# Patient Record
Sex: MALE | Race: WHITE | NOT HISPANIC OR LATINO | Employment: FULL TIME | URBAN - METROPOLITAN AREA
[De-identification: names, ages, dates, MRNs, and addresses within clinical notes are randomized per-mention and may not be internally consistent; named-entity substitution may affect disease eponyms.]

---

## 2018-04-26 ENCOUNTER — TELEPHONE (OUTPATIENT)
Dept: GASTROENTEROLOGY | Facility: CLINIC | Age: 58
End: 2018-04-26

## 2018-05-16 RX ORDER — SUCRALFATE ORAL 1 G/10ML
SUSPENSION ORAL
COMMUNITY
Start: 2015-04-23 | End: 2018-05-18

## 2018-05-18 ENCOUNTER — OFFICE VISIT (OUTPATIENT)
Dept: GASTROENTEROLOGY | Facility: CLINIC | Age: 58
End: 2018-05-18
Payer: COMMERCIAL

## 2018-05-18 VITALS
HEART RATE: 80 BPM | SYSTOLIC BLOOD PRESSURE: 118 MMHG | BODY MASS INDEX: 31.58 KG/M2 | HEIGHT: 64 IN | WEIGHT: 185 LBS | DIASTOLIC BLOOD PRESSURE: 70 MMHG

## 2018-05-18 DIAGNOSIS — K21.9 CHRONIC GERD: Primary | ICD-10-CM

## 2018-05-18 PROCEDURE — 99213 OFFICE O/P EST LOW 20 MIN: CPT | Performed by: INTERNAL MEDICINE

## 2018-05-18 RX ORDER — PANTOPRAZOLE SODIUM 40 MG/1
TABLET, DELAYED RELEASE ORAL
COMMUNITY
Start: 2018-05-03 | End: 2019-08-11 | Stop reason: SDUPTHER

## 2018-05-18 NOTE — LETTER
May 18, 2018     Mckenzie Barton DO  [address]  [address]    Patient: Antony Reynoso   YOB: 1960   Date of Visit: 5/18/2018       Dear Dr Marck Nguyen: Thank you for referring Maia Luna to me for evaluation  Below are my notes for this consultation  If you have questions, please do not hesitate to call me  I look forward to following your patient along with you  Sincerely,        Chandu Melendez MD        CC: No Recipients  LeelaIván Azevedo  5/18/2018 10:07 AM  Sign at close encounter  [facility]      Chief Complaint: follow up reflux     HPI:  Antony Reynoso is a 66 y o   male with history of GERD who presents here today for follow up  He has been experiencing increase in epigastric abdominal pain with increase in reflux  He is taking Pantoprazole once daily, prior to this was only taking Pantoprazole once or twice weekly  His reflux has since improved since returning to the daily dosage  No exertional symptomatology  He does eat fairly late at night which gives him some nocturnal symptomatology  He denies any early satiety, melena, hematochezia, dysphagia  His weight has been stable  He had abnormal XR study showing questionable polyp  Review of Systems:   Constitutional: No fever or chills, feels well, no tiredness, no recent weight gain or weight loss  HENT: No complaints of earache, no hearing loss, no nosebleeds, no nasal discharge, no sore throat, no hoarseness  Eyes: No complaints of eye pain, no red eyes, no discharge from eyes, no itchy eyes  Cardiovascular: No complaints of slow heart rate, no fast heart rate, no chest pain, no palpitations, no leg claudication, no lower extremity edema  Respiratory: No complaints of shortness of breath, no wheezing, no cough, no SOB on exertion, no orthopnea     Gastrointestinal: As noted in HPI  Genitourinary: No complaints of dysuria, no incontinence, no hesitancy, no nocturia  Musculoskeletal: No complaints of arthralgia, no myalgias, no joint swelling or stiffness, no limb pain or swelling  Neurological: No complaints of headache, no confusion, no convulsions, no numbness or tingling, no dizziness or fainting, no limb weakness, no difficulty walking  Skin: No complaints of skin rash or skin lesions, no itching, no skin wound, no dry skin  Hematological/Lymphatic: No complaints of swollen glands, does not bleed easy  Allergic/Immunologic: No immunocompromised state  Endocrine:  No complaints of polyuria, no polydipsia  Psychiatric/Behavioral: is not suicidal, no sleep disturbances, no anxiety or depression, no change in personality, no emotional problems  Historical Information   Past Medical History:   Diagnosis Date    Hyperlipidemia      No past surgical history on file  Social History   History   Alcohol Use No     History   Drug Use No     History   Smoking Status    Never Smoker   Smokeless Tobacco    Not on file     No family history on file  Current Medications: has a current medication list which includes the following prescription(s): pantoprazole and rosuvastatin  Vital Signs: /70   Pulse 80   Ht 5' 4" (1 626 m)   Wt 83 9 kg (185 lb)   BMI 31 76 kg/m²        Physical Exam:   Constitutional  General Appearance: No acute distress, well appearing and well nourished  Head  Normocephalic  Eyes  Conjunctivae and lids: No swelling, erythema, or discharge  Pupils and irises: Equal, round and reactive to light  Ears, Nose, Mouth, and Throat  External inspection of ears and nose: Normal  Nasal mucosa, septum and turbinates: Normal without edema or erythema/   Oropharynx: Normal with no erythema, edema, exudate or lesions  Neck  Normal range of motion  Neck supple  Cardiovascular  Auscultation of the heart: Normal rate and rhythm, normal S1 and S2 without murmurs    Examination of the extremities for edema and/or varicosities: Normal  Pulmonary/Chest  Respiratory effort: No increased work of breathing or signs of respiratory distress  Auscultation of lungs: Clear to auscultation, equal breath sounds bilaterally, no wheezes, rales, no rhonchi  Abdomen  Abdomen: Non-tender, no masses  No succussion splash  Liver and spleen: No hepatomegaly or splenomegaly  Musculoskeletal  Gait and station: normal   Digits and Nails: normal without clubbing or cyanosis  Inspection/palpation of joints, bones, and muscles: Normal  Neurological  No nystagmus or asterixis  Skin  Skin and subcutaneous tissue: Normal without rashes or lesions  Lymphatic  Palpation of the lymph nodes in neck: No lymphadenopathy  Psychiatric  Orientation to person, place and time: Normal   Mood and affect: Normal          Labs:  Lab Results   Component Value Date    ALT 48 06/25/2016    AST 33 06/25/2016    BUN 21 06/26/2016    CALCIUM 7 5 (L) 06/26/2016     (H) 06/26/2016    CHOL 110 06/26/2016    CO2 29 06/26/2016    CREATININE 1 09 06/26/2016    HDL 38 (L) 06/26/2016    HCT 43 1 06/25/2016    HGB 15 2 06/25/2016     06/25/2016    K 3 7 06/26/2016     06/26/2016    TRIG 42 06/26/2016    WBC 6 77 06/25/2016         X-Rays & Procedures:   No orders to display           ______________________________________________________________________      Assessment & Plan:     Chronic GERD  Plan is to schedule him for EGD  Continue Pantoprazole daily  Attestation:   By signing my name below, Ivania Abbott, attest that this documentation has been prepared under the direction and in the presence of Nick Damon MD  Electronically Signed: Iván Florentino  05/18/18       I, Nick Damon, personally performed the services described in this documentation  All medical record entries made by the aaronibopal were at my direction and in my presence   I have reviewed the chart and discharge instructions and agree that the record reflects my personal performance and is accurate and complete   Josh Major MD  05/18/18

## 2018-05-18 NOTE — PROGRESS NOTES
[facility]      Chief Complaint: follow up reflux     HPI:  Sukhdev Luna is a 66 y o   male with history of GERD who presents here today for follow up  He has been experiencing increase in epigastric abdominal pain with increase in reflux  He is taking Pantoprazole once daily, prior to this was only taking Pantoprazole once or twice weekly  His reflux has since improved since returning to the daily dosage  No exertional symptomatology  He does eat fairly late at night which gives him some nocturnal symptomatology  He denies any early satiety, melena, hematochezia, dysphagia  His weight has been stable  He had abnormal XR study showing questionable polyp  Review of Systems:   Constitutional: No fever or chills, feels well, no tiredness, no recent weight gain or weight loss  HENT: No complaints of earache, no hearing loss, no nosebleeds, no nasal discharge, no sore throat, no hoarseness  Eyes: No complaints of eye pain, no red eyes, no discharge from eyes, no itchy eyes  Cardiovascular: No complaints of slow heart rate, no fast heart rate, no chest pain, no palpitations, no leg claudication, no lower extremity edema  Respiratory: No complaints of shortness of breath, no wheezing, no cough, no SOB on exertion, no orthopnea  Gastrointestinal: As noted in HPI  Genitourinary: No complaints of dysuria, no incontinence, no hesitancy, no nocturia  Musculoskeletal: No complaints of arthralgia, no myalgias, no joint swelling or stiffness, no limb pain or swelling  Neurological: No complaints of headache, no confusion, no convulsions, no numbness or tingling, no dizziness or fainting, no limb weakness, no difficulty walking  Skin: No complaints of skin rash or skin lesions, no itching, no skin wound, no dry skin  Hematological/Lymphatic: No complaints of swollen glands, does not bleed easy  Allergic/Immunologic: No immunocompromised state    Endocrine:  No complaints of polyuria, no polydipsia  Psychiatric/Behavioral: is not suicidal, no sleep disturbances, no anxiety or depression, no change in personality, no emotional problems  Historical Information   Past Medical History:   Diagnosis Date    Hyperlipidemia      No past surgical history on file  Social History   History   Alcohol Use No     History   Drug Use No     History   Smoking Status    Never Smoker   Smokeless Tobacco    Not on file     No family history on file  Current Medications: has a current medication list which includes the following prescription(s): pantoprazole and rosuvastatin  Vital Signs: /70   Pulse 80   Ht 5' 4" (1 626 m)   Wt 83 9 kg (185 lb)   BMI 31 76 kg/m²       Physical Exam:   Constitutional  General Appearance: No acute distress, well appearing and well nourished  Head  Normocephalic  Eyes  Conjunctivae and lids: No swelling, erythema, or discharge  Pupils and irises: Equal, round and reactive to light  Ears, Nose, Mouth, and Throat  External inspection of ears and nose: Normal  Nasal mucosa, septum and turbinates: Normal without edema or erythema/   Oropharynx: Normal with no erythema, edema, exudate or lesions  Neck  Normal range of motion  Neck supple  Cardiovascular  Auscultation of the heart: Normal rate and rhythm, normal S1 and S2 without murmurs  Examination of the extremities for edema and/or varicosities: Normal  Pulmonary/Chest  Respiratory effort: No increased work of breathing or signs of respiratory distress  Auscultation of lungs: Clear to auscultation, equal breath sounds bilaterally, no wheezes, rales, no rhonchi  Abdomen  Abdomen: Non-tender, no masses  No succussion splash  Liver and spleen: No hepatomegaly or splenomegaly  Musculoskeletal  Gait and station: normal   Digits and Nails: normal without clubbing or cyanosis  Inspection/palpation of joints, bones, and muscles: Normal  Neurological  No nystagmus or asterixis  Skin  Skin and subcutaneous tissue: Normal without rashes or lesions  Lymphatic  Palpation of the lymph nodes in neck: No lymphadenopathy  Psychiatric  Orientation to person, place and time: Normal   Mood and affect: Normal          Labs:  Lab Results   Component Value Date    ALT 48 06/25/2016    AST 33 06/25/2016    BUN 21 06/26/2016    CALCIUM 7 5 (L) 06/26/2016     (H) 06/26/2016    CHOL 110 06/26/2016    CO2 29 06/26/2016    CREATININE 1 09 06/26/2016    HDL 38 (L) 06/26/2016    HCT 43 1 06/25/2016    HGB 15 2 06/25/2016     06/25/2016    K 3 7 06/26/2016     06/26/2016    TRIG 42 06/26/2016    WBC 6 77 06/25/2016         X-Rays & Procedures:   No orders to display           ______________________________________________________________________      Assessment & Plan:     Chronic GERD  Plan is to schedule him for EGD  Continue Pantoprazole daily  He will begin to take Pepcid q h s     We discussed his eating habits but there is little hope he will be able to change these given his occupation  The             Attestation:   By signing my name below, I, SURENDRA CHAWLA  [facility], attest that this documentation has been prepared under the direction and in the presence of Riccardo Beauchamp MD  Electronically Signed: SURENDRA S  [facility] Hospital[facility]  05/18/18       IRiccardo, personally performed the services described in this documentation  All medical record entries made by the scribe were at my direction and in my presence  I have reviewed the chart and discharge instructions and agree that the record reflects my personal performance and is accurate and complete   Riccardo Beauchamp MD  05/18/18

## 2018-05-24 ENCOUNTER — ANESTHESIA (OUTPATIENT)
Dept: PERIOP | Facility: HOSPITAL | Age: 58
End: 2018-05-24
Payer: COMMERCIAL

## 2018-05-24 ENCOUNTER — ANESTHESIA EVENT (OUTPATIENT)
Dept: PERIOP | Facility: HOSPITAL | Age: 58
End: 2018-05-24
Payer: COMMERCIAL

## 2018-05-24 ENCOUNTER — HOSPITAL ENCOUNTER (OUTPATIENT)
Facility: HOSPITAL | Age: 58
Setting detail: OUTPATIENT SURGERY
Discharge: HOME | End: 2018-05-24
Attending: INTERNAL MEDICINE | Admitting: INTERNAL MEDICINE
Payer: COMMERCIAL

## 2018-05-24 VITALS
RESPIRATION RATE: 22 BRPM | HEIGHT: 64 IN | SYSTOLIC BLOOD PRESSURE: 117 MMHG | OXYGEN SATURATION: 98 % | WEIGHT: 184.97 LBS | HEART RATE: 69 BPM | BODY MASS INDEX: 31.58 KG/M2 | TEMPERATURE: 97.7 F | DIASTOLIC BLOOD PRESSURE: 71 MMHG

## 2018-05-24 DIAGNOSIS — K21.9 CHRONIC GERD: ICD-10-CM

## 2018-05-24 PROCEDURE — 88342 IMHCHEM/IMCYTCHM 1ST ANTB: CPT | Performed by: PATHOLOGY

## 2018-05-24 PROCEDURE — 43239 EGD BIOPSY SINGLE/MULTIPLE: CPT | Performed by: INTERNAL MEDICINE

## 2018-05-24 PROCEDURE — 88305 TISSUE EXAM BY PATHOLOGIST: CPT | Performed by: PATHOLOGY

## 2018-05-24 RX ORDER — PROPOFOL 10 MG/ML
INJECTION, EMULSION INTRAVENOUS AS NEEDED
Status: DISCONTINUED | OUTPATIENT
Start: 2018-05-24 | End: 2018-05-24 | Stop reason: SURG

## 2018-05-24 RX ORDER — ASPIRIN 81 MG/1
81 TABLET ORAL DAILY
COMMUNITY

## 2018-05-24 RX ORDER — LIDOCAINE HYDROCHLORIDE 10 MG/ML
INJECTION, SOLUTION INFILTRATION; PERINEURAL AS NEEDED
Status: DISCONTINUED | OUTPATIENT
Start: 2018-05-24 | End: 2018-05-24 | Stop reason: SURG

## 2018-05-24 RX ORDER — SODIUM CHLORIDE, SODIUM LACTATE, POTASSIUM CHLORIDE, CALCIUM CHLORIDE 600; 310; 30; 20 MG/100ML; MG/100ML; MG/100ML; MG/100ML
INJECTION, SOLUTION INTRAVENOUS CONTINUOUS PRN
Status: DISCONTINUED | OUTPATIENT
Start: 2018-05-24 | End: 2018-05-24 | Stop reason: SURG

## 2018-05-24 RX ADMIN — PROPOFOL 150 MG: 10 INJECTION, EMULSION INTRAVENOUS at 10:13

## 2018-05-24 RX ADMIN — PROPOFOL 30 MG: 10 INJECTION, EMULSION INTRAVENOUS at 10:17

## 2018-05-24 RX ADMIN — LIDOCAINE HYDROCHLORIDE 50 MG: 10 INJECTION, SOLUTION INFILTRATION; PERINEURAL at 10:12

## 2018-05-24 RX ADMIN — SODIUM CHLORIDE, SODIUM LACTATE, POTASSIUM CHLORIDE, AND CALCIUM CHLORIDE: .6; .31; .03; .02 INJECTION, SOLUTION INTRAVENOUS at 10:08

## 2018-05-24 NOTE — ANESTHESIA PREPROCEDURE EVALUATION
Review of Systems/Medical History  Patient summary reviewed  Chart reviewed      Cardiovascular  Hyperlipidemia,    Pulmonary       GI/Hepatic    GERD well controlled,             Endo/Other     GYN       Hematology   Musculoskeletal       Neurology   Psychology           Physical Exam    Airway    Mallampati score: II  TM Distance: >3 FB  Neck ROM: full     Dental   No notable dental hx     Cardiovascular  Rhythm: regular, Rate: normal, Cardiovascular exam normal    Pulmonary  Pulmonary exam normal Breath sounds clear to auscultation,     Other Findings        Anesthesia Plan  ASA Score- 2     Anesthesia Type- IV sedation with anesthesia with ASA Monitors  Additional Monitors:   Airway Plan:         Plan Factors-    Induction- intravenous  Postoperative Plan- Plan for postoperative opioid use  Informed Consent- Anesthetic plan and risks discussed with patient and spouse  I personally reviewed this patient with the CRNA  Discussed and agreed on the Anesthesia Plan with the CRNA  Felicita Lynn

## 2018-05-24 NOTE — DISCHARGE INSTRUCTIONS
Upper Endoscopy   WHAT YOU NEED TO KNOW:   An upper endoscopy is also called an upper gastrointestinal (GI) endoscopy, or an esophagogastroduodenoscopy (EGD)  You may feel bloated, gassy, or have some abdominal discomfort after your procedure  Your throat may be sore for 24 to 36 hours  You may burp or pass gas from air that is still inside your body  DISCHARGE INSTRUCTIONS:   Call 911 for any of the following:   · You have sudden chest pain or trouble breathing  Seek care immediately if:   · You feel dizzy or faint  · You have trouble swallowing  · Your bowel movements are very dark or black  · Your abdomen is hard and firm and you have severe pain  · You vomit blood  Contact your healthcare provider if:   · You feel full or bloated and cannot burp or pass gas  · You have not had a bowel movement for 3 days after your procedure  · You have neck pain  · You have a fever or chills  · You have nausea or are vomiting  · You have a rash or hives  · You have questions or concerns about your endoscopy  Relieve a sore throat:  Suck on throat lozenges or crushed ice  Gargle with a small amount of warm salt water  Mix 1 teaspoon of salt and 1 cup of warm water to make salt water  Relieve gas and discomfort from bloating:  Lie on your right side with a heating pad on your abdomen  Take short walks to help pass gas  Eat small meals until bloating is relieved  Rest after your procedure: You have been given medicine to relax you  Do not  drive or make important decisions until the day after your procedure  Return to your normal activity as directed  You can usually return to work the day after your procedure  Follow up with your healthcare provider as directed:  Write down your questions so you remember to ask them during your visits     © 2017 [address] is for End User's use only and may not be sold, redistributed or otherwise used for commercial purposes  All illustrations and images included in CareNotes® are the copyrighted property of A D A M , Inc  or Romeo Edwards  The above information is an  only  It is not intended as medical advice for individual conditions or treatments  Talk to your doctor, nurse or pharmacist before following any medical regimen to see if it is safe and effective for you

## 2018-05-24 NOTE — OP NOTE
**** GI/ENDOSCOPY REPORT ****     PATIENT NAME: Marcus Brumfield - VISIT ID:  Patient ID: [id number]   YOB: 1960     INTRODUCTION: Esophagogastroduodenoscopy - A 66 male patient presents for   an outpatient Esophagogastroduodenoscopy at [facility]  INDICATIONS: Pain located in the epigastrium  GERD  CONSENT: The benefits, risks, and alternatives to the procedure were   discussed and informed consent was obtained from the patient  PREPARATION:  EKG, pulse, pulse oximetry and blood pressure were monitored   throughout the procedure  MEDICATIONS:asa 2     PROCEDURE:  The endoscope was passed without difficulty through the mouth   under direct visualization and advanced to the 3rd portion of the   duodenum  The scope was withdrawn and the mucosa was carefully examined  FINDINGS:  The EGD examination was completely normal   Esophagus: The   esophagus appeared to be normal  The Z line was visualized at 40 cm from   the entry site  Stomach: The antrum, body of the stomach, cardia, fundus,   incisura, and pylorus appeared to be normal  A biopsy was taken from the   antrum, body of the stomach, and fundus  Duodenum: The duodenal bulb, 2nd   portion of the duodenum, and 3rd portion of the duodenum appeared to be   normal      COMPLICATIONS: There were no complications  IMPRESSIONS: Normal EGD  Normal esophagus  Z line visualized  Normal   antrum, body of the stomach, cardia, fundus, incisura, and pylorus  Biopsy   taken  Normal duodenal bulb, 2nd portion of the duodenum, and 3rd portion   of the duodenum  RECOMMENDATIONS: Follow-up on the results of the biopsy specimens in 1   week  ESTIMATED BLOOD LOSS: Insignificant  PATHOLOGY SPECIMENS: Random biopsy taken from the antrum, body of the   stomach, and fundus       PROCEDURE CODES: 14289 - EGD flexible; with biopsy     ICD-9 Codes: 789 06 Abdominal pain, epigastric 530 81 Esophageal reflux     ICD-10 Codes: R10 13 Epigastric pain K21 Gastro-esophageal reflux disease     PERFORMED BY: RASHEEDA Mullins  on 05/24/2018  Version 1, electronically signed by RASHEEDA Feliciano , D O  on   05/24/2018 at 10:20

## 2018-05-29 ENCOUNTER — TELEPHONE (OUTPATIENT)
Dept: GASTROENTEROLOGY | Facility: CLINIC | Age: 58
End: 2018-05-29

## 2018-11-21 ENCOUNTER — TELEPHONE (OUTPATIENT)
Dept: FAMILY MEDICINE CLINIC | Facility: CLINIC | Age: 58
End: 2018-11-21

## 2018-11-21 NOTE — TELEPHONE ENCOUNTER
Patient's wife called to get the results of his sleep study - no results are in epic or old system  On Monday, can you please call [facility] to have a copy faxed over since Im not here on Monday   Thanks    [phone]

## 2018-11-26 ENCOUNTER — TELEPHONE (OUTPATIENT)
Dept: FAMILY MEDICINE CLINIC | Facility: CLINIC | Age: 58
End: 2018-11-26

## 2018-11-26 DIAGNOSIS — E78.2 MIXED HYPERLIPIDEMIA: Primary | ICD-10-CM

## 2018-11-26 NOTE — TELEPHONE ENCOUNTER
I ordered lab tests for the patient  If done at [facility]! [employer] patient may need to  the Rx rec was issued for the lab  Also I am not aware of the CPAP supplies    If that is what the [facility] equipment is check this and let me know if I need to sign a form or else we need to request a new order

## 2018-11-26 NOTE — TELEPHONE ENCOUNTER
Faxed lab RX to [facility] @6:28 and informed pt's wife  Will call [facility] tomorrow  pc from pt's wife said pt needs lab RX for recheck cholesterol to [facility]/ also said [address] forms for equipment  And she would like this done before end of year  P# [phone]

## 2018-11-30 NOTE — TELEPHONE ENCOUNTER
pc to [facility] was told that the Home Sleep Study and Titration was faxed to new fax # nwith request for order of CPAP & supplies Pressure 8cm H2O Fax # [phone]

## 2018-12-03 NOTE — TELEPHONE ENCOUNTER
Please check if the order came through from the company for the CPAP machine and supplies that I should sign off for this

## 2018-12-04 DIAGNOSIS — G47.33 OSA (OBSTRUCTIVE SLEEP APNEA): Primary | ICD-10-CM

## 2018-12-04 NOTE — TELEPHONE ENCOUNTER
Please order the CPAP machine and supplies with pressure at 8 cm per instructions from CPAP sleep study interpretation report  Then I will sign the report and we can print and fax it to the supplier for the patient  Then we can notify the patient

## 2018-12-05 ENCOUNTER — OFFICE VISIT (OUTPATIENT)
Dept: UROLOGY | Facility: CLINIC | Age: 58
End: 2018-12-05
Payer: COMMERCIAL

## 2018-12-05 VITALS
BODY MASS INDEX: 31.76 KG/M2 | HEIGHT: 64 IN | WEIGHT: 186 LBS | SYSTOLIC BLOOD PRESSURE: 124 MMHG | DIASTOLIC BLOOD PRESSURE: 76 MMHG | HEART RATE: 83 BPM

## 2018-12-05 DIAGNOSIS — N20.0 KIDNEY STONES: ICD-10-CM

## 2018-12-05 DIAGNOSIS — R10.30 INGUINAL PAIN, UNSPECIFIED LATERALITY: Primary | ICD-10-CM

## 2018-12-05 PROCEDURE — 99213 OFFICE O/P EST LOW 20 MIN: CPT | Performed by: PHYSICIAN ASSISTANT

## 2018-12-05 NOTE — PROGRESS NOTES
UROLOGY PROGRESS NOTE   Patient Identifiers: Stella Amaya (MRN [id number])  Date of Service: 12/5/2018    Subjective:     77-year-old male seen in the past for kidney stones  He presents with a complaint of low back pain and right groin pain  He was unable to provide a urine sample  He has no lower tract complaints  He fell over the summer cleaning is poor  No burning no gross hematuria  Patient has    See above      Objective:     VITALS:    Vitals:    12/05/18 0833   BP: 124/76   Pulse: 83     AUA SYMPTOM SCORE      Most Recent Value   AUA SYMPTOM SCORE   How often have you had a sensation of not emptying your bladder completely after you finished urinating? 1   How often have you had to urinate again less than two hours after you finished urinating? 0   How often have you found you stopped and started again several times when you urinate? 1   How often have you found it difficult to postpone urination? 0   How often have you had a weak urinary stream?  0   How often have you had to push or strain to begin urination?   0   How many times did you most typically get up to urinate from the time you went to bed at night until the time you got up in the morning?  0   Quality of Life: If you were to spend the rest of your life with your urinary condition just the way it is now, how would you feel about that?  1   AUA SYMPTOM SCORE  2            LABS:  Lab Results   Component Value Date    HGB 15 2 06/25/2016    HCT 43 1 06/25/2016    WBC 6 77 06/25/2016     06/25/2016   ]    Lab Results   Component Value Date    K 3 7 06/26/2016     (H) 06/26/2016    CO2 29 06/26/2016    BUN 21 06/26/2016    CREATININE 1 09 06/26/2016    CALCIUM 7 5 (L) 06/26/2016   ]        INPATIENT MEDS:    Current Outpatient Prescriptions:     aspirin (ECOTRIN LOW STRENGTH) 81 mg EC tablet, Take 81 mg by mouth daily, Disp: , Rfl:     pantoprazole (PROTONIX) 40 mg tablet, , Disp: , Rfl:     rosuvastatin (CRESTOR) 5 mg tablet, Take 5 mg by mouth daily  , Disp: , Rfl:       Physical Exam:   /76 (BP Location: Left arm, Patient Position: Sitting, Cuff Size: Adult)   Pulse 83   Ht 5' 4" (1 626 m)   Wt 84 4 kg (186 lb)   BMI 31 93 kg/m²   GEN: no acute distress    RESP: breathing comfortably with no accessory muscle use    ABD: soft, non-tender, non-distended   INCISION:    EXT: no significant peripheral edema   (Male): Penis circumcised, phallus normal, meatus patent  Testicles descended into scrotum bilaterally without masses nor tenderness  No inguinal hernias bilaterally  RADIOLOGY:    none     Assessment:    1  Right groin pain   2   History kidney stones     Plan:   - I believe his discomfort is mostly musculoskeletal  - check a renal ultrasound to rule out any stones  - he should call for the results  -

## 2018-12-12 ENCOUNTER — HOSPITAL ENCOUNTER (OUTPATIENT)
Dept: RADIOLOGY | Facility: MEDICAL CENTER | Age: 58
Discharge: HOME | End: 2018-12-12
Payer: COMMERCIAL

## 2018-12-12 DIAGNOSIS — N20.0 KIDNEY STONES: ICD-10-CM

## 2018-12-12 PROCEDURE — 76770 US EXAM ABDO BACK WALL COMP: CPT

## 2018-12-17 ENCOUNTER — TELEPHONE (OUTPATIENT)
Dept: UROLOGY | Facility: AMBULATORY SURGERY CENTER | Age: 58
End: 2018-12-17

## 2018-12-17 DIAGNOSIS — N28.1 KIDNEY CYST, ACQUIRED: Primary | ICD-10-CM

## 2018-12-17 NOTE — TELEPHONE ENCOUNTER
I called the patient's wife left a message as requested with ultrasound results  There is comment of a small cyst on the kidney and radiologist is  Recommending a CT scan for further evaluation  This can be done after the new year  I did put orders in

## 2018-12-17 NOTE — TELEPHONE ENCOUNTER
Spoke with patient's wife, she wants ultrasound results  Please call her back  She said you can leave a message on machine

## 2018-12-18 NOTE — TELEPHONE ENCOUNTER
I called patients wife back and she just wanted to know if she can have the CT scan before the New Year because they have already met their deductible  Claudetta Harry, P A -C said he can have this done before the New Year  I gave her the central scheduling phone number to set up the CT scan

## 2018-12-27 ENCOUNTER — OFFICE VISIT (OUTPATIENT)
Dept: FAMILY MEDICINE CLINIC | Facility: CLINIC | Age: 58
End: 2018-12-27
Payer: COMMERCIAL

## 2018-12-27 ENCOUNTER — HOSPITAL ENCOUNTER (OUTPATIENT)
Dept: RADIOLOGY | Facility: MEDICAL CENTER | Age: 58
Discharge: HOME | End: 2018-12-27
Payer: COMMERCIAL

## 2018-12-27 VITALS
DIASTOLIC BLOOD PRESSURE: 78 MMHG | TEMPERATURE: 99.6 F | SYSTOLIC BLOOD PRESSURE: 118 MMHG | HEART RATE: 87 BPM | WEIGHT: 186.2 LBS | BODY MASS INDEX: 31.79 KG/M2 | HEIGHT: 64 IN | OXYGEN SATURATION: 97 %

## 2018-12-27 DIAGNOSIS — R35.1 NOCTURIA MORE THAN TWICE PER NIGHT: ICD-10-CM

## 2018-12-27 DIAGNOSIS — G47.33 OBSTRUCTIVE SLEEP APNEA: ICD-10-CM

## 2018-12-27 DIAGNOSIS — E78.2 MIXED HYPERLIPIDEMIA: ICD-10-CM

## 2018-12-27 DIAGNOSIS — N28.1 KIDNEY CYST, ACQUIRED: ICD-10-CM

## 2018-12-27 DIAGNOSIS — F41.9 ANXIETY: ICD-10-CM

## 2018-12-27 DIAGNOSIS — K21.9 CHRONIC GERD: Primary | ICD-10-CM

## 2018-12-27 PROCEDURE — 74178 CT ABD&PLV WO CNTR FLWD CNTR: CPT

## 2018-12-27 PROCEDURE — 1036F TOBACCO NON-USER: CPT | Performed by: FAMILY MEDICINE

## 2018-12-27 PROCEDURE — 99214 OFFICE O/P EST MOD 30 MIN: CPT | Performed by: FAMILY MEDICINE

## 2018-12-27 RX ADMIN — IOHEXOL 100 ML: 350 INJECTION, SOLUTION INTRAVENOUS at 14:14

## 2018-12-27 NOTE — ASSESSMENT & PLAN NOTE
Patient presents for lab review general checkup last cholesterol done was good continue on rosuvastatin repeat lab work in 6 months  Total cholesterol 162 HDL is at 53 the ratio is good he has no liver function test elevations blood glucose is good as well

## 2018-12-27 NOTE — ASSESSMENT & PLAN NOTE
Patient recently started sleep apnea machine his CPAP is set at 3-4 mm Hg he needs to work on adjusting the apparatus to get best results at this point

## 2018-12-27 NOTE — ASSESSMENT & PLAN NOTE
Patient presents today for six-month follow-up examination GERD symptoms are under control with pantoprazole continue 40 mg daily

## 2018-12-27 NOTE — PATIENT INSTRUCTIONS

## 2018-12-27 NOTE — ASSESSMENT & PLAN NOTE
Generalized anxiety disorder which is mild patient is not on any medications for this at this time doing relatively well sleeping good working daily no difficulty interacting with others no panic attacks

## 2018-12-31 ENCOUNTER — TELEPHONE (OUTPATIENT)
Dept: UROLOGY | Facility: AMBULATORY SURGERY CENTER | Age: 58
End: 2018-12-31

## 2019-01-02 NOTE — TELEPHONE ENCOUNTER
Called and discussed results of Ct scan with the patient  Patient denies any issues or concerns  Advised patient to follow up with our office PRN

## 2019-03-06 ENCOUNTER — TELEPHONE (OUTPATIENT)
Dept: FAMILY MEDICINE CLINIC | Facility: CLINIC | Age: 59
End: 2019-03-06

## 2019-03-06 DIAGNOSIS — J01.00 ACUTE MAXILLARY SINUSITIS, RECURRENCE NOT SPECIFIED: Primary | ICD-10-CM

## 2019-03-06 RX ORDER — AMOXICILLIN 500 MG/1
1000 TABLET, FILM COATED ORAL 2 TIMES DAILY
Qty: 28 TABLET | Refills: 1 | Status: SHIPPED | OUTPATIENT
Start: 2019-03-06 | End: 2019-03-13

## 2019-03-06 NOTE — TELEPHONE ENCOUNTER
Patient has sinus infection  Has congestion and discahrge from nose for 5 days    Need antibotic can not come in today

## 2019-03-08 ENCOUNTER — HOSPITAL ENCOUNTER (EMERGENCY)
Facility: HOSPITAL | Age: 59
Discharge: HOME | End: 2019-03-08
Attending: EMERGENCY MEDICINE
Payer: COMMERCIAL

## 2019-03-08 VITALS
OXYGEN SATURATION: 98 % | WEIGHT: 200.62 LBS | SYSTOLIC BLOOD PRESSURE: 166 MMHG | BODY MASS INDEX: 34.44 KG/M2 | DIASTOLIC BLOOD PRESSURE: 92 MMHG | TEMPERATURE: 98 F | RESPIRATION RATE: 20 BRPM | HEART RATE: 80 BPM

## 2019-03-08 DIAGNOSIS — N20.1 RIGHT URETERAL STONE: Primary | ICD-10-CM

## 2019-03-08 LAB
BACTERIA UR QL AUTO: ABNORMAL /HPF
BILIRUB UR QL STRIP: NEGATIVE
CLARITY UR: CLEAR
COLOR UR: YELLOW
GLUCOSE UR STRIP-MCNC: NEGATIVE MG/DL
HGB UR QL STRIP.AUTO: ABNORMAL
KETONES UR STRIP-MCNC: NEGATIVE MG/DL
LEUKOCYTE ESTERASE UR QL STRIP: NEGATIVE
MUCOUS THREADS UR QL AUTO: ABNORMAL
NITRITE UR QL STRIP: NEGATIVE
NON-SQ EPI CELLS URNS QL MICRO: ABNORMAL /HPF
PH UR STRIP.AUTO: 5.5 [PH] (ref 4.5–8)
PROT UR STRIP-MCNC: NEGATIVE MG/DL
RBC #/AREA URNS AUTO: ABNORMAL /HPF
SP GR UR STRIP.AUTO: 1.02 (ref 1–1.03)
UROBILINOGEN UR QL STRIP.AUTO: 0.2 E.U./DL
WBC #/AREA URNS AUTO: ABNORMAL /HPF

## 2019-03-08 PROCEDURE — 81003 URINALYSIS AUTO W/O SCOPE: CPT

## 2019-03-08 PROCEDURE — 81001 URINALYSIS AUTO W/SCOPE: CPT

## 2019-03-08 PROCEDURE — 99284 EMERGENCY DEPT VISIT MOD MDM: CPT

## 2019-03-08 RX ORDER — HYDROCODONE BITARTRATE AND ACETAMINOPHEN 5; 325 MG/1; MG/1
1 TABLET ORAL EVERY 6 HOURS PRN
Qty: 10 TABLET | Refills: 0 | Status: SHIPPED | OUTPATIENT
Start: 2019-03-08 | End: 2019-03-18

## 2019-03-08 RX ORDER — HYDROCODONE BITARTRATE AND ACETAMINOPHEN 5; 325 MG/1; MG/1
1 TABLET ORAL ONCE
Status: COMPLETED | OUTPATIENT
Start: 2019-03-08 | End: 2019-03-08

## 2019-03-08 RX ADMIN — HYDROCODONE BITARTRATE AND ACETAMINOPHEN 1 TABLET: 5; 325 TABLET ORAL at 21:43

## 2019-03-09 NOTE — ED PROVIDER NOTES
History  Chief Complaint   Patient presents with    Flank Pain     Pt  with right flank pain starting approx  30 min PTA, also complains of chills  Hx  of kidney stones  History provided by:  Patient   used: No     53-year-old male presented with acute onset right-sided flank pain radiating towards the groin starting shortly prior to arrival   Initially had some difficulty urinating but was eventually able to urinate spontaneously  No retention  The no fever, chills  History of ureteral stones and this feels similar  Has never had any ureteral procedures  Arrival to the emergency department he actually reports his pain has become minimal   Per records,  CT from 3 months ago showed bilateral renal calculi  Check urine to ensure no infection and re-evaluate  Prior to Admission Medications   Prescriptions Last Dose Informant Patient Reported? Taking?   amoxicillin (AMOXIL) 500 MG tablet   No Yes   Sig: Take 2 tablets (1,000 mg total) by mouth 2 (two) times a day for 7 days   aspirin (ECOTRIN LOW STRENGTH) 81 mg EC tablet  Self Yes Yes   Sig: Take 81 mg by mouth daily   pantoprazole (PROTONIX) 40 mg tablet  Self Yes Yes   rosuvastatin (CRESTOR) 5 mg tablet  Self Yes Yes   Sig: Take 5 mg by mouth daily  Facility-Administered Medications: None       Past Medical History:   Diagnosis Date    Chronic low back pain     Hyperlipidemia     Sleep apnea        Past Surgical History:   Procedure Laterality Date    COLONOSCOPY      ESOPHAGOGASTRODUODENOSCOPY      PA ESOPHAGOGASTRODUODENOSCOPY TRANSORAL DIAGNOSTIC N/A 5/24/2018    Procedure: ESOPHAGOGASTRODUODENOSCOPY (EGD); Surgeon: Alfonso Pat MD;  Location: [facility]; Service: Gastroenterology       Family History   Problem Relation Age of Onset    Diabetes Mother     Lung cancer Father      I have reviewed and agree with the history as documented      Social History     Tobacco Use    Smoking status: Never Smoker    Smokeless tobacco: Never Used   Substance Use Topics    Alcohol use: No    Drug use: No        Review of Systems   Constitutional: Negative for activity change and appetite change  Genitourinary: Negative for decreased urine volume and difficulty urinating  Neurological: Negative for dizziness and weakness  All other systems reviewed and are negative  Physical Exam  Physical Exam   Constitutional: He is oriented to person, place, and time  He appears well-developed and well-nourished  No distress  HENT:   Head: Normocephalic  Mouth/Throat: Oropharynx is clear and moist    Cardiovascular: Normal rate and regular rhythm  Pulmonary/Chest: Effort normal  No respiratory distress  Abdominal: Soft  He exhibits no distension  There is no tenderness  No CVA tenderness  Neurological: He is alert and oriented to person, place, and time  Skin: Skin is warm  There is erythema  There is pallor  Psychiatric: He has a normal mood and affect  His behavior is normal    Nursing note and vitals reviewed        Vital Signs  ED Triage Vitals [03/08/19 2109]   Temperature Pulse Respirations Blood Pressure SpO2   98 °F (36 7 °C) 80 20 166/92 98 %      Temp Source Heart Rate Source Patient Position - Orthostatic VS BP Location FiO2 (%)   Oral Monitor Sitting Right arm --      Pain Score       8           Vitals:    03/08/19 2109   BP: 166/92   Pulse: 80   Patient Position - Orthostatic VS: Sitting       Visual Acuity      ED Medications  Medications   HYDROcodone-acetaminophen (NORCO) 5-325 mg per tablet 1 tablet (1 tablet Oral Given 3/8/19 2143)       Diagnostic Studies  Results Reviewed     Procedure Component Value Units Date/Time    Urine Microscopic [[id number]]  (Abnormal) Collected:  03/08/19 2140    Lab Status:  Final result Specimen:  Urine, Clean Catch Updated:  03/08/19 2223     RBC, UA 0-1 /hpf      WBC, UA 0-1 /hpf      Epithelial Cells None Seen /hpf      Bacteria, UA Occasional /hpf      MUCUS THREADS Moderate    ED Urine Macroscopic [[id number]]  (Abnormal) Collected:  03/08/19 2140    Lab Status:  Final result Specimen:  Urine Updated:  03/08/19 2136     Color, UA Yellow     Clarity, UA Clear     pH, UA 5 5     Leukocytes, UA Negative     Nitrite, UA Negative     Protein, UA Negative mg/dl      Glucose, UA Negative mg/dl      Ketones, UA Negative mg/dl      Urobilinogen, UA 0 2 E U /dl      Bilirubin, UA Negative     Blood, UA Trace     Specific [place], UA 1 025    Narrative:       CLINITEK RESULT                 No orders to display              Procedures  Procedures       Phone Contacts  ED Phone Contact    ED Course  ED Course as of Mar 09 0125   Giovanny Trujillo Mar 08, 2019   2237 Patient has not had recurrence of pain  Stable for discharge  MDM  Number of Diagnoses or Management Options  Right ureteral stone: new and requires workup  Diagnosis management comments: 68-year-old male presented with right-sided flank pain radiating to the groin is evening  His pain subsided after about an hour and is asymptomatic on my exam   Abdominal tenderness  Minimal CVA tenderness  Urine remarkable for trace blood  Will treat empirically for ureteral stone  Amount and/or Complexity of Data Reviewed  Clinical lab tests: ordered and reviewed    Patient Progress  Patient progress: improved      Disposition  Final diagnoses:   Right ureteral stone - Suspected     Time reflects when diagnosis was documented in both MDM as applicable and the Disposition within this note     Time User Action Codes Description Comment    3/8/2019 10:37 PM Mario NAVARRETE Add [N20 1] Right ureteral stone     3/8/2019 10:37 PM Mario NAVARRETE Modify [N20 1] Right ureteral stone Suspected      ED Disposition     ED Disposition Condition Date/Time Comment    Discharge Stable Fri Mar 8, 2019 10:37 PM [address] [place] [address] discharge to home/self care              Follow-up Information     Follow up With Specialties Details Why Contact Info Additional [address] Emergency Department Emergency Medicine  If symptoms worsen [address]  [phone] [facility], [address], [place], [place], [address]    Your urologist               Discharge Medication List as of 3/8/2019 10:40 PM      START taking these medications    Details   HYDROcodone-acetaminophen (NORCO) 5-325 mg per tablet Take 1 tablet by mouth every 6 (six) hours as needed for pain for up to 10 daysMax Daily Amount: 4 tablets, Starting Fri 3/8/2019, Until Mon 3/18/2019, Print         CONTINUE these medications which have NOT CHANGED    Details   amoxicillin (AMOXIL) 500 MG tablet Take 2 tablets (1,000 mg total) by mouth 2 (two) times a day for 7 days, Starting Wed 3/6/2019, Until Wed 3/13/2019, Normal      aspirin (ECOTRIN LOW STRENGTH) 81 mg EC tablet Take 81 mg by mouth daily, Historical Med      pantoprazole (PROTONIX) 40 mg tablet Starting Thu 5/3/2018, Historical Med      rosuvastatin (CRESTOR) 5 mg tablet Take 5 mg by mouth daily  , Historical Med           No discharge procedures on file      ED Provider  Electronically Signed by           Che Cline MD  03/09/19 7781

## 2019-03-22 ENCOUNTER — TELEPHONE (OUTPATIENT)
Dept: FAMILY MEDICINE CLINIC | Facility: CLINIC | Age: 59
End: 2019-03-22

## 2019-03-22 DIAGNOSIS — J01.80 ACUTE NON-RECURRENT SINUSITIS OF OTHER SINUS: Primary | ICD-10-CM

## 2019-03-22 RX ORDER — AZITHROMYCIN 500 MG/1
500 TABLET, FILM COATED ORAL DAILY
Qty: 5 TABLET | Refills: 1 | Status: SHIPPED | OUTPATIENT
Start: 2019-03-22 | End: 2019-03-27

## 2019-03-22 NOTE — TELEPHONE ENCOUNTER
Pt's wife was in office today stating that  was in office for a cold he was given an antibiotic and still does not feel better instructed by you to put in message for Z-pac please ordered and send to pharmacy

## 2019-03-26 ENCOUNTER — OFFICE VISIT (OUTPATIENT)
Dept: FAMILY MEDICINE CLINIC | Facility: CLINIC | Age: 59
End: 2019-03-26
Payer: COMMERCIAL

## 2019-03-26 VITALS
HEART RATE: 87 BPM | DIASTOLIC BLOOD PRESSURE: 80 MMHG | HEIGHT: 64 IN | SYSTOLIC BLOOD PRESSURE: 130 MMHG | WEIGHT: 181 LBS | OXYGEN SATURATION: 97 % | TEMPERATURE: 98.3 F | BODY MASS INDEX: 30.9 KG/M2

## 2019-03-26 DIAGNOSIS — K21.9 CHRONIC GERD: ICD-10-CM

## 2019-03-26 DIAGNOSIS — J31.0 NON-ALLERGIC RHINITIS: ICD-10-CM

## 2019-03-26 DIAGNOSIS — Z11.59 NEED FOR HEPATITIS C SCREENING TEST: Primary | ICD-10-CM

## 2019-03-26 DIAGNOSIS — G47.33 OBSTRUCTIVE SLEEP APNEA: ICD-10-CM

## 2019-03-26 DIAGNOSIS — R10.31 RIGHT GROIN PAIN: ICD-10-CM

## 2019-03-26 PROCEDURE — 99213 OFFICE O/P EST LOW 20 MIN: CPT | Performed by: FAMILY MEDICINE

## 2019-03-26 NOTE — ASSESSMENT & PLAN NOTE
Intermittant right groin pain, will see the Urologist for this in the next 2 weeks  Hx of stones  No hernia on exam today  Trace hematuria on last UA, pt states its clear now

## 2019-03-26 NOTE — ASSESSMENT & PLAN NOTE
Not allergic her rhinosinusitis with turbinate hypertrophy likely secondary to his CPAP machine which patient feels is creating the problem he has no fevers or chills associated with this  He will adjust the machine periodically have the pressures checked on this  And inquire about I recommend humidifier in the room    I recommend nasal saline solution morning and night prior to and after awakening from the CPAP machine to flush out the sinuses possibly also a nasal flush such as a Neti pot

## 2019-03-26 NOTE — PROGRESS NOTES
Assessment/Plan:       Problem List Items Addressed This Visit        Digestive    Chronic GERD      Chronic GERD symptoms stable with pantoprazole continue current medication no change            Respiratory    Obstructive sleep apnea      Obstructive sleep apnea stable patient has less fatigue however the machine and the mask is creating   Sinus congestion and pressure  I recommend that he adjust the mask inquire about changing this through the supply company and check the equipment  Have the equipment cleaned and be sure he can have humidified air  Add a humidifier to the bedroom         Non-allergic rhinitis      Not allergic her rhinosinusitis with turbinate hypertrophy likely secondary to his CPAP machine which patient feels is creating the problem he has no fevers or chills associated with this  He will adjust the machine periodically have the pressures checked on this  And inquire about I recommend humidifier in the room  I recommend nasal saline solution morning and night prior to and after awakening from the CPAP machine to flush out the sinuses possibly also a nasal flush such as a Neti pot            Other    Right groin pain     Intermittant right groin pain, will see the Urologist for this in the next 2 weeks  Hx of stones  No hernia on exam today  Trace hematuria on last UA, pt states its clear now  Other Visit Diagnoses     Need for hepatitis C screening test    -  Primary            Subjective:      Patient ID: Chago Woo is a 64 y o  male  Patient presents today overall doing well has kidney stones and will be following up with Urology in the next 2 weeks  He has had nasal congestion which has been chronic and ongoing despite thinking this was sinusitis and taking antibiotics it is unchanged and likely due to his CPAP machine as the patient presumes this  Disruption in urination at times hypogastric and groin pains come and go        The following portions of the patient's history were reviewed and updated as appropriate: allergies, current medications, past family history, past medical history, past social history, past surgical history and problem list     Review of Systems   Constitutional: Negative for chills, fatigue and fever  HENT: Positive for postnasal drip and rhinorrhea  Negative for congestion, nosebleeds, sinus pressure and sore throat  Eyes: Negative for discharge and redness  Respiratory: Negative for cough and shortness of breath  Cardiovascular: Negative for chest pain, palpitations and leg swelling  Gastrointestinal: Negative for abdominal pain, blood in stool and nausea  Endocrine: Negative for cold intolerance, heat intolerance and polyuria  Genitourinary: Negative for dysuria and frequency  Musculoskeletal: Negative for arthralgias, back pain and myalgias  Skin: Negative for rash  Neurological: Negative for dizziness, weakness and headaches  Hematological: Negative for adenopathy  Psychiatric/Behavioral: Negative for behavioral problems and sleep disturbance  The patient is not nervous/anxious  Objective:      /80 (BP Location: Left arm, Patient Position: Sitting)   Pulse 87   Temp 98 3 °F (36 8 °C) (Tympanic)   Ht 5' 4" (1 626 m)   Wt 82 1 kg (181 lb)   SpO2 97%   BMI 31 07 kg/m²        Physical Exam   Constitutional: He is oriented to person, place, and time  He appears well-developed and well-nourished  HENT:   Head: Normocephalic and atraumatic  Right Ear: External ear normal    Left Ear: External ear normal    Nose: Nose normal    Mouth/Throat: Oropharynx is clear and moist      Clear thick rhinorrhea mild turbinate hypertrophy pale boggy turbinates  Throat is clear no exudates   Eyes: Pupils are equal, round, and reactive to light  Conjunctivae and EOM are normal  No scleral icterus  Neck: Normal range of motion  Neck supple  No JVD present  No thyromegaly present     Cardiovascular: Normal rate, regular rhythm and normal heart sounds  No murmur heard  Pulmonary/Chest: Effort normal and breath sounds normal  He has no wheezes  He has no rales  He exhibits no tenderness  Abdominal: Soft  Bowel sounds are normal  He exhibits no distension and no mass  There is no tenderness  There is no rebound and no guarding  Musculoskeletal: Normal range of motion  He exhibits no edema, tenderness or deformity  Lymphadenopathy:     He has no cervical adenopathy  Neurological: He is alert and oriented to person, place, and time  He has normal reflexes  He displays normal reflexes  No cranial nerve deficit  Skin: Skin is warm and dry  No rash noted  No erythema  Psychiatric: He has a normal mood and affect  His behavior is normal  Judgment and thought content normal    Nursing note and vitals reviewed  Data:    Laboratory Results: I have personally reviewed the pertinent laboratory results/reports   Radiology/Other Diagnostic Testing Results: I have personally reviewed pertinent reports         Lab Results   Component Value Date    WBC 6 77 06/25/2016    HGB 15 2 06/25/2016    HCT 43 1 06/25/2016    MCV 90 06/25/2016     06/25/2016     Lab Results   Component Value Date    K 3 7 06/26/2016     (H) 06/26/2016    CO2 29 06/26/2016    BUN 21 06/26/2016    CREATININE 1 09 06/26/2016    CALCIUM 7 5 (L) 06/26/2016    AST 33 06/25/2016    ALT 48 06/25/2016    ALKPHOS 66 06/25/2016    EGFR >60 0 06/26/2016     Lab Results   Component Value Date    CHOLESTEROL 110 06/26/2016     Lab Results   Component Value Date    HDL 38 (L) 06/26/2016     Lab Results   Component Value Date    LDLCALC 64 06/26/2016     Lab Results   Component Value Date    TRIG 42 06/26/2016     No results found for: [place], [place]  Lab Results   Component Value Date    [id number] 4 829 (H) 06/25/2016     No results found for: HGBA1C  No results found for: PSA    Selena Countess, DO

## 2019-03-26 NOTE — ASSESSMENT & PLAN NOTE
Obstructive sleep apnea stable patient has less fatigue however the machine and the mask is creating   Sinus congestion and pressure  I recommend that he adjust the mask inquire about changing this through the supply company and check the equipment  Have the equipment cleaned and be sure he can have humidified air    Add a humidifier to the bedroom

## 2019-04-05 ENCOUNTER — OFFICE VISIT (OUTPATIENT)
Dept: UROLOGY | Facility: CLINIC | Age: 59
End: 2019-04-05
Payer: COMMERCIAL

## 2019-04-05 ENCOUNTER — TRANSCRIBE ORDERS (OUTPATIENT)
Dept: ADMINISTRATIVE | Age: 59
End: 2019-04-05

## 2019-04-05 ENCOUNTER — APPOINTMENT (OUTPATIENT)
Dept: RADIOLOGY | Age: 59
End: 2019-04-05
Payer: COMMERCIAL

## 2019-04-05 VITALS
HEIGHT: 64 IN | BODY MASS INDEX: 31.24 KG/M2 | HEART RATE: 86 BPM | DIASTOLIC BLOOD PRESSURE: 76 MMHG | SYSTOLIC BLOOD PRESSURE: 120 MMHG | WEIGHT: 183 LBS

## 2019-04-05 DIAGNOSIS — N20.0 KIDNEY STONES: ICD-10-CM

## 2019-04-05 DIAGNOSIS — N20.0 KIDNEY STONES: Primary | ICD-10-CM

## 2019-04-05 PROCEDURE — 74018 RADEX ABDOMEN 1 VIEW: CPT

## 2019-04-05 PROCEDURE — 99213 OFFICE O/P EST LOW 20 MIN: CPT | Performed by: PHYSICIAN ASSISTANT

## 2019-04-05 RX ORDER — OXYCODONE HYDROCHLORIDE AND ACETAMINOPHEN 5; 325 MG/1; MG/1
1 TABLET ORAL EVERY 4 HOURS PRN
Qty: 12 TABLET | Refills: 0 | Status: SHIPPED | OUTPATIENT
Start: 2019-04-05 | End: 2020-01-06 | Stop reason: ALTCHOICE

## 2019-06-06 DIAGNOSIS — E78.2 MIXED HYPERLIPIDEMIA: Primary | ICD-10-CM

## 2019-06-06 RX ORDER — ROSUVASTATIN CALCIUM 5 MG/1
5 TABLET, COATED ORAL DAILY
Qty: 90 TABLET | Refills: 3 | Status: SHIPPED | OUTPATIENT
Start: 2019-06-06 | End: 2020-06-01

## 2019-06-22 ENCOUNTER — APPOINTMENT (OUTPATIENT)
Dept: LAB | Facility: CLINIC | Age: 59
End: 2019-06-22
Payer: COMMERCIAL

## 2019-06-22 DIAGNOSIS — Z11.59 NEED FOR HEPATITIS C SCREENING TEST: ICD-10-CM

## 2019-06-22 DIAGNOSIS — E78.2 MIXED HYPERLIPIDEMIA: ICD-10-CM

## 2019-06-22 DIAGNOSIS — K21.9 CHRONIC GERD: ICD-10-CM

## 2019-06-22 DIAGNOSIS — R35.1 NOCTURIA MORE THAN TWICE PER NIGHT: ICD-10-CM

## 2019-06-22 LAB
ALBUMIN SERPL BCP-MCNC: 4.1 G/DL (ref 3.5–5)
ALP SERPL-CCNC: 71 U/L (ref 46–116)
ALT SERPL W P-5'-P-CCNC: 34 U/L (ref 12–78)
ANION GAP SERPL CALCULATED.3IONS-SCNC: 6 MMOL/L (ref 4–13)
AST SERPL W P-5'-P-CCNC: 18 U/L (ref 5–45)
BASOPHILS # BLD AUTO: 0.04 THOUSANDS/ΜL (ref 0–0.1)
BASOPHILS NFR BLD AUTO: 1 % (ref 0–1)
BILIRUB SERPL-MCNC: 0.52 MG/DL (ref 0.2–1)
BUN SERPL-MCNC: 23 MG/DL (ref 5–25)
CALCIUM SERPL-MCNC: 9.1 MG/DL (ref 8.3–10.1)
CHLORIDE SERPL-SCNC: 111 MMOL/L (ref 100–108)
CHOLEST SERPL-MCNC: 160 MG/DL (ref 50–200)
CO2 SERPL-SCNC: 28 MMOL/L (ref 21–32)
CREAT SERPL-MCNC: 1.06 MG/DL (ref 0.6–1.3)
EOSINOPHIL # BLD AUTO: 0.07 THOUSAND/ΜL (ref 0–0.61)
EOSINOPHIL NFR BLD AUTO: 2 % (ref 0–6)
ERYTHROCYTE [DISTWIDTH] IN BLOOD BY AUTOMATED COUNT: 12.4 % (ref 11.6–15.1)
GFR SERPL CREATININE-BSD FRML MDRD: 78 ML/MIN/1.73SQ M
GLUCOSE P FAST SERPL-MCNC: 100 MG/DL (ref 65–99)
HCT VFR BLD AUTO: 44 % (ref 36.5–49.3)
HDLC SERPL-MCNC: 52 MG/DL (ref 40–60)
HGB BLD-MCNC: 14.5 G/DL (ref 12–17)
IMM GRANULOCYTES # BLD AUTO: 0.01 THOUSAND/UL (ref 0–0.2)
IMM GRANULOCYTES NFR BLD AUTO: 0 % (ref 0–2)
LDLC SERPL CALC-MCNC: 98 MG/DL (ref 0–100)
LYMPHOCYTES # BLD AUTO: 1.29 THOUSANDS/ΜL (ref 0.6–4.47)
LYMPHOCYTES NFR BLD AUTO: 30 % (ref 14–44)
MCH RBC QN AUTO: 31.4 PG (ref 26.8–34.3)
MCHC RBC AUTO-ENTMCNC: 33 G/DL (ref 31.4–37.4)
MCV RBC AUTO: 95 FL (ref 82–98)
MONOCYTES # BLD AUTO: 0.36 THOUSAND/ΜL (ref 0.17–1.22)
MONOCYTES NFR BLD AUTO: 8 % (ref 4–12)
NEUTROPHILS # BLD AUTO: 2.53 THOUSANDS/ΜL (ref 1.85–7.62)
NEUTS SEG NFR BLD AUTO: 59 % (ref 43–75)
NONHDLC SERPL-MCNC: 108 MG/DL
NRBC BLD AUTO-RTO: 0 /100 WBCS
PLATELET # BLD AUTO: 144 THOUSANDS/UL (ref 149–390)
PMV BLD AUTO: 12 FL (ref 8.9–12.7)
POTASSIUM SERPL-SCNC: 4.1 MMOL/L (ref 3.5–5.3)
PROT SERPL-MCNC: 7 G/DL (ref 6.4–8.2)
PSA SERPL-MCNC: 0.6 NG/ML (ref 0–4)
RBC # BLD AUTO: 4.62 MILLION/UL (ref 3.88–5.62)
SODIUM SERPL-SCNC: 145 MMOL/L (ref 136–145)
TRIGL SERPL-MCNC: 49 MG/DL
WBC # BLD AUTO: 4.3 THOUSAND/UL (ref 4.31–10.16)

## 2019-06-22 PROCEDURE — 85025 COMPLETE CBC W/AUTO DIFF WBC: CPT

## 2019-06-22 PROCEDURE — 86803 HEPATITIS C AB TEST: CPT

## 2019-06-22 PROCEDURE — G0103 PSA SCREENING: HCPCS

## 2019-06-22 PROCEDURE — 36415 COLL VENOUS BLD VENIPUNCTURE: CPT

## 2019-06-22 PROCEDURE — 80061 LIPID PANEL: CPT

## 2019-06-22 PROCEDURE — 80053 COMPREHEN METABOLIC PANEL: CPT

## 2019-06-23 LAB — HCV AB SER QL: NORMAL

## 2019-07-10 ENCOUNTER — OFFICE VISIT (OUTPATIENT)
Dept: FAMILY MEDICINE CLINIC | Facility: CLINIC | Age: 59
End: 2019-07-10
Payer: COMMERCIAL

## 2019-07-10 VITALS
BODY MASS INDEX: 31.41 KG/M2 | DIASTOLIC BLOOD PRESSURE: 70 MMHG | WEIGHT: 184 LBS | HEART RATE: 70 BPM | TEMPERATURE: 99.6 F | OXYGEN SATURATION: 97 % | SYSTOLIC BLOOD PRESSURE: 116 MMHG | HEIGHT: 64 IN

## 2019-07-10 DIAGNOSIS — G47.33 OBSTRUCTIVE SLEEP APNEA: ICD-10-CM

## 2019-07-10 DIAGNOSIS — E78.2 MIXED HYPERLIPIDEMIA: ICD-10-CM

## 2019-07-10 DIAGNOSIS — K21.9 CHRONIC GERD: Primary | ICD-10-CM

## 2019-07-10 DIAGNOSIS — R35.1 NOCTURIA MORE THAN TWICE PER NIGHT: ICD-10-CM

## 2019-07-10 PROCEDURE — 3008F BODY MASS INDEX DOCD: CPT | Performed by: FAMILY MEDICINE

## 2019-07-10 PROCEDURE — 99214 OFFICE O/P EST MOD 30 MIN: CPT | Performed by: FAMILY MEDICINE

## 2019-07-10 PROCEDURE — 1036F TOBACCO NON-USER: CPT | Performed by: FAMILY MEDICINE

## 2019-07-10 NOTE — PROGRESS NOTES
Assessment/Plan:       Problem List Items Addressed This Visit        Digestive    Chronic GERD - Primary     Chronic GERD symptoms without esophagitis continue on Protonix 40 mg daily and follow up at next office visit in 6 months  Patient has not had any dyspepsia blood in his stools or worsening abdominal pain if symptoms change or worsen he will be referred to Gastroenterology            Respiratory    Obstructive sleep apnea     Obstructive sleep apnea continue with current treatment plan and follow-up in 6 months keep the equipment clean change equipment every 6 months            Other    Hyperlipidemia     Hyperlipidemia with total cholesterol level at 160 now on Crestor 5 mg doing relatively well continue this watch diet avoid saturated fats and follow-up lipid profile in 6 months         Nocturia more than twice per night     BPH with nocturia PSA level is good at 0 6 continue with current medications no change in urinary flow at this time if symptoms worsen I could initiate Flomax for him but at this point he is doing well no change at this time follow-up PSA in 1 year                 Subjective:      Patient ID: Lesvia Rice is a 64 y o  male  Patient presents today for general checkup regarding chronic low back pain GERD sleep apnea and at this point he has cracks between the toes which are itching and bothering him he would like evaluated further      The following portions of the patient's history were reviewed and updated as appropriate: allergies, current medications, past family history, past medical history, past social history, past surgical history and problem list     Review of Systems   Constitutional: Negative for chills, fatigue and fever  HENT: Negative for congestion, nosebleeds, rhinorrhea, sinus pressure and sore throat  Eyes: Negative for discharge and redness  Respiratory: Negative for cough and shortness of breath      Cardiovascular: Negative for chest pain, palpitations and leg swelling  Gastrointestinal: Negative for abdominal pain, blood in stool and nausea  Endocrine: Negative for cold intolerance, heat intolerance and polyuria  Genitourinary: Negative for dysuria and frequency  Musculoskeletal: Negative for arthralgias, back pain and myalgias  Skin: Negative for rash  Neurological: Negative for dizziness, weakness and headaches  Hematological: Negative for adenopathy  Psychiatric/Behavioral: Negative for behavioral problems and sleep disturbance  The patient is not nervous/anxious  Objective:      /70 (BP Location: Left arm, Patient Position: Sitting)   Pulse 70   Temp 99 6 °F (37 6 °C) (Tympanic)   Ht 5' 4" (1 626 m)   Wt 83 5 kg (184 lb)   SpO2 97%   BMI 31 58 kg/m²        Physical Exam   Constitutional: He is oriented to person, place, and time  He appears well-developed and well-nourished  HENT:   Head: Normocephalic and atraumatic  Right Ear: External ear normal    Left Ear: External ear normal    Nose: Nose normal    Mouth/Throat: Oropharynx is clear and moist    Eyes: Pupils are equal, round, and reactive to light  Conjunctivae and EOM are normal  No scleral icterus  Neck: Normal range of motion  Neck supple  No JVD present  No thyromegaly present  Cardiovascular: Normal rate, regular rhythm and normal heart sounds  No murmur heard  Pulmonary/Chest: Effort normal and breath sounds normal  He has no wheezes  He has no rales  He exhibits no tenderness  Abdominal: Soft  Bowel sounds are normal  He exhibits no distension and no mass  There is no tenderness  There is no rebound and no guarding  Musculoskeletal: Normal range of motion  He exhibits no edema, tenderness or deformity  Lymphadenopathy:     He has no cervical adenopathy  Neurological: He is alert and oriented to person, place, and time  He has normal reflexes  He displays normal reflexes  No cranial nerve deficit  Skin: Skin is warm and dry  No rash noted  No erythema  Psychiatric: He has a normal mood and affect  His behavior is normal  Judgment and thought content normal    Nursing note and vitals reviewed  Data:    Laboratory Results: I have personally reviewed the pertinent laboratory results/reports   Radiology/Other Diagnostic Testing Results: I have personally reviewed pertinent reports         Lab Results   Component Value Date    WBC 4 30 (L) 06/22/2019    HGB 14 5 06/22/2019    HCT 44 0 06/22/2019    MCV 95 06/22/2019     (L) 06/22/2019     Lab Results   Component Value Date    K 4 1 06/22/2019     (H) 06/22/2019    CO2 28 06/22/2019    BUN 23 06/22/2019    CREATININE 1 06 06/22/2019    GLUF 100 (H) 06/22/2019    CALCIUM 9 1 06/22/2019    AST 18 06/22/2019    ALT 34 06/22/2019    ALKPHOS 71 06/22/2019    EGFR 78 06/22/2019     Lab Results   Component Value Date    CHOLESTEROL 160 06/22/2019    CHOLESTEROL 110 06/26/2016     Lab Results   Component Value Date    HDL 52 06/22/2019    HDL 38 (L) 06/26/2016     Lab Results   Component Value Date    LDLCALC 98 06/22/2019    LDLCALC 64 06/26/2016     Lab Results   Component Value Date    TRIG 49 06/22/2019    TRIG 42 06/26/2016     No results found for: [place], [place]  Lab Results   Component Value Date    [id number] 4 829 (H) 06/25/2016     No results found for: HGBA1C  Lab Results   Component Value Date    PSA 0 6 06/22/2019       Marek Rojo DO

## 2019-07-10 NOTE — ASSESSMENT & PLAN NOTE
Hyperlipidemia with total cholesterol level at 160 now on Crestor 5 mg doing relatively well continue this watch diet avoid saturated fats and follow-up lipid profile in 6 months

## 2019-07-10 NOTE — PATIENT INSTRUCTIONS

## 2019-07-10 NOTE — ASSESSMENT & PLAN NOTE
Chronic GERD symptoms without esophagitis continue on Protonix 40 mg daily and follow up at next office visit in 6 months    Patient has not had any dyspepsia blood in his stools or worsening abdominal pain if symptoms change or worsen he will be referred to Gastroenterology

## 2019-07-10 NOTE — ASSESSMENT & PLAN NOTE
BPH with nocturia PSA level is good at 0 6 continue with current medications no change in urinary flow at this time if symptoms worsen I could initiate Flomax for him but at this point he is doing well no change at this time follow-up PSA in 1 year

## 2019-07-10 NOTE — ASSESSMENT & PLAN NOTE
Obstructive sleep apnea continue with current treatment plan and follow-up in 6 months keep the equipment clean change equipment every 6 months

## 2019-07-10 NOTE — PROGRESS NOTES
BMI Counseling: Body mass index is 31 58 kg/m²  Discussed the patient's BMI with him  The BMI is above average  BMI counseling and education was provided to the patient  Nutrition recommendations include decreasing overall calorie intake

## 2019-08-11 DIAGNOSIS — K21.9 CHRONIC GERD: Primary | ICD-10-CM

## 2019-08-12 RX ORDER — PANTOPRAZOLE SODIUM 40 MG/1
TABLET, DELAYED RELEASE ORAL
Qty: 90 TABLET | Refills: 3 | Status: SHIPPED | OUTPATIENT
Start: 2019-08-12 | End: 2020-08-06

## 2019-10-29 ENCOUNTER — TELEPHONE (OUTPATIENT)
Dept: FAMILY MEDICINE CLINIC | Facility: CLINIC | Age: 59
End: 2019-10-29

## 2019-10-29 DIAGNOSIS — J32.9 SINUSITIS, UNSPECIFIED CHRONICITY, UNSPECIFIED LOCATION: Primary | ICD-10-CM

## 2019-10-29 RX ORDER — AZITHROMYCIN 500 MG/1
500 TABLET, FILM COATED ORAL DAILY
Qty: 5 TABLET | Refills: 1 | Status: SHIPPED | OUTPATIENT
Start: 2019-10-29 | End: 2019-11-03

## 2019-10-29 NOTE — TELEPHONE ENCOUNTER
This patient is most likely at work all day today he is welcome to come at the end of the day otherwise an antibiotic has been sent in for him and notify him that if he is not improved to come in by the end of the week or next week

## 2019-10-29 NOTE — TELEPHONE ENCOUNTER
Patient woke up at 830 am with a sore throat and r ear pain  Is requesting antiboitc / Refused appt for today  You have several openings

## 2019-12-21 ENCOUNTER — APPOINTMENT (OUTPATIENT)
Dept: LAB | Facility: CLINIC | Age: 59
End: 2019-12-21
Payer: COMMERCIAL

## 2019-12-21 DIAGNOSIS — E78.2 MIXED HYPERLIPIDEMIA: ICD-10-CM

## 2019-12-21 DIAGNOSIS — K21.9 CHRONIC GERD: ICD-10-CM

## 2019-12-21 LAB
ALBUMIN SERPL BCP-MCNC: 4 G/DL (ref 3.5–5)
ALP SERPL-CCNC: 51 U/L (ref 46–116)
ALT SERPL W P-5'-P-CCNC: 34 U/L (ref 12–78)
ANION GAP SERPL CALCULATED.3IONS-SCNC: 3 MMOL/L (ref 4–13)
AST SERPL W P-5'-P-CCNC: 19 U/L (ref 5–45)
BASOPHILS # BLD AUTO: 0.05 THOUSANDS/ΜL (ref 0–0.1)
BASOPHILS NFR BLD AUTO: 1 % (ref 0–1)
BILIRUB SERPL-MCNC: 0.63 MG/DL (ref 0.2–1)
BUN SERPL-MCNC: 22 MG/DL (ref 5–25)
CALCIUM SERPL-MCNC: 9.5 MG/DL (ref 8.3–10.1)
CHLORIDE SERPL-SCNC: 108 MMOL/L (ref 100–108)
CHOLEST SERPL-MCNC: 169 MG/DL (ref 50–200)
CO2 SERPL-SCNC: 31 MMOL/L (ref 21–32)
CREAT SERPL-MCNC: 1.11 MG/DL (ref 0.6–1.3)
EOSINOPHIL # BLD AUTO: 0.18 THOUSAND/ΜL (ref 0–0.61)
EOSINOPHIL NFR BLD AUTO: 4 % (ref 0–6)
ERYTHROCYTE [DISTWIDTH] IN BLOOD BY AUTOMATED COUNT: 11.9 % (ref 11.6–15.1)
GFR SERPL CREATININE-BSD FRML MDRD: 73 ML/MIN/1.73SQ M
GLUCOSE P FAST SERPL-MCNC: 97 MG/DL (ref 65–99)
HCT VFR BLD AUTO: 46.9 % (ref 36.5–49.3)
HDLC SERPL-MCNC: 49 MG/DL
HGB BLD-MCNC: 15.6 G/DL (ref 12–17)
IMM GRANULOCYTES # BLD AUTO: 0.01 THOUSAND/UL (ref 0–0.2)
IMM GRANULOCYTES NFR BLD AUTO: 0 % (ref 0–2)
LDLC SERPL CALC-MCNC: 110 MG/DL (ref 0–100)
LYMPHOCYTES # BLD AUTO: 1.38 THOUSANDS/ΜL (ref 0.6–4.47)
LYMPHOCYTES NFR BLD AUTO: 27 % (ref 14–44)
MCH RBC QN AUTO: 31.7 PG (ref 26.8–34.3)
MCHC RBC AUTO-ENTMCNC: 33.3 G/DL (ref 31.4–37.4)
MCV RBC AUTO: 95 FL (ref 82–98)
MONOCYTES # BLD AUTO: 0.45 THOUSAND/ΜL (ref 0.17–1.22)
MONOCYTES NFR BLD AUTO: 9 % (ref 4–12)
NEUTROPHILS # BLD AUTO: 3.02 THOUSANDS/ΜL (ref 1.85–7.62)
NEUTS SEG NFR BLD AUTO: 59 % (ref 43–75)
NONHDLC SERPL-MCNC: 120 MG/DL
NRBC BLD AUTO-RTO: 0 /100 WBCS
PLATELET # BLD AUTO: 146 THOUSANDS/UL (ref 149–390)
PMV BLD AUTO: 12.1 FL (ref 8.9–12.7)
POTASSIUM SERPL-SCNC: 4.3 MMOL/L (ref 3.5–5.3)
PROT SERPL-MCNC: 7.2 G/DL (ref 6.4–8.2)
RBC # BLD AUTO: 4.92 MILLION/UL (ref 3.88–5.62)
SODIUM SERPL-SCNC: 142 MMOL/L (ref 136–145)
TRIGL SERPL-MCNC: 51 MG/DL
WBC # BLD AUTO: 5.09 THOUSAND/UL (ref 4.31–10.16)

## 2019-12-21 PROCEDURE — 80053 COMPREHEN METABOLIC PANEL: CPT

## 2019-12-21 PROCEDURE — 36415 COLL VENOUS BLD VENIPUNCTURE: CPT

## 2019-12-21 PROCEDURE — 80061 LIPID PANEL: CPT

## 2019-12-21 PROCEDURE — 85025 COMPLETE CBC W/AUTO DIFF WBC: CPT

## 2019-12-21 PROCEDURE — 86765 RUBEOLA ANTIBODY: CPT

## 2019-12-24 LAB — MEV IGG SER QL: NORMAL

## 2020-01-06 ENCOUNTER — OFFICE VISIT (OUTPATIENT)
Dept: FAMILY MEDICINE CLINIC | Facility: CLINIC | Age: 60
End: 2020-01-06
Payer: COMMERCIAL

## 2020-01-06 VITALS
SYSTOLIC BLOOD PRESSURE: 114 MMHG | BODY MASS INDEX: 32.33 KG/M2 | DIASTOLIC BLOOD PRESSURE: 70 MMHG | HEIGHT: 64 IN | OXYGEN SATURATION: 98 % | HEART RATE: 96 BPM | WEIGHT: 189.4 LBS

## 2020-01-06 DIAGNOSIS — E78.2 MIXED HYPERLIPIDEMIA: ICD-10-CM

## 2020-01-06 DIAGNOSIS — G47.33 OBSTRUCTIVE SLEEP APNEA: ICD-10-CM

## 2020-01-06 DIAGNOSIS — Z12.5 SCREENING PSA (PROSTATE SPECIFIC ANTIGEN): ICD-10-CM

## 2020-01-06 DIAGNOSIS — J06.9 ACUTE URI: ICD-10-CM

## 2020-01-06 PROCEDURE — 99214 OFFICE O/P EST MOD 30 MIN: CPT | Performed by: NURSE PRACTITIONER

## 2020-01-06 PROCEDURE — 1036F TOBACCO NON-USER: CPT | Performed by: NURSE PRACTITIONER

## 2020-01-06 PROCEDURE — 3008F BODY MASS INDEX DOCD: CPT | Performed by: NURSE PRACTITIONER

## 2020-01-06 RX ORDER — AZITHROMYCIN 250 MG/1
250 TABLET, FILM COATED ORAL DAILY
Qty: 6 TABLET | Refills: 0 | Status: SHIPPED | OUTPATIENT
Start: 2020-01-06 | End: 2020-01-11

## 2020-01-06 RX ORDER — FLUTICASONE PROPIONATE 50 MCG
1 SPRAY, SUSPENSION (ML) NASAL DAILY
Qty: 1 BOTTLE | Refills: 0 | Status: SHIPPED | OUTPATIENT
Start: 2020-01-06 | End: 2021-02-03 | Stop reason: ALTCHOICE

## 2020-01-06 NOTE — ASSESSMENT & PLAN NOTE
You have been prescribed an antibiotic  This medication is used to treat bacterial infections  Follow the directions as prescribed  Do not share this medication with anyone  Do not stop taking her medication until it is finished, even if you are feeling better  Take medication with a full glass of water

## 2020-01-06 NOTE — PATIENT INSTRUCTIONS
Cold Symptoms   WHAT YOU NEED TO KNOW:   A cold is an infection caused by a virus  The infection causes your upper respiratory system to become inflamed  Common symptoms of a cold include sneezing, dry throat, a stuffy nose, headache, watery eyes, and a cough  Your cough may be dry, or you may cough up mucus  You may also have muscle aches, joint pain, and tiredness  Rarely, you may have a fever  Most colds go away without treatment  DISCHARGE INSTRUCTIONS:   Return to the emergency department if:   · You have increased tiredness and weakness  · You are unable to eat  · Your heart is beating much faster than usual for you  · You see white spots in the back of your throat and your neck is swollen and sore to the touch  · You see pinpoint or larger reddish-purple dots on your skin  Contact your healthcare provider if:   · You have a fever higher than 102°F (38 9°C)  · You have new or worsening shortness of breath  · You have thick nasal drainage for more than 2 days  · Your symptoms do not improve or get worse within 5 days  · You have questions or concerns about your condition or care  Medicines: The following medicines may be suggested by your healthcare provider to decrease your cold symptoms  These medicines are available without a doctor's order  Ask which medicines to take and when to take them  Follow directions  · NSAIDs or acetaminophen  help to bring down a fever or decrease pain  · Decongestants  help decrease nasal stuffiness  · Antihistamines  help decrease sneezing and a runny nose  · Cough suppressants  help decrease how much you cough  · Expectorants  help loosen mucus so you can cough it up  · Take your medicine as directed  Contact your healthcare provider if you think your medicine is not helping or if you have side effects  Tell him of her if you are allergic to any medicine  Keep a list of the medicines, vitamins, and herbs you take   Include the amounts, and when and why you take them  Bring the list or the pill bottles to follow-up visits  Carry your medicine list with you in case of an emergency  Symptom relief: The following may help relieve cold symptoms, such as a dry throat and congestion:  · Gargle with mouthwash or warm salt water as directed  · Suck on throat lozenges or hard candy  · Use a cold or warm vaporizer or humidifier to ease your breathing  · Rest for at least 2 days and then as needed to decrease tiredness and weakness  · Use petroleum based jelly around your nostrils to decrease irritation from blowing your nose  Drink liquids:  Liquids will help thin and loosen thick mucus so you can cough it up  Liquids will also keep you hydrated  Ask your healthcare provider which liquids are best for you and how much to drink each day  Prevent the spread of germs: You can spread your cold germs to others for at least 3 days after your symptoms start  Wash your hands often  Do not share items, such as eating utensils  Cover your nose and mouth when you cough or sneeze using the crook of your elbow instead of your hands  Throw used tissues in the garbage  Do not smoke:  Smoking may worsen your symptoms and increase the length of time you feel sick  Talk with your healthcare provider if you need help to stop smoking  Follow up with your healthcare provider as directed:  Write down your questions so you remember to ask them during your visits  © 2017 [employer] Information is for End User's use only and may not be sold, redistributed or otherwise used for commercial purposes  All illustrations and images included in CareNotes® are the copyrighted property of [employer]  or [employer]  The above information is an  only  It is not intended as medical advice for individual conditions or treatments   Talk to your doctor, nurse or pharmacist before following any medical regimen to see if it is safe and effective for you

## 2020-01-06 NOTE — PROGRESS NOTES
OFFICE VISIT  Sanford Powell 64 y o  male MRN: [id number]    BMI Counseling: Body mass index is 32 51 kg/m²  The BMI is above normal  Nutrition recommendations include consuming healthier snacks  Exercise recommendations include moderate physical activity 150 minutes/week  No pharmacotherapy was ordered  Assessment / Plan:  Problem List Items Addressed This Visit        Respiratory    Obstructive sleep apnea     Will refer to ENT, to discuss implantable device  Relevant Orders    Ambulatory Referral to Otolaryngology    Acute URI     You have been prescribed an antibiotic  This medication is used to treat bacterial infections  Follow the directions as prescribed  Do not share this medication with anyone  Do not stop taking her medication until it is finished, even if you are feeling better  Take medication with a full glass of water  Relevant Medications    fluticasone (FLONASE) 50 mcg/act nasal spray    azithromycin (ZITHROMAX) 250 mg tablet       Other    Hyperlipidemia     Continue crestor 5mg  Discussed lifestyle changes, exercise  Relevant Orders    CBC and differential    Comprehensive metabolic panel    Lipid Panel with Direct LDL reflex      Other Visit Diagnoses     Screening PSA (prostate specific antigen)        Relevant Orders    PSA, total and free            Reason For Visit / Chief Complaint  Chief Complaint   Patient presents with    Cold Like Symptoms     pt states hes had fever and cough x 2 days         HPI:  Sanford Powell is a 64 y o  male who presents today for acute sick visit, he reports fever 101 on Saturday  He reports a cough, worse when laying down  HE has some congestion  He reports ill contact at home, one week ago  He has tried otc zyretc with no relief  He has known [facility], he is unable to tolerate his cpap machine, he has tried numerous times  He is interested in  Implanted device       Historical Information   Past Medical History: Diagnosis Date    Chronic low back pain     GERD (gastroesophageal reflux disease)     Hyperlipidemia     Sleep apnea      Past Surgical History:   Procedure Laterality Date    COLONOSCOPY      ESOPHAGOGASTRODUODENOSCOPY      RI ESOPHAGOGASTRODUODENOSCOPY TRANSORAL DIAGNOSTIC N/A 5/24/2018    Procedure: ESOPHAGOGASTRODUODENOSCOPY (EGD); Surgeon: Shu Hazel MD;  Location: [facility]; Service: Gastroenterology     Social History   Social History     Substance and Sexual Activity   Alcohol Use No     Social History     Substance and Sexual Activity   Drug Use No     Social History     Tobacco Use   Smoking Status Never Smoker   Smokeless Tobacco Never Used     Family History   Problem Relation Age of Onset    Diabetes Mother     Lung cancer Father        Meds/Allergies   No Known Allergies    Meds:    Current Outpatient Medications:     aspirin (ECOTRIN LOW STRENGTH) 81 mg EC tablet, Take 81 mg by mouth daily, Disp: , Rfl:     pantoprazole (PROTONIX) 40 mg tablet, TAKE 1 TABLET DAILY, Disp: 90 tablet, Rfl: 3    rosuvastatin (CRESTOR) 5 mg tablet, Take 1 tablet (5 mg total) by mouth daily, Disp: 90 tablet, Rfl: 3    azithromycin (ZITHROMAX) 250 mg tablet, Take 1 tablet (250 mg total) by mouth daily for 5 days 2 pills today, then one daily for 4 more days, Disp: 6 tablet, Rfl: 0    fluticasone (FLONASE) 50 mcg/act nasal spray, 1 spray into each nostril daily, Disp: 1 Bottle, Rfl: 0      REVIEW OF SYSTEMS  Review of Systems   Constitutional: Positive for fatigue  Negative for appetite change and fever  HENT: Positive for congestion and rhinorrhea  Negative for ear discharge, ear pain and postnasal drip  Eyes: Negative for pain, discharge, redness, itching and visual disturbance  Respiratory: Positive for cough  Negative for chest tightness, shortness of breath and wheezing  Cardiovascular: Negative for chest pain, palpitations and leg swelling     Gastrointestinal: Negative for abdominal distention, abdominal pain, blood in stool, diarrhea, nausea and vomiting  Endocrine: Negative for cold intolerance, heat intolerance, polydipsia, polyphagia and polyuria  Genitourinary: Negative for decreased urine volume, difficulty urinating, dysuria, frequency, hematuria, testicular pain and urgency  Musculoskeletal: Negative for arthralgias, back pain, myalgias, neck pain and neck stiffness  Skin: Negative for color change, pallor, rash and wound  Neurological: Negative for dizziness, light-headedness, numbness and headaches  Hematological: Negative for adenopathy  Does not bruise/bleed easily  Psychiatric/Behavioral: Negative for agitation, behavioral problems, self-injury, sleep disturbance and suicidal ideas  The patient is not nervous/anxious  Current Vitals:   Blood Pressure: 114/70 (01/06/20 1331)  Pulse: 96 (01/06/20 1331)  Height: 5' 4" (162 6 cm) (01/06/20 1331)  Weight - Scale: 85 9 kg (189 lb 6 4 oz) (01/06/20 1331)  SpO2: 98 % (01/06/20 1331)  [unfilled]    PHYSICAL EXAMS:  Physical Exam   Constitutional: He is oriented to person, place, and time  He appears well-developed and well-nourished  HENT:   Head: Normocephalic and atraumatic  Right Ear: External ear normal    Left Ear: External ear normal    Nose: Mucosal edema and rhinorrhea present  Mouth/Throat: Posterior oropharyngeal erythema present  Eyes: Pupils are equal, round, and reactive to light  Conjunctivae are normal  Right eye exhibits no discharge  Left eye exhibits no discharge  Neck: Normal range of motion  Neck supple  No thyromegaly present  Cardiovascular: Normal rate, regular rhythm and normal heart sounds  Pulmonary/Chest: Effort normal and breath sounds normal    Abdominal: Soft  Bowel sounds are normal  He exhibits no distension  There is no tenderness  Musculoskeletal: Normal range of motion  He exhibits no edema, tenderness or deformity     Lymphadenopathy:     He has cervical adenopathy  Right cervical: Superficial cervical adenopathy present  Left cervical: Superficial cervical adenopathy present  Neurological: He is alert and oriented to person, place, and time  Skin: Skin is warm and dry  No rash noted  No erythema  Psychiatric: He has a normal mood and affect  His behavior is normal            Lab, imaging and other studies: I have personally reviewed pertinent reports  Lisette Dillon

## 2020-01-07 ENCOUNTER — TELEPHONE (OUTPATIENT)
Dept: FAMILY MEDICINE CLINIC | Facility: CLINIC | Age: 60
End: 2020-01-07

## 2020-01-07 DIAGNOSIS — J06.9 ACUTE URI: Primary | ICD-10-CM

## 2020-01-07 RX ORDER — BENZONATATE 100 MG/1
100 CAPSULE ORAL 3 TIMES DAILY PRN
Qty: 20 CAPSULE | Refills: 0 | Status: SHIPPED | OUTPATIENT
Start: 2020-01-07 | End: 2020-01-14

## 2020-05-31 DIAGNOSIS — E78.2 MIXED HYPERLIPIDEMIA: ICD-10-CM

## 2020-06-01 RX ORDER — ROSUVASTATIN CALCIUM 5 MG/1
TABLET, COATED ORAL
Qty: 90 TABLET | Refills: 3 | Status: SHIPPED | OUTPATIENT
Start: 2020-06-01 | End: 2021-05-26

## 2020-07-01 ENCOUNTER — APPOINTMENT (OUTPATIENT)
Dept: LAB | Facility: CLINIC | Age: 60
End: 2020-07-01
Payer: COMMERCIAL

## 2020-07-01 DIAGNOSIS — Z12.5 SCREENING PSA (PROSTATE SPECIFIC ANTIGEN): ICD-10-CM

## 2020-07-01 DIAGNOSIS — E78.2 MIXED HYPERLIPIDEMIA: ICD-10-CM

## 2020-07-01 LAB
ALBUMIN SERPL BCP-MCNC: 4.1 G/DL (ref 3.5–5)
ALP SERPL-CCNC: 46 U/L (ref 46–116)
ALT SERPL W P-5'-P-CCNC: 36 U/L (ref 12–78)
ANION GAP SERPL CALCULATED.3IONS-SCNC: 7 MMOL/L (ref 4–13)
AST SERPL W P-5'-P-CCNC: 20 U/L (ref 5–45)
BASOPHILS # BLD AUTO: 0.03 THOUSANDS/ΜL (ref 0–0.1)
BASOPHILS NFR BLD AUTO: 1 % (ref 0–1)
BILIRUB SERPL-MCNC: 0.79 MG/DL (ref 0.2–1)
BUN SERPL-MCNC: 17 MG/DL (ref 5–25)
CALCIUM SERPL-MCNC: 9.2 MG/DL (ref 8.3–10.1)
CHLORIDE SERPL-SCNC: 107 MMOL/L (ref 100–108)
CHOLEST SERPL-MCNC: 183 MG/DL (ref 50–200)
CO2 SERPL-SCNC: 26 MMOL/L (ref 21–32)
CREAT SERPL-MCNC: 1.02 MG/DL (ref 0.6–1.3)
EOSINOPHIL # BLD AUTO: 0.1 THOUSAND/ΜL (ref 0–0.61)
EOSINOPHIL NFR BLD AUTO: 2 % (ref 0–6)
ERYTHROCYTE [DISTWIDTH] IN BLOOD BY AUTOMATED COUNT: 12.3 % (ref 11.6–15.1)
GFR SERPL CREATININE-BSD FRML MDRD: 81 ML/MIN/1.73SQ M
GLUCOSE P FAST SERPL-MCNC: 93 MG/DL (ref 65–99)
HCT VFR BLD AUTO: 45.9 % (ref 36.5–49.3)
HDLC SERPL-MCNC: 54 MG/DL
HGB BLD-MCNC: 15.2 G/DL (ref 12–17)
IMM GRANULOCYTES # BLD AUTO: 0.01 THOUSAND/UL (ref 0–0.2)
IMM GRANULOCYTES NFR BLD AUTO: 0 % (ref 0–2)
LDLC SERPL CALC-MCNC: 114 MG/DL (ref 0–100)
LYMPHOCYTES # BLD AUTO: 1.31 THOUSANDS/ΜL (ref 0.6–4.47)
LYMPHOCYTES NFR BLD AUTO: 28 % (ref 14–44)
MCH RBC QN AUTO: 32.5 PG (ref 26.8–34.3)
MCHC RBC AUTO-ENTMCNC: 33.1 G/DL (ref 31.4–37.4)
MCV RBC AUTO: 98 FL (ref 82–98)
MONOCYTES # BLD AUTO: 0.52 THOUSAND/ΜL (ref 0.17–1.22)
MONOCYTES NFR BLD AUTO: 11 % (ref 4–12)
NEUTROPHILS # BLD AUTO: 2.75 THOUSANDS/ΜL (ref 1.85–7.62)
NEUTS SEG NFR BLD AUTO: 58 % (ref 43–75)
NRBC BLD AUTO-RTO: 0 /100 WBCS
PLATELET # BLD AUTO: 146 THOUSANDS/UL (ref 149–390)
PMV BLD AUTO: 12.3 FL (ref 8.9–12.7)
POTASSIUM SERPL-SCNC: 4 MMOL/L (ref 3.5–5.3)
PROT SERPL-MCNC: 7.2 G/DL (ref 6.4–8.2)
RBC # BLD AUTO: 4.68 MILLION/UL (ref 3.88–5.62)
SODIUM SERPL-SCNC: 140 MMOL/L (ref 136–145)
TRIGL SERPL-MCNC: 77 MG/DL
WBC # BLD AUTO: 4.72 THOUSAND/UL (ref 4.31–10.16)

## 2020-07-01 PROCEDURE — 85025 COMPLETE CBC W/AUTO DIFF WBC: CPT

## 2020-07-01 PROCEDURE — 80061 LIPID PANEL: CPT

## 2020-07-01 PROCEDURE — 80053 COMPREHEN METABOLIC PANEL: CPT

## 2020-07-01 PROCEDURE — 84153 ASSAY OF PSA TOTAL: CPT

## 2020-07-01 PROCEDURE — 84154 ASSAY OF PSA FREE: CPT

## 2020-07-01 PROCEDURE — 36415 COLL VENOUS BLD VENIPUNCTURE: CPT

## 2020-07-03 LAB
PSA FREE MFR SERPL: 24.3 %
PSA FREE SERPL-MCNC: 0.17 NG/ML
PSA SERPL-MCNC: 0.7 NG/ML (ref 0–4)

## 2020-07-07 ENCOUNTER — OFFICE VISIT (OUTPATIENT)
Dept: FAMILY MEDICINE CLINIC | Facility: CLINIC | Age: 60
End: 2020-07-07
Payer: COMMERCIAL

## 2020-07-07 VITALS
DIASTOLIC BLOOD PRESSURE: 76 MMHG | RESPIRATION RATE: 20 BRPM | HEIGHT: 64 IN | BODY MASS INDEX: 31.65 KG/M2 | OXYGEN SATURATION: 99 % | SYSTOLIC BLOOD PRESSURE: 130 MMHG | TEMPERATURE: 97.6 F | WEIGHT: 185.4 LBS | HEART RATE: 83 BPM

## 2020-07-07 DIAGNOSIS — K21.9 CHRONIC GERD: Primary | ICD-10-CM

## 2020-07-07 DIAGNOSIS — G47.33 OBSTRUCTIVE SLEEP APNEA: ICD-10-CM

## 2020-07-07 DIAGNOSIS — R35.1 NOCTURIA MORE THAN TWICE PER NIGHT: ICD-10-CM

## 2020-07-07 DIAGNOSIS — E78.2 MIXED HYPERLIPIDEMIA: ICD-10-CM

## 2020-07-07 DIAGNOSIS — Z00.00 ANNUAL PHYSICAL EXAM: ICD-10-CM

## 2020-07-07 PROCEDURE — 3008F BODY MASS INDEX DOCD: CPT | Performed by: FAMILY MEDICINE

## 2020-07-07 PROCEDURE — 99396 PREV VISIT EST AGE 40-64: CPT | Performed by: FAMILY MEDICINE

## 2020-07-07 NOTE — ASSESSMENT & PLAN NOTE
Hyperlipidemia stable continue with Crestor 5 mg tablets check lipid profile every 6 months as directed heart healthy diet and low saturated fats

## 2020-07-07 NOTE — ASSESSMENT & PLAN NOTE
Nocturia stable currently if the patient notes change in urinary stream or worsening frequency at night he will notify me in follow-up with Urology check PSA annually

## 2020-07-07 NOTE — ASSESSMENT & PLAN NOTE
Obstructive sleep apnea stable currently no change in regimen work on weight reduction exercise regularly

## 2020-07-07 NOTE — PROGRESS NOTES
Assessment/Plan:       Problem List Items Addressed This Visit        Digestive    Chronic GERD - Primary     Chronic GERD symptoms stable with Protonix 40 mg tablets continue this medication as directed and follow up with me in 6 months            Respiratory    Obstructive sleep apnea      Obstructive sleep apnea stable currently no change in regimen work on weight reduction exercise regularly            Other    Hyperlipidemia      Hyperlipidemia stable continue with Crestor 5 mg tablets check lipid profile every 6 months as directed heart healthy diet and low saturated fats         Relevant Orders    CBC and differential    Comprehensive metabolic panel    Lipid panel    Nocturia more than twice per night      Nocturia stable currently if the patient notes change in urinary stream or worsening frequency at night he will notify me in follow-up with Urology check PSA annually         Annual physical exam      Annual physical exam completed reviewed all medications laboratory work and discuss general health concerns with patient at this time follow-up in 6 months         Relevant Orders    HIV 1/2 Antigen/Antibody (4th Generation) w Reflex [facility]            Subjective:      Patient ID: Huy Jade is a 64 y o  male  Patient presents for annual physical exam doing well overall continuation with medications and here to review all laboratory work      The following portions of the patient's history were reviewed and updated as appropriate: allergies, current medications, past family history, past medical history, past social history, past surgical history and problem list     Review of Systems   Constitutional: Negative for chills, fatigue and fever  HENT: Negative for congestion, nosebleeds, rhinorrhea, sinus pressure and sore throat  Eyes: Negative for discharge and redness  Respiratory: Negative for cough and shortness of breath      Cardiovascular: Negative for chest pain, palpitations and leg swelling  Gastrointestinal: Negative for abdominal pain, blood in stool and nausea  Endocrine: Negative for cold intolerance, heat intolerance and polyuria  Genitourinary: Negative for dysuria and frequency  Musculoskeletal: Negative for arthralgias, back pain and myalgias  Skin: Negative for rash  Neurological: Negative for dizziness, weakness and headaches  Hematological: Negative for adenopathy  Psychiatric/Behavioral: Negative for behavioral problems and sleep disturbance  The patient is not nervous/anxious  Objective:      /76   Pulse 83   Temp 97 6 °F (36 4 °C) (Tympanic)   Resp 20   Ht 5' 4" (1 626 m)   Wt 84 1 kg (185 lb 6 4 oz)   SpO2 99%   BMI 31 82 kg/m²        Physical Exam   Constitutional: He is oriented to person, place, and time  He appears well-developed and well-nourished  HENT:   Head: Normocephalic and atraumatic  Right Ear: External ear normal    Left Ear: External ear normal    Nose: Nose normal    Mouth/Throat: Oropharynx is clear and moist    Eyes: Pupils are equal, round, and reactive to light  Conjunctivae and EOM are normal  No scleral icterus  Neck: Normal range of motion  Neck supple  No JVD present  No thyromegaly present  Cardiovascular: Normal rate, regular rhythm and normal heart sounds  No murmur heard  Pulmonary/Chest: Effort normal and breath sounds normal  He has no wheezes  He has no rales  He exhibits no tenderness  Abdominal: Soft  Bowel sounds are normal  He exhibits no distension and no mass  There is no tenderness  There is no rebound and no guarding  Musculoskeletal: Normal range of motion  He exhibits no edema, tenderness or deformity  Lymphadenopathy:     He has no cervical adenopathy  Neurological: He is alert and oriented to person, place, and time  He has normal reflexes  He displays normal reflexes  No cranial nerve deficit  Skin: Skin is warm and dry  No rash noted  No erythema     Psychiatric: He has a normal mood and affect  His behavior is normal  Judgment and thought content normal    Nursing note and vitals reviewed  Data:    Laboratory Results: I have personally reviewed the pertinent laboratory results/reports   Radiology/Other Diagnostic Testing Results: I have personally reviewed pertinent reports         Lab Results   Component Value Date    WBC 4 72 07/01/2020    HGB 15 2 07/01/2020    HCT 45 9 07/01/2020    MCV 98 07/01/2020     (L) 07/01/2020     Lab Results   Component Value Date    K 4 0 07/01/2020     07/01/2020    CO2 26 07/01/2020    BUN 17 07/01/2020    CREATININE 1 02 07/01/2020    GLUF 93 07/01/2020    CALCIUM 9 2 07/01/2020    AST 20 07/01/2020    ALT 36 07/01/2020    ALKPHOS 46 07/01/2020    EGFR 81 07/01/2020     Lab Results   Component Value Date    CHOLESTEROL 183 07/01/2020    CHOLESTEROL 169 12/21/2019    CHOLESTEROL 160 06/22/2019     Lab Results   Component Value Date    HDL 54 07/01/2020    HDL 49 12/21/2019    HDL 52 06/22/2019     Lab Results   Component Value Date    LDLCALC 114 (H) 07/01/2020    LDLCALC 110 (H) 12/21/2019    LDLCALC 98 06/22/2019     Lab Results   Component Value Date    TRIG 77 07/01/2020    TRIG 51 12/21/2019    TRIG 49 06/22/2019     No results found for: [place], [place]  Lab Results   Component Value Date    ICQ4UNBBPMGT 4 829 (H) 06/25/2016     No results found for: HGBA1C  Lab Results   Component Value Date    PSA 0 7 07/01/2020       Farrah Calderon DO

## 2020-07-07 NOTE — ASSESSMENT & PLAN NOTE
Chronic GERD symptoms stable with Protonix 40 mg tablets continue this medication as directed and follow up with me in 6 months

## 2020-07-07 NOTE — PATIENT INSTRUCTIONS
Heart Healthy Diet   WHAT YOU NEED TO KNOW:   A heart healthy diet is an eating plan low in total fat, unhealthy fats, and sodium (salt)  A heart healthy diet helps decrease your risk for heart disease and stroke  Limit the amount of fat you eat to 25% to 35% of your total daily calories  Limit sodium to less than 2,300 mg each day  DISCHARGE INSTRUCTIONS:   Healthy fats:  Healthy fats can help improve cholesterol levels  The risk for heart disease is decreased when cholesterol levels are normal  Choose healthy fats, such as the following:  · Unsaturated fat  is found in foods such as soybean, canola, olive, corn, and safflower oils  It is also found in soft tub margarine that is made with liquid vegetable oil  · Omega-3 fat  is found in certain fish, such as salmon, tuna, and trout, and in walnuts and flaxseed  Unhealthy fats:  Unhealthy fats can cause unhealthy cholesterol levels in your blood and increase your risk of heart disease  Limit unhealthy fats, such as the following:  · Cholesterol  is found in animal foods, such as eggs and lobster, and in dairy products made from whole milk  Limit cholesterol to less than 300 milligrams (mg) each day  You may need to limit cholesterol to 200 mg each day if you have heart disease  · Saturated fat  is found in meats, such as steiner and hamburger  It is also found in chicken or turkey skin, whole milk, and butter  Limit saturated fat to less than 7% of your total daily calories  Limit saturated fat to less than 6% if you have heart disease or are at increased risk for it  · Trans fat  is found in packaged foods, such as potato chips and cookies  It is also in hard margarine, some fried foods, and shortening  Avoid trans fats as much as possible    Heart healthy foods and drinks to include:  Ask your dietitian or healthcare provider how many servings to have from each of the following food groups:  · Grains:      ¨ Whole-wheat breads, cereals, and pastas, and brown rice    ¨ Low-fat, low-sodium crackers and chips    · Vegetables:      ¨ Broccoli, green beans, green peas, and spinach    ¨ Collards, kale, and lima beans    ¨ Carrots, sweet potatoes, tomatoes, and peppers    ¨ Canned vegetables with no salt added    · Fruits:      ¨ Bananas, peaches, pears, and pineapple    ¨ Grapes, raisins, and dates    ¨ Oranges, tangerines, grapefruit, orange juice, and grapefruit juice    ¨ Apricots, mangoes, melons, and papaya    ¨ Raspberries and strawberries    ¨ Canned fruit with no added sugar    · Low-fat dairy products:      ¨ Nonfat (skim) milk, 1% milk, and low-fat almond, cashew, or soy milks fortified with calcium    ¨ Low-fat cheese, regular or frozen yogurt, and cottage cheese    · Meats and proteins , such as lean cuts of beef and pork (loin, leg, round), skinless chicken and turkey, legumes, soy products, egg whites, and nuts  Foods and drinks to limit or avoid:  Ask your dietitian or healthcare provider about these and other foods that are high in unhealthy fat, sodium, and sugar:  · Snack or packaged foods , such as frozen dinners, cookies, macaroni and cheese, and cereals with more than 300 mg of sodium per serving    · Canned or dry mixes  for cakes, soups, sauces, or gravies    · Vegetables with added sodium , such as instant potatoes, vegetables with added sauces, or regular canned vegetables    · Other foods high in sodium , such as ketchup, barbecue sauce, salad dressing, pickles, olives, soy sauce, and miso    · High-fat dairy foods  such as whole or 2% milk, cream cheese, or sour cream, and cheeses     · High-fat protein foods  such as high-fat cuts of beef (T-bone steaks, ribs), chicken or turkey with skin, and organ meats, such as liver    · Cured or smoked meats , such as hot dogs, steiner, and sausage    · Unhealthy fats and oils , such as butter, stick margarine, shortening, and cooking oils such as coconut or palm oil    · Food and drinks high in sugar , such as soft drinks (soda), sports drinks, sweetened tea, candy, cake, cookies, pies, and doughnuts  Other diet guidelines to follow:   · Eat more foods containing omega-3 fats  Eat fish high in omega-3 fats at least 2 times a week  · Limit alcohol  Too much alcohol can damage your heart and raise your blood pressure  Women should limit alcohol to 1 drink a day  Men should limit alcohol to 2 drinks a day  A drink of alcohol is 12 ounces of beer, 5 ounces of wine, or 1½ ounces of liquor  · Choose low-sodium foods  High-sodium foods can lead to high blood pressure  Add little or no salt to food you prepare  Use herbs and spices in place of salt  · Eat more fiber  to help lower cholesterol levels  Eat at least 5 servings of fruits and vegetables each day  Eat 3 ounces of whole-grain foods each day  Legumes (beans) are also a good source of fiber  Lifestyle guidelines:   · Do not smoke  Nicotine and other chemicals in cigarettes and cigars can cause lung and heart damage  Ask your healthcare provider for information if you currently smoke and need help to quit  E-cigarettes or smokeless tobacco still contain nicotine  Talk to your healthcare provider before you use these products  · Exercise regularly  to help you maintain a healthy weight and improve your blood pressure and cholesterol levels  Ask your healthcare provider about the best exercise plan for you  Do not start an exercise program without asking your healthcare provider  Follow up with your healthcare provider as directed:  Write down your questions so you remember to ask them during your visits  © 2017 [employer] Information is for End User's use only and may not be sold, redistributed or otherwise used for commercial purposes  All illustrations and images included in CareNotes® are the copyrighted property of [employer]  or [employer]  The above information is an  only   It is not intended as medical advice for individual conditions or treatments  Talk to your doctor, nurse or pharmacist before following any medical regimen to see if it is safe and effective for you

## 2020-07-07 NOTE — ASSESSMENT & PLAN NOTE
Annual physical exam completed reviewed all medications laboratory work and discuss general health concerns with patient at this time follow-up in 6 months

## 2020-07-27 DIAGNOSIS — R35.1 NOCTURIA MORE THAN TWICE PER NIGHT: Primary | ICD-10-CM

## 2020-08-06 DIAGNOSIS — K21.9 CHRONIC GERD: ICD-10-CM

## 2020-08-06 RX ORDER — PANTOPRAZOLE SODIUM 40 MG/1
TABLET, DELAYED RELEASE ORAL
Qty: 90 TABLET | Refills: 3 | Status: SHIPPED | OUTPATIENT
Start: 2020-08-06 | End: 2021-08-02

## 2020-11-16 ENCOUNTER — OFFICE VISIT (OUTPATIENT)
Dept: FAMILY MEDICINE CLINIC | Facility: CLINIC | Age: 60
End: 2020-11-16
Payer: COMMERCIAL

## 2020-11-16 ENCOUNTER — TELEPHONE (OUTPATIENT)
Dept: FAMILY MEDICINE CLINIC | Facility: CLINIC | Age: 60
End: 2020-11-16

## 2020-11-16 VITALS
BODY MASS INDEX: 31.41 KG/M2 | HEART RATE: 71 BPM | OXYGEN SATURATION: 98 % | WEIGHT: 184 LBS | TEMPERATURE: 97.5 F | DIASTOLIC BLOOD PRESSURE: 80 MMHG | HEIGHT: 64 IN | SYSTOLIC BLOOD PRESSURE: 120 MMHG

## 2020-11-16 DIAGNOSIS — L23.7 ALLERGIC CONTACT DERMATITIS DUE TO PLANTS, EXCEPT FOOD: Primary | ICD-10-CM

## 2020-11-16 PROCEDURE — 3725F SCREEN DEPRESSION PERFORMED: CPT | Performed by: FAMILY MEDICINE

## 2020-11-16 PROCEDURE — 99213 OFFICE O/P EST LOW 20 MIN: CPT | Performed by: FAMILY MEDICINE

## 2020-11-16 RX ORDER — DESOXIMETASONE 2.5 MG/G
CREAM TOPICAL 2 TIMES DAILY
Qty: 30 G | Refills: 0 | Status: SHIPPED | OUTPATIENT
Start: 2020-11-16 | End: 2021-02-03 | Stop reason: ALTCHOICE

## 2020-11-23 ENCOUNTER — OFFICE VISIT (OUTPATIENT)
Dept: FAMILY MEDICINE CLINIC | Facility: CLINIC | Age: 60
End: 2020-11-23
Payer: COMMERCIAL

## 2020-11-23 ENCOUNTER — TELEPHONE (OUTPATIENT)
Dept: FAMILY MEDICINE CLINIC | Facility: CLINIC | Age: 60
End: 2020-11-23

## 2020-11-23 VITALS
RESPIRATION RATE: 18 BRPM | BODY MASS INDEX: 31.38 KG/M2 | SYSTOLIC BLOOD PRESSURE: 120 MMHG | HEART RATE: 72 BPM | OXYGEN SATURATION: 98 % | TEMPERATURE: 98.2 F | HEIGHT: 64 IN | WEIGHT: 183.8 LBS | DIASTOLIC BLOOD PRESSURE: 80 MMHG

## 2020-11-23 DIAGNOSIS — L23.7 ALLERGIC CONTACT DERMATITIS DUE TO PLANTS, EXCEPT FOOD: Primary | ICD-10-CM

## 2020-11-23 PROCEDURE — 3008F BODY MASS INDEX DOCD: CPT | Performed by: FAMILY MEDICINE

## 2020-11-23 PROCEDURE — 1036F TOBACCO NON-USER: CPT | Performed by: FAMILY MEDICINE

## 2020-11-23 PROCEDURE — 99213 OFFICE O/P EST LOW 20 MIN: CPT | Performed by: FAMILY MEDICINE

## 2020-11-23 RX ORDER — PREDNISONE 10 MG/1
TABLET ORAL
Qty: 28 TABLET | Refills: 1 | Status: SHIPPED | OUTPATIENT
Start: 2020-11-23 | End: 2021-02-03 | Stop reason: ALTCHOICE

## 2021-01-18 ENCOUNTER — LAB (OUTPATIENT)
Dept: LAB | Facility: CLINIC | Age: 61
End: 2021-01-18
Payer: COMMERCIAL

## 2021-01-18 DIAGNOSIS — Z00.00 ANNUAL PHYSICAL EXAM: ICD-10-CM

## 2021-01-18 DIAGNOSIS — E78.2 MIXED HYPERLIPIDEMIA: ICD-10-CM

## 2021-01-18 LAB
ALBUMIN SERPL BCP-MCNC: 4 G/DL (ref 3.5–5)
ALP SERPL-CCNC: 61 U/L (ref 46–116)
ALT SERPL W P-5'-P-CCNC: 35 U/L (ref 12–78)
ANION GAP SERPL CALCULATED.3IONS-SCNC: 2 MMOL/L (ref 4–13)
AST SERPL W P-5'-P-CCNC: 21 U/L (ref 5–45)
BACTERIA UR QL AUTO: NORMAL /HPF
BASOPHILS # BLD AUTO: 0.05 THOUSANDS/ΜL (ref 0–0.1)
BASOPHILS NFR BLD AUTO: 1 % (ref 0–1)
BILIRUB SERPL-MCNC: 0.73 MG/DL (ref 0.2–1)
BILIRUB UR QL STRIP: NEGATIVE
BUN SERPL-MCNC: 20 MG/DL (ref 5–25)
CALCIUM SERPL-MCNC: 8.7 MG/DL (ref 8.3–10.1)
CHLORIDE SERPL-SCNC: 107 MMOL/L (ref 100–108)
CHOLEST SERPL-MCNC: 172 MG/DL (ref 50–200)
CLARITY UR: CLEAR
CO2 SERPL-SCNC: 32 MMOL/L (ref 21–32)
COLOR UR: YELLOW
CREAT SERPL-MCNC: 1.17 MG/DL (ref 0.6–1.3)
EOSINOPHIL # BLD AUTO: 0.12 THOUSAND/ΜL (ref 0–0.61)
EOSINOPHIL NFR BLD AUTO: 2 % (ref 0–6)
ERYTHROCYTE [DISTWIDTH] IN BLOOD BY AUTOMATED COUNT: 12.1 % (ref 11.6–15.1)
GFR SERPL CREATININE-BSD FRML MDRD: 68 ML/MIN/1.73SQ M
GLUCOSE P FAST SERPL-MCNC: 118 MG/DL (ref 65–99)
GLUCOSE UR STRIP-MCNC: NEGATIVE MG/DL
HCT VFR BLD AUTO: 45.4 % (ref 36.5–49.3)
HDLC SERPL-MCNC: 52 MG/DL
HGB BLD-MCNC: 15 G/DL (ref 12–17)
HGB UR QL STRIP.AUTO: NEGATIVE
HYALINE CASTS #/AREA URNS LPF: NORMAL /LPF
IMM GRANULOCYTES # BLD AUTO: 0.01 THOUSAND/UL (ref 0–0.2)
IMM GRANULOCYTES NFR BLD AUTO: 0 % (ref 0–2)
KETONES UR STRIP-MCNC: NEGATIVE MG/DL
LDLC SERPL CALC-MCNC: 110 MG/DL (ref 0–100)
LEUKOCYTE ESTERASE UR QL STRIP: NEGATIVE
LYMPHOCYTES # BLD AUTO: 1.47 THOUSANDS/ΜL (ref 0.6–4.47)
LYMPHOCYTES NFR BLD AUTO: 28 % (ref 14–44)
MCH RBC QN AUTO: 32.1 PG (ref 26.8–34.3)
MCHC RBC AUTO-ENTMCNC: 33 G/DL (ref 31.4–37.4)
MCV RBC AUTO: 97 FL (ref 82–98)
MONOCYTES # BLD AUTO: 0.58 THOUSAND/ΜL (ref 0.17–1.22)
MONOCYTES NFR BLD AUTO: 11 % (ref 4–12)
NEUTROPHILS # BLD AUTO: 3.01 THOUSANDS/ΜL (ref 1.85–7.62)
NEUTS SEG NFR BLD AUTO: 58 % (ref 43–75)
NITRITE UR QL STRIP: NEGATIVE
NON-SQ EPI CELLS URNS QL MICRO: NORMAL /HPF
NONHDLC SERPL-MCNC: 120 MG/DL
NRBC BLD AUTO-RTO: 0 /100 WBCS
PH UR STRIP.AUTO: 6 [PH]
PLATELET # BLD AUTO: 147 THOUSANDS/UL (ref 149–390)
PMV BLD AUTO: 11.8 FL (ref 8.9–12.7)
POTASSIUM SERPL-SCNC: 4.2 MMOL/L (ref 3.5–5.3)
PROT SERPL-MCNC: 6.9 G/DL (ref 6.4–8.2)
PROT UR STRIP-MCNC: ABNORMAL MG/DL
RBC # BLD AUTO: 4.67 MILLION/UL (ref 3.88–5.62)
RBC #/AREA URNS AUTO: NORMAL /HPF
SODIUM SERPL-SCNC: 141 MMOL/L (ref 136–145)
SP GR UR STRIP.AUTO: 1.03 (ref 1–1.03)
TRIGL SERPL-MCNC: 51 MG/DL
UROBILINOGEN UR QL STRIP.AUTO: 1 E.U./DL
WBC # BLD AUTO: 5.24 THOUSAND/UL (ref 4.31–10.16)
WBC #/AREA URNS AUTO: NORMAL /HPF

## 2021-01-18 PROCEDURE — 85025 COMPLETE CBC W/AUTO DIFF WBC: CPT

## 2021-01-18 PROCEDURE — 80061 LIPID PANEL: CPT

## 2021-01-18 PROCEDURE — 87389 HIV-1 AG W/HIV-1&-2 AB AG IA: CPT

## 2021-01-18 PROCEDURE — 80053 COMPREHEN METABOLIC PANEL: CPT

## 2021-01-18 PROCEDURE — 81001 URINALYSIS AUTO W/SCOPE: CPT | Performed by: FAMILY MEDICINE

## 2021-01-18 PROCEDURE — 36415 COLL VENOUS BLD VENIPUNCTURE: CPT

## 2021-01-19 LAB — HIV 1+2 AB+HIV1 P24 AG SERPL QL IA: NORMAL

## 2021-02-03 ENCOUNTER — OFFICE VISIT (OUTPATIENT)
Dept: FAMILY MEDICINE CLINIC | Facility: CLINIC | Age: 61
End: 2021-02-03
Payer: COMMERCIAL

## 2021-02-03 VITALS
HEIGHT: 64 IN | OXYGEN SATURATION: 91 % | BODY MASS INDEX: 30.7 KG/M2 | HEART RATE: 92 BPM | WEIGHT: 179.8 LBS | TEMPERATURE: 98.1 F | DIASTOLIC BLOOD PRESSURE: 82 MMHG | SYSTOLIC BLOOD PRESSURE: 132 MMHG

## 2021-02-03 DIAGNOSIS — M25.521 RIGHT ELBOW PAIN: Primary | ICD-10-CM

## 2021-02-03 DIAGNOSIS — R35.1 NOCTURIA MORE THAN TWICE PER NIGHT: ICD-10-CM

## 2021-02-03 DIAGNOSIS — E78.2 MIXED HYPERLIPIDEMIA: ICD-10-CM

## 2021-02-03 DIAGNOSIS — R73.9 HYPERGLYCEMIA: ICD-10-CM

## 2021-02-03 DIAGNOSIS — K21.9 CHRONIC GERD: ICD-10-CM

## 2021-02-03 PROCEDURE — 99214 OFFICE O/P EST MOD 30 MIN: CPT | Performed by: FAMILY MEDICINE

## 2021-02-03 PROCEDURE — 1036F TOBACCO NON-USER: CPT | Performed by: FAMILY MEDICINE

## 2021-02-03 PROCEDURE — 3008F BODY MASS INDEX DOCD: CPT | Performed by: FAMILY MEDICINE

## 2021-02-03 NOTE — PATIENT INSTRUCTIONS
Meal Planning with Diabetes Exchanges   AMBULATORY CARE:   Diabetes exchanges  are servings of food that contain similar amounts of carbohydrate, fat, protein, and calories within a food group  The exchanges can be used to develop a healthy meal plan that helps to keep your blood sugar within the recommended levels  A meal plan with the right amount of carbohydrates is especially important  Your blood sugar naturally rises after you eat carbohydrates  Too many carbohydrates in 1 meal or snack can raise your blood sugar level  Carbohydrates are found in starches, fruit, milk, yogurt, and sweets  Call your doctor if:   · You have high blood sugar levels during a certain time of day, or almost all of the time  · You often have low blood sugar levels  · You have questions or concerns about your condition or care  Create a meal plan with exchanges:  A dietitian will work with you to develop a healthy meal plan that is right for you  This meal plan will include the amount of exchanges you can have from each food group throughout the day  Follow your meal plan by keeping track of the amount of exchanges you eat for each meal and snack  Your meal plan will be based on your age, weight, blood sugar levels, medicine, and activity level  Starch food group exchanges:  Each exchange below contains about 15 grams of carbohydrate , 3 grams of protein, 1 gram of fat, and 80 calories  · 1 ounce of white, whole wheat or rye bread (1 slice)    · 1 ounce of bagel (about ¼ of a bagel)    · 1 6-inch flour or corn tortilla or 1 4-inch pancake (about ¼ inch thick)    · ?  cup of cooked pasta or rice    · ¾ cup of dry, ready-to-eat cereal with no sugar added     · ½ cup of cooked cereal, such as oatmeal    · 3 vu cracker squares or 8 animal crackers    · 6 saltine-type crackers or     · 3 cups of popcorn or ¾ ounce of pretzels     · Starchy vegetables and cooked legumes:      ? ½ cup of corn, green peas, sweet potatoes, or mashed potatoes     ? ¼ of a large baked potato     ? 1 cup of acorn, butternut squash, or pumpkin     ? ½ cup of beans, lentils, or peas (such as neri, kidney, or black-eyed)    ? ? cup of lima beans    Fruit group exchanges:  Each exchange contains about 15 grams of carbohydrate  and 60 calories  · 1 small (4 ounce) apple, banana orange, or nectarine    · ½ cup of canned or fresh fruit    · ½ cup (4 ounces) of unsweetened fruit juice    · 2 tablespoons of dried fruit    Milk group exchanges:  Each exchange contains about 12 grams of carbohydrate  and 8 grams of protein  The amount of fat and calories in each serving depends on the type of milk (such as whole, low-fat, or fat-free)  · 1 cup fat-free or low-fat milk    · ¾ cup of plain, nonfat yogurt    · 1 cup fat-free, flavored yogurt with artificial (no calorie) sweetener    Non-starchy vegetable group exchanges:  Each exchange contains about 5 grams of carbohydrate , 2 grams of protein, and 25 calories  Examples include beets, broccoli, cabbage, carrots, cauliflower, cucumber, mushrooms, tomatoes, and zucchini  · ½ cup of cooked vegetables or 1 cup of raw vegetables     · ½ cup of vegetable juice    Meat and meat substitute group exchanges:  Each exchange of a lean meat  listed below contains about 7 grams of protein, 0 to 3 grams of fat, and 45 calories  The meat and meat substitutes food group does not contain any carbohydrates  Medium and high-fat meats have more calories  · 1 ounce of chicken or turkey without skin, or 1 ounce of fish (not breaded or fried)     · 1 ounce of lean beef, pork, or lamb     · 1-inch cube or 1 ounce of low-fat cheese     · 2 egg whites or ¼ cup of egg substitute     · ½ cup of tofu    Sweets, desserts, and other carbohydrate group exchanges:   · Sweets and other desserts:  Each exchange has about 15 grams of carbohydrate   ? 1 ounce of rogelio food cake or 2-inch square cake (unfrosted)    ? 2 small cookies     ?  ½ cup of sugar-free, fat-free ice cream    ? 1 tablespoon of syrup, jam, jelly, table sugar, or honey    · Combination foods:     ? 1 cup of an entrée, such as lasagna, spaghetti with meatballs, macaroni and cheese, and chili with beans (each serving counts as 2 carbohydrate exchanges )     ? 1 cup of tomato or vegetable beef soup (each serving counts as 1 carbohydrate exchange )    Fat group exchanges:  Each exchange contains 5 grams of fat and 45 calories  · 1 teaspoon of oil (such as canola, olive, or corn oil)     · 6 almonds or cashews, 10 peanuts, or 4 pecan halves     · 2 tablespoons of avocado     · ½ tablespoon of peanut butter     · 1 teaspoon of regular margarine or 2 teaspoons of low-fat margarine     · 1 teaspoon of regular butter or 1 tablespoon of low-fat butter     · 1 teaspoon of regular mayonnaise or 1 tablespoon of low-fat mayonnaise     · 1 tablespoon of regular salad dressing or 2 tablespoons of low-fat salad dressing    Free foods: The foods on this list are called free foods because they have very few calories  Free foods usually do not increase your blood sugar if you limit them  · 1 tablespoon of catsup or taco sauce     · ¼ cup of salsa     · 2 tablespoons of sugar-free syrup or 2 teaspoons of light jam or jelly     · 1 tablespoon of fat-free salad dressing     · 4 tablespoons of fat-free margarine or fat-free mayonnaise     · Sugar-free drinks: diet soda, sugar-free drink mixes, or mineral water     · Low-sodium bouillon or fat-free broth     · Mustard     · Seasonings such as spices, herbs, and garlic     · Sugar-free gelatin without added fruit    Other healthy nutrition guidelines:   · Limit drinks with sugar substitutes  Your dietitian or healthcare provider will encourage you to drink water  Water helps your kidneys to function properly  Ask how much water you should drink every day  · Eat more fiber    Choose foods that are good sources of fiber, such as fruits, vegetables, and whole grains  Cereals that contain 5 or more grams of fiber per serving are good sources of fiber  Legumes such as garbanzo, neri beans, kidney beans, and lentils are also good sources  · Limit fat  Ask your dietitian or healthcare provider how much fat you should eat each day  Choose foods low in fat, saturated fat, trans fat, and cholesterol  Examples include turkey or chicken without the skin, fish, lean cuts of meat, and beans  Low-fat dairy foods, such as low-fat or fat-free milk and low-fat yogurt are also good choices  Omega-3 fatty acids are healthy fats that are found in canola oil, soybean oil and fatty fish  [place], albacore tuna, and sardines are good sources of omega 3 fatty acids  Eat 2 servings of these types of fish each week  Do not eat fried fish  · Limit sugar  Sugar and sweets must be counted toward the carbohydrate exchanges that you can have within your meal plan  Limit sugar and sweets because they are usually also high in calories and fat  Eat smaller portions of sweets by sharing a dessert or asking for a child-size portion at a restaurant  · Limit sodium  (salt) to about 2,300 mg per day  You may need to eat even less sodium if you have certain medical conditions  Foods high in sodium include soy sauce, potato chips, and soup  · Limit alcohol  Ask your healthcare provider if it is safe for you to drink alcohol  If alcohol is safe for you to have, eat a meal when you drink alcohol  If you drink alcohol on an empty stomach, your blood sugar may drop to a low level  Women 21 years or older and men 72 years or older should limit alcohol to 1 drink a day  Men aged 24 to 59 years should limit alcohol to 2 drinks a day  A drink of alcohol is 5 ounces of wine, 12 ounces of beer, or 1½ ounces of liquor  Other ways to manage your diabetes:   · Control your blood sugar level  Test your blood sugar level regularly and keep a record of the results   Ask your healthcare provider when and how often to test your blood sugar  You may need to check your blood sugar level at least 3 times each day  · Talk to your healthcare provider about your weight  Ask if you need to lose weight, and how much you need to lose  If you are overweight, you may need to make other changes to lose weight  Ask your healthcare provider to help you create a weight loss program      · Get regular physical activity  Physical activity can help decrease your blood sugar level  It can also help to decrease your risk for heart disease and help you lose weight  Adults should have moderate intensity physical activity for at least 150 minutes every week  Spread the amount of activity over at least 3 days a week  Do not skip more than 2 days in a row  Children should get at least 60 minutes of moderate physical activity on most days of the week  Examples of moderate physical activity include brisk walking, running, and swimming  Do not sit for longer than 30 minutes  Work with your healthcare provider to create a plan for physical activity  © Copyright [address] Information is for End User's use only and may not be sold, redistributed or otherwise used for commercial purposes  All illustrations and images included in CareNotes® are the copyrighted property of [employer]  or [address] [place] [address]  The above information is an  only  It is not intended as medical advice for individual conditions or treatments  Talk to your doctor, nurse or pharmacist before following any medical regimen to see if it is safe and effective for you

## 2021-02-03 NOTE — ASSESSMENT & PLAN NOTE
Lab work reviewed at this time continue same diet heart healthy diet avoiding saturated fats and concentrated sweets follow-up laboratory work in 6 months

## 2021-02-03 NOTE — ASSESSMENT & PLAN NOTE
Patient is managing to watch diet now closely avoiding concentrated sweets reducing carbohydrates and follow-up laboratory work in 6 months

## 2021-02-03 NOTE — ASSESSMENT & PLAN NOTE
Right elbow pain tendinitis after arm wrestling patient will ice this area at rest it refer for Orthopedics if not improved over the next 2-4 weeks

## 2021-02-03 NOTE — PROGRESS NOTES
Assessment/Plan:       Problem List Items Addressed This Visit        Digestive    Chronic GERD     Continue medication as needed proton pump inhibitor follow-up in 6 months            Other    Hyperlipidemia     Lab work reviewed at this time continue same diet heart healthy diet avoiding saturated fats and concentrated sweets follow-up laboratory work in 6 months         Right elbow pain - Primary     Right elbow pain tendinitis after arm wrestling patient will ice this area at rest it refer for Orthopedics if not improved over the next 2-4 weeks         Hyperglycemia     Patient is managing to watch diet now closely avoiding concentrated sweets reducing carbohydrates and follow-up laboratory work in 6 months                 Subjective:      Patient ID: Robert Kirk is a 64 y o  male  Patient has been working more on diet recently lost about 9 lb since the holidays when he over ate and was exercising less his blood sugar was really reviewed and showed an elevated 1  One [de-identified] [age] he notes that his mother had diabetes and now he is more aware of a diabetic diet and working on exercise and weight reduction  Repeating lab work will be done in the near future he came in today for his general six-month checkup       The following portions of the patient's history were reviewed and updated as appropriate: allergies, current medications, past family history, past medical history, past social history, past surgical history and problem list     Review of Systems   Constitutional: Negative for chills, fatigue and fever  HENT: Negative for congestion, nosebleeds, rhinorrhea, sinus pressure and sore throat  Eyes: Negative for discharge and redness  Respiratory: Negative for cough and shortness of breath  Cardiovascular: Negative for chest pain, palpitations and leg swelling  Gastrointestinal: Negative for abdominal pain, blood in stool and nausea     Endocrine: Negative for cold intolerance, heat intolerance and polyuria  Genitourinary: Negative for dysuria and frequency  Musculoskeletal: Negative for arthralgias, back pain and myalgias  Skin: Negative for rash  Neurological: Negative for dizziness, weakness and headaches  Hematological: Negative for adenopathy  Psychiatric/Behavioral: Negative for behavioral problems and sleep disturbance  The patient is not nervous/anxious  Objective:      /82   Pulse 92   Temp 98 1 °F (36 7 °C)   Ht 5' 4" (1 626 m)   Wt 81 6 kg (179 lb 12 8 oz)   SpO2 91%   BMI 30 86 kg/m²        Physical Exam  Vitals signs and nursing note reviewed  Constitutional:       Appearance: Normal appearance  He is well-developed and normal weight  HENT:      Head: Normocephalic and atraumatic  Right Ear: External ear normal       Left Ear: Tympanic membrane and external ear normal       Nose: Nose normal       Mouth/Throat:      Mouth: Mucous membranes are moist       Pharynx: Oropharynx is clear  Eyes:      General: No scleral icterus  Conjunctiva/sclera: Conjunctivae normal       Pupils: Pupils are equal, round, and reactive to light  Neck:      Musculoskeletal: Normal range of motion and neck supple  Thyroid: No thyromegaly  Vascular: No JVD  Cardiovascular:      Rate and Rhythm: Normal rate and regular rhythm  Heart sounds: Normal heart sounds  No murmur  Pulmonary:      Effort: Pulmonary effort is normal       Breath sounds: Normal breath sounds  No wheezing or rales  Chest:      Chest wall: No tenderness  Abdominal:      General: Bowel sounds are normal  There is no distension  Palpations: Abdomen is soft  There is no mass  Tenderness: There is no abdominal tenderness  There is no guarding or rebound  Musculoskeletal: Normal range of motion  General: No tenderness or deformity  Lymphadenopathy:      Cervical: No cervical adenopathy  Skin:     General: Skin is warm and dry        Findings: No erythema or rash  Neurological:      Mental Status: He is alert and oriented to person, place, and time  Cranial Nerves: No cranial nerve deficit  Deep Tendon Reflexes: Reflexes are normal and symmetric  Reflexes normal    Psychiatric:         Mood and Affect: Mood normal          Behavior: Behavior normal          Thought Content: Thought content normal          Judgment: Judgment normal           Data:    Laboratory Results: I have personally reviewed the pertinent laboratory results/reports   Radiology/Other Diagnostic Testing Results: I have personally reviewed pertinent reports         Lab Results   Component Value Date    WBC 5 24 01/18/2021    HGB 15 0 01/18/2021    HCT 45 4 01/18/2021    MCV 97 01/18/2021     (L) 01/18/2021     Lab Results   Component Value Date    K 4 2 01/18/2021     01/18/2021    CO2 32 01/18/2021    BUN 20 01/18/2021    CREATININE 1 17 01/18/2021    GLUF 118 (H) 01/18/2021    CALCIUM 8 7 01/18/2021    AST 21 01/18/2021    ALT 35 01/18/2021    ALKPHOS 61 01/18/2021    EGFR 68 01/18/2021     Lab Results   Component Value Date    CHOLESTEROL 172 01/18/2021    CHOLESTEROL 183 07/01/2020    CHOLESTEROL 169 12/21/2019     Lab Results   Component Value Date    HDL 52 01/18/2021    HDL 54 07/01/2020    HDL 49 12/21/2019     Lab Results   Component Value Date    LDLCALC 110 (H) 01/18/2021    LDLCALC 114 (H) 07/01/2020    LDLCALC 110 (H) 12/21/2019     Lab Results   Component Value Date    TRIG 51 01/18/2021    TRIG 77 07/01/2020    TRIG 51 12/21/2019     No results found for: [place], [place]  Lab Results   Component Value Date    BVF5SVMSIPPE 4 829 (H) 06/25/2016     No results found for: HGBA1C  Lab Results   Component Value Date    PSA 0 7 07/01/2020       Natalee Coe DO

## 2021-02-05 ENCOUNTER — TELEPHONE (OUTPATIENT)
Dept: FAMILY MEDICINE CLINIC | Facility: CLINIC | Age: 61
End: 2021-02-05

## 2021-02-05 NOTE — TELEPHONE ENCOUNTER
I spoke to patients wife, she was made aware and advised  ----- Message from Romero Noe DO sent at 2/4/2021 12:56 PM EST -----  Diclofenac 1% gel-OTC  ----- Message -----  From: Angle Yun MA  Sent: 2/4/2021  11:48 AM EST  To: Romero oNe DO    Pt called asking what the name of the cream that he could use for his elbow     TY

## 2021-05-26 DIAGNOSIS — E78.2 MIXED HYPERLIPIDEMIA: ICD-10-CM

## 2021-05-26 RX ORDER — ROSUVASTATIN CALCIUM 5 MG/1
TABLET, COATED ORAL
Qty: 90 TABLET | Refills: 3 | Status: SHIPPED | OUTPATIENT
Start: 2021-05-26 | End: 2022-04-28

## 2021-06-10 ENCOUNTER — APPOINTMENT (OUTPATIENT)
Dept: LAB | Facility: CLINIC | Age: 61
End: 2021-06-10
Payer: COMMERCIAL

## 2021-06-10 DIAGNOSIS — M25.521 RIGHT ELBOW PAIN: ICD-10-CM

## 2021-06-10 DIAGNOSIS — E78.2 MIXED HYPERLIPIDEMIA: ICD-10-CM

## 2021-06-10 DIAGNOSIS — R73.9 HYPERGLYCEMIA: ICD-10-CM

## 2021-06-10 DIAGNOSIS — K21.9 CHRONIC GERD: ICD-10-CM

## 2021-06-10 DIAGNOSIS — R35.1 NOCTURIA MORE THAN TWICE PER NIGHT: ICD-10-CM

## 2021-06-10 LAB
ALBUMIN SERPL BCP-MCNC: 4.3 G/DL (ref 3.5–5)
ALP SERPL-CCNC: 56 U/L (ref 46–116)
ALT SERPL W P-5'-P-CCNC: 40 U/L (ref 12–78)
ANION GAP SERPL CALCULATED.3IONS-SCNC: 4 MMOL/L (ref 4–13)
AST SERPL W P-5'-P-CCNC: 21 U/L (ref 5–45)
BASOPHILS # BLD AUTO: 0.03 THOUSANDS/ΜL (ref 0–0.1)
BASOPHILS NFR BLD AUTO: 1 % (ref 0–1)
BILIRUB SERPL-MCNC: 0.64 MG/DL (ref 0.2–1)
BUN SERPL-MCNC: 18 MG/DL (ref 5–25)
CALCIUM SERPL-MCNC: 8.9 MG/DL (ref 8.3–10.1)
CHLORIDE SERPL-SCNC: 108 MMOL/L (ref 100–108)
CHOLEST SERPL-MCNC: 164 MG/DL (ref 50–200)
CO2 SERPL-SCNC: 29 MMOL/L (ref 21–32)
CREAT SERPL-MCNC: 0.88 MG/DL (ref 0.6–1.3)
EOSINOPHIL # BLD AUTO: 0.03 THOUSAND/ΜL (ref 0–0.61)
EOSINOPHIL NFR BLD AUTO: 1 % (ref 0–6)
ERYTHROCYTE [DISTWIDTH] IN BLOOD BY AUTOMATED COUNT: 12.1 % (ref 11.6–15.1)
EST. AVERAGE GLUCOSE BLD GHB EST-MCNC: 108 MG/DL
GFR SERPL CREATININE-BSD FRML MDRD: 94 ML/MIN/1.73SQ M
GLUCOSE P FAST SERPL-MCNC: 104 MG/DL (ref 65–99)
HBA1C MFR BLD: 5.4 %
HCT VFR BLD AUTO: 44.7 % (ref 36.5–49.3)
HDLC SERPL-MCNC: 54 MG/DL
HGB BLD-MCNC: 14.8 G/DL (ref 12–17)
IMM GRANULOCYTES # BLD AUTO: 0.01 THOUSAND/UL (ref 0–0.2)
IMM GRANULOCYTES NFR BLD AUTO: 0 % (ref 0–2)
LDLC SERPL CALC-MCNC: 102 MG/DL (ref 0–100)
LYMPHOCYTES # BLD AUTO: 1.16 THOUSANDS/ΜL (ref 0.6–4.47)
LYMPHOCYTES NFR BLD AUTO: 27 % (ref 14–44)
MCH RBC QN AUTO: 31.8 PG (ref 26.8–34.3)
MCHC RBC AUTO-ENTMCNC: 33.1 G/DL (ref 31.4–37.4)
MCV RBC AUTO: 96 FL (ref 82–98)
MONOCYTES # BLD AUTO: 0.41 THOUSAND/ΜL (ref 0.17–1.22)
MONOCYTES NFR BLD AUTO: 10 % (ref 4–12)
NEUTROPHILS # BLD AUTO: 2.59 THOUSANDS/ΜL (ref 1.85–7.62)
NEUTS SEG NFR BLD AUTO: 61 % (ref 43–75)
NONHDLC SERPL-MCNC: 110 MG/DL
NRBC BLD AUTO-RTO: 0 /100 WBCS
PLATELET # BLD AUTO: 131 THOUSANDS/UL (ref 149–390)
PMV BLD AUTO: 12.3 FL (ref 8.9–12.7)
POTASSIUM SERPL-SCNC: 4.3 MMOL/L (ref 3.5–5.3)
PROT SERPL-MCNC: 7.4 G/DL (ref 6.4–8.2)
RBC # BLD AUTO: 4.66 MILLION/UL (ref 3.88–5.62)
SODIUM SERPL-SCNC: 141 MMOL/L (ref 136–145)
TRIGL SERPL-MCNC: 40 MG/DL
TSH SERPL DL<=0.05 MIU/L-ACNC: 2.47 UIU/ML (ref 0.36–3.74)
WBC # BLD AUTO: 4.23 THOUSAND/UL (ref 4.31–10.16)

## 2021-06-10 PROCEDURE — 84153 ASSAY OF PSA TOTAL: CPT

## 2021-06-10 PROCEDURE — 83036 HEMOGLOBIN GLYCOSYLATED A1C: CPT

## 2021-06-10 PROCEDURE — 36415 COLL VENOUS BLD VENIPUNCTURE: CPT

## 2021-06-10 PROCEDURE — 80061 LIPID PANEL: CPT

## 2021-06-10 PROCEDURE — 84443 ASSAY THYROID STIM HORMONE: CPT

## 2021-06-10 PROCEDURE — 84154 ASSAY OF PSA FREE: CPT

## 2021-06-10 PROCEDURE — 85025 COMPLETE CBC W/AUTO DIFF WBC: CPT

## 2021-06-10 PROCEDURE — 80053 COMPREHEN METABOLIC PANEL: CPT

## 2021-06-11 LAB
PSA FREE MFR SERPL: 30 %
PSA FREE SERPL-MCNC: 0.18 NG/ML
PSA SERPL-MCNC: 0.6 NG/ML (ref 0–4)

## 2021-06-14 ENCOUNTER — TELEPHONE (OUTPATIENT)
Dept: FAMILY MEDICINE CLINIC | Facility: CLINIC | Age: 61
End: 2021-06-14

## 2021-06-14 NOTE — TELEPHONE ENCOUNTER
All labs were good fasting blood sugar just above 100 david but A1c number at 5 4 showing still in normal range not prediabetic range and he needs to continue to monitor diet and exercise    Cholesterol liver kidney function all the rest was good as well as prostate I will go over this with him at his next appointment here

## 2021-07-14 ENCOUNTER — TELEMEDICINE (OUTPATIENT)
Dept: FAMILY MEDICINE CLINIC | Facility: CLINIC | Age: 61
End: 2021-07-14
Payer: COMMERCIAL

## 2021-07-14 DIAGNOSIS — J06.9 ACUTE URI: Primary | ICD-10-CM

## 2021-07-14 PROCEDURE — 99214 OFFICE O/P EST MOD 30 MIN: CPT | Performed by: NURSE PRACTITIONER

## 2021-07-14 RX ORDER — AZITHROMYCIN 250 MG/1
TABLET, FILM COATED ORAL
Qty: 6 TABLET | Refills: 0 | Status: SHIPPED | OUTPATIENT
Start: 2021-07-14 | End: 2021-07-18

## 2021-07-14 NOTE — PROGRESS NOTES
Virtual Regular Visit    Verification of patient location:    Patient is currently located in the state of [place]  Patient is currently located in a state in which I am licensed    Assessment/Plan:    Problem List Items Addressed This Visit        Respiratory    Acute URI - Primary     You have been prescribed an antibiotic  This medication is used to treat bacterial infections  Follow the directions as prescribed  Do not share this medication with anyone  Do not stop taking her medication until it is finished, even if you are feeling better  Taking medication with a full glass of water  Call the office if you experience any possible side effects  I recommend that the patient takes an over the counter probiotic or eats yogurt with live cultures in it [place]) to keep good bacteria in the gut and help prevent diarrhea  Wash hands frequently to prevent the spread of infection  Ibuprofen and/or tylenol as needed for pain or fever  If not improving over the next 7-10 days, call office  Relevant Medications    azithromycin (ZITHROMAX) 250 mg tablet               Reason for visit is   Chief Complaint   Patient presents with    URI        Encounter provider [facility]    Provider located at [place] [address]  [phone]      Recent Visits  No visits were found meeting these conditions  Showing recent visits within past 7 days and meeting all other requirements  Today's Visits  Date Type Provider Dept   07/14/21 Telemedicine [facility] [address] [facility] today's visits and meeting all other requirements  Future Appointments  No visits were found meeting these conditions  Showing future appointments within next 150 days and meeting all other requirements       The patient was identified by name and date of birth   Sukhdev Luna was informed that this is a telemedicine visit and that the visit is being conducted through [employer] and patient was informed that this is a secure, HIPAA-compliant platform  He agrees to proceed     My office door was closed  No one else was in the room  He acknowledged consent and understanding of privacy and security of the video platform  The patient has agreed to participate and understands they can discontinue the visit at any time  Patient is aware this is a billable service  Subjective  Nancy Bonilla is a 63 y o  male who presents today for cold like symptoms  He reports left side sore tort, left ear pain, blocked, and nasal congestion  He reports no fever or chills, no body aches  He reports not taking any otc medication, no ill contacts at home  Was in [place] around child who had sinus infection  He is prone to antbx, does get twice yearly  HPI     Past Medical History:   Diagnosis Date    Chronic low back pain     GERD (gastroesophageal reflux disease)     Hyperlipidemia     Sleep apnea        Past Surgical History:   Procedure Laterality Date    COLONOSCOPY      ESOPHAGOGASTRODUODENOSCOPY      MT ESOPHAGOGASTRODUODENOSCOPY TRANSORAL DIAGNOSTIC N/A 5/24/2018    Procedure: ESOPHAGOGASTRODUODENOSCOPY (EGD); Surgeon: Stas Pederson MD;  Location: [facility]; Service: Gastroenterology       Current Outpatient Medications   Medication Sig Dispense Refill    aspirin (ECOTRIN LOW STRENGTH) 81 mg EC tablet Take 81 mg by mouth daily      azithromycin (ZITHROMAX) 250 mg tablet Take 2 tablets today then 1 tablet daily x 4 days 6 tablet 0    fluocinonide (LIDEX) 0 05 % cream Apply topically 2 (two) times a day 30 g 0    pantoprazole (PROTONIX) 40 mg tablet TAKE 1 TABLET DAILY 90 tablet 3    rosuvastatin (CRESTOR) 5 mg tablet TAKE 1 TABLET DAILY 90 tablet 3     No current facility-administered medications for this visit  No Known Allergies    Review of Systems   Constitutional: Negative for activity change, chills, fatigue and fever     HENT: Positive for congestion, sinus pressure, sinus pain and sore throat  Negative for ear discharge, ear pain, tinnitus and trouble swallowing  Eyes: Negative for photophobia, pain, discharge, itching and visual disturbance  Respiratory: Negative for cough, chest tightness, shortness of breath and wheezing  Cardiovascular: Negative for chest pain and leg swelling  Gastrointestinal: Negative for abdominal distention, abdominal pain, constipation, diarrhea, nausea and vomiting  Endocrine: Negative for polydipsia, polyphagia and polyuria  Genitourinary: Negative for dysuria and frequency  Musculoskeletal: Negative for arthralgias, myalgias, neck pain and neck stiffness  Skin: Negative for color change  Neurological: Negative for dizziness, syncope, weakness, numbness and headaches  Hematological: Does not bruise/bleed easily  Psychiatric/Behavioral: Negative for behavioral problems, confusion, self-injury, sleep disturbance and suicidal ideas  The patient is not nervous/anxious  Video Exam    There were no vitals filed for this visit  Physical Exam  Constitutional:       Appearance: Normal appearance  He is not ill-appearing  Pulmonary:      Effort: Pulmonary effort is normal    Neurological:      Mental Status: He is alert  Psychiatric:         Mood and Affect: Mood normal          Behavior: Behavior normal           I spent 15 minutes with patient today in which greater than 50% of the time was spent in counseling/coordination of care regarding treatment plan     VIRTUAL VISIT Illa Bound verbally agrees to participate in [employer]  Pt is aware that [employer] could be limited without vital signs or the ability to perform a full hands-on physical [place] Push understands he or the provider may request at any time to terminate the video visit and request the patient to seek care or treatment in person

## 2021-08-01 DIAGNOSIS — K21.9 CHRONIC GERD: ICD-10-CM

## 2021-08-02 RX ORDER — PANTOPRAZOLE SODIUM 40 MG/1
TABLET, DELAYED RELEASE ORAL
Qty: 90 TABLET | Refills: 3 | Status: SHIPPED | OUTPATIENT
Start: 2021-08-02 | End: 2022-07-27

## 2021-09-15 ENCOUNTER — OFFICE VISIT (OUTPATIENT)
Dept: FAMILY MEDICINE CLINIC | Facility: CLINIC | Age: 61
End: 2021-09-15
Payer: COMMERCIAL

## 2021-09-15 VITALS
WEIGHT: 168.5 LBS | HEART RATE: 74 BPM | DIASTOLIC BLOOD PRESSURE: 80 MMHG | OXYGEN SATURATION: 98 % | HEIGHT: 64 IN | RESPIRATION RATE: 18 BRPM | BODY MASS INDEX: 28.77 KG/M2 | TEMPERATURE: 100.1 F | SYSTOLIC BLOOD PRESSURE: 140 MMHG

## 2021-09-15 DIAGNOSIS — G47.33 OBSTRUCTIVE SLEEP APNEA: Primary | ICD-10-CM

## 2021-09-15 DIAGNOSIS — E78.2 MIXED HYPERLIPIDEMIA: ICD-10-CM

## 2021-09-15 DIAGNOSIS — R35.1 NOCTURIA MORE THAN TWICE PER NIGHT: ICD-10-CM

## 2021-09-15 DIAGNOSIS — K21.9 CHRONIC GERD: ICD-10-CM

## 2021-09-15 PROCEDURE — 99396 PREV VISIT EST AGE 40-64: CPT | Performed by: FAMILY MEDICINE

## 2021-09-15 PROCEDURE — 3008F BODY MASS INDEX DOCD: CPT | Performed by: FAMILY MEDICINE

## 2021-09-15 PROCEDURE — 1036F TOBACCO NON-USER: CPT | Performed by: FAMILY MEDICINE

## 2021-09-15 PROCEDURE — 3725F SCREEN DEPRESSION PERFORMED: CPT | Performed by: FAMILY MEDICINE

## 2021-09-15 NOTE — PROGRESS NOTES
[facility]    NAME: Mega Olguin  AGE: 63 y o  SEX: male  : 1960     DATE: 9/15/2021     Assessment and Plan:     Problem List Items Addressed This Visit        Digestive    Chronic GERD      Chronic GERD continue with pantoprazole 40 mg tablets            Respiratory    Obstructive sleep apnea - Primary      Patient doing well overall working on weight reduction now his weight has come down last year in  183 lb he now he is at 168 lb he is down 15 lb continue to work on diet and exercise to improve on sleep apnea            Other    Hyperlipidemia      Mixed hyperlipidemia stable on Crestor 5 mg tablets follow-up in 6 months repeat laboratory work at that time continue with low-fat diet plan         Nocturia more than twice per night     PSA followed now yearly symptoms have not worsed  Immunizations and preventive care screenings were discussed with patient today  Appropriate education was printed on patient's after visit summary  Counseling:  Alcohol/drug use: discussed moderation in alcohol intake, the recommendations for healthy alcohol use, and avoidance of illicit drug use  Dental Health: discussed importance of regular tooth brushing, flossing, and dental visits  Injury prevention: discussed safety/seat belts, safety helmets, smoke detectors, carbon dioxide detectors, and smoking near bedding or upholstery  Sexual health: discussed sexually transmitted diseases, partner selection, use of condoms, avoidance of unintended pregnancy, and contraceptive alternatives  · Exercise: the importance of regular exercise/physical activity was discussed  Recommend exercise 3-5 times per week for at least 30 minutes  Return in about 6 months (around 3/15/2022)       Chief Complaint:     Chief Complaint   Patient presents with    Follow-up     6 month with labs      History of Present Illness:     Adult Annual Physical   Patient here for a comprehensive physical exam  The patient reports no problems  Diet and Physical Activity  · Diet/Nutrition: well balanced diet  · Exercise: no formal exercise  Depression Screening  PHQ-9 Depression Screening    PHQ-9:   Frequency of the following problems over the past two weeks:      Little interest or pleasure in doing things: 0 - not at all  Feeling down, depressed, or hopeless: 0 - not at all  PHQ-2 Score: 0       General Health  · Sleep: sleeps well  · Hearing: normal - bilateral   · Vision: no vision problems  · Dental: regular dental visits   Health  · Symptoms include: none     Review of Systems:     Review of Systems   Constitutional: Negative for chills, fatigue and fever  HENT: Negative for congestion, nosebleeds, rhinorrhea, sinus pressure and sore throat  Eyes: Negative for discharge and redness  Respiratory: Negative for cough and shortness of breath  Cardiovascular: Negative for chest pain, palpitations and leg swelling  Gastrointestinal: Negative for abdominal pain, blood in stool and nausea  Endocrine: Negative for cold intolerance, heat intolerance and polyuria  Genitourinary: Negative for dysuria and frequency  Musculoskeletal: Negative for arthralgias, back pain and myalgias  Skin: Negative for rash  Neurological: Negative for dizziness, weakness and headaches  Hematological: Negative for adenopathy  Psychiatric/Behavioral: Negative for behavioral problems and sleep disturbance  The patient is not nervous/anxious         Past Medical History:     Past Medical History:   Diagnosis Date    Chronic low back pain     GERD (gastroesophageal reflux disease)     Hyperlipidemia     Sleep apnea       Past Surgical History:     Past Surgical History:   Procedure Laterality Date    COLONOSCOPY      ESOPHAGOGASTRODUODENOSCOPY      RI ESOPHAGOGASTRODUODENOSCOPY TRANSORAL DIAGNOSTIC N/A 5/24/2018 Procedure: ESOPHAGOGASTRODUODENOSCOPY (EGD); Surgeon: Rell Jimenez MD;  Location: [facility]; Service: Gastroenterology      Family History:     Family History   Problem Relation Age of Onset    Diabetes Mother     Lung cancer Father       Social History:     Social History     Socioeconomic History    Marital status: /Civil Union     Spouse name: None    Number of children: None    Years of education: None    Highest education level: None   Occupational History    None   Tobacco Use    Smoking status: Never Smoker    Smokeless tobacco: Never Used   Vaping Use    Vaping Use: Never used   Substance and Sexual Activity    Alcohol use: Yes    Drug use: No    Sexual activity: Yes   Other Topics Concern    None   Social History Narrative    None     Social Determinants of Health     Financial Resource Strain:     Difficulty of Paying Living Expenses:    Food Insecurity:     Worried About Running Out of Food in the Last Year:     [address] in the Last Year:    Transportation Needs:     Lack of Transportation (Medical):      Lack of Transportation (Non-Medical):    Physical Activity:     Days of Exercise per Week:     Minutes of Exercise per Session:    Stress:     Feeling of Stress :    Social Connections:     Frequency of Communication with Friends and Family:     Frequency of Social Gatherings with Friends and Family:     Attends [employer]oration[employer] Services:     Active Member of Clubs or Organizations:     Attends Club or Organization Meetings:     Marital Status:    Intimate Partner Violence:     Fear of Current or Ex-Partner:     Emotionally Abused:     Physically Abused:     Sexually Abused:       Current Medications:     Current Outpatient Medications   Medication Sig Dispense Refill    aspirin (ECOTRIN LOW STRENGTH) 81 mg EC tablet Take 81 mg by mouth daily      fluocinonide (LIDEX) 0 05 % cream Apply topically 2 (two) times a day 30 g 0    pantoprazole (PROTONIX) 40 mg tablet TAKE 1 TABLET DAILY 90 tablet 3    rosuvastatin (CRESTOR) 5 mg tablet TAKE 1 TABLET DAILY 90 tablet 3     No current facility-administered medications for this visit  Allergies:     No Known Allergies   Physical Exam:     /80 (BP Location: Left arm, Patient Position: Sitting)   Pulse 74   Temp 100 1 °F (37 8 °C)   Resp 18   Ht 5' 4" (1 626 m)   Wt 76 4 kg (168 lb 8 oz)   SpO2 98%   BMI 28 92 kg/m²     Physical Exam  Vitals and nursing note reviewed  Constitutional:       Appearance: Normal appearance  He is well-developed and normal weight  HENT:      Head: Normocephalic and atraumatic  Right Ear: Tympanic membrane and ear canal normal       Left Ear: Tympanic membrane, ear canal and external ear normal       Nose: Nose normal       Mouth/Throat:      Mouth: Mucous membranes are moist       Pharynx: Oropharynx is clear  Eyes:      Conjunctiva/sclera: Conjunctivae normal    Cardiovascular:      Rate and Rhythm: Normal rate and regular rhythm  Pulses: Normal pulses  Heart sounds: Normal heart sounds  No murmur heard  Pulmonary:      Effort: Pulmonary effort is normal  No respiratory distress  Breath sounds: Normal breath sounds  Abdominal:      Palpations: Abdomen is soft  Tenderness: There is no abdominal tenderness  Musculoskeletal:      Cervical back: Neck supple  Skin:     General: Skin is warm and dry  Neurological:      General: No focal deficit present  Mental Status: He is alert and oriented to person, place, and time  Mental status is at baseline  Psychiatric:         Mood and Affect: Mood normal          Behavior: Behavior normal          Thought Content: Thought content normal          Judgment: Judgment normal         BMI Counseling: Body mass index is 28 92 kg/m²   The BMI is above normal  Nutrition recommendations include reducing portion sizes, reducing fast food intake, consuming healthier snacks, decreasing soda and/or juice intake, moderation in carbohydrate intake, reducing intake of saturated fat and trans fat and reducing intake of cholesterol  Exercise recommendations include exercising 3-5 times per week    Joana DO AUGUST [address]

## 2021-09-15 NOTE — ASSESSMENT & PLAN NOTE
Mixed hyperlipidemia stable on Crestor 5 mg tablets follow-up in 6 months repeat laboratory work at that time continue with low-fat diet plan

## 2021-09-15 NOTE — ASSESSMENT & PLAN NOTE
Patient doing well overall working on weight reduction now his weight has come down last year in November 1 183 lb he now he is at 168 lb he is down 15 lb continue to work on diet and exercise to improve on sleep apnea

## 2021-12-19 ENCOUNTER — HOSPITAL ENCOUNTER (EMERGENCY)
Facility: HOSPITAL | Age: 61
Discharge: HOME | End: 2021-12-19
Attending: EMERGENCY MEDICINE
Payer: COMMERCIAL

## 2021-12-19 VITALS
HEART RATE: 92 BPM | OXYGEN SATURATION: 100 % | RESPIRATION RATE: 18 BRPM | SYSTOLIC BLOOD PRESSURE: 160 MMHG | DIASTOLIC BLOOD PRESSURE: 75 MMHG | TEMPERATURE: 98.4 F

## 2021-12-19 DIAGNOSIS — R55 NEAR SYNCOPE: ICD-10-CM

## 2021-12-19 DIAGNOSIS — K56.41 FECAL IMPACTION (HCC): Primary | ICD-10-CM

## 2021-12-19 PROCEDURE — 99283 EMERGENCY DEPT VISIT LOW MDM: CPT

## 2021-12-19 PROCEDURE — 99282 EMERGENCY DEPT VISIT SF MDM: CPT | Performed by: EMERGENCY MEDICINE

## 2022-02-25 ENCOUNTER — OFFICE VISIT (OUTPATIENT)
Dept: FAMILY MEDICINE CLINIC | Facility: CLINIC | Age: 62
End: 2022-02-25
Payer: COMMERCIAL

## 2022-02-25 VITALS
RESPIRATION RATE: 18 BRPM | HEIGHT: 64 IN | SYSTOLIC BLOOD PRESSURE: 120 MMHG | BODY MASS INDEX: 29.16 KG/M2 | HEART RATE: 79 BPM | OXYGEN SATURATION: 98 % | TEMPERATURE: 98 F | DIASTOLIC BLOOD PRESSURE: 60 MMHG | WEIGHT: 170.8 LBS

## 2022-02-25 DIAGNOSIS — E78.2 MIXED HYPERLIPIDEMIA: ICD-10-CM

## 2022-02-25 DIAGNOSIS — R35.1 NOCTURIA MORE THAN TWICE PER NIGHT: ICD-10-CM

## 2022-02-25 DIAGNOSIS — M54.32 SCIATICA, LEFT SIDE: Primary | ICD-10-CM

## 2022-02-25 DIAGNOSIS — R73.9 HYPERGLYCEMIA: ICD-10-CM

## 2022-02-25 DIAGNOSIS — K21.9 CHRONIC GERD: ICD-10-CM

## 2022-02-25 PROCEDURE — 1036F TOBACCO NON-USER: CPT | Performed by: FAMILY MEDICINE

## 2022-02-25 PROCEDURE — 99214 OFFICE O/P EST MOD 30 MIN: CPT | Performed by: FAMILY MEDICINE

## 2022-02-25 PROCEDURE — 3008F BODY MASS INDEX DOCD: CPT | Performed by: FAMILY MEDICINE

## 2022-02-25 RX ORDER — NAPROXEN 500 MG/1
500 TABLET ORAL 2 TIMES DAILY WITH MEALS
Qty: 60 TABLET | Refills: 5 | Status: SHIPPED | OUTPATIENT
Start: 2022-02-25 | End: 2022-03-24 | Stop reason: HOSPADM

## 2022-02-25 NOTE — ASSESSMENT & PLAN NOTE
Follow-up Gastroenterology continue with pantoprazole monitor for side effects from nonsteroidals provided this time for his lumbar sprain

## 2022-02-25 NOTE — PROGRESS NOTES
Assessment/Plan:       Problem List Items Addressed This Visit        Digestive    Chronic GERD     Follow-up Gastroenterology continue with pantoprazole monitor for side effects from nonsteroidals provided this time for his lumbar sprain         Relevant Orders    CBC and differential    Comprehensive metabolic panel    Lipid panel    TSH, 3rd generation with Free T4 reflex    PSA, total and free       Nervous and Auditory    Sciatica, left side - Primary     Left-sided sciatica developed unexplained possibly from bending moving twisting he fell to his knees last night unable to move he had difficulty with urinating and at this point feels pain but is overall stable  On exam he has pain at L4-5 region left side in to his sciatic notch buttocks without radicular pain shooting down into his legs  He has a positive straight leg raise test on the left side  I recommend physical therapy home stretching exercises strengthening core muscles and stretching his lumbar spine he understood this and will follow directions at home    Anti-inflammatory medication briefly can be prescribed         Relevant Medications    naproxen (Naprosyn) 500 mg tablet    Other Relevant Orders    CBC and differential    Comprehensive metabolic panel    Lipid panel    TSH, 3rd generation with Free T4 reflex    PSA, total and free    Ambulatory Referral to Physical Therapy       Other    Hyperlipidemia     Follow-up lab work at next visit         Relevant Orders    CBC and differential    Comprehensive metabolic panel    Lipid panel    TSH, 3rd generation with Free T4 reflex    PSA, total and free    Nocturia more than twice per night     Monitor PSA         Relevant Orders    CBC and differential    Comprehensive metabolic panel    Lipid panel    TSH, 3rd generation with Free T4 reflex    PSA, total and free    Hyperglycemia     Lab work ordered review at next visit next month         Relevant Orders    CBC and differential    Comprehensive metabolic panel    Lipid panel    TSH, 3rd generation with Free T4 reflex    PSA, total and free            Subjective:      Patient ID: Jigna Hutchinson is a 63 y o  male  Patient presents today for left-sided pain shooting in sciatic distribution into his buttocks over the last 2 days      The following portions of the patient's history were reviewed and updated as appropriate: allergies, current medications, past family history, past medical history, past social history, past surgical history and problem list     Review of Systems   Constitutional: Negative for chills, fatigue and fever  HENT: Negative for congestion, nosebleeds, rhinorrhea, sinus pressure and sore throat  Eyes: Negative for discharge and redness  Respiratory: Negative for cough and shortness of breath  Cardiovascular: Negative for chest pain, palpitations and leg swelling  Gastrointestinal: Negative for abdominal pain, blood in stool and nausea  Endocrine: Negative for cold intolerance, heat intolerance and polyuria  Genitourinary: Negative for dysuria and frequency  Musculoskeletal: Positive for back pain  Negative for arthralgias and myalgias  Skin: Negative for rash  Neurological: Negative for dizziness, weakness and headaches  Hematological: Negative for adenopathy  Psychiatric/Behavioral: Negative for behavioral problems and sleep disturbance  The patient is not nervous/anxious  Objective:      /60 (BP Location: Left arm, Patient Position: Standing)   Pulse 79   Temp 98 °F (36 7 °C)   Resp 18   Ht 5' 4" (1 626 m)   Wt 77 5 kg (170 lb 12 8 oz)   SpO2 98%   BMI 29 32 kg/m²        Physical Exam  Vitals and nursing note reviewed  Constitutional:       Appearance: Normal appearance  He is well-developed and normal weight  HENT:      Head: Normocephalic and atraumatic        Right Ear: External ear normal       Left Ear: External ear normal       Nose: Nose normal    Eyes:      General: No scleral icterus  Conjunctiva/sclera: Conjunctivae normal       Pupils: Pupils are equal, round, and reactive to light  Neck:      Thyroid: No thyromegaly  Vascular: No JVD  Cardiovascular:      Rate and Rhythm: Normal rate and regular rhythm  Heart sounds: Normal heart sounds  No murmur heard  Pulmonary:      Effort: Pulmonary effort is normal       Breath sounds: Normal breath sounds  No wheezing or rales  Chest:      Chest wall: No tenderness  Abdominal:      General: Bowel sounds are normal  There is no distension  Palpations: Abdomen is soft  There is no mass  Tenderness: There is no abdominal tenderness  There is no guarding or rebound  Musculoskeletal:         General: No tenderness or deformity  Normal range of motion  Cervical back: Normal range of motion and neck supple  Comments: Sciatic notch pain left side in to lumbar spine   Lymphadenopathy:      Cervical: No cervical adenopathy  Skin:     General: Skin is warm and dry  Findings: No erythema or rash  Neurological:      Mental Status: He is alert and oriented to person, place, and time  Cranial Nerves: No cranial nerve deficit  Deep Tendon Reflexes: Reflexes are normal and symmetric  Reflexes normal    Psychiatric:         Behavior: Behavior normal          Thought Content: Thought content normal          Judgment: Judgment normal           Data:    Laboratory Results: I have personally reviewed the pertinent laboratory results/reports   Radiology/Other Diagnostic Testing Results: I have personally reviewed pertinent reports         Lab Results   Component Value Date    WBC 4 23 (L) 06/10/2021    HGB 14 8 06/10/2021    HCT 44 7 06/10/2021    MCV 96 06/10/2021     (L) 06/10/2021     Lab Results   Component Value Date    K 4 3 06/10/2021     06/10/2021    CO2 29 06/10/2021    BUN 18 06/10/2021    CREATININE 0 88 06/10/2021    GLUF 104 (H) 06/10/2021    CALCIUM 8 9 06/10/2021 AST 21 06/10/2021    ALT 40 06/10/2021    ALKPHOS 56 06/10/2021    EGFR 94 06/10/2021     Lab Results   Component Value Date    CHOLESTEROL 164 06/10/2021    CHOLESTEROL 172 01/18/2021    CHOLESTEROL 183 07/01/2020     Lab Results   Component Value Date    HDL 54 06/10/2021    HDL 52 01/18/2021    HDL 54 07/01/2020     Lab Results   Component Value Date    LDLCALC 102 (H) 06/10/2021    LDLCALC 110 (H) 01/18/2021    LDLCALC 114 (H) 07/01/2020     Lab Results   Component Value Date    TRIG 40 06/10/2021    TRIG 51 01/18/2021    TRIG 77 07/01/2020     No results found for: [place], [place]  Lab Results   Component Value Date    [id number] 2 470 06/10/2021     Lab Results   Component Value Date    HGBA1C 5 4 06/10/2021     Lab Results   Component Value Date    PSA 0 6 06/10/2021       [facility], DO

## 2022-02-25 NOTE — ASSESSMENT & PLAN NOTE
Left-sided sciatica developed unexplained possibly from bending moving twisting he fell to his knees last night unable to move he had difficulty with urinating and at this point feels pain but is overall stable  On exam he has pain at L4-5 region left side in to his sciatic notch buttocks without radicular pain shooting down into his legs  He has a positive straight leg raise test on the left side  I recommend physical therapy home stretching exercises strengthening core muscles and stretching his lumbar spine he understood this and will follow directions at home    Anti-inflammatory medication briefly can be prescribed

## 2022-03-19 ENCOUNTER — APPOINTMENT (OUTPATIENT)
Dept: LAB | Facility: CLINIC | Age: 62
End: 2022-03-19
Payer: COMMERCIAL

## 2022-03-19 ENCOUNTER — RA CDI HCC (OUTPATIENT)
Dept: OTHER | Facility: HOSPITAL | Age: 62
End: 2022-03-19

## 2022-03-19 DIAGNOSIS — R73.9 HYPERGLYCEMIA: ICD-10-CM

## 2022-03-19 DIAGNOSIS — K21.9 CHRONIC GERD: ICD-10-CM

## 2022-03-19 DIAGNOSIS — M54.32 SCIATICA, LEFT SIDE: ICD-10-CM

## 2022-03-19 DIAGNOSIS — E78.2 MIXED HYPERLIPIDEMIA: ICD-10-CM

## 2022-03-19 DIAGNOSIS — R35.1 NOCTURIA MORE THAN TWICE PER NIGHT: ICD-10-CM

## 2022-03-19 LAB
ALBUMIN SERPL BCP-MCNC: 4.1 G/DL (ref 3.5–5)
ALP SERPL-CCNC: 42 U/L (ref 46–116)
ALT SERPL W P-5'-P-CCNC: 33 U/L (ref 12–78)
ANION GAP SERPL CALCULATED.3IONS-SCNC: 6 MMOL/L (ref 4–13)
AST SERPL W P-5'-P-CCNC: 22 U/L (ref 5–45)
BASOPHILS # BLD AUTO: 0.04 THOUSANDS/ΜL (ref 0–0.1)
BASOPHILS NFR BLD AUTO: 1 % (ref 0–1)
BILIRUB SERPL-MCNC: 0.78 MG/DL (ref 0.2–1)
BUN SERPL-MCNC: 21 MG/DL (ref 5–25)
CALCIUM SERPL-MCNC: 8.9 MG/DL (ref 8.3–10.1)
CHLORIDE SERPL-SCNC: 108 MMOL/L (ref 100–108)
CHOLEST SERPL-MCNC: 146 MG/DL
CO2 SERPL-SCNC: 27 MMOL/L (ref 21–32)
CREAT SERPL-MCNC: 1.02 MG/DL (ref 0.6–1.3)
EOSINOPHIL # BLD AUTO: 0.1 THOUSAND/ΜL (ref 0–0.61)
EOSINOPHIL NFR BLD AUTO: 3 % (ref 0–6)
ERYTHROCYTE [DISTWIDTH] IN BLOOD BY AUTOMATED COUNT: 12 % (ref 11.6–15.1)
GFR SERPL CREATININE-BSD FRML MDRD: 79 ML/MIN/1.73SQ M
GLUCOSE P FAST SERPL-MCNC: 93 MG/DL (ref 65–99)
HCT VFR BLD AUTO: 44.2 % (ref 36.5–49.3)
HDLC SERPL-MCNC: 47 MG/DL
HGB BLD-MCNC: 14.9 G/DL (ref 12–17)
IMM GRANULOCYTES # BLD AUTO: 0.01 THOUSAND/UL (ref 0–0.2)
IMM GRANULOCYTES NFR BLD AUTO: 0 % (ref 0–2)
LDLC SERPL CALC-MCNC: 89 MG/DL (ref 0–100)
LYMPHOCYTES # BLD AUTO: 1.55 THOUSANDS/ΜL (ref 0.6–4.47)
LYMPHOCYTES NFR BLD AUTO: 39 % (ref 14–44)
MCH RBC QN AUTO: 32.3 PG (ref 26.8–34.3)
MCHC RBC AUTO-ENTMCNC: 33.7 G/DL (ref 31.4–37.4)
MCV RBC AUTO: 96 FL (ref 82–98)
MONOCYTES # BLD AUTO: 0.43 THOUSAND/ΜL (ref 0.17–1.22)
MONOCYTES NFR BLD AUTO: 11 % (ref 4–12)
NEUTROPHILS # BLD AUTO: 1.83 THOUSANDS/ΜL (ref 1.85–7.62)
NEUTS SEG NFR BLD AUTO: 46 % (ref 43–75)
NONHDLC SERPL-MCNC: 99 MG/DL
NRBC BLD AUTO-RTO: 0 /100 WBCS
PLATELET # BLD AUTO: 128 THOUSANDS/UL (ref 149–390)
PMV BLD AUTO: 11.9 FL (ref 8.9–12.7)
POTASSIUM SERPL-SCNC: 4 MMOL/L (ref 3.5–5.3)
PROT SERPL-MCNC: 7.1 G/DL (ref 6.4–8.2)
RBC # BLD AUTO: 4.62 MILLION/UL (ref 3.88–5.62)
SODIUM SERPL-SCNC: 141 MMOL/L (ref 136–145)
TRIGL SERPL-MCNC: 50 MG/DL
TSH SERPL DL<=0.05 MIU/L-ACNC: 2.78 UIU/ML (ref 0.36–3.74)
WBC # BLD AUTO: 3.96 THOUSAND/UL (ref 4.31–10.16)

## 2022-03-19 PROCEDURE — 36415 COLL VENOUS BLD VENIPUNCTURE: CPT

## 2022-03-19 PROCEDURE — 84153 ASSAY OF PSA TOTAL: CPT

## 2022-03-19 PROCEDURE — 80053 COMPREHEN METABOLIC PANEL: CPT

## 2022-03-19 PROCEDURE — 84443 ASSAY THYROID STIM HORMONE: CPT

## 2022-03-19 PROCEDURE — 84154 ASSAY OF PSA FREE: CPT

## 2022-03-19 PROCEDURE — 80061 LIPID PANEL: CPT

## 2022-03-19 PROCEDURE — 85025 COMPLETE CBC W/AUTO DIFF WBC: CPT

## 2022-03-19 NOTE — PROGRESS NOTES
[facility]  coding opportunities       Chart reviewed, no opportunity found: CHART REVIEWED, NO OPPORTUNITY FOUND        Patients Insurance        Commercial Insurance: [address] [place] [address]

## 2022-03-22 LAB
PSA FREE MFR SERPL: 32 %
PSA FREE SERPL-MCNC: 0.16 NG/ML
PSA SERPL-MCNC: 0.5 NG/ML (ref 0–4)

## 2022-03-24 ENCOUNTER — OFFICE VISIT (OUTPATIENT)
Dept: FAMILY MEDICINE CLINIC | Facility: CLINIC | Age: 62
End: 2022-03-24
Payer: COMMERCIAL

## 2022-03-24 VITALS
SYSTOLIC BLOOD PRESSURE: 132 MMHG | TEMPERATURE: 98.8 F | BODY MASS INDEX: 27.83 KG/M2 | HEART RATE: 74 BPM | HEIGHT: 64 IN | DIASTOLIC BLOOD PRESSURE: 80 MMHG | OXYGEN SATURATION: 98 % | WEIGHT: 163 LBS

## 2022-03-24 DIAGNOSIS — G47.33 OBSTRUCTIVE SLEEP APNEA: ICD-10-CM

## 2022-03-24 DIAGNOSIS — E78.2 MIXED HYPERLIPIDEMIA: ICD-10-CM

## 2022-03-24 DIAGNOSIS — K21.9 CHRONIC GERD: Primary | ICD-10-CM

## 2022-03-24 DIAGNOSIS — M54.32 SCIATICA, LEFT SIDE: ICD-10-CM

## 2022-03-24 PROCEDURE — 1036F TOBACCO NON-USER: CPT | Performed by: FAMILY MEDICINE

## 2022-03-24 PROCEDURE — 99214 OFFICE O/P EST MOD 30 MIN: CPT | Performed by: FAMILY MEDICINE

## 2022-03-24 PROCEDURE — 3008F BODY MASS INDEX DOCD: CPT | Performed by: FAMILY MEDICINE

## 2022-03-24 PROCEDURE — 3725F SCREEN DEPRESSION PERFORMED: CPT | Performed by: FAMILY MEDICINE

## 2022-03-24 NOTE — PROGRESS NOTES
Assessment/Plan:       Problem List Items Addressed This Visit        Digestive    Chronic GERD - Primary     Chronic GERD stable with pantoprazole 40 mg continue same dosage            Respiratory    Obstructive sleep apnea     Stable continue same treatment            Nervous and Auditory    Sciatica, left side     Continue home exercise program stretching and strengthening            Other    Hyperlipidemia     Continue with Crestor 5 mg tablets                 Subjective:      Patient ID: Jonathan King is a 63 y o  male  Follow-up exam 6 months and review lab work for hyperglycemia  Patient has been dieting and exercising he has dropped weight now      The following portions of the patient's history were reviewed and updated as appropriate: allergies, current medications, past family history, past medical history, past social history, past surgical history and problem list     Review of Systems   Constitutional: Negative for chills, fatigue and fever  HENT: Negative for congestion, nosebleeds, rhinorrhea, sinus pressure and sore throat  Eyes: Negative for discharge and redness  Respiratory: Negative for cough and shortness of breath  Cardiovascular: Negative for chest pain, palpitations and leg swelling  Gastrointestinal: Negative for abdominal pain, blood in stool and nausea  Endocrine: Negative for cold intolerance, heat intolerance and polyuria  Genitourinary: Negative for dysuria and frequency  Musculoskeletal: Negative for arthralgias, back pain and myalgias  Skin: Negative for rash  Neurological: Negative for dizziness, weakness and headaches  Hematological: Negative for adenopathy  Psychiatric/Behavioral: Negative for behavioral problems and sleep disturbance  The patient is not nervous/anxious            Objective:      /80 (BP Location: Left arm, Patient Position: Sitting)   Pulse 74   Temp 98 8 °F (37 1 °C)   Ht 5' 4" (1 626 m)   Wt 73 9 kg (163 lb)   SpO2 98% BMI 27 98 kg/m²        Physical Exam  Vitals and nursing note reviewed  Constitutional:       Appearance: Normal appearance  He is well-developed and normal weight  HENT:      Head: Normocephalic and atraumatic  Right Ear: Tympanic membrane, ear canal and external ear normal       Left Ear: Tympanic membrane, ear canal and external ear normal       Nose: Nose normal       Mouth/Throat:      Mouth: Mucous membranes are moist       Pharynx: Oropharynx is clear  Eyes:      General: No scleral icterus  Extraocular Movements: Extraocular movements intact  Conjunctiva/sclera: Conjunctivae normal       Pupils: Pupils are equal, round, and reactive to light  Neck:      Thyroid: No thyromegaly  Vascular: No JVD  Cardiovascular:      Rate and Rhythm: Normal rate and regular rhythm  Pulses: Normal pulses  Heart sounds: Normal heart sounds  No murmur heard  Pulmonary:      Effort: Pulmonary effort is normal       Breath sounds: Normal breath sounds  No wheezing or rales  Chest:      Chest wall: No tenderness  Abdominal:      General: Bowel sounds are normal  There is no distension  Palpations: Abdomen is soft  There is no mass  Tenderness: There is no abdominal tenderness  There is no guarding or rebound  Musculoskeletal:         General: No tenderness or deformity  Normal range of motion  Cervical back: Normal range of motion and neck supple  Lymphadenopathy:      Cervical: No cervical adenopathy  Skin:     General: Skin is warm and dry  Capillary Refill: Capillary refill takes less than 2 seconds  Findings: No erythema or rash  Neurological:      General: No focal deficit present  Mental Status: He is alert and oriented to person, place, and time  Mental status is at baseline  Cranial Nerves: No cranial nerve deficit  Deep Tendon Reflexes: Reflexes are normal and symmetric   Reflexes normal    Psychiatric:         Mood and Affect: Mood normal          Behavior: Behavior normal          Thought Content: Thought content normal          Judgment: Judgment normal           Data:    Laboratory Results: I have personally reviewed the pertinent laboratory results/reports   Radiology/Other Diagnostic Testing Results: I have personally reviewed pertinent reports         Lab Results   Component Value Date    WBC 3 96 (L) 03/19/2022    HGB 14 9 03/19/2022    HCT 44 2 03/19/2022    MCV 96 03/19/2022     (L) 03/19/2022     Lab Results   Component Value Date    K 4 0 03/19/2022     03/19/2022    CO2 27 03/19/2022    BUN 21 03/19/2022    CREATININE 1 02 03/19/2022    GLUF 93 03/19/2022    CALCIUM 8 9 03/19/2022    AST 22 03/19/2022    ALT 33 03/19/2022    ALKPHOS 42 (L) 03/19/2022    EGFR 79 03/19/2022     Lab Results   Component Value Date    CHOLESTEROL 146 03/19/2022    CHOLESTEROL 164 06/10/2021    CHOLESTEROL 172 01/18/2021     Lab Results   Component Value Date    HDL 47 03/19/2022    HDL 54 06/10/2021    HDL 52 01/18/2021     Lab Results   Component Value Date    LDLCALC 89 03/19/2022    LDLCALC 102 (H) 06/10/2021    LDLCALC 110 (H) 01/18/2021     Lab Results   Component Value Date    TRIG 50 03/19/2022    TRIG 40 06/10/2021    TRIG 51 01/18/2021     No results found for: [place], [place]  Lab Results   Component Value Date    [id number] 2 780 03/19/2022     Lab Results   Component Value Date    HGBA1C 5 4 06/10/2021     Lab Results   Component Value Date    PSA 0 5 03/19/2022       Carrington Ryan DO

## 2022-04-28 DIAGNOSIS — E78.2 MIXED HYPERLIPIDEMIA: ICD-10-CM

## 2022-04-28 RX ORDER — ROSUVASTATIN CALCIUM 5 MG/1
TABLET, COATED ORAL
Qty: 90 TABLET | Refills: 3 | Status: SHIPPED | OUTPATIENT
Start: 2022-04-28

## 2022-05-12 ENCOUNTER — OFFICE VISIT (OUTPATIENT)
Dept: FAMILY MEDICINE CLINIC | Facility: CLINIC | Age: 62
End: 2022-05-12
Payer: COMMERCIAL

## 2022-05-12 VITALS — RESPIRATION RATE: 20 BRPM | TEMPERATURE: 99.8 F | OXYGEN SATURATION: 97 % | HEART RATE: 82 BPM

## 2022-05-12 DIAGNOSIS — R09.81 NASAL CONGESTION: Primary | ICD-10-CM

## 2022-05-12 DIAGNOSIS — G47.33 OBSTRUCTIVE SLEEP APNEA: ICD-10-CM

## 2022-05-12 DIAGNOSIS — R73.9 HYPERGLYCEMIA: ICD-10-CM

## 2022-05-12 DIAGNOSIS — U07.1 COVID-19 VIRUS INFECTION: ICD-10-CM

## 2022-05-12 LAB
SARS-COV-2 AG UPPER RESP QL IA: POSITIVE
VALID CONTROL: ABNORMAL

## 2022-05-12 PROCEDURE — 1036F TOBACCO NON-USER: CPT | Performed by: FAMILY MEDICINE

## 2022-05-12 PROCEDURE — 87811 SARS-COV-2 COVID19 W/OPTIC: CPT | Performed by: FAMILY MEDICINE

## 2022-05-12 PROCEDURE — 99214 OFFICE O/P EST MOD 30 MIN: CPT | Performed by: FAMILY MEDICINE

## 2022-05-12 NOTE — ASSESSMENT & PLAN NOTE
Hyperglycemia stable monitor for changes in blood sugar and fluctuations due to recent COVID infection

## 2022-05-12 NOTE — ASSESSMENT & PLAN NOTE
Complicated situation for COVID infection he will watch for change in respiratory status and contact me if symptoms change or worsen

## 2022-05-12 NOTE — PROGRESS NOTES
COVID-19 Outpatient Progress Note    Assessment/Plan:    Problem List Items Addressed This Visit        Respiratory    Obstructive sleep apnea     Complicated situation for COVID infection he will watch for change in respiratory status and contact me if symptoms change or worsen              Other    Hyperglycemia     Hyperglycemia stable monitor for changes in blood sugar and fluctuations due to recent COVID infection           COVID-19 virus infection     Symptoms began yesterday congestion sore throat came to the office for evaluation testing done in the parking lot came back positive after rapid testing he denies exposure or known contact  He has been working from home and wears a mask when he goes out to stores  He will take vitamin-C D and zinc and continue with home treatment plans for symptoms and call me in 24 hours if change or worsening symptoms he declines other treatment at this time  He did have positive COVID infection in January as well and he is unvaccinated             Other Visit Diagnoses     Nasal congestion    -  Primary    Relevant Orders    POCT Rapid Covid Ag (Completed)         Disposition:     Rapid antigen testing was performed and the result is POSITIVE for COVID-19  Patient has COVID-19 infection  Based off CDC guidelines, they were recommended to isolate for 5 days from the date of the positive test  If they remain asymptomatic, isolation may be ended followed by 5 days of wearing a mask when around othes to minimize risk of infecting others  If they have a fever, continue to stay home until fever resolves for at least 24 hours  Discussed symptom directed medication options with patient  Discussed vitamin D, vitamin C, and/or zinc supplementation with patient  I have spent 27 minutes directly with the patient   Greater than 50% of this time was spent in counseling/coordination of care regarding: diagnostic results, prognosis, risks and benefits of treatment options, instructions for management, patient and family education, importance of treatment compliance, risk factor reductions and impressions  Encounter provider Tonna Ganser, DO    Provider located at [facility] [address]  [address] [address] [place] [address]  [phone]    Recent Visits  No visits were found meeting these conditions  Showing recent visits within past 7 days and meeting all other requirements  Today's Visits  Date Type Provider Dept   05/12/22 Office Visit Tonna Ganser, DO [address] [facility] today's visits and meeting all other requirements  Future Appointments  No visits were found meeting these conditions  Showing future appointments within next 150 days and meeting all other requirements     Subjective:   Deb Phan is a 63 y o  male who is concerned about COVID-19  Patient's symptoms include nasal congestion, sore throat and headache  Patient denies fever, chills, fatigue, rhinorrhea, cough, shortness of breath, abdominal pain, nausea and myalgias           COVID-19 vaccination status: Not vaccinated    Exposure:   Contact with a person who is under investigation (PUI) for or who is positive for COVID-19 within the last 14 days?: No    Hospitalized recently for fever and/or lower respiratory symptoms?: No      Currently a healthcare worker that is involved in direct patient care?: No      Works in a special setting where the risk of COVID-19 transmission may be high? (this may include long-term care, correctional and group home facilities; homeless shelters; assisted-living facilities and group homes ): No      Resident in a special setting where the risk of COVID-19 transmission may be high? (this may include long-term care, correctional and group home facilities; homeless shelters; assisted-living facilities and group homes ): No      Lab Results   Component Value Date    SARSCOVAG Positive (A) 05/12/2022     Past Medical History: Diagnosis Date    Chronic low back pain     GERD (gastroesophageal reflux disease)     Hyperlipidemia     Sleep apnea      Past Surgical History:   Procedure Laterality Date    COLONOSCOPY      ESOPHAGOGASTRODUODENOSCOPY      SD ESOPHAGOGASTRODUODENOSCOPY TRANSORAL DIAGNOSTIC N/A 5/24/2018    Procedure: ESOPHAGOGASTRODUODENOSCOPY (EGD); Surgeon: Obinna Curry MD;  Location: [facility]; Service: Gastroenterology     Current Outpatient Medications   Medication Sig Dispense Refill    aspirin (ECOTRIN LOW STRENGTH) 81 mg EC tablet Take 81 mg by mouth daily      pantoprazole (PROTONIX) 40 mg tablet TAKE 1 TABLET DAILY 90 tablet 3    rosuvastatin (CRESTOR) 5 mg tablet TAKE 1 TABLET DAILY 90 tablet 3     No current facility-administered medications for this visit  No Known Allergies    Review of Systems   Constitutional: Negative for chills, fatigue and fever  HENT: Positive for congestion and sore throat  Negative for nosebleeds, rhinorrhea and sinus pressure  Eyes: Negative for discharge and redness  Respiratory: Negative for cough and shortness of breath  Cardiovascular: Negative for chest pain, palpitations and leg swelling  Gastrointestinal: Negative for abdominal pain, blood in stool and nausea  Endocrine: Negative for cold intolerance, heat intolerance and polyuria  Genitourinary: Negative for dysuria and frequency  Musculoskeletal: Negative for arthralgias, back pain and myalgias  Skin: Negative for rash  Neurological: Positive for headaches  Negative for dizziness and weakness  Hematological: Negative for adenopathy  Psychiatric/Behavioral: Negative for behavioral problems and sleep disturbance  The patient is not nervous/anxious  Objective:    Vitals:    05/12/22 1557   Pulse: 82   Resp: 20   Temp: 99 8 °F (37 7 °C)   SpO2: 97%       Physical Exam  Vitals and nursing note reviewed  Constitutional:       Appearance: Normal appearance   He is well-developed and normal weight  HENT:      Head: Normocephalic and atraumatic  Right Ear: Tympanic membrane, ear canal and external ear normal       Left Ear: Tympanic membrane, ear canal and external ear normal       Nose: Congestion and rhinorrhea present  Mouth/Throat:      Mouth: Mucous membranes are moist       Pharynx: Oropharynx is clear  Eyes:      Extraocular Movements: Extraocular movements intact  Conjunctiva/sclera: Conjunctivae normal       Pupils: Pupils are equal, round, and reactive to light  Cardiovascular:      Rate and Rhythm: Normal rate and regular rhythm  Pulses: Normal pulses  Heart sounds: Normal heart sounds  No murmur heard  Pulmonary:      Effort: Pulmonary effort is normal  No respiratory distress  Breath sounds: Normal breath sounds  Abdominal:      Palpations: Abdomen is soft  Tenderness: There is no abdominal tenderness  Musculoskeletal:      Cervical back: Normal range of motion and neck supple  Skin:     General: Skin is warm and dry  Neurological:      General: No focal deficit present  Mental Status: He is alert and oriented to person, place, and time  Mental status is at baseline  Psychiatric:         Mood and Affect: Mood normal          Behavior: Behavior normal          Thought Content: Thought content normal          Judgment: Judgment normal          VIRTUAL VISIT DISCLAIMER    Deb Phan verbally agrees to participate in [employer]  Pt is aware that [employer] could be limited without vital signs or the ability to perform a full hands-on physical Mojgan Briseno understands he or the provider may request at any time to terminate the video visit and request the patient to seek care or treatment in person

## 2022-05-12 NOTE — ASSESSMENT & PLAN NOTE
Symptoms began yesterday congestion sore throat came to the office for evaluation testing done in the parking lot came back positive after rapid testing he denies exposure or known contact  He has been working from home and wears a mask when he goes out to stores  He will take vitamin-C D and zinc and continue with home treatment plans for symptoms and call me in 24 hours if change or worsening symptoms he declines other treatment at this time    He did have positive COVID infection in January as well and he is unvaccinated

## 2022-07-19 ENCOUNTER — OFFICE VISIT (OUTPATIENT)
Dept: FAMILY MEDICINE CLINIC | Facility: CLINIC | Age: 62
End: 2022-07-19
Payer: COMMERCIAL

## 2022-07-19 VITALS
BODY MASS INDEX: 27.9 KG/M2 | TEMPERATURE: 98.1 F | DIASTOLIC BLOOD PRESSURE: 80 MMHG | OXYGEN SATURATION: 97 % | SYSTOLIC BLOOD PRESSURE: 128 MMHG | HEIGHT: 64 IN | WEIGHT: 163.4 LBS | HEART RATE: 76 BPM | RESPIRATION RATE: 18 BRPM

## 2022-07-19 DIAGNOSIS — L74.0 HEAT RASH: Primary | ICD-10-CM

## 2022-07-19 PROCEDURE — 99213 OFFICE O/P EST LOW 20 MIN: CPT | Performed by: FAMILY MEDICINE

## 2022-07-19 RX ORDER — DESOXIMETASONE 2.5 MG/G
CREAM TOPICAL 2 TIMES DAILY
Qty: 30 G | Refills: 0 | Status: SHIPPED | OUTPATIENT
Start: 2022-07-19

## 2022-07-19 RX ORDER — HYDROXYZINE HYDROCHLORIDE 25 MG/1
25 TABLET, FILM COATED ORAL EVERY 6 HOURS PRN
Qty: 30 TABLET | Refills: 1 | Status: SHIPPED | OUTPATIENT
Start: 2022-07-19

## 2022-07-19 NOTE — ASSESSMENT & PLAN NOTE
Petechial rash which is papular in form over his upper thighs groin and lower back into the upper buttocks    This occurred as a result of combination of stress and heat and I will give him hydrocortisone at this time along with Atarax

## 2022-07-19 NOTE — PROGRESS NOTES
Assessment/Plan:       Problem List Items Addressed This Visit        Musculoskeletal and Integument    Heat rash - Primary     Petechial rash which is papular in form over his upper thighs groin and lower back into the upper buttocks  This occurred as a result of combination of stress and heat and I will give him hydrocortisone at this time along with Atarax                 Subjective:      Patient ID: Christine Dejesus is a 63 y o  male  Papular rash over groin and legs      The following portions of the patient's history were reviewed and updated as appropriate: allergies, current medications, past family history, past medical history, past social history, past surgical history and problem list     Review of Systems   Constitutional: Negative for chills, fatigue and fever  HENT: Negative for congestion, nosebleeds, rhinorrhea, sinus pressure and sore throat  Eyes: Negative for discharge and redness  Respiratory: Negative for cough and shortness of breath  Cardiovascular: Negative for chest pain, palpitations and leg swelling  Gastrointestinal: Negative for abdominal pain, blood in stool and nausea  Endocrine: Negative for cold intolerance, heat intolerance and polyuria  Genitourinary: Negative for dysuria and frequency  Musculoskeletal: Negative for arthralgias, back pain and myalgias  Skin: Positive for rash  Neurological: Negative for dizziness, weakness and headaches  Hematological: Negative for adenopathy  Psychiatric/Behavioral: Negative for behavioral problems and sleep disturbance  The patient is not nervous/anxious  Objective:      /80 (BP Location: Left arm, Patient Position: Sitting)   Pulse 76   Temp 98 1 °F (36 7 °C)   Resp 18   Ht 5' 4" (1 626 m)   Wt 74 1 kg (163 lb 6 4 oz)   SpO2 97%   BMI 28 05 kg/m²        Physical Exam  Vitals and nursing note reviewed  Constitutional:       Appearance: Normal appearance  He is well-developed and normal weight  HENT:      Head: Normocephalic and atraumatic  Right Ear: Tympanic membrane and external ear normal       Left Ear: External ear normal       Nose: Nose normal       Mouth/Throat:      Mouth: Mucous membranes are moist    Eyes:      General: No scleral icterus  Conjunctiva/sclera: Conjunctivae normal       Pupils: Pupils are equal, round, and reactive to light  Neck:      Thyroid: No thyromegaly  Vascular: No JVD  Cardiovascular:      Rate and Rhythm: Normal rate and regular rhythm  Pulses: Normal pulses  Heart sounds: Normal heart sounds  No murmur heard  Pulmonary:      Effort: Pulmonary effort is normal       Breath sounds: Normal breath sounds  No wheezing or rales  Chest:      Chest wall: No tenderness  Abdominal:      General: Bowel sounds are normal  There is no distension  Palpations: Abdomen is soft  There is no mass  Tenderness: There is no abdominal tenderness  There is no guarding or rebound  Musculoskeletal:         General: No tenderness or deformity  Normal range of motion  Cervical back: Normal range of motion and neck supple  Lymphadenopathy:      Cervical: No cervical adenopathy  Skin:     General: Skin is warm and dry  Findings: No erythema or rash  Neurological:      General: No focal deficit present  Mental Status: He is alert and oriented to person, place, and time  Cranial Nerves: No cranial nerve deficit  Deep Tendon Reflexes: Reflexes are normal and symmetric  Reflexes normal    Psychiatric:         Mood and Affect: Mood normal          Behavior: Behavior normal          Thought Content: Thought content normal          Judgment: Judgment normal           Data:    Laboratory Results: I have personally reviewed the pertinent laboratory results/reports   Radiology/Other Diagnostic Testing Results: I have personally reviewed pertinent reports         Lab Results   Component Value Date    WBC 3 96 (L) 03/19/2022    HGB 14 9 03/19/2022    HCT 44 2 03/19/2022    MCV 96 03/19/2022     (L) 03/19/2022     Lab Results   Component Value Date    K 4 0 03/19/2022     03/19/2022    CO2 27 03/19/2022    BUN 21 03/19/2022    CREATININE 1 02 03/19/2022    GLUF 93 03/19/2022    CALCIUM 8 9 03/19/2022    AST 22 03/19/2022    ALT 33 03/19/2022    ALKPHOS 42 (L) 03/19/2022    EGFR 79 03/19/2022     Lab Results   Component Value Date    CHOLESTEROL 146 03/19/2022    CHOLESTEROL 164 06/10/2021    CHOLESTEROL 172 01/18/2021     Lab Results   Component Value Date    HDL 47 03/19/2022    HDL 54 06/10/2021    HDL 52 01/18/2021     Lab Results   Component Value Date    LDLCALC 89 03/19/2022    LDLCALC 102 (H) 06/10/2021    LDLCALC 110 (H) 01/18/2021     Lab Results   Component Value Date    TRIG 50 03/19/2022    TRIG 40 06/10/2021    TRIG 51 01/18/2021     No results found for: [place], [place]  Lab Results   Component Value Date    [id number] 2 780 03/19/2022     Lab Results   Component Value Date    HGBA1C 5 4 06/10/2021     Lab Results   Component Value Date    PSA 0 5 03/19/2022       Lance Go, DO    BMI Counseling: Body mass index is 28 05 kg/m²  The BMI is above normal  Nutrition recommendations include reducing portion sizes, decreasing overall calorie intake, reducing fast food intake, consuming healthier snacks, decreasing soda and/or juice intake and moderation in carbohydrate intake  Exercise recommendations include exercising 3-5 times per week

## 2022-07-27 DIAGNOSIS — K21.9 CHRONIC GERD: ICD-10-CM

## 2022-07-27 RX ORDER — PANTOPRAZOLE SODIUM 40 MG/1
TABLET, DELAYED RELEASE ORAL
Qty: 90 TABLET | Refills: 3 | Status: SHIPPED | OUTPATIENT
Start: 2022-07-27

## 2022-07-28 ENCOUNTER — OFFICE VISIT (OUTPATIENT)
Dept: FAMILY MEDICINE CLINIC | Facility: CLINIC | Age: 62
End: 2022-07-28
Payer: COMMERCIAL

## 2022-07-28 VITALS
SYSTOLIC BLOOD PRESSURE: 120 MMHG | RESPIRATION RATE: 18 BRPM | WEIGHT: 167.4 LBS | OXYGEN SATURATION: 97 % | BODY MASS INDEX: 28.58 KG/M2 | TEMPERATURE: 99 F | DIASTOLIC BLOOD PRESSURE: 80 MMHG | HEIGHT: 64 IN | HEART RATE: 85 BPM

## 2022-07-28 DIAGNOSIS — L74.0 HEAT RASH: Primary | ICD-10-CM

## 2022-07-28 DIAGNOSIS — R23.8 BLISTERS OF MULTIPLE SITES: ICD-10-CM

## 2022-07-28 PROCEDURE — 99213 OFFICE O/P EST LOW 20 MIN: CPT | Performed by: FAMILY MEDICINE

## 2022-07-28 RX ORDER — CEPHALEXIN 500 MG/1
500 CAPSULE ORAL EVERY 8 HOURS SCHEDULED
Qty: 21 CAPSULE | Refills: 0 | Status: SHIPPED | OUTPATIENT
Start: 2022-07-28 | End: 2022-08-04

## 2022-07-28 NOTE — PROGRESS NOTES
Assessment/Plan:       Problem List Items Addressed This Visit        Musculoskeletal and Integument    Heat rash - Primary    Relevant Medications    cephalexin (KEFLEX) 500 mg capsule       Other    Blisters of multiple sites     Opened and drained blisters ointment applied he will use Desitin                 Subjective:      Patient ID: Tisha Norman is a 63 y o  male  Infected blisters of legs      The following portions of the patient's history were reviewed and updated as appropriate: allergies, current medications, past family history, past medical history, past social history, past surgical history and problem list     Review of Systems   Constitutional: Negative for chills, fatigue and fever  HENT: Negative for congestion, nosebleeds, rhinorrhea, sinus pressure and sore throat  Eyes: Negative for discharge and redness  Respiratory: Negative for cough and shortness of breath  Cardiovascular: Negative for chest pain, palpitations and leg swelling  Gastrointestinal: Negative for abdominal pain, blood in stool and nausea  Endocrine: Negative for cold intolerance, heat intolerance and polyuria  Genitourinary: Negative for dysuria and frequency  Musculoskeletal: Negative for arthralgias, back pain and myalgias  Skin: Positive for rash  Neurological: Negative for dizziness, weakness and headaches  Hematological: Negative for adenopathy  Psychiatric/Behavioral: Negative for behavioral problems and sleep disturbance  The patient is not nervous/anxious  Objective:      /80 (BP Location: Left arm, Patient Position: Sitting)   Pulse 85   Temp 99 °F (37 2 °C)   Resp 18   Ht 5' 4" (1 626 m)   Wt 75 9 kg (167 lb 6 4 oz)   SpO2 97%   BMI 28 73 kg/m²        Physical Exam  Vitals and nursing note reviewed  Constitutional:       Appearance: He is well-developed  HENT:      Head: Normocephalic and atraumatic        Right Ear: External ear normal       Left Ear: External ear normal       Nose: Nose normal    Eyes:      General: No scleral icterus  Conjunctiva/sclera: Conjunctivae normal       Pupils: Pupils are equal, round, and reactive to light  Neck:      Thyroid: No thyromegaly  Vascular: No JVD  Cardiovascular:      Rate and Rhythm: Normal rate and regular rhythm  Heart sounds: Normal heart sounds  No murmur heard  Pulmonary:      Effort: Pulmonary effort is normal       Breath sounds: Normal breath sounds  No wheezing or rales  Chest:      Chest wall: No tenderness  Abdominal:      General: Bowel sounds are normal  There is no distension  Palpations: Abdomen is soft  There is no mass  Tenderness: There is no abdominal tenderness  There is no guarding or rebound  Musculoskeletal:         General: No tenderness or deformity  Normal range of motion  Cervical back: Normal range of motion and neck supple  Lymphadenopathy:      Cervical: No cervical adenopathy  Skin:     General: Skin is warm and dry  Findings: Rash present  No erythema  Neurological:      Mental Status: He is alert and oriented to person, place, and time  Cranial Nerves: No cranial nerve deficit  Deep Tendon Reflexes: Reflexes are normal and symmetric  Reflexes normal    Psychiatric:         Behavior: Behavior normal          Thought Content:  Thought content normal          Judgment: Judgment normal           Data:    Laboratory Results:   Radiology/Other Diagnostic Testing Results:      Lab Results   Component Value Date    WBC 3 96 (L) 03/19/2022    HGB 14 9 03/19/2022    HCT 44 2 03/19/2022    MCV 96 03/19/2022     (L) 03/19/2022     Lab Results   Component Value Date    K 4 0 03/19/2022     03/19/2022    CO2 27 03/19/2022    BUN 21 03/19/2022    CREATININE 1 02 03/19/2022    GLUF 93 03/19/2022    CALCIUM 8 9 03/19/2022    AST 22 03/19/2022    ALT 33 03/19/2022    ALKPHOS 42 (L) 03/19/2022    EGFR 79 03/19/2022     Lab Results   Component Value Date    CHOLESTEROL 146 03/19/2022    CHOLESTEROL 164 06/10/2021    CHOLESTEROL 172 01/18/2021     Lab Results   Component Value Date    HDL 47 03/19/2022    HDL 54 06/10/2021    HDL 52 01/18/2021     Lab Results   Component Value Date    LDLCALC 89 03/19/2022    LDLCALC 102 (H) 06/10/2021    LDLCALC 110 (H) 01/18/2021     Lab Results   Component Value Date    TRIG 50 03/19/2022    TRIG 40 06/10/2021    TRIG 51 01/18/2021     No results found for: [place], [place]  Lab Results   Component Value Date    [id number] 2 780 03/19/2022     Lab Results   Component Value Date    HGBA1C 5 4 06/10/2021     Lab Results   Component Value Date    PSA 0 5 03/19/2022       Yolette Suresh DO

## 2022-08-03 DIAGNOSIS — L50.9 HIVES: Primary | ICD-10-CM

## 2022-09-30 ENCOUNTER — HOSPITAL ENCOUNTER (EMERGENCY)
Facility: HOSPITAL | Age: 62
Discharge: HOME | End: 2022-09-30
Attending: EMERGENCY MEDICINE
Payer: COMMERCIAL

## 2022-09-30 ENCOUNTER — APPOINTMENT (EMERGENCY)
Dept: CT IMAGING | Facility: HOSPITAL | Age: 62
End: 2022-09-30
Payer: COMMERCIAL

## 2022-09-30 VITALS
WEIGHT: 168 LBS | HEIGHT: 64 IN | DIASTOLIC BLOOD PRESSURE: 66 MMHG | OXYGEN SATURATION: 100 % | BODY MASS INDEX: 28.68 KG/M2 | RESPIRATION RATE: 20 BRPM | HEART RATE: 87 BPM | SYSTOLIC BLOOD PRESSURE: 127 MMHG | TEMPERATURE: 98.7 F

## 2022-09-30 DIAGNOSIS — N20.1 RIGHT URETERAL STONE: ICD-10-CM

## 2022-09-30 DIAGNOSIS — R10.9 ACUTE ABDOMINAL PAIN IN RIGHT FLANK: Primary | ICD-10-CM

## 2022-09-30 LAB
ALBUMIN SERPL BCP-MCNC: 4.3 G/DL (ref 3.5–5)
ALP SERPL-CCNC: 52 U/L (ref 46–116)
ALT SERPL W P-5'-P-CCNC: 40 U/L (ref 12–78)
ANION GAP SERPL CALCULATED.3IONS-SCNC: 9 MMOL/L (ref 4–13)
AST SERPL W P-5'-P-CCNC: 22 U/L (ref 5–45)
BACTERIA UR QL AUTO: ABNORMAL /HPF
BASOPHILS # BLD AUTO: 0.02 THOUSANDS/ΜL (ref 0–0.1)
BASOPHILS NFR BLD AUTO: 0 % (ref 0–1)
BILIRUB DIRECT SERPL-MCNC: 0.15 MG/DL (ref 0–0.2)
BILIRUB SERPL-MCNC: 0.63 MG/DL (ref 0.2–1)
BILIRUB UR QL STRIP: NEGATIVE
BUN SERPL-MCNC: 18 MG/DL (ref 5–25)
CALCIUM SERPL-MCNC: 9 MG/DL (ref 8.3–10.1)
CHLORIDE SERPL-SCNC: 105 MMOL/L (ref 96–108)
CLARITY UR: CLEAR
CO2 SERPL-SCNC: 28 MMOL/L (ref 21–32)
COLOR UR: YELLOW
CREAT SERPL-MCNC: 1.02 MG/DL (ref 0.6–1.3)
EOSINOPHIL # BLD AUTO: 0.01 THOUSAND/ΜL (ref 0–0.61)
EOSINOPHIL NFR BLD AUTO: 0 % (ref 0–6)
ERYTHROCYTE [DISTWIDTH] IN BLOOD BY AUTOMATED COUNT: 11.9 % (ref 11.6–15.1)
GFR SERPL CREATININE-BSD FRML MDRD: 79 ML/MIN/1.73SQ M
GLUCOSE SERPL-MCNC: 137 MG/DL (ref 65–140)
GLUCOSE UR STRIP-MCNC: NEGATIVE MG/DL
HCT VFR BLD AUTO: 43 % (ref 36.5–49.3)
HGB BLD-MCNC: 14.8 G/DL (ref 12–17)
HGB UR QL STRIP.AUTO: ABNORMAL
IMM GRANULOCYTES # BLD AUTO: 0.02 THOUSAND/UL (ref 0–0.2)
IMM GRANULOCYTES NFR BLD AUTO: 0 % (ref 0–2)
KETONES UR STRIP-MCNC: ABNORMAL MG/DL
LACTATE SERPL-SCNC: 2.2 MMOL/L (ref 0.5–2)
LEUKOCYTE ESTERASE UR QL STRIP: NEGATIVE
LIPASE SERPL-CCNC: 147 U/L (ref 73–393)
LYMPHOCYTES # BLD AUTO: 0.71 THOUSANDS/ΜL (ref 0.6–4.47)
LYMPHOCYTES NFR BLD AUTO: 9 % (ref 14–44)
MCH RBC QN AUTO: 32.5 PG (ref 26.8–34.3)
MCHC RBC AUTO-ENTMCNC: 34.4 G/DL (ref 31.4–37.4)
MCV RBC AUTO: 95 FL (ref 82–98)
MONOCYTES # BLD AUTO: 0.34 THOUSAND/ΜL (ref 0.17–1.22)
MONOCYTES NFR BLD AUTO: 4 % (ref 4–12)
MUCOUS THREADS UR QL AUTO: ABNORMAL
NEUTROPHILS # BLD AUTO: 7.01 THOUSANDS/ΜL (ref 1.85–7.62)
NEUTS SEG NFR BLD AUTO: 87 % (ref 43–75)
NITRITE UR QL STRIP: NEGATIVE
NON-SQ EPI CELLS URNS QL MICRO: ABNORMAL /HPF
NRBC BLD AUTO-RTO: 0 /100 WBCS
PH UR STRIP.AUTO: 6 [PH]
PLATELET # BLD AUTO: 134 THOUSANDS/UL (ref 149–390)
PMV BLD AUTO: 11 FL (ref 8.9–12.7)
POTASSIUM SERPL-SCNC: 4.2 MMOL/L (ref 3.5–5.3)
PROT SERPL-MCNC: 7.5 G/DL (ref 6.4–8.4)
PROT UR STRIP-MCNC: ABNORMAL MG/DL
RBC # BLD AUTO: 4.55 MILLION/UL (ref 3.88–5.62)
RBC #/AREA URNS AUTO: ABNORMAL /HPF
SODIUM SERPL-SCNC: 142 MMOL/L (ref 135–147)
SP GR UR STRIP.AUTO: 1.02 (ref 1–1.03)
UROBILINOGEN UR QL STRIP.AUTO: 0.2 E.U./DL
WBC # BLD AUTO: 8.11 THOUSAND/UL (ref 4.31–10.16)
WBC #/AREA URNS AUTO: ABNORMAL /HPF

## 2022-09-30 PROCEDURE — 36415 COLL VENOUS BLD VENIPUNCTURE: CPT | Performed by: EMERGENCY MEDICINE

## 2022-09-30 PROCEDURE — 80076 HEPATIC FUNCTION PANEL: CPT | Performed by: EMERGENCY MEDICINE

## 2022-09-30 PROCEDURE — 83690 ASSAY OF LIPASE: CPT | Performed by: EMERGENCY MEDICINE

## 2022-09-30 PROCEDURE — 99284 EMERGENCY DEPT VISIT MOD MDM: CPT

## 2022-09-30 PROCEDURE — 96374 THER/PROPH/DIAG INJ IV PUSH: CPT

## 2022-09-30 PROCEDURE — 96361 HYDRATE IV INFUSION ADD-ON: CPT

## 2022-09-30 PROCEDURE — 99285 EMERGENCY DEPT VISIT HI MDM: CPT | Performed by: EMERGENCY MEDICINE

## 2022-09-30 PROCEDURE — G1004 CDSM NDSC: HCPCS

## 2022-09-30 PROCEDURE — 74177 CT ABD & PELVIS W/CONTRAST: CPT

## 2022-09-30 PROCEDURE — 80048 BASIC METABOLIC PNL TOTAL CA: CPT | Performed by: EMERGENCY MEDICINE

## 2022-09-30 PROCEDURE — 96375 TX/PRO/DX INJ NEW DRUG ADDON: CPT

## 2022-09-30 PROCEDURE — 83605 ASSAY OF LACTIC ACID: CPT | Performed by: EMERGENCY MEDICINE

## 2022-09-30 PROCEDURE — 85025 COMPLETE CBC W/AUTO DIFF WBC: CPT | Performed by: EMERGENCY MEDICINE

## 2022-09-30 PROCEDURE — 87086 URINE CULTURE/COLONY COUNT: CPT | Performed by: EMERGENCY MEDICINE

## 2022-09-30 PROCEDURE — 81001 URINALYSIS AUTO W/SCOPE: CPT | Performed by: EMERGENCY MEDICINE

## 2022-09-30 RX ORDER — TAMSULOSIN HYDROCHLORIDE 0.4 MG/1
0.4 CAPSULE ORAL
Qty: 7 CAPSULE | Refills: 0 | Status: SHIPPED | OUTPATIENT
Start: 2022-10-01

## 2022-09-30 RX ORDER — KETOROLAC TROMETHAMINE 30 MG/ML
15 INJECTION, SOLUTION INTRAMUSCULAR; INTRAVENOUS ONCE
Status: COMPLETED | OUTPATIENT
Start: 2022-09-30 | End: 2022-09-30

## 2022-09-30 RX ORDER — OXYCODONE HYDROCHLORIDE AND ACETAMINOPHEN 5; 325 MG/1; MG/1
1 TABLET ORAL EVERY 4 HOURS PRN
Qty: 15 TABLET | Refills: 0 | Status: SHIPPED | OUTPATIENT
Start: 2022-09-30 | End: 2022-10-10

## 2022-09-30 RX ORDER — MORPHINE SULFATE 4 MG/ML
4 INJECTION, SOLUTION INTRAMUSCULAR; INTRAVENOUS ONCE
Status: COMPLETED | OUTPATIENT
Start: 2022-09-30 | End: 2022-09-30

## 2022-09-30 RX ORDER — NAPROXEN 500 MG/1
500 TABLET ORAL EVERY 12 HOURS PRN
Qty: 20 TABLET | Refills: 0 | Status: SHIPPED | OUTPATIENT
Start: 2022-09-30

## 2022-09-30 RX ORDER — ONDANSETRON 4 MG/1
4 TABLET, ORALLY DISINTEGRATING ORAL EVERY 8 HOURS PRN
Qty: 12 TABLET | Refills: 0 | Status: SHIPPED | OUTPATIENT
Start: 2022-09-30

## 2022-09-30 RX ORDER — TAMSULOSIN HYDROCHLORIDE 0.4 MG/1
0.4 CAPSULE ORAL ONCE
Status: COMPLETED | OUTPATIENT
Start: 2022-09-30 | End: 2022-09-30

## 2022-09-30 RX ORDER — ONDANSETRON 2 MG/ML
4 INJECTION INTRAMUSCULAR; INTRAVENOUS ONCE
Status: COMPLETED | OUTPATIENT
Start: 2022-09-30 | End: 2022-09-30

## 2022-09-30 RX ADMIN — SODIUM CHLORIDE 1000 ML: 0.9 INJECTION, SOLUTION INTRAVENOUS at 09:59

## 2022-09-30 RX ADMIN — IOHEXOL 100 ML: 350 INJECTION, SOLUTION INTRAVENOUS at 10:40

## 2022-09-30 RX ADMIN — ONDANSETRON 4 MG: 2 INJECTION INTRAMUSCULAR; INTRAVENOUS at 09:59

## 2022-09-30 RX ADMIN — TAMSULOSIN HYDROCHLORIDE 0.4 MG: 0.4 CAPSULE ORAL at 12:03

## 2022-09-30 RX ADMIN — MORPHINE SULFATE 4 MG: 4 INJECTION INTRAVENOUS at 10:00

## 2022-09-30 RX ADMIN — KETOROLAC TROMETHAMINE 15 MG: 30 INJECTION, SOLUTION INTRAMUSCULAR at 09:59

## 2022-09-30 NOTE — ED PROVIDER NOTES
History  Chief Complaint   Patient presents with    Flank Pain     Patient co right sided flank pain that radiates this to his abdomen  Symptoms started this morning  Hx of Kidney stones  Patient is a 24-year-old male with past medical history of chronic low back pain, GERD, hyperlipidemia, nephrolithiasis, presents to the emergency department by ambulance for acute right flank pain that radiates into his right and central lower abdomen  He reports pressure in his lower abdomen as well as difficulty urinating since this started  The pain started at 5:00 a m , waking him from sleep  He states it feels similar to prior kidney stones  He reports painful urination and is only urinating a small amount at a time  He denies any gross hematuria  Vital signs pre-hospital were all within normal limits  Patient was noted to have chills but denies any known fever  He reports nausea but no vomiting  He states he had 4 bowel movements this morning but they were normal and not diarrhea and nonbloody  He denies any headache, dizziness or near syncope, chest pain, palpitations, dyspnea, cough, URI symptoms, abdominal distension, vomiting, blood per rectum, melena, testicular pain or swelling, skin rash or color change, extremity weakness or paresthesia or other focal neurologic deficits  He reports having had prior kidney stones all of which he has passed on his own  History provided by:  Patient and EMS personnel   used: No    Flank Pain  Associated symptoms: chills, dysuria and nausea    Associated symptoms: no chest pain, no constipation, no cough, no diarrhea, no fever, no hematuria, no shortness of breath, no sore throat and no vomiting        Prior to Admission Medications   Prescriptions Last Dose Informant Patient Reported?  Taking?   aspirin (ECOTRIN LOW STRENGTH) 81 mg EC tablet  Self Yes No   Sig: Take 81 mg by mouth daily   desoximetasone (TOPICORT) 0 25 % cream   No No   Sig: Apply topically 2 (two) times a day   hydrOXYzine HCL (ATARAX) 25 mg tablet   No No   Sig: Take 1 tablet (25 mg total) by mouth every 6 (six) hours as needed for itching   pantoprazole (PROTONIX) 40 mg tablet   No No   Sig: TAKE 1 TABLET DAILY   rosuvastatin (CRESTOR) 5 mg tablet   No No   Sig: TAKE 1 TABLET DAILY      Facility-Administered Medications: None       Past Medical History:   Diagnosis Date    Chronic low back pain     GERD (gastroesophageal reflux disease)     Hyperlipidemia     Sleep apnea        Past Surgical History:   Procedure Laterality Date    COLONOSCOPY      ESOPHAGOGASTRODUODENOSCOPY      [place] ESOPHAGOGASTRODUODENOSCOPY TRANSORAL DIAGNOSTIC N/A 5/24/2018    Procedure: ESOPHAGOGASTRODUODENOSCOPY (EGD); Surgeon: Sade Garcia MD;  Location: [facility]; Service: Gastroenterology       Family History   Problem Relation Age of Onset    Diabetes Mother     Lung cancer Father      I have reviewed and agree with the history as documented  E-Cigarette/Vaping    E-Cigarette Use Never User      E-Cigarette/Vaping Substances    Nicotine No     THC No     CBD No     Flavoring No     Other No     Unknown No      Social History     Tobacco Use    Smoking status: Never Smoker    Smokeless tobacco: Never Used   Vaping Use    Vaping Use: Never used   Substance Use Topics    Alcohol use: Yes    Drug use: No       Review of Systems   Constitutional: Positive for chills  Negative for fever  HENT: Negative for congestion, ear pain, rhinorrhea and sore throat  Respiratory: Negative for cough, shortness of breath and wheezing  Cardiovascular: Negative for chest pain and palpitations  Gastrointestinal: Positive for abdominal pain and nausea  Negative for abdominal distention, blood in stool, constipation, diarrhea and vomiting  Genitourinary: Positive for difficulty urinating, dysuria and flank pain   Negative for frequency, hematuria, scrotal swelling, testicular pain and urgency  Musculoskeletal: Negative for back pain, neck pain and neck stiffness  Skin: Negative for color change, pallor, rash and wound  Allergic/Immunologic: Negative for immunocompromised state  Neurological: Negative for dizziness, syncope, weakness, light-headedness, numbness and headaches  Hematological: Negative for adenopathy  Does not bruise/bleed easily  Psychiatric/Behavioral: Negative for confusion and decreased concentration  All other systems reviewed and are negative  Physical Exam  Physical Exam  Vitals and nursing note reviewed  Constitutional:       General: He is not in acute distress  Appearance: Normal appearance  He is well-developed  He is not ill-appearing, toxic-appearing or diaphoretic  HENT:      Head: Normocephalic and atraumatic  Right Ear: External ear normal       Left Ear: External ear normal       Mouth/Throat:      Comments: Orpharyngeal exam deferred at this time due to risk of exposure to COVID-19 during current pandemic  Patient has no oropharyngeal complaints  Eyes:      Extraocular Movements: Extraocular movements intact  Conjunctiva/sclera: Conjunctivae normal    Neck:      Vascular: No JVD  Cardiovascular:      Rate and Rhythm: Normal rate and regular rhythm  Pulses: Normal pulses  Heart sounds: Normal heart sounds  No murmur heard  No friction rub  No gallop  Pulmonary:      Effort: Pulmonary effort is normal  No respiratory distress  Breath sounds: Normal breath sounds  No wheezing, rhonchi or rales  Abdominal:      General: There is no distension  Palpations: Abdomen is soft  There is no mass  Tenderness: There is abdominal tenderness  There is no right CVA tenderness, left CVA tenderness, guarding or rebound  Comments: Diffuse tenderness in the right upper and lower abdomen as well as the suprapubic region and left lower quadrant  Musculoskeletal:         General: No swelling or tenderness  Normal range of motion  Cervical back: Normal range of motion and neck supple  No rigidity  Skin:     General: Skin is warm and dry  Coloration: Skin is not pale  Findings: No erythema or rash  Neurological:      General: No focal deficit present  Mental Status: He is alert and oriented to person, place, and time  Sensory: No sensory deficit  Motor: No weakness     Psychiatric:         Mood and Affect: Mood normal          Behavior: Behavior normal          Vital Signs  ED Triage Vitals [09/30/22 0938]   Temperature Pulse Respirations Blood Pressure SpO2   98 7 °F (37 1 °C) 87 20 127/66 100 %      Temp Source Heart Rate Source Patient Position - Orthostatic VS BP Location FiO2 (%)   Oral Monitor Lying Left arm --      Pain Score       --         Vitals:    09/30/22 0938   BP: 127/66   BP Location: Left arm   Pulse: 87   Resp: 20   Temp: 98 7 °F (37 1 °C)   TempSrc: Oral   SpO2: 100%   Weight: 76 2 kg (168 lb)   Height: 5' 4" (1 626 m)       Visual Acuity      ED Medications  Medications   sodium chloride 0 9 % bolus 1,000 mL (0 mL Intravenous Stopped 9/30/22 1204)   ondansetron (ZOFRAN) injection 4 mg (4 mg Intravenous Given 9/30/22 0959)   ketorolac (TORADOL) injection 15 mg (15 mg Intravenous Given 9/30/22 0959)   morphine injection 4 mg (4 mg Intravenous Given 9/30/22 1000)   iohexol (OMNIPAQUE) 350 MG/ML injection (SINGLE-DOSE) 100 mL (100 mL Intravenous Given 9/30/22 1040)   tamsulosin (FLOMAX) capsule 0 4 mg (0 4 mg Oral Given 9/30/22 1203)       Diagnostic Studies  Results Reviewed     Procedure Component Value Units Date/Time    Urine Microscopic [[id number]]  (Abnormal) Collected: 09/30/22 1018    Lab Status: Final result Specimen: Urine, Clean Catch Updated: 09/30/22 1108     RBC, UA 10-20 /hpf      WBC, UA None Seen /hpf      Epithelial Cells None Seen /hpf      Bacteria, UA None Seen /hpf      MUCUS THREADS Occasional    UA (URINE) with reflex to Scope [[id number]] (Abnormal) Collected: 09/30/22 1018    Lab Status: Final result Specimen: Urine, Clean Catch Updated: 09/30/22 1042     Color, UA Yellow     Clarity, UA Clear     Specific [place], UA 1 020     pH, UA 6 0     Leukocytes, UA Negative     Nitrite, UA Negative     Protein, UA Trace mg/dl      Glucose, UA Negative mg/dl      Ketones, UA >=80 (3+) mg/dl      Urobilinogen, UA 0 2 E U /dl      Bilirubin, UA Negative     Occult Blood, UA Large    Basic metabolic panel [[id number]] Collected: 09/30/22 0953    Lab Status: Final result Specimen: Blood from Arm, Left Updated: 09/30/22 1035     Sodium 142 mmol/L      Potassium 4 2 mmol/L      Chloride 105 mmol/L      CO2 28 mmol/L      ANION GAP 9 mmol/L      BUN 18 mg/dL      Creatinine 1 02 mg/dL      Glucose 137 mg/dL      Calcium 9 0 mg/dL      eGFR 79 ml/min/1 73sq m     Narrative:      [place] guidelines for Chronic Kidney Disease (CKD):     Stage 1 with normal or high GFR (GFR > 90 mL/min/1 73 square meters)    Stage 2 Mild CKD (GFR = 60-89 mL/min/1 73 square meters)    Stage 3A Moderate CKD (GFR = 45-59 mL/min/1 73 square meters)    Stage 3B Moderate CKD (GFR = 30-44 mL/min/1 73 square meters)    Stage 4 Severe CKD (GFR = 15-29 mL/min/1 73 square meters)    Stage 5 End Stage CKD (GFR <15 mL/min/1 73 square meters)  Note: GFR calculation is accurate only with a steady state creatinine    Hepatic function panel [[id number]]  (Normal) Collected: 09/30/22 0953    Lab Status: Final result Specimen: Blood from Arm, Left Updated: 09/30/22 1035     Total Bilirubin 0 63 mg/dL      Bilirubin, Direct 0 15 mg/dL      Alkaline Phosphatase 52 U/L      AST 22 U/L      ALT 40 U/L      Total Protein 7 5 g/dL      Albumin 4 3 g/dL     Lipase [[id number]]  (Normal) Collected: 09/30/22 0953    Lab Status: Final result Specimen: Blood from Arm, Left Updated: 09/30/22 1035     Lipase 147 u/L     Lactic acid [[id number]]  (Abnormal) Collected: 09/30/22 0953    Lab Status: Final result Specimen: Blood from Arm, Left Updated: 09/30/22 1031     LACTIC ACID 2 2 mmol/L     Narrative:      Result may be elevated if tourniquet was used during collection  Urine culture [[id number]] Collected: 09/30/22 1019    Lab Status: In process Specimen: Urine, Clean Catch Updated: 09/30/22 1021    CBC and differential [[id number]]  (Abnormal) Collected: 09/30/22 0953    Lab Status: Final result Specimen: Blood from Arm, Left Updated: 09/30/22 1002     WBC 8 11 Thousand/uL      RBC 4 55 Million/uL      Hemoglobin 14 8 g/dL      Hematocrit 43 0 %      MCV 95 fL      MCH 32 5 pg      MCHC 34 4 g/dL      RDW 11 9 %      MPV 11 0 fL      Platelets 833 Thousands/uL      nRBC 0 /100 WBCs      Neutrophils Relative 87 %      Immat GRANS % 0 %      Lymphocytes Relative 9 %      Monocytes Relative 4 %      Eosinophils Relative 0 %      Basophils Relative 0 %      Neutrophils Absolute 7 01 Thousands/µL      Immature Grans Absolute 0 02 Thousand/uL      Lymphocytes Absolute 0 71 Thousands/µL      Monocytes Absolute 0 34 Thousand/µL      Eosinophils Absolute 0 01 Thousand/µL      Basophils Absolute 0 02 Thousands/µL                  CT abdomen pelvis with contrast   Final Result by Uche Diaz MD (09/30 1149)   Addendum 1 of 1 by Uche Diaz MD (09/30 1149)   ADDENDUM:      Correction of laterality error  Calculus is in the right distal ureter    causing mild right hydronephrosis  KIDNEYS/URETERS: Normal kidney size and position, with multifocal cortical    scarring at the anterior upper pole of the left kidney  No suspicious    solid lesions  Subcentimeter simple cyst at the posterior upper pole of    the left kidney  Multiple bilateral    nonobstructive renal calculi, measuring 9 mm in the interpolar left kidney    and 6 mm in the lower pole of the left kidney, and measuring 4 mm and 3 mm    in the lower pole of the right kidney  No left hydronephrosis   Mild right hydroureteronephrosis    secondary to a 3 x 3 x 3 mm calculus in the distal ureter just proximal to    the ureterovesical junction  IMPRESSION:    Mild right hydroureteronephrosis secondary to a 3 mm calculus just    proximal to the ureterovesical junction  Additional bilateral    nonobstructive calculi noted  Final      Mild left hydroureteronephrosis secondary to a 3 mm calculus just proximal to the ureterovesical junction  Additional bilateral nonobstructive calculi noted  I personally discussed this study with Shraddha Mitchell on 9/30/2022 at 11:36 AM                     Workstation performed: [id number]                    Procedures  Procedures         ED Course  ED Course as of 09/30/22 1212   Fri Sep 30, 2022   1032 Patient reassessed and pain is starting to improve after being given medication  Patient appears more comfortable  1105 Blood, UA(!): Large   1108 Leukocytes, UA: Negative   1108 Nitrite, UA: Negative   1109 WBC, UA: None Seen   1109 Bacteria, UA: None Seen   1157 Spoke with radiologist who confirmed a right UVJ stone  Although the initial CT report shows left-sided UVJ stone, radiologist made addendum and corrected the report that it is RIGHT sided  Patient updated about this  Plan is to continue hydrating and start Flomax  Pain controlled and will likely give patient a trial to be discharged  18 Patient's wife is present and needs to go back to work patient does not want to stay for repeat lactic acid  Most likely this is from mild dehydration  Given there is no leukocytosis, no WILFRED, patient being afebrile, very low suspicion for sepsis and patient has already been hydrated with 1 L of IV fluids  ED return parameters discussed including worsening or uncontrolled pain, worsening or uncontrolled nausea or vomiting, new fevers, new difficulty urinating or any other concerning symptoms                           MDM  Number of Diagnoses or Management Options  Diagnosis management comments: 82-year-old male presents to the ED for acute right flank pain radiating into the lower abdomen, with nausea, difficulty urinating and dysuria  Most likely patient has acute renal colic secondary to passing kidney stone  Other considerations include UTI or ascending infection/pyelonephritis  Will workup with UA, culture, abdominal labs and CT abdomen and pelvis with IV contrast   Will give IV fluid bolus, Toradol, morphine for pain relief and Zofran for nausea  Amount and/or Complexity of Data Reviewed  Clinical lab tests: ordered and reviewed  Tests in the radiology section of CPT®: ordered and reviewed  Obtain history from someone other than the patient: yes (EMS)  Independent visualization of images, tracings, or specimens: yes        Disposition  Final diagnoses:   Acute abdominal pain in right flank   Right ureteral stone     Time reflects when diagnosis was documented in both MDM as applicable and the Disposition within this note     Time User Action Codes Description Comment    9/30/2022 12:00 PM Rui Weber [R10 9] Acute abdominal pain in right flank     9/30/2022 12:00 PM Rui Weber [N20 1] Right ureteral stone       ED Disposition     ED Disposition   Discharge    Condition   Stable    Date/Time   Fri Sep 30, 2022 12:01 PM    Comment   [facility] to home/self care                 Follow-up Information     Follow up With Specialties Details Why Contact Info Additional Information    Gilberto Celis DO Family Medicine Schedule an appointment as soon as possible for a visit   [address] [facility] [address] Sol[address]       [facility] Urology Schedule an appointment as soon as possible for a visit   [address]  [address] [phone]  [address]  [facility], [address]  [address] [facility], [address], [facility], [place], [phone] [address] [facility] Emergency Department Emergency Medicine Go to  If symptoms worsen [address] [facility] [address] [facility] Emergency Department, [address] [place], [place], [address]          Patient's Medications   Discharge Prescriptions    NAPROXEN (NAPROSYN) 500 MG TABLET    Take 1 tablet (500 mg total) by mouth every 12 (twelve) hours as needed for mild pain or moderate pain       Start Date: 9/30/2022 End Date: --       Order Dose: 500 mg       Quantity: 20 tablet    Refills: 0    ONDANSETRON (ZOFRAN-ODT) 4 MG DISINTEGRATING TABLET    Take 1 tablet (4 mg total) by mouth every 8 (eight) hours as needed for nausea or vomiting       Start Date: 9/30/2022 End Date: --       Order Dose: 4 mg       Quantity: 12 tablet    Refills: 0    OXYCODONE-ACETAMINOPHEN (PERCOCET) 5-325 MG PER TABLET    Take 1 tablet by mouth every 4 (four) hours as needed for severe pain for up to 10 days Max Daily Amount: 6 tablets       Start Date: 9/30/2022 End Date: 10/10/2022       Order Dose: 1 tablet       Quantity: 15 tablet    Refills: 0    TAMSULOSIN (FLOMAX) 0 4 MG    Take 1 capsule (0 4 mg total) by mouth daily with dinner Do not start before October 1, 2022  Start Date: 10/1/2022 End Date: --       Order Dose: 0 4 mg       Quantity: 7 capsule    Refills: 0       No discharge procedures on file      PDMP Review     None          ED Provider  Electronically Signed by           Jluis Altamirano DO  09/30/22 6216

## 2022-10-01 LAB — BACTERIA UR CULT: NORMAL

## 2022-10-03 ENCOUNTER — TELEPHONE (OUTPATIENT)
Dept: OTHER | Facility: OTHER | Age: 62
End: 2022-10-03

## 2022-10-03 DIAGNOSIS — N20.0 CALCULUS OF KIDNEY: Primary | ICD-10-CM

## 2022-10-03 NOTE — TELEPHONE ENCOUNTER
Pt called in stating he was in the hospital on Friday with kidney stones  He passed the stone on Saturday and he has it in a container  He would like the dr to get it tested  He is requesting a call back

## 2022-10-04 NOTE — TELEPHONE ENCOUNTER
Chelsie Beard, [facility] Clinical 17 hours ago (2:52 PM)     BL    Patient has not been seen in more than 3 years   Would recommend AP visit for evaluation and discussion  Hortensia De La Garza can be sent out at that visit  Btey salas

## 2022-10-06 ENCOUNTER — OFFICE VISIT (OUTPATIENT)
Dept: UROLOGY | Facility: CLINIC | Age: 62
End: 2022-10-06
Payer: COMMERCIAL

## 2022-10-06 VITALS
SYSTOLIC BLOOD PRESSURE: 128 MMHG | HEART RATE: 69 BPM | BODY MASS INDEX: 28.2 KG/M2 | DIASTOLIC BLOOD PRESSURE: 78 MMHG | WEIGHT: 165.2 LBS | OXYGEN SATURATION: 100 % | HEIGHT: 64 IN

## 2022-10-06 DIAGNOSIS — N20.0 KIDNEY STONES: Primary | ICD-10-CM

## 2022-10-06 PROCEDURE — 99213 OFFICE O/P EST LOW 20 MIN: CPT | Performed by: PHYSICIAN ASSISTANT

## 2022-10-06 PROCEDURE — 82360 CALCULUS ASSAY QUANT: CPT | Performed by: PHYSICIAN ASSISTANT

## 2022-10-12 LAB
COLOR STONE: NORMAL
COM MFR STONE: 100 %
COMMENT-STONE3: NORMAL
COMPOSITION: NORMAL
LABORATORY COMMENT REPORT: NORMAL
PHOTO: NORMAL
SIZE STONE: NORMAL MM
SPEC SOURCE SUBJ: NORMAL
STONE ANALYSIS-IMP: NORMAL
WT STONE: 38 MG

## 2022-10-18 ENCOUNTER — OFFICE VISIT (OUTPATIENT)
Dept: FAMILY MEDICINE CLINIC | Facility: CLINIC | Age: 62
End: 2022-10-18
Payer: COMMERCIAL

## 2022-10-18 VITALS
OXYGEN SATURATION: 97 % | HEART RATE: 72 BPM | RESPIRATION RATE: 18 BRPM | TEMPERATURE: 98.3 F | SYSTOLIC BLOOD PRESSURE: 112 MMHG | HEIGHT: 64 IN | WEIGHT: 167.6 LBS | BODY MASS INDEX: 28.61 KG/M2 | DIASTOLIC BLOOD PRESSURE: 70 MMHG

## 2022-10-18 DIAGNOSIS — Z00.00 ANNUAL PHYSICAL EXAM: ICD-10-CM

## 2022-10-18 DIAGNOSIS — R35.1 NOCTURIA MORE THAN TWICE PER NIGHT: ICD-10-CM

## 2022-10-18 DIAGNOSIS — M54.32 SCIATICA, LEFT SIDE: ICD-10-CM

## 2022-10-18 DIAGNOSIS — H61.23 BILATERAL IMPACTED CERUMEN: ICD-10-CM

## 2022-10-18 DIAGNOSIS — E78.2 MIXED HYPERLIPIDEMIA: ICD-10-CM

## 2022-10-18 DIAGNOSIS — K21.9 CHRONIC GERD: Primary | ICD-10-CM

## 2022-10-18 PROCEDURE — 99396 PREV VISIT EST AGE 40-64: CPT | Performed by: FAMILY MEDICINE

## 2022-10-18 PROCEDURE — 69210 REMOVE IMPACTED EAR WAX UNI: CPT | Performed by: FAMILY MEDICINE

## 2022-10-18 NOTE — PROGRESS NOTES
ADULT ANNUAL [address] [facility]    NAME: Loki Mcnamara  AGE: 63 y o  SEX: male  : 1960     DATE: 10/18/2022     Assessment and Plan:     Problem List Items Addressed This Visit        Digestive    Chronic GERD - Primary     Can you with pantoprazole            Nervous and Auditory    Sciatica, left side     Stable continue home exercise program         Bilateral impacted cerumen     Ear pain and hearing loss secondary to cerumen impaction this improved after irrigation            Other    Hyperlipidemia     Continue Crestor 5mg recheck laboratory work in March         Nocturia more than twice per night     Check PSA at next lab test in March               Immunizations and preventive care screenings were discussed with patient today  Appropriate education was printed on patient's after visit summary  Discussed risks and benefits of prostate cancer screening  We discussed the controversial history of PSA screening for prostate cancer in the [place] as well as the risk of over detection and over treatment of prostate cancer by way of PSA screening  The patient understands that PSA blood testing is an imperfect way to screen for prostate cancer and that elevated PSA levels in the blood may also be caused by infection, inflammation, prostatic trauma or manipulation, urological procedures, or by benign prostatic enlargement  The role of the digital rectal examination in prostate cancer screening was also discussed and I discussed with him that there is large interobserver variability in the findings of digital rectal examination  Counseling:  Alcohol/drug use: discussed moderation in alcohol intake, the recommendations for healthy alcohol use, and avoidance of illicit drug use  Dental Health: discussed importance of regular tooth brushing, flossing, and dental visits    Injury prevention: discussed safety/seat belts, safety helmets, smoke detectors, carbon dioxide detectors, and smoking near bedding or upholstery  Sexual health: discussed sexually transmitted diseases, partner selection, use of condoms, avoidance of unintended pregnancy, and contraceptive alternatives  · Exercise: the importance of regular exercise/physical activity was discussed  Recommend exercise 3-5 times per week for at least 30 minutes  No follow-ups on file  Chief Complaint:     Chief Complaint   Patient presents with   • Cerumen Impaction     Right ear also hurts      History of Present Illness:     Adult Annual Physical   Patient here for a comprehensive physical exam  The patient reports no problems  Diet and Physical Activity  · Diet/Nutrition: well balanced diet  · Exercise: no formal exercise  Depression Screening  PHQ-2/9 Depression Screening    Little interest or pleasure in doing things: 0 - not at all  Feeling down, depressed, or hopeless: 0 - not at all  PHQ-2 Score: 0  PHQ-2 Interpretation: Negative depression screen       General Health  · Sleep: sleeps well  · Hearing: normal - bilateral   · Vision: no vision problems and wears glasses  · Dental: regular dental visits   Health  · Symptoms include: none     Review of Systems:     Review of Systems   Constitutional: Negative for chills, fatigue and fever  HENT: Negative for congestion, nosebleeds, rhinorrhea, sinus pressure and sore throat  Eyes: Negative for discharge and redness  Respiratory: Negative for cough and shortness of breath  Cardiovascular: Negative for chest pain, palpitations and leg swelling  Gastrointestinal: Negative for abdominal pain, blood in stool and nausea  Endocrine: Negative for cold intolerance, heat intolerance and polyuria  Genitourinary: Negative for dysuria and frequency  Musculoskeletal: Negative for arthralgias, back pain and myalgias  Skin: Negative for rash     Neurological: Negative for dizziness, weakness and headaches  Hematological: Negative for adenopathy  Psychiatric/Behavioral: Negative for behavioral problems and sleep disturbance  The patient is not nervous/anxious  Past Medical History:     Past Medical History:   Diagnosis Date   • Chronic low back pain    • GERD (gastroesophageal reflux disease)    • Hyperlipidemia    • Sleep apnea       Past Surgical History:     Past Surgical History:   Procedure Laterality Date   • COLONOSCOPY     • ESOPHAGOGASTRODUODENOSCOPY     • SC ESOPHAGOGASTRODUODENOSCOPY TRANSORAL DIAGNOSTIC N/A 5/24/2018    Procedure: ESOPHAGOGASTRODUODENOSCOPY (EGD); Surgeon: Vic Combs MD;  Location: [facility];   Service: Gastroenterology      Family History:     Family History   Problem Relation Age of Onset   • Diabetes Mother    • Lung cancer Father       Social History:     Social History     Socioeconomic History   • Marital status: /Civil Union     Spouse name: None   • Number of children: None   • Years of education: None   • Highest education level: None   Occupational History   • None   Tobacco Use   • Smoking status: Never Smoker   • Smokeless tobacco: Never Used   Vaping Use   • Vaping Use: Never used   Substance and Sexual Activity   • Alcohol use: Yes     Comment: social   • Drug use: No   • Sexual activity: Yes   Other Topics Concern   • None   Social History Narrative   • None     Social Determinants of Health     Financial Resource Strain: Not on file   Food Insecurity: Not on file   Transportation Needs: Not on file   Physical Activity: Not on file   Stress: Not on file   Social Connections: Not on file   Intimate Partner Violence: Not on file   Housing Stability: Not on file      Current Medications:     Current Outpatient Medications   Medication Sig Dispense Refill   • aspirin (ECOTRIN LOW STRENGTH) 81 mg EC tablet Take 81 mg by mouth daily     • pantoprazole (PROTONIX) 40 mg tablet TAKE 1 TABLET DAILY 90 tablet 3   • rosuvastatin (CRESTOR) 5 mg tablet TAKE 1 TABLET DAILY 90 tablet 3   • desoximetasone (TOPICORT) 0 25 % cream Apply topically 2 (two) times a day (Patient not taking: Reported on 10/6/2022) 30 g 0   • hydrOXYzine HCL (ATARAX) 25 mg tablet Take 1 tablet (25 mg total) by mouth every 6 (six) hours as needed for itching (Patient not taking: Reported on 10/6/2022) 30 tablet 1   • naproxen (NAPROSYN) 500 mg tablet Take 1 tablet (500 mg total) by mouth every 12 (twelve) hours as needed for mild pain or moderate pain (Patient not taking: No sig reported) 20 tablet 0   • ondansetron (ZOFRAN-ODT) 4 mg disintegrating tablet Take 1 tablet (4 mg total) by mouth every 8 (eight) hours as needed for nausea or vomiting (Patient not taking: No sig reported) 12 tablet 0   • tamsulosin (FLOMAX) 0 4 mg Take 1 capsule (0 4 mg total) by mouth daily with dinner Do not start before October 1, 2022  (Patient not taking: No sig reported) 7 capsule 0     No current facility-administered medications for this visit  Allergies:     No Known Allergies   Physical Exam:     /70 (BP Location: Left arm, Patient Position: Sitting)   Pulse 72   Temp 98 3 °F (36 8 °C)   Resp 18   Ht 5' 4" (1 626 m)   Wt 76 kg (167 lb 9 6 oz)   SpO2 97%   BMI 28 77 kg/m²       Physical Exam  Vitals and nursing note reviewed  Constitutional:       Appearance: Normal appearance  He is well-developed and normal weight  HENT:      Head: Normocephalic and atraumatic  Right Ear: Tympanic membrane, ear canal and external ear normal       Left Ear: Tympanic membrane, ear canal and external ear normal       Nose: Nose normal       Mouth/Throat:      Mouth: Mucous membranes are moist       Pharynx: Oropharynx is clear  Eyes:      Extraocular Movements: Extraocular movements intact  Conjunctiva/sclera: Conjunctivae normal       Pupils: Pupils are equal, round, and reactive to light  Cardiovascular:      Rate and Rhythm: Normal rate and regular rhythm  Pulses: Normal pulses  Heart sounds: Normal heart sounds  No murmur heard  Pulmonary:      Effort: Pulmonary effort is normal  No respiratory distress  Breath sounds: Normal breath sounds  Abdominal:      General: Bowel sounds are normal       Palpations: Abdomen is soft  Tenderness: There is no abdominal tenderness  Musculoskeletal:         General: Normal range of motion  Cervical back: Normal range of motion and neck supple  Skin:     General: Skin is warm and dry  Capillary Refill: Capillary refill takes less than 2 seconds  Neurological:      General: No focal deficit present  Mental Status: He is alert and oriented to person, place, and time  Mental status is at baseline  Psychiatric:         Mood and Affect: Mood normal          Behavior: Behavior normal          Thought Content: Thought content normal          Judgment: Judgment normal       Ear cerumen removal    Date/Time: 10/18/2022 4:13 PM  Performed by: Rukhsana Kirkpatrick DO  Authorized by: Rukhsana Kirkpatrick DO   Universal Protocol:  Consent: Verbal consent obtained  Consent given by: patient  Patient understanding: patient states understanding of the procedure being performed  Patient identity confirmed: verbally with patient      Patient location:  Clinic  Procedure details:     Local anesthetic:  None    Location:  L ear and R ear    Procedure type: irrigation with instrumentation      Instrumentation: curette      Approach:  External  Post-procedure details:     Complication:  None    Hearing quality:  Improved    Patient tolerance of procedure:   Tolerated well, no immediate complications        Rukhsana Kirkpatrick DO  [address]

## 2022-11-09 ENCOUNTER — OFFICE VISIT (OUTPATIENT)
Dept: FAMILY MEDICINE CLINIC | Facility: CLINIC | Age: 62
End: 2022-11-09

## 2022-11-09 VITALS
HEART RATE: 80 BPM | BODY MASS INDEX: 28.75 KG/M2 | SYSTOLIC BLOOD PRESSURE: 132 MMHG | RESPIRATION RATE: 18 BRPM | HEIGHT: 64 IN | OXYGEN SATURATION: 98 % | DIASTOLIC BLOOD PRESSURE: 80 MMHG | TEMPERATURE: 97 F | WEIGHT: 168.4 LBS

## 2022-11-09 DIAGNOSIS — L05.91 PILONIDAL CYST: Primary | ICD-10-CM

## 2022-11-09 RX ORDER — CEPHALEXIN 500 MG/1
500 CAPSULE ORAL EVERY 8 HOURS SCHEDULED
Qty: 21 CAPSULE | Refills: 1 | Status: SHIPPED | OUTPATIENT
Start: 2022-11-09 | End: 2022-11-16

## 2022-11-09 NOTE — ASSESSMENT & PLAN NOTE
Mild infection inflammation at upper buttocks and tailbone  Patient did take cephalexin for 2 doses and will need more at this point and I do recommend Sitz baths with Epson salt  He will take cephalexin for another additional 5-7 days and avoid long term sitting  He should avoid a bus going in and out to his job over the next 2 weeks    He should avoid sitting for more than 30 minutes at a time and alternate positions to prevent pressure over the sacrum

## 2022-11-09 NOTE — PROGRESS NOTES
Assessment/Plan:       Problem List Items Addressed This Visit        Musculoskeletal and Integument    Pilonidal cyst - Primary     Mild infection inflammation at upper buttocks and tailbone  Patient did take cephalexin for 2 doses and will need more at this point and I do recommend Sitz baths with Epson salt  He will take cephalexin for another additional 5-7 days and avoid long term sitting  He should avoid a bus going in and out to his job over the next 2 weeks  He should avoid sitting for more than 30 minutes at a time and alternate positions to prevent pressure over the sacrum         Relevant Medications    cephalexin (KEFLEX) 500 mg capsule            Subjective:      Patient ID: Sanford Powell is a 63 y o  male  Painful pilonidal cyst on tailbone area      The following portions of the patient's history were reviewed and updated as appropriate: allergies, current medications, past family history, past medical history, past social history, past surgical history and problem list     Review of Systems   Constitutional: Negative for chills, fatigue and fever  HENT: Negative for congestion, nosebleeds, rhinorrhea, sinus pressure and sore throat  Eyes: Negative for discharge and redness  Respiratory: Negative for cough and shortness of breath  Cardiovascular: Negative for chest pain, palpitations and leg swelling  Gastrointestinal: Negative for abdominal pain, blood in stool and nausea  Endocrine: Negative for cold intolerance, heat intolerance and polyuria  Genitourinary: Negative for dysuria and frequency  Musculoskeletal: Negative for arthralgias, back pain and myalgias  Skin: Positive for rash  Neurological: Negative for dizziness, weakness and headaches  Hematological: Negative for adenopathy  Psychiatric/Behavioral: Negative for behavioral problems and sleep disturbance  The patient is not nervous/anxious            Objective:      /80   Pulse 80   Temp (!) 97 °F (36 1 °C)   Resp 18   Ht 5' 4" (1 626 m)   Wt 76 4 kg (168 lb 6 4 oz)   SpO2 98%   BMI 28 91 kg/m²        Physical Exam  Vitals and nursing note reviewed  Constitutional:       Appearance: He is well-developed  HENT:      Head: Normocephalic and atraumatic  Right Ear: External ear normal       Left Ear: External ear normal       Nose: Nose normal    Eyes:      General: No scleral icterus  Conjunctiva/sclera: Conjunctivae normal       Pupils: Pupils are equal, round, and reactive to light  Neck:      Thyroid: No thyromegaly  Vascular: No JVD  Cardiovascular:      Rate and Rhythm: Normal rate and regular rhythm  Heart sounds: Normal heart sounds  No murmur heard  Pulmonary:      Effort: Pulmonary effort is normal       Breath sounds: Normal breath sounds  No wheezing or rales  Chest:      Chest wall: No tenderness  Abdominal:      General: Bowel sounds are normal  There is no distension  Palpations: Abdomen is soft  There is no mass  Tenderness: There is no abdominal tenderness  There is no guarding or rebound  Musculoskeletal:         General: No tenderness or deformity  Normal range of motion  Cervical back: Normal range of motion and neck supple  Lymphadenopathy:      Cervical: No cervical adenopathy  Skin:     General: Skin is warm and dry  Findings: No erythema or rash  Comments: Sacral pilonidal cyst   Neurological:      Mental Status: He is alert and oriented to person, place, and time  Cranial Nerves: No cranial nerve deficit  Deep Tendon Reflexes: Reflexes are normal and symmetric  Reflexes normal    Psychiatric:         Behavior: Behavior normal          Thought Content: Thought content normal          Judgment: Judgment normal           Data:    Laboratory Results: I have personally reviewed the pertinent laboratory results/reports   Radiology/Other Diagnostic Testing Results: I have personally reviewed pertinent reports  Lab Results   Component Value Date    WBC 8 11 09/30/2022    HGB 14 8 09/30/2022    HCT 43 0 09/30/2022    MCV 95 09/30/2022     (L) 09/30/2022     Lab Results   Component Value Date    K 4 2 09/30/2022     09/30/2022    CO2 28 09/30/2022    BUN 18 09/30/2022    CREATININE 1 02 09/30/2022    GLUF 93 03/19/2022    CALCIUM 9 0 09/30/2022    AST 22 09/30/2022    ALT 40 09/30/2022    ALKPHOS 52 09/30/2022    EGFR 79 09/30/2022     Lab Results   Component Value Date    CHOLESTEROL 146 03/19/2022    CHOLESTEROL 164 06/10/2021    CHOLESTEROL 172 01/18/2021     Lab Results   Component Value Date    HDL 47 03/19/2022    HDL 54 06/10/2021    HDL 52 01/18/2021     Lab Results   Component Value Date    LDLCALC 89 03/19/2022    LDLCALC 102 (H) 06/10/2021    LDLCALC 110 (H) 01/18/2021     Lab Results   Component Value Date    TRIG 50 03/19/2022    TRIG 40 06/10/2021    TRIG 51 01/18/2021     No results found for: [place], [place]  Lab Results   Component Value Date    QLR2WPDBRPET 2 780 03/19/2022     Lab Results   Component Value Date    HGBA1C 5 4 06/10/2021     Lab Results   Component Value Date    PSA 0 5 03/19/2022       Salvador Banegas DO

## 2022-11-22 ENCOUNTER — TELEPHONE (OUTPATIENT)
Dept: FAMILY MEDICINE CLINIC | Facility: CLINIC | Age: 62
End: 2022-11-22

## 2022-11-22 NOTE — TELEPHONE ENCOUNTER
Pt asking if you thought that he should refill that antibiotic as he does feel that it is still infected and asking for another 2 weeks off of work returning to the office Dec 12, please advise

## 2022-12-05 ENCOUNTER — OFFICE VISIT (OUTPATIENT)
Dept: FAMILY MEDICINE CLINIC | Facility: CLINIC | Age: 62
End: 2022-12-05

## 2022-12-05 VITALS
DIASTOLIC BLOOD PRESSURE: 70 MMHG | SYSTOLIC BLOOD PRESSURE: 114 MMHG | BODY MASS INDEX: 28.68 KG/M2 | RESPIRATION RATE: 16 BRPM | HEIGHT: 64 IN | WEIGHT: 168 LBS | TEMPERATURE: 98.5 F | OXYGEN SATURATION: 97 % | HEART RATE: 74 BPM

## 2022-12-05 DIAGNOSIS — M54.2 NECK PAIN: Primary | ICD-10-CM

## 2022-12-05 DIAGNOSIS — L05.91 PILONIDAL CYST: ICD-10-CM

## 2022-12-05 DIAGNOSIS — M54.32 SCIATICA, LEFT SIDE: ICD-10-CM

## 2022-12-05 DIAGNOSIS — M79.671 RIGHT FOOT PAIN: ICD-10-CM

## 2022-12-05 NOTE — PROGRESS NOTES
Assessment/Plan:       Problem List Items Addressed This Visit        Nervous and Auditory    Sciatica, left side     Flare up of sciatic pain now due to foot injury patient will continue to work from home            Musculoskeletal and Integument    Pilonidal cyst     Improved but still painful patient needs to limit sitting on hard surfaces            Other    Neck pain - Primary     Sprain injury to right side sternocleidomastoid muscle after helping put up a [place] tree for his daughter  Patient will give this time for recovery he will remain working out of his home         Right foot pain     Plantar fasciitis pain wrapped with Coban and patient will remain off his foot over the next week minimal weight-bearing ice and moist heat              Subjective:      Patient ID: Raúl Salinas is a 63 y o  male  Patient injured his neck and foot putting up a [employer] tree this compounded with sciatic pain which is now worsening due to change in his overall gait and pilonidal cyst from sacral pain and sitting      The following portions of the patient's history were reviewed and updated as appropriate: allergies, current medications, past family history, past medical history, past social history, past surgical history and problem list     Review of Systems   Constitutional: Negative for chills, fatigue and fever  HENT: Negative for congestion, nosebleeds, rhinorrhea, sinus pressure and sore throat  Eyes: Negative for discharge and redness  Respiratory: Negative for cough and shortness of breath  Cardiovascular: Negative for chest pain, palpitations and leg swelling  Gastrointestinal: Negative for abdominal pain, blood in stool and nausea  Endocrine: Negative for cold intolerance, heat intolerance and polyuria  Genitourinary: Negative for dysuria and frequency  Musculoskeletal: Positive for arthralgias, back pain and neck pain  Negative for myalgias  Skin: Negative for rash     Neurological: Negative for dizziness, weakness and headaches  Hematological: Negative for adenopathy  Psychiatric/Behavioral: Negative for behavioral problems and sleep disturbance  The patient is not nervous/anxious  Objective:      /70 (BP Location: Left arm, Patient Position: Sitting, Cuff Size: Standard)   Pulse 74   Temp 98 5 °F (36 9 °C)   Resp 16   Ht 5' 4" (1 626 m)   Wt 76 2 kg (168 lb)   SpO2 97%   BMI 28 84 kg/m²        Physical Exam  Vitals and nursing note reviewed  Constitutional:       Appearance: He is well-developed and well-nourished  HENT:      Head: Normocephalic and atraumatic  Right Ear: External ear normal       Left Ear: External ear normal       Nose: Nose normal       Mouth/Throat:      Mouth: Oropharynx is clear and moist    Eyes:      General: No scleral icterus  Extraocular Movements: EOM normal       Conjunctiva/sclera: Conjunctivae normal       Pupils: Pupils are equal, round, and reactive to light  Neck:      Thyroid: No thyromegaly  Vascular: No JVD  Cardiovascular:      Rate and Rhythm: Normal rate and regular rhythm  Heart sounds: Normal heart sounds  No murmur heard  Pulmonary:      Effort: Pulmonary effort is normal       Breath sounds: Normal breath sounds  No wheezing or rales  Chest:      Chest wall: No tenderness  Abdominal:      General: Bowel sounds are normal  There is no distension  Palpations: Abdomen is soft  There is no mass  Tenderness: There is no abdominal tenderness  There is no guarding or rebound  Musculoskeletal:         General: No tenderness, deformity or edema  Normal range of motion  Cervical back: Normal range of motion and neck supple  Comments: Neck pain and decreased range of motion tenderness over right sternocleidomastoid muscle at origin and cephalad portion  Tenderness over right plantar arch   Lymphadenopathy:      Cervical: No cervical adenopathy     Skin:     General: Skin is warm and dry  Findings: No erythema or rash  Neurological:      Mental Status: He is alert and oriented to person, place, and time  Cranial Nerves: No cranial nerve deficit  Deep Tendon Reflexes: Reflexes are normal and symmetric  Reflexes normal    Psychiatric:         Mood and Affect: Mood and affect normal          Behavior: Behavior normal          Thought Content: Thought content normal          Judgment: Judgment normal           Data:    Laboratory Results: I have personally reviewed the pertinent laboratory results/reports   Radiology/Other Diagnostic Testing Results: I have personally reviewed pertinent reports         Lab Results   Component Value Date    WBC 8 11 09/30/2022    HGB 14 8 09/30/2022    HCT 43 0 09/30/2022    MCV 95 09/30/2022     (L) 09/30/2022     Lab Results   Component Value Date    K 4 2 09/30/2022     09/30/2022    CO2 28 09/30/2022    BUN 18 09/30/2022    CREATININE 1 02 09/30/2022    GLUF 93 03/19/2022    CALCIUM 9 0 09/30/2022    AST 22 09/30/2022    ALT 40 09/30/2022    ALKPHOS 52 09/30/2022    EGFR 79 09/30/2022     Lab Results   Component Value Date    CHOLESTEROL 146 03/19/2022    CHOLESTEROL 164 06/10/2021    CHOLESTEROL 172 01/18/2021     Lab Results   Component Value Date    HDL 47 03/19/2022    HDL 54 06/10/2021    HDL 52 01/18/2021     Lab Results   Component Value Date    LDLCALC 89 03/19/2022    LDLCALC 102 (H) 06/10/2021    LDLCALC 110 (H) 01/18/2021     Lab Results   Component Value Date    TRIG 50 03/19/2022    TRIG 40 06/10/2021    TRIG 51 01/18/2021     No results found for: [place], [place]  Lab Results   Component Value Date    [id number] 2 780 03/19/2022     Lab Results   Component Value Date    HGBA1C 5 4 06/10/2021     Lab Results   Component Value Date    PSA 0 5 03/19/2022       Romero Rosen, DO

## 2022-12-05 NOTE — ASSESSMENT & PLAN NOTE
Plantar fasciitis pain wrapped with Coban and patient will remain off his foot over the next week minimal weight-bearing ice and moist heat

## 2022-12-05 NOTE — ASSESSMENT & PLAN NOTE
Sprain injury to right side sternocleidomastoid muscle after helping put up a [employer] tree for his daughter    Patient will give this time for recovery he will remain working out of his home

## 2022-12-05 NOTE — PATIENT INSTRUCTIONS
Neck Exercises   AMBULATORY CARE:   Neck exercises  help reduce neck pain, and improve neck movement and strength  Neck exercises also help prevent long-term neck problems  What you need to know about neck exercises:   Do the exercises every day,  or as often as directed by your healthcare provider  Move slowly, gently, and smoothly  Avoid fast or jerky motions  Stand and sit the way your healthcare provider shows you  Good posture may reduce your neck pain  Check your posture often, even when you are not doing your neck exercises  How to perform neck exercises safely:   Exercise position:  You may sit or stand while you do neck exercises  Face forward  Your shoulders should be straight and relaxed, with a good posture  Head tilts, forward and back:  Gently bow your head and try to touch your chin to your chest  Your healthcare provider may tell you to push on the back of your neck to help bow your head  Raise your chin back to the starting position  Tilt your head back as far as possible so you are looking up at the ceiling  Your healthcare provider may tell you to lift your chin to help tilt your head back  Return your head to the starting position  Head tilts, side to side:  Tilt your head, bringing your ear toward your shoulder  Then tilt your head toward the other shoulder  Head turns:  Turn your head to look over your shoulder  Tilt your chin down and try to touch it to your shoulder  Do not raise your shoulder to your chin  Face forward again  Do the same on the other side  Head rolls:  Slowly bring your chin toward your chest  Next, roll your head to the right  Your ear should be positioned over your shoulder  Hold this position for 5 seconds  Roll your head back toward your chest and to the left into the same position  Hold for 5 seconds  Gently roll your head back and around in a clockwise [place] 3 times   Next, move your head in the reverse direction (counterclockwise) in a [place] 3 times  Do not shrug your shoulders upwards while you do this exercise  Contact your healthcare provider if:   Your pain does not get better, or gets worse  You have questions or concerns about your condition, care, or exercise program     © Copyright [facility] 2022 Information is for End User's use only and may not be sold, redistributed or otherwise used for commercial purposes  All illustrations and images included in CareNotes® are the copyrighted property of [employer]  or Chiki Taylor  The above information is an  only  It is not intended as medical advice for individual conditions or treatments  Talk to your doctor, nurse or pharmacist before following any medical regimen to see if it is safe and effective for you

## 2022-12-17 PROBLEM — H61.23 BILATERAL IMPACTED CERUMEN: Status: RESOLVED | Noted: 2022-10-18 | Resolved: 2022-12-17

## 2023-01-06 ENCOUNTER — TELEPHONE (OUTPATIENT)
Dept: FAMILY MEDICINE CLINIC | Facility: CLINIC | Age: 63
End: 2023-01-06

## 2023-01-06 DIAGNOSIS — L05.91 PILONIDAL CYST: Primary | ICD-10-CM

## 2023-01-06 NOTE — TELEPHONE ENCOUNTER
Chapo Andujar, this is Gloria Martins  You can get me at [phone]  That's my home number  My cell is [phone]  I need to speak to somebody  I need to have my letter for work extended and I also need the doctor to refer me to a surgeon who can take care of the pioneros cyst that I have  You can just give me a call back today  I'd appreciate it again  It's [phone] and it's Gloria Martins  Thank you   Bye

## 2023-02-08 ENCOUNTER — CONSULT (OUTPATIENT)
Dept: SURGERY | Facility: CLINIC | Age: 63
End: 2023-02-08

## 2023-02-08 VITALS
DIASTOLIC BLOOD PRESSURE: 70 MMHG | TEMPERATURE: 97.8 F | RESPIRATION RATE: 16 BRPM | OXYGEN SATURATION: 99 % | BODY MASS INDEX: 29.02 KG/M2 | HEIGHT: 64 IN | SYSTOLIC BLOOD PRESSURE: 130 MMHG | HEART RATE: 75 BPM | WEIGHT: 170 LBS

## 2023-02-08 DIAGNOSIS — L08.9 SKIN INFLAMMATION: Primary | ICD-10-CM

## 2023-02-08 DIAGNOSIS — L05.91 PILONIDAL CYST: ICD-10-CM

## 2023-02-08 NOTE — LETTER
February 8, 2023     [facility], [address] [place] [id number]    Patient: Joelle Hassan   YOB: 1960   Date of Visit: 2/8/2023       Dear Dr Eugenio Aquino: Thank you for referring Leonidas Nieto to me for evaluation  Below are my notes for this consultation  If you have questions, please do not hesitate to call me  I look forward to following your patient along with you  Sincerely,        Caleb Jain MD        CC: No Recipients  Caleb Jain MD  2/8/2023 11:08 AM  Incomplete  Assessment/Plan:    Skin inflammation  The patient is a 74-year-old male with a remote past medical history significant for pilonidal cyst status post office excision 30 years ago in [facility] presenting to the office with complaints of chronic recurrent pain in a similar location at the tailbone concerning for recurrent pilonidal cyst for general surgery consultation  Patient works as a  in [place]  He has a 3-hour commute daily to and from [place]  During that commute he develops chronic recurrent pain in the tailbone which he attributes to overlying skin inflammation  He has been excused from work and been working from home  During that time his symptom complex has improved substantially  Today on physical examination he is well-nourished well-appearing male in no acute distress  He is pleasant competent reliable as a historian  He is accompanied by his wife in the examination room  They are asked affecting their first grandchild in early March  On physical examination of the skin of the gluteal cleft and tailbone there is no physical evidence to support the diagnosis of recurrent pilonidal cyst     I explained my physical findings to the patient  His overlying skin inflammation is quite likely secondary to his long commute to and from [place] each day  I have given him 1 month off from in person work    I look forward to seeing him back in the middle of March  Based upon his symptom complex will make additional diagnostic and therapeutic treatment recommendations  Alternatives to the proposed plan include additional testing now in the form of an MRI and second surgical opinion  All questions answered to the satisfaction of the patient who is in agreement with the treatment plan  Subjective:     Patient ID: Darrin Gomez is a 63 y o  male  Patient came in today for a Pilonidal Cyst that he has been dealing with for 2 months on and off  Patient states the cyst gets inflamed and irritated making it hard for him to sit  Patient denies drainage or fevers or chills  Stephon QURESHI MA      The following portions of the patient's history were reviewed and updated as appropriate: allergies, current medications, past family history, past medical history, past social history, past surgical history and problem list     Review of Systems   Constitutional: Negative for chills and fever  HENT: Negative for ear pain and sore throat  Eyes: Negative for pain and visual disturbance  Respiratory: Negative for cough and shortness of breath  Cardiovascular: Negative for chest pain and palpitations  Gastrointestinal: Negative for abdominal pain and vomiting  Genitourinary: Negative for dysuria and hematuria  Musculoskeletal: Negative for arthralgias and back pain  Skin: Negative for color change and rash  Neurological: Negative for seizures and syncope  All other systems reviewed and are negative  Objective:      /70 (BP Location: Left arm, Patient Position: Sitting, Cuff Size: Standard)   Pulse 75   Temp 97 8 °F (36 6 °C) (Temporal)   Resp 16   Ht 5' 4" (1 626 m)   Wt 77 1 kg (170 lb)   SpO2 99%   BMI 29 18 kg/m²         Physical Exam  Vitals and nursing note reviewed  Constitutional:       Appearance: He is well-developed  HENT:      Head: Normocephalic and atraumatic     Eyes:      Conjunctiva/sclera: Conjunctivae normal       Pupils: Pupils are equal, round, and reactive to light  Cardiovascular:      Rate and Rhythm: Normal rate and regular rhythm  Pulmonary:      Effort: Pulmonary effort is normal       Breath sounds: Normal breath sounds  Abdominal:      General: Bowel sounds are normal       Palpations: Abdomen is soft  Musculoskeletal:         General: Normal range of motion  Cervical back: Normal range of motion and neck supple  Skin:     General: Skin is warm and dry  Comments: Normal examination of the skin   Neurological:      Mental Status: He is alert and oriented to person, place, and time  Psychiatric:         Behavior: Behavior normal          Thought Content:  Thought content normal          Judgment: Judgment normal

## 2023-02-08 NOTE — PROGRESS NOTES
Assessment/Plan:    Skin inflammation  The patient is a 62-year-old male with a remote past medical history significant for pilonidal cyst status post office excision 30 years ago in [facility] presenting to the office with complaints of chronic recurrent pain in a similar location at the tailbone concerning for recurrent pilonidal cyst for general surgery consultation  Patient works as a  in [place]  He has a 3-hour commute daily to and from [place]  During that commute he develops chronic recurrent pain in the tailbone which he attributes to overlying skin inflammation  He has been excused from work and been working from home  During that time his symptom complex has improved substantially  Today on physical examination he is well-nourished well-appearing male in no acute distress  He is pleasant competent reliable as a historian  He is accompanied by his wife in the examination room  They are asked affecting their first grandchild in early March  On physical examination of the skin of the gluteal cleft and tailbone there is no physical evidence to support the diagnosis of recurrent pilonidal cyst     I explained my physical findings to the patient  His overlying skin inflammation is quite likely secondary to his long commute to and from [place] each day  I have given him 1 month off from in person work  I look forward to seeing him back in the middle of March  Based upon his symptom complex will make additional diagnostic and therapeutic treatment recommendations  Alternatives to the proposed plan include additional testing now in the form of an MRI and second surgical opinion  All questions answered to the satisfaction of the patient who is in agreement with the treatment plan  Subjective:      Patient ID: Sánchez Thompson is a 63 y o  male  Patient came in today for a Pilonidal Cyst that he has been dealing with for 2 months on and off   Patient states the cyst gets inflamed and irritated making it hard for him to sit  Patient denies drainage or fevers or chills  Stephon QURESHI MA      The following portions of the patient's history were reviewed and updated as appropriate: allergies, current medications, past family history, past medical history, past social history, past surgical history and problem list     Review of Systems   Constitutional: Negative for chills and fever  HENT: Negative for ear pain and sore throat  Eyes: Negative for pain and visual disturbance  Respiratory: Negative for cough and shortness of breath  Cardiovascular: Negative for chest pain and palpitations  Gastrointestinal: Negative for abdominal pain and vomiting  Genitourinary: Negative for dysuria and hematuria  Musculoskeletal: Negative for arthralgias and back pain  Skin: Negative for color change and rash  Neurological: Negative for seizures and syncope  All other systems reviewed and are negative  Objective:      /70 (BP Location: Left arm, Patient Position: Sitting, Cuff Size: Standard)   Pulse 75   Temp 97 8 °F (36 6 °C) (Temporal)   Resp 16   Ht 5' 4" (1 626 m)   Wt 77 1 kg (170 lb)   SpO2 99%   BMI 29 18 kg/m²          Physical Exam  Vitals and nursing note reviewed  Constitutional:       Appearance: He is well-developed  HENT:      Head: Normocephalic and atraumatic  Eyes:      Conjunctiva/sclera: Conjunctivae normal       Pupils: Pupils are equal, round, and reactive to light  Cardiovascular:      Rate and Rhythm: Normal rate and regular rhythm  Pulmonary:      Effort: Pulmonary effort is normal       Breath sounds: Normal breath sounds  Abdominal:      General: Bowel sounds are normal       Palpations: Abdomen is soft  Musculoskeletal:         General: Normal range of motion  Cervical back: Normal range of motion and neck supple  Skin:     General: Skin is warm and dry        Comments: Normal examination of the skin   Neurological:      Mental Status: He is alert and oriented to person, place, and time  Psychiatric:         Behavior: Behavior normal          Thought Content:  Thought content normal          Judgment: Judgment normal

## 2023-02-08 NOTE — ASSESSMENT & PLAN NOTE
The patient is a 63-year-old male with a remote past medical history significant for pilonidal cyst status post office excision 30 years ago in [facility] presenting to the office with complaints of chronic recurrent pain in a similar location at the [address] [place] [address] concerning for recurrent pilonidal cyst for general surgery consultation  Patient works as a  in [employer] [facility]  He has a 3-hour commute daily to and from [employer] [facility]  During that commute he develops chronic recurrent pain in the tailbone which he attributes to overlying skin inflammation  He has been excused from work and been working from home  During that time his symptom complex has improved substantially  Today on physical examination he is well-nourished well-appearing male in no acute distress  He is pleasant competent reliable as a historian  He is accompanied by his wife in the examination room  They are asked affecting their first grandchild in early March  On physical examination of the skin of the gluteal cleft and tailbone there is no physical evidence to support the diagnosis of recurrent pilonidal cyst     I explained my physical findings to the patient  His overlying skin inflammation is quite likely secondary to his long commute to and from [employer] [facility] each day  I have given him 1 month off from in person work  I look forward to seeing him back in the middle of March  Based upon his symptom complex will make additional diagnostic and therapeutic treatment recommendations  Alternatives to the proposed plan include additional testing now in the form of an MRI and second surgical opinion  All questions answered to the satisfaction of the patient who is in agreement with the treatment plan

## 2023-02-09 ENCOUNTER — TELEPHONE (OUTPATIENT)
Dept: SURGERY | Facility: CLINIC | Age: 63
End: 2023-02-09

## 2023-02-09 NOTE — TELEPHONE ENCOUNTER
Patient's spouse Holger Marshall) called, states that Christofer Navarro' return to work letter is not available in My Chart  Requested that the letter be emailed to [email or web address]  Emailed today

## 2023-03-20 ENCOUNTER — TELEPHONE (OUTPATIENT)
Dept: SURGERY | Facility: CLINIC | Age: 63
End: 2023-03-20

## 2023-03-20 NOTE — TELEPHONE ENCOUNTER
Called pt and left voicemail to give our office a call to reschedule his appt on 03/24/23 due to the provider not being in at that time due to having to be in the OR

## 2023-03-25 ENCOUNTER — APPOINTMENT (OUTPATIENT)
Dept: LAB | Facility: CLINIC | Age: 63
End: 2023-03-25

## 2023-03-25 DIAGNOSIS — Z00.00 ANNUAL PHYSICAL EXAM: ICD-10-CM

## 2023-03-25 LAB
ALBUMIN SERPL BCP-MCNC: 4 G/DL (ref 3.5–5)
ALP SERPL-CCNC: 44 U/L (ref 46–116)
ALT SERPL W P-5'-P-CCNC: 50 U/L (ref 12–78)
ANION GAP SERPL CALCULATED.3IONS-SCNC: 1 MMOL/L (ref 4–13)
AST SERPL W P-5'-P-CCNC: 28 U/L (ref 5–45)
BASOPHILS # BLD AUTO: 0.04 THOUSANDS/ÂΜL (ref 0–0.1)
BASOPHILS NFR BLD AUTO: 1 % (ref 0–1)
BILIRUB SERPL-MCNC: 0.89 MG/DL (ref 0.2–1)
BUN SERPL-MCNC: 15 MG/DL (ref 5–25)
CALCIUM SERPL-MCNC: 9.2 MG/DL (ref 8.3–10.1)
CHLORIDE SERPL-SCNC: 106 MMOL/L (ref 96–108)
CHOLEST SERPL-MCNC: 161 MG/DL
CO2 SERPL-SCNC: 31 MMOL/L (ref 21–32)
CREAT SERPL-MCNC: 0.94 MG/DL (ref 0.6–1.3)
EOSINOPHIL # BLD AUTO: 0.13 THOUSAND/ÂΜL (ref 0–0.61)
EOSINOPHIL NFR BLD AUTO: 3 % (ref 0–6)
ERYTHROCYTE [DISTWIDTH] IN BLOOD BY AUTOMATED COUNT: 12.1 % (ref 11.6–15.1)
GFR SERPL CREATININE-BSD FRML MDRD: 87 ML/MIN/1.73SQ M
GLUCOSE P FAST SERPL-MCNC: 100 MG/DL (ref 65–99)
HCT VFR BLD AUTO: 44.8 % (ref 36.5–49.3)
HDLC SERPL-MCNC: 56 MG/DL
HGB BLD-MCNC: 15 G/DL (ref 12–17)
IMM GRANULOCYTES # BLD AUTO: 0.01 THOUSAND/UL (ref 0–0.2)
IMM GRANULOCYTES NFR BLD AUTO: 0 % (ref 0–2)
LDLC SERPL CALC-MCNC: 93 MG/DL (ref 0–100)
LYMPHOCYTES # BLD AUTO: 1.64 THOUSANDS/ÂΜL (ref 0.6–4.47)
LYMPHOCYTES NFR BLD AUTO: 35 % (ref 14–44)
MCH RBC QN AUTO: 32 PG (ref 26.8–34.3)
MCHC RBC AUTO-ENTMCNC: 33.5 G/DL (ref 31.4–37.4)
MCV RBC AUTO: 96 FL (ref 82–98)
MONOCYTES # BLD AUTO: 0.5 THOUSAND/ÂΜL (ref 0.17–1.22)
MONOCYTES NFR BLD AUTO: 11 % (ref 4–12)
NEUTROPHILS # BLD AUTO: 2.34 THOUSANDS/ÂΜL (ref 1.85–7.62)
NEUTS SEG NFR BLD AUTO: 50 % (ref 43–75)
NONHDLC SERPL-MCNC: 105 MG/DL
NRBC BLD AUTO-RTO: 0 /100 WBCS
PLATELET # BLD AUTO: 132 THOUSANDS/UL (ref 149–390)
PMV BLD AUTO: 11.9 FL (ref 8.9–12.7)
POTASSIUM SERPL-SCNC: 3.9 MMOL/L (ref 3.5–5.3)
PROT SERPL-MCNC: 6.9 G/DL (ref 6.4–8.4)
RBC # BLD AUTO: 4.69 MILLION/UL (ref 3.88–5.62)
SODIUM SERPL-SCNC: 138 MMOL/L (ref 135–147)
TRIGL SERPL-MCNC: 61 MG/DL
TSH SERPL DL<=0.05 MIU/L-ACNC: 4.07 UIU/ML (ref 0.45–4.5)
WBC # BLD AUTO: 4.66 THOUSAND/UL (ref 4.31–10.16)

## 2023-03-28 ENCOUNTER — TELEPHONE (OUTPATIENT)
Dept: SURGERY | Facility: CLINIC | Age: 63
End: 2023-03-28

## 2023-03-28 LAB
PSA FREE MFR SERPL: 35.7 %
PSA FREE SERPL-MCNC: 0.25 NG/ML
PSA SERPL-MCNC: 0.7 NG/ML (ref 0–4)

## 2023-04-05 ENCOUNTER — OFFICE VISIT (OUTPATIENT)
Dept: PODIATRY | Facility: CLINIC | Age: 63
End: 2023-04-05

## 2023-04-05 VITALS
BODY MASS INDEX: 28.51 KG/M2 | HEIGHT: 64 IN | DIASTOLIC BLOOD PRESSURE: 72 MMHG | WEIGHT: 167 LBS | HEART RATE: 80 BPM | SYSTOLIC BLOOD PRESSURE: 115 MMHG

## 2023-04-05 DIAGNOSIS — L84 CALLUS OF FOOT: ICD-10-CM

## 2023-04-05 DIAGNOSIS — M79.671 RIGHT FOOT PAIN: Primary | ICD-10-CM

## 2023-04-05 NOTE — PROGRESS NOTES
Assessment/Plan:    No problem-specific Assessment & Plan notes found for this encounter  Diagnoses and all orders for this visit:    Right foot pain    Callus of foot        Plan:     -  Patient was counseled and educated on the condition and the diagnosis  The diagnosis, treatment options and prognosis were discussed in detail    - Hyperkeratotic lesion was sharply trimmed to normal epithelium with a 15 blade without incident  Discussed off-loading devices to decrease the growth of the calluses such as callus pads and foot ware modification  Patient was instructed to use a pumice stone at home to reduce the growth of the callus as well as OTC lotion or prescription to their feet  - return as needed       Subjective:      Patient ID: Maritza Galvin is a 66 y o  male  21-year-old male with past medical history as below presents for an evaluation of right foot pain secondary to a callus or possible wart on the lateral aspect of his heel  Patient reports he did have some discomfort with standing and walking from pressure to this area  He does have a history of similar foot callus previously which was treated  Denies any other complaints  The following portions of the patient's history were reviewed and updated as appropriate:   He  has a past medical history of Chronic low back pain, GERD (gastroesophageal reflux disease), Hyperlipidemia, Sleep apnea, and Verruca (2 yrs)    He   Patient Active Problem List    Diagnosis Date Noted   • Skin inflammation 02/08/2023   • Neck pain 12/05/2022   • Right foot pain 12/05/2022   • Pilonidal cyst 11/09/2022   • Blisters of multiple sites 07/28/2022   • Heat rash 07/19/2022   • COVID-19 virus infection 05/12/2022   • Sciatica, left side 02/25/2022   • Right elbow pain 02/03/2021   • Hyperglycemia 02/03/2021   • Allergic contact dermatitis due to plants, except food 11/16/2020   • Annual physical exam 07/07/2020   • Acute URI 01/06/2020   • Non-allergic "rhinitis 03/26/2019   • Right groin pain 03/26/2019   • Nocturia more than twice per night 12/27/2018   • Obstructive sleep apnea 12/27/2018   • Chronic GERD 05/18/2018   • Chest pain 06/26/2016   • Hyperlipidemia 06/26/2016   • Anxiety 06/26/2016     He  has a past surgical history that includes Colonoscopy; Esophagogastroduodenoscopy; and pr esophagogastroduodenoscopy transoral diagnostic (N/A, 5/24/2018)       Review of Systems   All other systems reviewed and are negative  Objective:      /72 (BP Location: Left arm, Patient Position: Sitting, Cuff Size: Standard)   Pulse 80   Ht 5' 4\" (1 626 m)   Wt 75 8 kg (167 lb)   BMI 28 67 kg/m²          Physical Exam  Vitals reviewed  Cardiovascular:      Pulses:           Dorsalis pedis pulses are 2+ on the right side and 2+ on the left side  Posterior tibial pulses are 2+ on the right side and 2+ on the left side  Musculoskeletal:        Feet:    Feet:      Right foot:      Protective Sensation: 10 sites tested  10 sites sensed  Skin integrity: Callus present  Left foot:      Protective Sensation: 10 sites tested  10 sites sensed  Comments: Right lateral plantar heel hyperkeratotic lesion noted  Mild discomfort with palpation          "

## 2023-04-10 PROBLEM — S43.421A SPRAIN OF RIGHT ROTATOR CUFF CAPSULE: Status: ACTIVE | Noted: 2023-04-10

## 2023-04-10 PROBLEM — Z23 NEED FOR DIPHTHERIA-TETANUS-PERTUSSIS (TDAP) VACCINE: Status: ACTIVE | Noted: 2023-04-10

## 2023-07-24 DIAGNOSIS — K21.9 CHRONIC GERD: ICD-10-CM

## 2023-07-24 RX ORDER — PANTOPRAZOLE SODIUM 40 MG/1
TABLET, DELAYED RELEASE ORAL
Qty: 90 TABLET | Refills: 3 | Status: SHIPPED | OUTPATIENT
Start: 2023-07-24

## 2023-08-04 ENCOUNTER — APPOINTMENT (OUTPATIENT)
Dept: LAB | Facility: CLINIC | Age: 63
End: 2023-08-04
Payer: COMMERCIAL

## 2023-08-04 DIAGNOSIS — E78.2 MIXED HYPERLIPIDEMIA: ICD-10-CM

## 2023-08-04 LAB
BASOPHILS # BLD AUTO: 0.04 THOUSANDS/ÂΜL (ref 0–0.1)
BASOPHILS NFR BLD AUTO: 1 % (ref 0–1)
EOSINOPHIL # BLD AUTO: 0.07 THOUSAND/ÂΜL (ref 0–0.61)
EOSINOPHIL NFR BLD AUTO: 2 % (ref 0–6)
ERYTHROCYTE [DISTWIDTH] IN BLOOD BY AUTOMATED COUNT: 12.1 % (ref 11.6–15.1)
HCT VFR BLD AUTO: 44 % (ref 36.5–49.3)
HGB BLD-MCNC: 14.5 G/DL (ref 12–17)
IMM GRANULOCYTES # BLD AUTO: 0.02 THOUSAND/UL (ref 0–0.2)
IMM GRANULOCYTES NFR BLD AUTO: 1 % (ref 0–2)
LYMPHOCYTES # BLD AUTO: 1.35 THOUSANDS/ÂΜL (ref 0.6–4.47)
LYMPHOCYTES NFR BLD AUTO: 32 % (ref 14–44)
MCH RBC QN AUTO: 32.3 PG (ref 26.8–34.3)
MCHC RBC AUTO-ENTMCNC: 33 G/DL (ref 31.4–37.4)
MCV RBC AUTO: 98 FL (ref 82–98)
MONOCYTES # BLD AUTO: 0.39 THOUSAND/ÂΜL (ref 0.17–1.22)
MONOCYTES NFR BLD AUTO: 9 % (ref 4–12)
NEUTROPHILS # BLD AUTO: 2.33 THOUSANDS/ÂΜL (ref 1.85–7.62)
NEUTS SEG NFR BLD AUTO: 55 % (ref 43–75)
NRBC BLD AUTO-RTO: 0 /100 WBCS
PLATELET # BLD AUTO: 129 THOUSANDS/UL (ref 149–390)
PMV BLD AUTO: 11.8 FL (ref 8.9–12.7)
RBC # BLD AUTO: 4.49 MILLION/UL (ref 3.88–5.62)
WBC # BLD AUTO: 4.2 THOUSAND/UL (ref 4.31–10.16)

## 2023-08-04 PROCEDURE — 84443 ASSAY THYROID STIM HORMONE: CPT

## 2023-08-04 PROCEDURE — 80053 COMPREHEN METABOLIC PANEL: CPT

## 2023-08-04 PROCEDURE — 80061 LIPID PANEL: CPT

## 2023-08-04 PROCEDURE — 36415 COLL VENOUS BLD VENIPUNCTURE: CPT

## 2023-08-04 PROCEDURE — 85025 COMPLETE CBC W/AUTO DIFF WBC: CPT

## 2023-08-05 LAB
ALBUMIN SERPL BCP-MCNC: 4.1 G/DL (ref 3.5–5)
ALP SERPL-CCNC: 56 U/L (ref 46–116)
ALT SERPL W P-5'-P-CCNC: 36 U/L (ref 12–78)
ANION GAP SERPL CALCULATED.3IONS-SCNC: 4 MMOL/L
AST SERPL W P-5'-P-CCNC: 21 U/L (ref 5–45)
BILIRUB SERPL-MCNC: 0.46 MG/DL (ref 0.2–1)
BUN SERPL-MCNC: 18 MG/DL (ref 5–25)
CALCIUM SERPL-MCNC: 8.6 MG/DL (ref 8.3–10.1)
CHLORIDE SERPL-SCNC: 111 MMOL/L (ref 96–108)
CHOLEST SERPL-MCNC: 138 MG/DL
CO2 SERPL-SCNC: 28 MMOL/L (ref 21–32)
CREAT SERPL-MCNC: 0.97 MG/DL (ref 0.6–1.3)
GFR SERPL CREATININE-BSD FRML MDRD: 83 ML/MIN/1.73SQ M
GLUCOSE P FAST SERPL-MCNC: 94 MG/DL (ref 65–99)
HDLC SERPL-MCNC: 56 MG/DL
LDLC SERPL CALC-MCNC: 77 MG/DL (ref 0–100)
NONHDLC SERPL-MCNC: 82 MG/DL
POTASSIUM SERPL-SCNC: 4.9 MMOL/L (ref 3.5–5.3)
PROT SERPL-MCNC: 6.8 G/DL (ref 6.4–8.4)
SODIUM SERPL-SCNC: 143 MMOL/L (ref 135–147)
TRIGL SERPL-MCNC: 26 MG/DL
TSH SERPL DL<=0.05 MIU/L-ACNC: 2.01 UIU/ML (ref 0.45–4.5)

## 2023-08-11 ENCOUNTER — OFFICE VISIT (OUTPATIENT)
Dept: FAMILY MEDICINE CLINIC | Facility: CLINIC | Age: 63
End: 2023-08-11
Payer: COMMERCIAL

## 2023-08-11 VITALS
WEIGHT: 161.4 LBS | OXYGEN SATURATION: 97 % | TEMPERATURE: 98 F | BODY MASS INDEX: 27.55 KG/M2 | HEIGHT: 64 IN | RESPIRATION RATE: 18 BRPM | DIASTOLIC BLOOD PRESSURE: 68 MMHG | SYSTOLIC BLOOD PRESSURE: 112 MMHG | HEART RATE: 74 BPM

## 2023-08-11 DIAGNOSIS — G47.33 OBSTRUCTIVE SLEEP APNEA: ICD-10-CM

## 2023-08-11 DIAGNOSIS — L57.0 ACTINIC KERATOSIS: ICD-10-CM

## 2023-08-11 DIAGNOSIS — E78.2 MIXED HYPERLIPIDEMIA: ICD-10-CM

## 2023-08-11 DIAGNOSIS — K21.9 CHRONIC GERD: Primary | ICD-10-CM

## 2023-08-11 PROCEDURE — 99214 OFFICE O/P EST MOD 30 MIN: CPT | Performed by: FAMILY MEDICINE

## 2023-08-11 PROCEDURE — G0439 PPPS, SUBSEQ VISIT: HCPCS | Performed by: FAMILY MEDICINE

## 2023-08-11 NOTE — PROGRESS NOTES
Assessment/Plan:       Problem List Items Addressed This Visit        Digestive    Chronic GERD - Primary     Stable GERD symptoms off pantoprazole follow good diet plan avoiding late night eating and recheck at physical exam in October            Respiratory    Obstructive sleep apnea     Stable monitor sleep patterns for worsening changes follow-up at next visit            Musculoskeletal and Integument    Actinic keratosis     Right ear lesion non healing. Other    Hyperlipidemia     Stable lipid profile continue with Crestor 5 mg repeat laboratory work in October follow heart healthy diet              Subjective:      Patient ID: Pam Andrea is a 66 y.o. male. Recheck evaluation appointment today discussed changing insurance in the upcoming future      The following portions of the patient's history were reviewed and updated as appropriate: allergies, current medications, past family history, past medical history, past social history, past surgical history and problem list.    Review of Systems   Constitutional: Negative for chills, fatigue and fever. HENT: Negative for congestion, nosebleeds, rhinorrhea, sinus pressure and sore throat. Eyes: Negative for discharge and redness. Respiratory: Negative for cough and shortness of breath. Cardiovascular: Negative for chest pain, palpitations and leg swelling. Gastrointestinal: Negative for abdominal pain, blood in stool and nausea. Endocrine: Negative for cold intolerance, heat intolerance and polyuria. Genitourinary: Negative for dysuria and frequency. Musculoskeletal: Negative for arthralgias, back pain and myalgias. Skin: Negative for rash. Neurological: Negative for dizziness, weakness and headaches. Hematological: Negative for adenopathy. Psychiatric/Behavioral: Negative for behavioral problems and sleep disturbance. The patient is not nervous/anxious.           Objective:      /68 (BP Location: Right arm, Patient Position: Sitting, Cuff Size: Large)   Pulse 74   Temp 98 °F (36.7 °C) (Temporal)   Resp 18   Ht 5' 4" (1.626 m)   Wt 73.2 kg (161 lb 6.4 oz)   SpO2 97%   BMI 27.70 kg/m²          Physical Exam  Vitals and nursing note reviewed. Constitutional:       Appearance: Normal appearance. He is well-developed and normal weight. HENT:      Head: Normocephalic and atraumatic. Right Ear: Tympanic membrane, ear canal and external ear normal.      Left Ear: Tympanic membrane, ear canal and external ear normal.      Nose: Nose normal.      Mouth/Throat:      Mouth: Mucous membranes are moist.      Pharynx: Oropharynx is clear. Eyes:      General: No scleral icterus. Extraocular Movements: Extraocular movements intact. Conjunctiva/sclera: Conjunctivae normal.      Pupils: Pupils are equal, round, and reactive to light. Neck:      Thyroid: No thyromegaly. Vascular: No JVD. Cardiovascular:      Rate and Rhythm: Normal rate and regular rhythm. Heart sounds: Normal heart sounds. No murmur heard. Pulmonary:      Effort: Pulmonary effort is normal.      Breath sounds: Normal breath sounds. No wheezing or rales. Chest:      Chest wall: No tenderness. Abdominal:      General: Bowel sounds are normal. There is no distension. Palpations: Abdomen is soft. There is no mass. Tenderness: There is no abdominal tenderness. There is no guarding or rebound. Musculoskeletal:         General: No tenderness or deformity. Normal range of motion. Cervical back: Normal range of motion and neck supple. Lymphadenopathy:      Cervical: No cervical adenopathy. Skin:     General: Skin is warm and dry. Capillary Refill: Capillary refill takes less than 2 seconds. Findings: No erythema or rash. Neurological:      General: No focal deficit present. Mental Status: He is alert and oriented to person, place, and time. Mental status is at baseline.       Cranial Nerves: No cranial nerve deficit. Deep Tendon Reflexes: Reflexes are normal and symmetric. Reflexes normal.   Psychiatric:         Mood and Affect: Mood normal.         Behavior: Behavior normal.         Thought Content: Thought content normal.         Judgment: Judgment normal.       Lesion Destruction    Date/Time: 8/11/2023 1:20 PM    Performed by: Radha Thornton DO  Authorized by: Radha Thornton DO  Universal Protocol:  Consent: Verbal consent obtained. Consent given by: patient  Patient understanding: patient states understanding of the procedure being performed  Patient identity confirmed: verbally with patient      Procedure Details - Lesion Destruction:     Number of Lesions:  1  Lesion 1:     Body area:  Head/neck    Head/neck location:  R ear    Malignancy: benign hyperkeratotic lesion      Destruction method: cryotherapy          Data:    Laboratory Results: I have personally reviewed the pertinent laboratory results/reports   Radiology/Other Diagnostic Testing Results: I have personally reviewed pertinent reports.        Lab Results   Component Value Date    WBC 4.20 (L) 08/04/2023    HGB 14.5 08/04/2023    HCT 44.0 08/04/2023    MCV 98 08/04/2023     (L) 08/04/2023     Lab Results   Component Value Date    K 4.9 08/04/2023     (H) 08/04/2023    CO2 28 08/04/2023    BUN 18 08/04/2023    CREATININE 0.97 08/04/2023    GLUF 94 08/04/2023    CALCIUM 8.6 08/04/2023    AST 21 08/04/2023    ALT 36 08/04/2023    ALKPHOS 56 08/04/2023    EGFR 83 08/04/2023     Lab Results   Component Value Date    CHOLESTEROL 138 08/04/2023    CHOLESTEROL 161 03/25/2023    CHOLESTEROL 146 03/19/2022     Lab Results   Component Value Date    HDL 56 08/04/2023    HDL 56 03/25/2023    HDL 47 03/19/2022     Lab Results   Component Value Date    LDLCALC 77 08/04/2023    LDLCALC 93 03/25/2023    LDLCALC 89 03/19/2022     Lab Results   Component Value Date    TRIG 26 08/04/2023    TRIG 61 03/25/2023    TRIG 50 03/19/2022     No results found for: "[place]"  Lab Results   Component Value Date    HKB3UKKCGGXN 2.010 08/04/2023     Lab Results   Component Value Date    HGBA1C 5.4 06/10/2021     Lab Results   Component Value Date    PSA 0.7 03/25/2023       Mau Ward DO

## 2023-08-11 NOTE — ASSESSMENT & PLAN NOTE
Stable lipid profile continue with Crestor 5 mg repeat laboratory work in October follow heart healthy diet

## 2023-08-11 NOTE — ASSESSMENT & PLAN NOTE
Stable GERD symptoms off pantoprazole follow good diet plan avoiding late night eating and recheck at physical exam in October

## 2023-09-12 ENCOUNTER — APPOINTMENT (EMERGENCY)
Dept: CT IMAGING | Facility: HOSPITAL | Age: 63
End: 2023-09-12
Payer: COMMERCIAL

## 2023-09-12 ENCOUNTER — HOSPITAL ENCOUNTER (EMERGENCY)
Facility: HOSPITAL | Age: 63
End: 2023-09-13
Attending: EMERGENCY MEDICINE
Payer: COMMERCIAL

## 2023-09-12 DIAGNOSIS — H54.61 VISION LOSS OF RIGHT EYE: Primary | ICD-10-CM

## 2023-09-12 LAB
ALBUMIN SERPL BCP-MCNC: 4.6 G/DL (ref 3.5–5)
ALP SERPL-CCNC: 42 U/L (ref 34–104)
ALT SERPL W P-5'-P-CCNC: 30 U/L (ref 7–52)
ANION GAP SERPL CALCULATED.3IONS-SCNC: 9 MMOL/L
APTT PPP: 26 SECONDS (ref 23–37)
AST SERPL W P-5'-P-CCNC: 25 U/L (ref 13–39)
BASOPHILS # BLD AUTO: 0.04 THOUSANDS/ÂΜL (ref 0–0.1)
BASOPHILS NFR BLD AUTO: 1 % (ref 0–1)
BILIRUB SERPL-MCNC: 0.58 MG/DL (ref 0.2–1)
BUN SERPL-MCNC: 24 MG/DL (ref 5–25)
CALCIUM SERPL-MCNC: 9.4 MG/DL (ref 8.4–10.2)
CARDIAC TROPONIN I PNL SERPL HS: 4 NG/L
CHLORIDE SERPL-SCNC: 103 MMOL/L (ref 96–108)
CO2 SERPL-SCNC: 28 MMOL/L (ref 21–32)
CREAT SERPL-MCNC: 1.11 MG/DL (ref 0.6–1.3)
EOSINOPHIL # BLD AUTO: 0.18 THOUSAND/ÂΜL (ref 0–0.61)
EOSINOPHIL NFR BLD AUTO: 3 % (ref 0–6)
ERYTHROCYTE [DISTWIDTH] IN BLOOD BY AUTOMATED COUNT: 11.9 % (ref 11.6–15.1)
GFR SERPL CREATININE-BSD FRML MDRD: 71 ML/MIN/1.73SQ M
GLUCOSE SERPL-MCNC: 99 MG/DL (ref 65–140)
HCT VFR BLD AUTO: 42.9 % (ref 36.5–49.3)
HGB BLD-MCNC: 14.8 G/DL (ref 12–17)
IMM GRANULOCYTES # BLD AUTO: 0.01 THOUSAND/UL (ref 0–0.2)
IMM GRANULOCYTES NFR BLD AUTO: 0 % (ref 0–2)
INR PPP: 1.05 (ref 0.84–1.19)
LYMPHOCYTES # BLD AUTO: 2.09 THOUSANDS/ÂΜL (ref 0.6–4.47)
LYMPHOCYTES NFR BLD AUTO: 29 % (ref 14–44)
MCH RBC QN AUTO: 33.2 PG (ref 26.8–34.3)
MCHC RBC AUTO-ENTMCNC: 34.5 G/DL (ref 31.4–37.4)
MCV RBC AUTO: 96 FL (ref 82–98)
MONOCYTES # BLD AUTO: 0.72 THOUSAND/ÂΜL (ref 0.17–1.22)
MONOCYTES NFR BLD AUTO: 10 % (ref 4–12)
NEUTROPHILS # BLD AUTO: 4.14 THOUSANDS/ÂΜL (ref 1.85–7.62)
NEUTS SEG NFR BLD AUTO: 57 % (ref 43–75)
NRBC BLD AUTO-RTO: 0 /100 WBCS
PLATELET # BLD AUTO: 147 THOUSANDS/UL (ref 149–390)
PMV BLD AUTO: 11.3 FL (ref 8.9–12.7)
POTASSIUM SERPL-SCNC: 3.6 MMOL/L (ref 3.5–5.3)
PROT SERPL-MCNC: 7.1 G/DL (ref 6.4–8.4)
PROTHROMBIN TIME: 14.3 SECONDS (ref 11.6–14.5)
RBC # BLD AUTO: 4.46 MILLION/UL (ref 3.88–5.62)
SODIUM SERPL-SCNC: 140 MMOL/L (ref 135–147)
WBC # BLD AUTO: 7.18 THOUSAND/UL (ref 4.31–10.16)

## 2023-09-12 PROCEDURE — 99285 EMERGENCY DEPT VISIT HI MDM: CPT | Performed by: EMERGENCY MEDICINE

## 2023-09-12 PROCEDURE — 84484 ASSAY OF TROPONIN QUANT: CPT | Performed by: EMERGENCY MEDICINE

## 2023-09-12 PROCEDURE — 0241U HB NFCT DS VIR RESP RNA 4 TRGT: CPT | Performed by: EMERGENCY MEDICINE

## 2023-09-12 PROCEDURE — 36415 COLL VENOUS BLD VENIPUNCTURE: CPT | Performed by: EMERGENCY MEDICINE

## 2023-09-12 PROCEDURE — 85610 PROTHROMBIN TIME: CPT | Performed by: EMERGENCY MEDICINE

## 2023-09-12 PROCEDURE — 99285 EMERGENCY DEPT VISIT HI MDM: CPT

## 2023-09-12 PROCEDURE — 85652 RBC SED RATE AUTOMATED: CPT | Performed by: EMERGENCY MEDICINE

## 2023-09-12 PROCEDURE — 80053 COMPREHEN METABOLIC PANEL: CPT | Performed by: EMERGENCY MEDICINE

## 2023-09-12 PROCEDURE — 82948 REAGENT STRIP/BLOOD GLUCOSE: CPT

## 2023-09-12 PROCEDURE — 85025 COMPLETE CBC W/AUTO DIFF WBC: CPT | Performed by: EMERGENCY MEDICINE

## 2023-09-12 PROCEDURE — 85730 THROMBOPLASTIN TIME PARTIAL: CPT | Performed by: EMERGENCY MEDICINE

## 2023-09-13 ENCOUNTER — APPOINTMENT (INPATIENT)
Dept: NON INVASIVE DIAGNOSTICS | Facility: HOSPITAL | Age: 63
End: 2023-09-13
Attending: PHYSICIAN ASSISTANT
Payer: COMMERCIAL

## 2023-09-13 ENCOUNTER — APPOINTMENT (OUTPATIENT)
Dept: RADIOLOGY | Facility: HOSPITAL | Age: 63
End: 2023-09-13
Payer: COMMERCIAL

## 2023-09-13 ENCOUNTER — HOSPITAL ENCOUNTER (INPATIENT)
Facility: HOSPITAL | Age: 63
LOS: 2 days | Discharge: HOME | End: 2023-09-15
Attending: STUDENT IN AN ORGANIZED HEALTH CARE EDUCATION/TRAINING PROGRAM | Admitting: STUDENT IN AN ORGANIZED HEALTH CARE EDUCATION/TRAINING PROGRAM
Payer: COMMERCIAL

## 2023-09-13 ENCOUNTER — APPOINTMENT (INPATIENT)
Dept: NON INVASIVE DIAGNOSTICS | Facility: HOSPITAL | Age: 63
End: 2023-09-13
Payer: COMMERCIAL

## 2023-09-13 VITALS
RESPIRATION RATE: 20 BRPM | HEART RATE: 79 BPM | BODY MASS INDEX: 27.59 KG/M2 | HEIGHT: 64 IN | SYSTOLIC BLOOD PRESSURE: 104 MMHG | WEIGHT: 161.6 LBS | OXYGEN SATURATION: 95 % | DIASTOLIC BLOOD PRESSURE: 58 MMHG | TEMPERATURE: 98.7 F

## 2023-09-13 DIAGNOSIS — H54.61 VISION LOSS OF RIGHT EYE: Primary | ICD-10-CM

## 2023-09-13 PROBLEM — H54.7 VISION PROBLEM: Status: ACTIVE | Noted: 2023-09-13

## 2023-09-13 LAB
2HR DELTA HS TROPONIN: 1 NG/L
AORTIC ROOT: 3.1 CM
APICAL FOUR CHAMBER EJECTION FRACTION: 64 %
ASCENDING AORTA: 3.1 CM
ATRIAL RATE: 103 BPM
CARDIAC TROPONIN I PNL SERPL HS: 5 NG/L
E WAVE DECELERATION TIME: 187 MS
ERYTHROCYTE [SEDIMENTATION RATE] IN BLOOD: 8 MM/HOUR (ref 0–19)
FLUAV RNA RESP QL NAA+PROBE: NEGATIVE
FLUBV RNA RESP QL NAA+PROBE: NEGATIVE
FRACTIONAL SHORTENING: 41 % (ref 28–44)
GLUCOSE SERPL-MCNC: 93 MG/DL (ref 65–140)
INTERVENTRICULAR SEPTUM IN DIASTOLE (PARASTERNAL SHORT AXIS VIEW): 1.3 CM
INTERVENTRICULAR SEPTUM: 1.3 CM (ref 0.6–1.1)
LAAS-AP2: 17.2 CM2
LAAS-AP4: 15.5 CM2
LEFT ATRIUM SIZE: 2.8 CM
LEFT ATRIUM VOLUME (MOD BIPLANE): 40 ML
LEFT INTERNAL DIMENSION IN SYSTOLE: 2.2 CM (ref 2.1–4)
LEFT VENTRICULAR INTERNAL DIMENSION IN DIASTOLE: 3.7 CM (ref 3.5–6)
LEFT VENTRICULAR POSTERIOR WALL IN END DIASTOLE: 1.3 CM
LEFT VENTRICULAR STROKE VOLUME: 43 ML
LVSV (TEICH): 43 ML
MV E'TISSUE VEL-SEP: 11 CM/S
MV PEAK A VEL: 0.87 M/S
MV PEAK E VEL: 81 CM/S
MV STENOSIS PRESSURE HALF TIME: 54 MS
MV VALVE AREA P 1/2 METHOD: 4.07 CM2
P AXIS: 69 DEGREES
PR INTERVAL: 142 MS
QRS AXIS: -17 DEGREES
QRSD INTERVAL: 102 MS
QT INTERVAL: 358 MS
QTC INTERVAL: 468 MS
RA PRESSURE ESTIMATED: 3 MMHG
RIGHT ATRIAL 2D VOLUME: 22 ML
RIGHT ATRIUM AREA SYSTOLE A4C: 11.8 CM2
RIGHT VENTRICLE ID DIMENSION: 3.5 CM
RSV RNA RESP QL NAA+PROBE: NEGATIVE
RV PSP: 21 MMHG
SARS-COV-2 RNA RESP QL NAA+PROBE: NEGATIVE
SL CV LEFT ATRIUM LENGTH A2C: 5.2 CM
SL CV LV EF: 70
SL CV PED ECHO LEFT VENTRICLE DIASTOLIC VOLUME (MOD BIPLANE) 2D: 60 ML
SL CV PED ECHO LEFT VENTRICLE SYSTOLIC VOLUME (MOD BIPLANE) 2D: 17 ML
T WAVE AXIS: 45 DEGREES
TR MAX PG: 18 MMHG
TR PEAK VELOCITY: 2.1 M/S
TRICUSPID ANNULAR PLANE SYSTOLIC EXCURSION: 2.1 CM
TRICUSPID VALVE PEAK REGURGITATION VELOCITY: 2.11 M/S
VENTRICULAR RATE: 103 BPM

## 2023-09-13 PROCEDURE — 96374 THER/PROPH/DIAG INJ IV PUSH: CPT

## 2023-09-13 PROCEDURE — 36415 COLL VENOUS BLD VENIPUNCTURE: CPT | Performed by: EMERGENCY MEDICINE

## 2023-09-13 PROCEDURE — 84484 ASSAY OF TROPONIN QUANT: CPT | Performed by: EMERGENCY MEDICINE

## 2023-09-13 PROCEDURE — 93306 TTE W/DOPPLER COMPLETE: CPT | Performed by: INTERNAL MEDICINE

## 2023-09-13 PROCEDURE — 93010 ELECTROCARDIOGRAM REPORT: CPT | Performed by: INTERNAL MEDICINE

## 2023-09-13 PROCEDURE — G1004 CDSM NDSC: HCPCS

## 2023-09-13 PROCEDURE — 99222 1ST HOSP IP/OBS MODERATE 55: CPT | Performed by: STUDENT IN AN ORGANIZED HEALTH CARE EDUCATION/TRAINING PROGRAM

## 2023-09-13 PROCEDURE — NC001 PR NO CHARGE: Performed by: EMERGENCY MEDICINE

## 2023-09-13 PROCEDURE — 93880 EXTRACRANIAL BILAT STUDY: CPT

## 2023-09-13 PROCEDURE — 97167 OT EVAL HIGH COMPLEX 60 MIN: CPT

## 2023-09-13 PROCEDURE — 93306 TTE W/DOPPLER COMPLETE: CPT

## 2023-09-13 PROCEDURE — 93005 ELECTROCARDIOGRAM TRACING: CPT

## 2023-09-13 PROCEDURE — 70551 MRI BRAIN STEM W/O DYE: CPT

## 2023-09-13 PROCEDURE — 97163 PT EVAL HIGH COMPLEX 45 MIN: CPT

## 2023-09-13 RX ORDER — PANTOPRAZOLE SODIUM 40 MG/1
40 TABLET, DELAYED RELEASE ORAL DAILY
Status: DISCONTINUED | OUTPATIENT
Start: 2023-09-13 | End: 2023-09-15 | Stop reason: HOSPADM

## 2023-09-13 RX ORDER — ATORVASTATIN CALCIUM 40 MG/1
40 TABLET, FILM COATED ORAL EVERY EVENING
Status: DISCONTINUED | OUTPATIENT
Start: 2023-09-13 | End: 2023-09-13

## 2023-09-13 RX ORDER — ATORVASTATIN CALCIUM 40 MG/1
40 TABLET, FILM COATED ORAL EVERY EVENING
Status: DISCONTINUED | OUTPATIENT
Start: 2023-09-13 | End: 2023-09-15 | Stop reason: HOSPADM

## 2023-09-13 RX ORDER — POTASSIUM CHLORIDE 20 MEQ/1
40 TABLET, EXTENDED RELEASE ORAL ONCE
Status: COMPLETED | OUTPATIENT
Start: 2023-09-13 | End: 2023-09-13

## 2023-09-13 RX ORDER — CHLORHEXIDINE GLUCONATE ORAL RINSE 1.2 MG/ML
15 SOLUTION DENTAL EVERY 12 HOURS SCHEDULED
Status: DISCONTINUED | OUTPATIENT
Start: 2023-09-13 | End: 2023-09-13

## 2023-09-13 RX ORDER — LABETALOL HYDROCHLORIDE 5 MG/ML
10 INJECTION, SOLUTION INTRAVENOUS EVERY 4 HOURS PRN
Status: DISCONTINUED | OUTPATIENT
Start: 2023-09-13 | End: 2023-09-15 | Stop reason: HOSPADM

## 2023-09-13 RX ORDER — TROPICAMIDE 10 MG/ML
1 SOLUTION/ DROPS OPHTHALMIC ONCE
Status: COMPLETED | OUTPATIENT
Start: 2023-09-13 | End: 2023-09-13

## 2023-09-13 RX ADMIN — ATORVASTATIN CALCIUM 40 MG: 40 TABLET, FILM COATED ORAL at 16:48

## 2023-09-13 RX ADMIN — TROPICAMIDE 1 DROP: 10 SOLUTION/ DROPS OPHTHALMIC at 19:02

## 2023-09-13 RX ADMIN — POTASSIUM CHLORIDE 40 MEQ: 1500 TABLET, EXTENDED RELEASE ORAL at 08:19

## 2023-09-13 RX ADMIN — Medication 18 MG: at 01:13

## 2023-09-13 RX ADMIN — CHLORHEXIDINE GLUCONATE 15 ML: 1.2 SOLUTION ORAL at 08:19

## 2023-09-13 RX ADMIN — TROPICAMIDE 1 DROP: 10 SOLUTION/ DROPS OPHTHALMIC at 18:40

## 2023-09-13 NOTE — PLAN OF CARE
Problem: PAIN - ADULT  Goal: Verbalizes/displays adequate comfort level or baseline comfort level  Description: Interventions:  - Encourage patient to monitor pain and request assistance  - Assess pain using appropriate pain scale  - Administer analgesics based on type and severity of pain and evaluate response  - Implement non-pharmacological measures as appropriate and evaluate response  - Consider cultural and social influences on pain and pain management  - Notify physician/advanced practitioner if interventions unsuccessful or patient reports new pain  Outcome: Progressing     Problem: DISCHARGE PLANNING  Goal: Discharge to home or other facility with appropriate resources  Description: INTERVENTIONS:  - Identify barriers to discharge w/patient and caregiver  - Arrange for needed discharge resources and transportation as appropriate  - Identify discharge learning needs (meds, wound care, etc.)  - Arrange for interpretive services to assist at discharge as needed  - Refer to Case Management Department for coordinating discharge planning if the patient needs post-hospital services based on physician/advanced practitioner order or complex needs related to functional status, cognitive ability, or social support system  Outcome: Progressing     Problem: Knowledge Deficit  Goal: Patient/family/caregiver demonstrates understanding of disease process, treatment plan, medications, and discharge instructions  Description: Complete learning assessment and assess knowledge base.   Interventions:  - Provide teaching at level of understanding  - Provide teaching via preferred learning methods  Outcome: Progressing     Problem: NEUROSENSORY - ADULT  Goal: Achieves stable or improved neurological status  Description: INTERVENTIONS  - Monitor and report changes in neurological status  - Monitor vital signs such as temperature, blood pressure, glucose, and any other labs ordered   - Initiate measures to prevent increased intracranial pressure  - Monitor for seizure activity and implement precautions if appropriate      Outcome: Progressing  Goal: Remains free of injury related to seizures activity  Description: INTERVENTIONS  - Maintain airway, patient safety  and administer oxygen as ordered  - Monitor patient for seizure activity, document and report duration and description of seizure to physician/advanced practitioner  - If seizure occurs,  ensure patient safety during seizure  - Reorient patient post seizure  - Seizure pads on all 4 side rails  - Instruct patient/family to notify RN of any seizure activity including if an aura is experienced  - Instruct patient/family to call for assistance with activity based on nursing assessment  - Administer anti-seizure medications if ordered    Outcome: Progressing  Goal: Achieves maximal functionality and self care  Description: INTERVENTIONS  - Monitor swallowing and airway patency with patient fatigue and changes in neurological status  - Encourage and assist patient to increase activity and self care.    - Encourage visually impaired, hearing impaired and aphasic patients to use assistive/communication devices  Outcome: Progressing     Problem: CARDIOVASCULAR - ADULT  Goal: Maintains optimal cardiac output and hemodynamic stability  Description: INTERVENTIONS:  - Monitor I/O, vital signs and rhythm  - Monitor for S/S and trends of decreased cardiac output  - Administer and titrate ordered vasoactive medications to optimize hemodynamic stability  - Assess quality of pulses, skin color and temperature  - Assess for signs of decreased coronary artery perfusion  - Instruct patient to report change in severity of symptoms  Outcome: Progressing  Goal: Absence of cardiac dysrhythmias or at baseline rhythm  Description: INTERVENTIONS:  - Continuous cardiac monitoring, vital signs, obtain 12 lead EKG if ordered  - Administer antiarrhythmic and heart rate control medications as ordered  - Monitor electrolytes and administer replacement therapy as ordered  Outcome: Progressing

## 2023-09-13 NOTE — EMTALA/ACUTE CARE TRANSFER
[address] [facility] [id number]  Dept: [phone]      EMTALA TRANSFER CONSENT    NAME Mohamud Peter                                         1960                              MRN [id number]    I have been informed of my rights regarding examination, treatment, and transfer   by Dr. Erendira Fernandez MD    Benefits: Specialized equipment and/or services available at the receiving facility (Include comment)________________________ (Ophthalmology)    Risks: Potential for delay in receiving treatment, Potential deterioration of medical condition, Loss of IV, Increased discomfort during transfer, Possible worsening of condition or death during transfer      Consent for Transfer:  I acknowledge that my medical condition has been evaluated and explained to me by the emergency department physician or other qualified medical person and/or my attending physician, who has recommended that I be transferred to the service of  Accepting Physician: Madhuri Herndon at [address] Name, [address] : [facility]. The above potential benefits of such transfer, the potential risks associated with such transfer, and the probable risks of not being transferred have been explained to me, and I fully understand them. The doctor has explained that, in my case, the benefits of transfer outweigh the risks. I agree to be transferred. I authorize the performance of emergency medical procedures and treatments upon me in both transit and upon arrival at the receiving facility. Additionally, I authorize the release of any and all medical records to the receiving facility and request they be transported with me, if possible. I understand that the safest mode of transportation during a medical emergency is an ambulance and that the Hospital advocates the use of this mode of transport.  Risks of traveling to the receiving facility by car, including absence of medical control, life sustaining equipment, such as oxygen, and medical personnel has been explained to me and I fully understand them. (MARANDA CORRECT BOX BELOW)  [  ]  I consent to the stated transfer and to be transported by ambulance/helicopter. [  ]  I consent to the stated transfer, but refuse transportation by ambulance and accept full responsibility for my transportation by car. I understand the risks of non-ambulance transfers and I exonerate the Hospital and its staff from any deterioration in my condition that results from this refusal.    X___________________________________________    DATE  23  TIME________  Signature of patient or legally responsible individual signing on patient behalf           RELATIONSHIP TO PATIENT_________________________          Provider Certification    NAME Mohamud Peter                                         1960                              MRN [id number]    A medical screening exam was performed on the above named patient. Based on the examination:    Condition Necessitating Transfer The encounter diagnosis was Vision loss of right eye.     Patient Condition: The patient has been stabilized such that within reasonable medical probability, no material deterioration of the patient condition or the condition of the unborn child(blanka) is likely to result from the transfer    Reason for Transfer: Level of Care needed not available at this facility    Transfer Requirements: Facility B   · Space available and qualified personnel available for treatment as acknowledged by PACS  · Agreed to accept transfer and to provide appropriate medical treatment as acknowledged by       Madhuri Herndon  · Appropriate medical records of the examination and treatment of the patient are provided at the time of transfer   [address] _______  · Transfer will be performed by qualified personnel from [place]  and appropriate transfer equipment as required, including the use of necessary and appropriate life support measures. Provider Certification: I have examined the patient and explained the following risks and benefits of being transferred/refusing transfer to the patient/family:  General risk, such as traffic hazards, adverse weather conditions, rough terrain or turbulence, possible failure of equipment (including vehicle or aircraft), or consequences of actions of persons outside the control of the transport personnel, Unanticipated needs of medical equipment and personnel during transport, The possibility of a transport vehicle being unavailable, Risk of worsening condition      Based on these reasonable risks and benefits to the patient and/or the unborn child(blanka), and based upon the information available at the time of the patient’s examination, I certify that the medical benefits reasonably to be expected from the provision of appropriate medical treatments at another medical facility outweigh the increasing risks, if any, to the individual’s medical condition, and in the case of labor to the unborn child, from effecting the transfer.     X____________________________________________ DATE 09/13/23        TIME_______      ORIGINAL - SEND TO MEDICAL RECORDS   COPY - SEND WITH PATIENT DURING TRANSFER

## 2023-09-13 NOTE — PLAN OF CARE
Problem: Neurological Deficit  Goal: Neurological status is stable or improving  Description: Interventions:  - Monitor and assess patient's level of consciousness, motor function, sensory function, and level of assistance needed for ADLs. - Monitor and report changes from baseline. Collaborate with interdisciplinary team to initiate plan and implement interventions as ordered. - Provide and maintain a safe environment. - Consider seizure precautions. - Consider fall precautions. - Consider aspiration precautions. - Consider bleeding precautions. Outcome: Progressing     Problem: Communication Impairment  Goal: Ability to express needs and understand communication  Description: Assess patient's communication skills and ability to understand information. Patient will demonstrate use of effective communication techniques, alternative methods of communication and understanding even if not able to speak. - Encourage communication and provide alternate methods of communication as needed. - Collaborate with case management/ for discharge needs. - Include patient/family/caregiver in decisions related to communication. Outcome: Progressing     Problem: Potential for Aspiration  Goal: Non-ventilated patient's risk of aspiration is minimized  Description: Assess and monitor vital signs, respiratory status, and labs (WBC). Monitor for signs of aspiration (tachypnea, cough, rales, wheezing, cyanosis, fever). - Assess and monitor patient's ability to swallow. - Place patient up in chair to eat if possible. - HOB up at 90 degrees to eat if unable to get patient up into chair.  - Supervise patient during oral intake. - Instruct patient/ family to take small bites. - Instruct patient/ family to take small single sips when taking liquids.   - Follow patient-specific strategies generated by speech pathologist.  Outcome: Progressing     Problem: Nutrition  Goal: Nutrition/Hydration status is improving  Description: Monitor and assess patient's nutrition/hydration status for malnutrition (ex- brittle hair, bruises, dry skin, pale skin and conjunctiva, muscle wasting, smooth red tongue, and disorientation). Collaborate with interdisciplinary team and initiate plan and interventions as ordered. Monitor patient's weight and dietary intake as ordered or per policy. Utilize nutrition screening tool and intervene per policy. Determine patient's food preferences and provide high-protein, high-caloric foods as appropriate. - Assist patient with eating.  - Allow adequate time for meals.  - Encourage patient to take dietary supplement as ordered. - Collaborate with clinical nutritionist.  - Include patient/family/caregiver in decisions related to nutrition.   Outcome: Progressing

## 2023-09-13 NOTE — PROGRESS NOTES
Attending documentation critical Care  Name: [place] 66 y.o. male I MRN: [id number]  Unit/Bed#: ICU 10 I Date of Admission: (Not on file)   Date of Service: 9/13/2023 I Hospital Day: 0    Patient arrived as a transfer from [address] [place] [address] secondary to acute right painless vision loss and acute stroke. Patient had a similar episode approximately 1 week ago that resolved within an hour. Patient had received TNK for stroke symptoms. Patient was on a statin as well as aspirin as an outpatient. Visit Vitals  /80 (BP Location: Right arm)   Pulse 86   Temp 98.7 °F (37.1 °C) (Oral)   Resp (!) 8   Ht 5' 4" (1.626 m)   Wt 72 kg (158 lb 11.7 oz)   SpO2 98%   BMI 27.25 kg/m²   Smoking Status Never   BSA 1.77 m²     GEN: NAD, awake and alert  HEENT: EOMI, PERRLA, MMM  CV: RRR, no murmur  Resp:  CTA, no R/R/W  GI: soft,NT/ND  Neuro: non focal motor exam, CN symmetric, normal speech, patient's pupils reactive to light on the right, visual fields appear to be intact, patient has more of a central visual disturbance where he is unable to make out details of objects  Skin: warm, dry    All laboratory data and imaging reviewed    Acute vision loss possible stroke equivalent status post TNK 9/13/2023 initial NIH 1  Hyperlipidemia-continue statin  TONIA-continue home PPI  GERD  Hypokalemia-replete and recheck    Goal blood pressure:  120-180    Stroke interventions:  Status post TNK  Lipitor 40 mg p.o. at bedtime    Vital signs reviewed from admission, patient has a respiratory rate recorded at 8 but otherwise no significant abnormalities. Prior respiratory rate was 22. On observation patient does not appear to be apneic/in respiratory distress. Laboratory data reviewed without any significant abnormalities identified. Imaging reviewed last night CTA with no hemodynamically significant stenosis of the arteries of the neck. 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   No high-grade proximal stenosis in the [place]. Continue with neurologic checks Q 1 hour status post thrombolytics. .  Repeat imaging at 24 hours status post thrombolytics. MRI pending. Plan to initiate anti-platelet therapy with Plavix as patient was on aspirin at home pending ophthalmology evaluation. .  Statin medication ordered. Continue with stroke protocol with PT/OT and speech evaluation. HbA1C, lipid panel and echo ordered. Neurology consult. Plan to to reimage if patient has decline in neurologic exam or < GCS by 2 points or more. Ophthalmology contacted overnight by emergency department at [address] [facility]. Plan is for evaluation today to exclude intraocular etiology for vision loss. Update:    Patient was reevaluated appears to be stable and his visual changes. Ophthalmology to evaluate patient this afternoon. Neurologic exam remained stable. Plan to transition to stepdown level 1.

## 2023-09-13 NOTE — SPEECH THERAPY NOTE
Speech/Language Pathology Note    Patient Name: Kt CHILDS Date: 9/13/2023     Consult received and chart reviewed. Per chart review and discussion, pt passed RN dysphagia assessment and is tolerating regular diet with thin liquids with no s/s of aspiration. Speech/Language deficits also denied. Formal speech/swallow evaluation does not appear to be indicated at this time. If new concerns arise, please reconsult. Thank you.

## 2023-09-13 NOTE — STROKE DOCUMENTATION
Reassessment for thrombolytics. .. unable to chart in proper spot at this time, no new onset of headaches, swelling or bleeding, Neuro XDL: diminished upper and lower visual fields of right eye. Vitals , /73, RR 18, O2 98% RA, GCS 15.

## 2023-09-13 NOTE — QUICK NOTE
Stroke alert 23:28.  at Discussed with ED physician at 23:29. Pt is 67 yo with TONIA and HLD who presents with acute onset right eye vision loss. Onset 1.5 hours ago(2200 on 9/12). Patient is only able to see flashes of light and through right lower quadrant of the right eye. Normal vision on left eye and no other focal deficits. No headaches. Patient has similar episode one week ago, that is self resolving within 1 hr. NIHSS 1. /72. CTH without hemorrhage. CTA with hypoplastic right vert, mild stenosis of WILNER, non flow limiting. Ddx: Possible CRAO vs intra-ocular issues such as retinal detachment vs migraines. His symptoms are slightly better, but continue to persist.     ED physician Dr. Ramone Hardy checking for exclusion criteria, which include severe head trauma or ischemic stroke within the past 3 months, brain tumor, GI cancer or bleeding within the past 21 days, neurologic surgery in the past 3 months or any previous ICH. No contraindication noted,  he  meets inclusion criteria for TNK (age >19yo, clinical diagnosis of ischemic stroke, treatment administered within 4.5hrs of when the patient was last seen neurologically intact). Risk and benefit discussed per ED. Patient think symptoms are improving and currently declining TNK(12:30am) and would like to wait a bit. Can still offer TNK if within 4.5 hour window.      Update at 12:56 9/13; patient agree to TNK.   - goal SBP less than 185 prior to and after administering IV tPA  - repeat head CT 24 hours after administering  - no AP or AC prior to above-mentioned repeat head CT  - ophthalmology consult in am  - admit to stroke pathway

## 2023-09-13 NOTE — ED PROVIDER NOTES
History  Chief Complaint   Patient presents with   • Loss of Vision     Patient reports not being able to see out of their right eye for approximately 1 hour prior to arrival. Reports episode of same two weeks ago that self resolved. Denies pain to eye, reports being able to only see shadows from right eye. Pupils equal round and reactive. 63-year-old male presents with a chief complaint of "I cannot see out of my right eye" which he states started approximately 1 hour prior to coming to the emergency room. Patient states he can see shadows out of the eye at present. Patient denies any ocular pain. Patient denies any headache. Patient notes a prior episode similar to this approximately 1 week ago. Patient states this lasted for an hour but improved and subsequently resolved. Today, the episode recurred similar to previously however he states it is not gotten any better. Patient denies any weakness. Patient denies any sensory loss. Patient denies any falls or trauma. Patient denies sudden change in the light when the event occurred today. Impression and plan: Painless vision loss with a broad differential.  Considering the history of repeat episode last week, concerns for potential retinal artery or vein occlusion as the source of patient's symptoms. Patient has improved vision when looking directly out of the inferior lateral quarter of his right eye raising suspicion for possible retinal etiology of patient's symptoms. Ultrasound completed which did not demonstrate findings that would be clearly a retinal detachment. Considering history and diagnostic uncertainty, a stroke alert was initiated. I discussed with on-call neurology who reviewed CT and CTA and did not demonstrate any acute findings, no signs of large vessel occlusion or hemorrhage. Recommended discussing thrombolytics with the patient.     I discussed with on-call ophthalmology who suggested transfer to the [facility] for evaluation. I discussed with ophthalmology at the [address] [place] [address] who was helpful and more concern for central process as the source of patient's symptoms, possibly NAION, though they suggested obtaining ESR for GCA, which subsequently was negative. Patient does not have headache or any swelling over the temporal artery. Unfortunately, there were no beds available at the [address] [place] [address] so they could not readily accept the patient and they state that they would not do any immediate intervention even if there were a retinal etiology of patient's symptoms. As such, suggested evaluation by our ophthalmology. I again discussed with our ophthalmology who advised transfer to [address] [place] [address]. I had multiple discussions with the patient about risks and benefits of TNK. Patient initially declined thrombolytics and did note some improvement in his vision in his right eye though this subsequently did not improve further like it did previously. After initially declining, patient subsequently agreed to initiation of thrombolytics after extensive discussion of the risks and benefits. Discussed with neuro critical care who accepted the patient for transfer to [address] [place] [address] for continued monitoring and management along with ophthalmology evaluation. Prior to Admission Medications   Prescriptions Last Dose Informant Patient Reported?  Taking?   aspirin (ECOTRIN LOW STRENGTH) 81 mg EC tablet  Self Yes No   Sig: Take 81 mg by mouth daily   rosuvastatin (CRESTOR) 5 mg tablet   No No   Sig: TAKE 1 TABLET DAILY      Facility-Administered Medications: None       Past Medical History:   Diagnosis Date   • Chronic low back pain    • GERD (gastroesophageal reflux disease)    • Hyperlipidemia    • Sleep apnea    • Verruca 2 yrs       Past Surgical History:   Procedure Laterality Date   • COLONOSCOPY     • ESOPHAGOGASTRODUODENOSCOPY     • [place] ESOPHAGOGASTRODUODENOSCOPY TRANSORAL DIAGNOSTIC N/A 5/24/2018    Procedure: ESOPHAGOGASTRODUODENOSCOPY (EGD); Surgeon: Kyle Cain MD;  Location: [facility]; Service: Gastroenterology       Family History   Problem Relation Age of Onset   • Diabetes Mother    • Lung cancer Father    • Cancer Father    • Cancer Brother         Bladder Cancer and a stroke   • No Known Problems Daughter    • No Known Problems Son      I have reviewed and agree with the history as documented. E-Cigarette/Vaping   • E-Cigarette Use Never User      E-Cigarette/Vaping Substances   • Nicotine No    • THC No    • CBD No    • Flavoring No    • Other No    • Unknown No      Social History     Tobacco Use   • Smoking status: Never   • Smokeless tobacco: Never   Vaping Use   • Vaping Use: Never used   Substance Use Topics   • Alcohol use: Yes     Alcohol/week: 4.0 standard drinks of alcohol     Types: 2 Glasses of wine, 2 Cans of beer per week     Comment: social   • Drug use: No       Review of Systems    Physical Exam  Physical Exam  Vitals reviewed. HENT:      Head: Atraumatic. Eyes:      Extraocular Movements:      Right eye: No nystagmus. Left eye: No nystagmus. Pupils: Pupils are equal, round, and reactive to light. Comments: Vision decreased right eye. Able to see waving hand but unable to count fingers when looking straight ahead. Able to see and count my fingers at the foot of the bed when looking out of the inferior lateral portion of the eye. Pupils are equal and reactive symmetrically. There is no apparent AFD. Cardiovascular:      Rate and Rhythm: Normal rate. Abdominal:      General: There is no distension. Tenderness: There is no abdominal tenderness. There is no guarding or rebound. Musculoskeletal:         General: No deformity. Cervical back: Neck supple. Skin:     General: Skin is warm and dry. Neurological:      Mental Status: He is alert and oriented to person, place, and time.       Cranial Nerves: Cranial nerve deficit present. Sensory: No sensory deficit. Motor: No weakness. Coordination: Coordination normal.      Gait: Gait normal.      Comments: Neuro:  Alert and answers questions appropriately. No dysarthria. Normal tandem gait, including toe walking and heel walking. Normal Romberg exam.  No pronator drift. Normal finger to nose. Normal fine motor function with rapid finger movements. Normal hand tap. Cranial nerves II through XII grossly intact. Visual fields grossly intact. Upper and lower motor strength 5/5 and symmetric. Normal light touch sensory exam.   Normal DTRs.       Psychiatric:         Mood and Affect: Mood normal.              Vital Signs  ED Triage Vitals [09/12/23 2312]   Temperature Pulse Respirations Blood Pressure SpO2   98.7 °F (37.1 °C) 90 19 159/77 99 %      Temp Source Heart Rate Source Patient Position - Orthostatic VS BP Location FiO2 (%)   Temporal Monitor Sitting Left arm --      Pain Score       --           Vitals:    09/13/23 0330 09/13/23 0343 09/13/23 0345 09/13/23 0400   BP: 119/64 120/65 113/63 104/58   Pulse: 96  89 79   Patient Position - Orthostatic VS: Sitting Sitting  Sitting         Visual Acuity  Visual Acuity    Flowsheet Row Most Recent Value   L Pupil Size (mm) 3   R Pupil Size (mm) 3          ED Medications  Medications   tenecteplase (TNKase) injection 18 mg (18 mg Intravenous Given 9/13/23 0113)       Diagnostic Studies  Results Reviewed     Procedure Component Value Units Date/Time    HS Troponin I 2hr [[id number]]  (Normal) Collected: 09/13/23 0126    Lab Status: Final result Specimen: Blood from Arm, Left Updated: 09/13/23 0155     hs TnI 2hr 5 ng/L      Delta 2hr hsTnI 1 ng/L     FLU/RSV/COVID - if FLU/RSV clinically relevant [[id number]]  (Normal) Collected: 09/12/23 2332    Lab Status: Final result Specimen: Nares from Nose Updated: 09/13/23 0127     SARS-CoV-2 Negative     INFLUENZA A PCR Negative     INFLUENZA B PCR Negative     RSV PCR Negative    Narrative:      FOR PEDIATRIC PATIENTS - copy/paste COVID Guidelines URL to browser: [email or web address]    SARS-CoV-2 assay is a Nucleic Acid Amplification assay intended for the  qualitative detection of nucleic acid from SARS-CoV-2 in nasopharyngeal  swabs. Results are for the presumptive identification of SARS-CoV-2 RNA. Positive results are indicative of infection with SARS-CoV-2, the virus  causing COVID-19, but do not rule out bacterial infection or co-infection  with other viruses. Laboratories within the [facility] and its  territories are required to report all positive results to the appropriate  public health authorities. Negative results do not preclude SARS-CoV-2  infection and should not be used as the sole basis for treatment or other  patient management decisions. Negative results must be combined with  clinical observations, patient history, and epidemiological information. This test has not been FDA cleared or approved. This test has been authorized by FDA under an Emergency Use Authorization  (EUA). This test is only authorized for the duration of time the  declaration that circumstances exist justifying the authorization of the  emergency use of an in vitro diagnostic tests for detection of SARS-CoV-2  virus and/or diagnosis of COVID-19 infection under section 564(b)(1) of  the Act, 21 U. S.C. 238ADQ-3(A)(6), unless the authorization is terminated  or revoked sooner. The test has been validated but independent review by FDA  and CLIA is pending. Test performed using [employer] Gene[employer]pert: This RT-PCR assay targets N2,  a region unique to SARS-CoV-2. A conserved region in the E-gene was chosen  for pan-Sarbecovirus detection which includes SARS-CoV-2. According to CMS-2020-01-R, this platform meets the definition of high-throughput technology.     Sedimentation rate, automated [[id number]]  (Normal) Collected: 09/12/23 2332    Lab Status: Final result Specimen: Blood from Arm, Left Updated: 09/13/23 0108     Sed Rate 8 mm/hour     Fingerstick Glucose (POCT) [[id number]]  (Normal) Collected: 09/12/23 2331    Lab Status: Final result Updated: 09/13/23 0008     POC Glucose 93 mg/dl     HS Troponin 0hr (reflex protocol) [[id number]]  (Normal) Collected: 09/12/23 2332    Lab Status: Final result Specimen: Blood from Arm, Left Updated: 09/12/23 2359     hs TnI 0hr 4 ng/L     Comprehensive metabolic panel [[id number]] Collected: 09/12/23 2332    Lab Status: Final result Specimen: Blood from Arm, Left Updated: 09/12/23 2358     Sodium 140 mmol/L      Potassium 3.6 mmol/L      Chloride 103 mmol/L      CO2 28 mmol/L      ANION GAP 9 mmol/L      BUN 24 mg/dL      Creatinine 1.11 mg/dL      Glucose 99 mg/dL      Calcium 9.4 mg/dL      AST 25 U/L      ALT 30 U/L      Alkaline Phosphatase 42 U/L      Total Protein 7.1 g/dL      Albumin 4.6 g/dL      Total Bilirubin 0.58 mg/dL      eGFR 71 ml/min/1.73sq m     Narrative:      [place] guidelines for Chronic Kidney Disease (CKD):   •  Stage 1 with normal or high GFR (GFR > 90 mL/min/1.73 square meters)  •  Stage 2 Mild CKD (GFR = 60-89 mL/min/1.73 square meters)  •  Stage 3A Moderate CKD (GFR = 45-59 mL/min/1.73 square meters)  •  Stage 3B Moderate CKD (GFR = 30-44 mL/min/1.73 square meters)  •  Stage 4 Severe CKD (GFR = 15-29 mL/min/1.73 square meters)  •  Stage 5 End Stage CKD (GFR <15 mL/min/1.73 square meters)  Note: GFR calculation is accurate only with a steady state creatinine    Protime-INR [[id number]]  (Normal) Collected: 09/12/23 2332    Lab Status: Final result Specimen: Blood from Arm, Left Updated: 09/12/23 2349     Protime 14.3 seconds      INR 1.05    APTT [[id number]]  (Normal) Collected: 09/12/23 2332    Lab Status: Final result Specimen: Blood from Arm, Left Updated: 09/12/23 0969     PTT 26 seconds     CBC and differential [[id number]] (Abnormal) Collected: 09/12/23 2332    Lab Status: Final result Specimen: Blood from Arm, Left Updated: 09/12/23 2339     WBC 7.18 Thousand/uL      RBC 4.46 Million/uL      Hemoglobin 14.8 g/dL      Hematocrit 42.9 %      MCV 96 fL      MCH 33.2 pg      MCHC 34.5 g/dL      RDW 11.9 %      MPV 11.3 fL      Platelets 149 Thousands/uL      nRBC 0 /100 WBCs      Neutrophils Relative 57 %      Immat GRANS % 0 %      Lymphocytes Relative 29 %      Monocytes Relative 10 %      Eosinophils Relative 3 %      Basophils Relative 1 %      Neutrophils Absolute 4.14 Thousands/µL      Immature Grans Absolute 0.01 Thousand/uL      Lymphocytes Absolute 2.09 Thousands/µL      Monocytes Absolute 0.72 Thousand/µL      Eosinophils Absolute 0.18 Thousand/µL      Basophils Absolute 0.04 Thousands/µL                  CTA stroke alert (head/neck)   Final Result by Aida Allen MD (09/13 0019)      1. No hemodynamically significant stenosis of the arteries of the neck. 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   2.  No high-grade proximal stenosis of the visualized [place] of [place]. I personally discussed this study with Cherie Gusman on 9/13/2023 12:17 AM.                              Workstation performed: [id number]         CT stroke alert brain   Final Result by Aida Allen MD (09/13 0019)      No acute intracranial hemorrhage, midline shift, or mass effect. If there is continued clinical concern for acute infarct, further evaluation with MRI may be obtained which is more sensitive. I personally discussed this study with Cherie Gusman on 9/13/2023 12:00 AM.         Workstation performed: [id number]                    Procedures  Procedures         ED Course  ED Course as of 09/17/23 2117   Wed Sep 13, 2023   0036 Extensive conversation with the patient multiple times about risk and benefits of thrombolytics. Patient states vision is currently improving and declining thrombolytics at this point. Discussed with Optho and neurology. Discussed with CHRISTINA Servin. 8911 Patient wants thrombolytics after repeat discussion. Extensive discussion with the patient about risks and benefits. Patient verbally consented to treatment after discussion. I discussed alternatives. I discussed risks and benefits in detail including potential hemorrhagic conversion. I discussed that there is no data regarding the use of this in retinal artery or vein thromboses. I reviewed all contraindications and risk factors who denied any. Patient was concerned because his platelet count was low though the labs today show platelet count of 744. Discussed with neuro critical care [address] [id number] [place] accepted the patient for transfer. 0116 Sed Rate: 8                               SBIRT 20yo+    Flowsheet Row Most Recent Value   Initial Alcohol Screen: US AUDIT-C     1. How often do you have a drink containing alcohol? 0 Filed at: 09/12/2023 2320   2. How many drinks containing alcohol do you have on a typical day you are drinking? 0 Filed at: 09/12/2023 2320   3a. Male UNDER 65: How often do you have five or more drinks on one occasion? 0 Filed at: 09/12/2023 2320   Audit-C Score 0 Filed at: 09/12/2023 2320   CHARMAINE: How many times in the past year have you. .. Used an illegal drug or used a prescription medication for non-medical reasons?  Never Filed at: 09/12/2023 2320                    MDM    Disposition  Final diagnoses:   Vision loss of right eye     Time reflects when diagnosis was documented in both MDM as applicable and the Disposition within this note     Time User Action Codes Description Comment    9/12/2023 11:26 PM [facility] [A62.66] Vision loss of right eye     9/13/2023  1:28 AM [facility] Add [I89.75] Vision loss, right eye     9/13/2023  1:28 AM [facility] Remove [H54.61] Vision loss, right eye       ED Disposition     ED Disposition   Transfer to Another Facility-In Network    Condition --    Date/Time   Wed Sep 13, 2023  1:28 AM    Comment   Omar Landers should be transferred out to [facility].            MD Documentation    Ana Chin Most Recent Value   Patient Condition The patient has been stabilized such that within reasonable medical probability, no material deterioration of the patient condition or the condition of the unborn child(blanka) is likely to result from the transfer   Reason for Transfer Level of Care needed not available at this facility   Benefits of Transfer Specialized equipment and/or services available at the receiving facility (Include comment)________________________  [Ophthalmology]   Risks of Transfer Potential for delay in receiving treatment, Potential deterioration of medical condition, Loss of IV, Increased discomfort during transfer, Possible worsening of condition or death during transfer   Accepting Physician [address] Name, [address] [facility]    (Name & Tel number) PACS   Transported by [employer] and Unit #) Sera Nathan   Sending MD [facility]   Provider Certification General risk, such as traffic hazards, adverse weather conditions, rough terrain or turbulence, possible failure of equipment (including vehicle or aircraft), or consequences of actions of persons outside the control of the transport personnel, Unanticipated needs of medical equipment and personnel during transport, The possibility of a transport vehicle being unavailable, Risk of worsening condition      RN Documentation    Flowsheet Row Most [facility] Name, [address] [facility]    (Name & Tel number) PACS   Transported by (Company and Unit #) [employer]      Follow-up Information    None         Discharge Medication List as of 9/13/2023  4:12 AM      CONTINUE these medications which have NOT CHANGED    Details   aspirin (ECOTRIN LOW STRENGTH) 81 mg EC tablet Take 81 mg by mouth daily, Historical Med      rosuvastatin (CRESTOR) 5 mg tablet TAKE 1 TABLET DAILY, Normal      pantoprazole (PROTONIX) 40 mg tablet TAKE 1 TABLET DAILY, Normal             No discharge procedures on file.     PDMP Review     None          ED Provider  Electronically Signed by           Marcos Farrar MD  09/17/23 1669

## 2023-09-13 NOTE — PHYSICAL THERAPY NOTE
PHYSICAL THERAPY TREATMENT  NAME:  Xi Horton  DATE: 09/13/23    AGE:   66 y.o. Mrn:   [id number]  ADMIT DX:  Vision loss of right eye [H54.61]    Past Medical History:   Diagnosis Date    Chronic low back pain     GERD (gastroesophageal reflux disease)     Hyperlipidemia     Sleep apnea     Verruca 2 yrs     Past Surgical History:   Procedure Laterality Date    COLONOSCOPY      ESOPHAGOGASTRODUODENOSCOPY      [place] ESOPHAGOGASTRODUODENOSCOPY TRANSORAL DIAGNOSTIC N/A 5/24/2018    Procedure: ESOPHAGOGASTRODUODENOSCOPY (EGD); Surgeon: Jennifer Jarvis MD;  Location: [facility]; Service: Gastroenterology       Length Of Stay: 0     09/13/23 0931   PT Last Visit   PT Visit Date 09/13/23   Note Type   Note type Evaluation   Pain Assessment   Pain Assessment Tool 0-10   Pain Score No Pain   Restrictions/Precautions   Weight Bearing Precautions Per Order No   Other Precautions Visual impairment   Home Living   Type of [facility] Two level;1/2 bath on main level;Stairs to enter with rails;Bed/bath upstairs   Home Equipment   (none)   Prior Function   Level of [place] Independent with ADLs; Independent with functional mobility; Independent with IADLS   Lives With Spouse; Son   Page Boateng Help From Family   IADLs Independent with driving; Independent with meal prep; Independent with medication management   Falls in the last 6 months 0   Vocational Full time employment   Comments 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; 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   General   Family/Caregiver Present Yes   Cognition   Overall Cognitive Status WFL   Attention Within functional limits   Orientation Level Oriented X4   Memory Within functional limits   Following Commands Follows all commands and directions without difficulty   Comments cooperative and motivated asks appropriate questions and makes needs known   RUE Assessment   RUE Assessment WFL   LUE Assessment   LUE Assessment WFL   RLE Assessment   RLE Assessment WFL  (5/5)   LLE Assessment   LLE Assessment WFL  (5/5)   Vision-Basic Assessment   Current Vision   (see OT vision description- limitations in R eye)   Coordination   Movements are Fluid and Coordinated 1   Sensation WFL   Finger to Nose & Finger to Finger  Intact   Heel to Shin Intact   Rapid Alternating Movements Intact   Light Touch   RLE Light Touch Grossly intact   LLE Light Touch Grossly intact   Sharp/Dull   RLE Sharp/Dull Grossly intact   LLE Sharp/Dull Grossly intact   Proprioception   RLE Proprioception Grossly intact   LLE Proprioception Grossly Intact   Transfers   Sit to Stand 7  Independent   Stand to Sit 7  Independent   Stand pivot 7  Independent   Ambulation/Elevation   Gait pattern WNL   Gait Assistance 5  Supervision  (> indep w/o LOB or dizziness noted)   Assistive Device None   Distance > 400'   Stair Management Assistance 5  Supervision   Stair Management Technique One rail R;Alternating pattern   Balance   Static Sitting Good   Dynamic Sitting Good   Static Standing Fair +   Dynamic Standing Fair   Ambulatory Fair   Endurance Deficit   Endurance Deficit No   Activity Tolerance   Activity Tolerance Patient tolerated treatment well   Medical Staff Made Aware OT   Nurse Made Aware yes   Assessment   Prognosis Good   Problem List Impaired vision   Assessment Pt is 66 y.o. male seen for PT evaluation s/p admit to [address] [place] [address] on 9/13/2023. Pt presenting as a xfer from [facility] w/ acute vision loss in R eye. (+) stroke alert + TNK given. MRI pending. PT consulted for mobility and assist w/ d/c planning. Pt   has a past medical history of Chronic low back pain, GERD (gastroesophageal reflux disease), Hyperlipidemia, Sleep apnea, and Verruca (2 yrs).   Due to acute medical issues, ongoing medical workup for primary dx; acute vision loss; stroke pathway; fall risk, iincreased assistance needed from caregiver at current time, continuous telemetry/ O2 monitoring in ICU, multiple lines, decline in overall functional mobility status;  note unstable clinical picture (high complexity)   Prior to admission, pt was fully indep will all mobility ADL's and IADL's. 0 use of AD; 0 falls drives and works [employer]. . Currently pt  Is A+O x4 and presents w/ ability to demo indep bed mobility; indep transfers from all surfaces and indep gait + stairs w/o AD > 400'. Pt's only deficit is R vision loss and education was provided on scanning + fall prevention strategies. Pt presents functioning near his baseline and currently w/ overall mobility deficits 2* to vision deficits in R eye;  multiple lines;   (Please find additional objective findings from PT assessment regarding body systems outlined above.) Pt will continue to benefit from skilled PT interventions to address stated impairments; to maximize functional potential; for ongoing pt/ family training; and DME needs. PT is currently recommending vision therapy/ defer to OT/ neuro ophthalmologist recommendations. Pt/ family agreeable to plan and d/c from PT at this time. Pt was educated to ambulate 3-4x daily to maintain CLOF until time of d/c. PT to sign off.  no skilled acute PT needs identified Please re consult if function decline of needs arise.  D/c PT   Barriers to Discharge None   Goals   Patient Goals go home- get my vision back   Recommendation   PT Discharge Recommendation No rehabilitation needs  (see assessment - vision therapy per OT)   Equipment Recommended   (none)   AM-PAC Basic Mobility Inpatient   Turning in Flat Bed Without Bedrails 4   Lying on Back to Sitting on Edge of Flat Bed Without Bedrails 4   Moving Bed to Chair 4   Standing Up From Chair Using Arms 4   Walk in Room 4   Climb 3-5 Stairs With Railing 3   Basic Mobility Inpatient Raw Score 23   Basic Mobility Standardized Score 50.88   Highest Level Of Mobility   JH-HLM Goal 7: Walk 25 feet or more   JH-HLM Achieved 8: Walk 250 feet ot more       The patient's AM-PAC Basic Mobility Inpatient Short Form Raw Score is 23. A Raw score of greater than 16 suggests the patient may benefit from discharge to home. Please also refer to the recommendation of the Physical Therapist for safe discharge planning.           Yasmeen Regalado, PT

## 2023-09-13 NOTE — NURSING NOTE
Patient received TNK at [facility] at [address] on 09/13.  Patient was transferred to [facility] on 09/13 at 0512 - TNK charting begun at [facility] AND [facility] at [phone] at the 30 minute interval due to time TNK was given

## 2023-09-13 NOTE — OCCUPATIONAL THERAPY NOTE
Occupational Therapy Evaluation     Patient Name: Kt Diego  WSLUW'J Date: 9/13/2023  Problem List  Active Problems: There are no active Hospital Problems. Past Medical History  Past Medical History:   Diagnosis Date    Chronic low back pain     GERD (gastroesophageal reflux disease)     Hyperlipidemia     Sleep apnea     Verruca 2 yrs     Past Surgical History  Past Surgical History:   Procedure Laterality Date    COLONOSCOPY      ESOPHAGOGASTRODUODENOSCOPY      OR ESOPHAGOGASTRODUODENOSCOPY TRANSORAL DIAGNOSTIC N/A 5/24/2018    Procedure: ESOPHAGOGASTRODUODENOSCOPY (EGD); Surgeon: Primitivo Morgan MD;  Location: [facility]; Service: Gastroenterology             09/13/23 0930   OT Last Visit   OT Visit Date 09/13/23   Note Type   Note type Evaluation   Pain Assessment   Pain Score No Pain   Restrictions/Precautions   Weight Bearing Precautions Per Order No   Other Precautions Visual impairment;Multiple lines;Telemetry; Fall Risk   Home Living   Type of [address] [place] [address] Two level;1/2 bath on main level;Bed/bath upstairs;Stairs to enter with rails   Bathroom Shower/Tub Tub/shower unit   Bathroom Toilet Standard   Bathroom Accessibility Accessible   Additional Comments Pt lives in a 2 story home with 1 NUNU, FFOS to second floor however does have 1/2 bath on main level. Denies DME PTA   Prior Function   Level of [place] Independent with ADLs; Independent with functional mobility; Independent with IADLS   Lives With Spouse; Son   [place] Help From Family   IADLs Independent with driving; Independent with meal prep; Independent with medication management   Falls in the last 6 months 0   Vocational Full time employment   Lifestyle   Autonomy I with ADLS and IADLS +    Reciprocal Relationships supportive spouse and son   Service to Others works FT   Intrinsic Gratification Baseball and spending time with his grandchild   Subjective   Subjective "It's getting better but it is still there"   ADL Where Assessed Chair   Eating Assistance 7  Independent   Grooming Assistance 7  Independent   UB Bathing Assistance 6  Modified Independent   LB Bathing Assistance 6  Modified Independent   [address] 6  Modified independent   UB Dressing Deficit Setup   LB Dressing Assistance 6  Modified independent   [address]  6  Modified independent   Bed Mobility   Additional Comments OOB upon presentation and at end of session   Transfers   Sit to Stand 6  Modified independent   Stand to Sit 6  Modified independent   Stand pivot 6  Modified independent   Additional Comments increased time   Functional Mobility   Functional Mobility 5  Supervision   Additional Comments house hold distances, no AD, no LOB   Balance   Static Sitting Good   Dynamic Sitting Fair +   Static Standing Fair   Dynamic Standing Fair -   Ambulatory Fair -   Activity Tolerance   Activity Tolerance Patient tolerated treatment well   Medical Staff Made Aware DPT, NSG aware   Nurse Made Aware yes, cleared for OTIE   RUE Assessment   RUE Assessment WFL   LUE Assessment   LUE Assessment WFL   Vision - Complex Assessment   Additional Comments Pt reports continues visual disturbance in R eye described as grey dots in his central vision, that is prominent when closing L eye but can see peripherally. Reports When both eyes are open the disturbance is less noticable but still present. Pt is able to avoide obstacles in hallway, read name badge and exit signs, and target items in visual field. Psychosocial   Psychosocial (WDL) WDL   Cognition   Overall Cognitive Status WFL   Arousal/Participation Alert; Responsive; Cooperative   Attention Within functional limits   Orientation Level Oriented X4   Memory Within functional limits   Following Commands Follows all commands and directions without difficulty   Comments Pleasant and cooperative, aware of deficits and makes accomadations for same to ensure safety with mobility   Assessment Assessment Patient is a 66 y.o. male admitted to [address] [place] [address] on 9/13/2023 due to <principal problem not specified>. Pt presents with c/o vision loss and was given TNK then transfers to [facility] for further evaluation. Pt continues to have R central visual disturbance which he describes as "grey dots in the middle of his R eye". Pt performed at (I) level with no OT needs identified at this time. Comorbidities affecting pt's physical performance at time of assessment include:  has a past medical history of Chronic low back pain, GERD (gastroesophageal reflux disease), Hyperlipidemia, Sleep apnea, and Verruca. .  The patient's raw score on the AM-PAC Daily Activity inpatient short form is 24  , standardized score is 57.54  , greater than 39.4. Patients at this level are likely to benefit from DC to home. From OT standpoint recommend Outpatient vision therapy upon D/C. No further acute OT needs identified at this time. Recommend continued mobilization with hospital staff and restorative services while in the hospital to increase pt’s endurance and strength upon D/C. From OT standpoint, recommend D/C to home with family support when medically cleared. D/C pt from OT caseload at this time.    Goals   Patient Goals To go home   Plan   OT Frequency Eval only   Recommendation   OT Discharge Recommendation Home with outpatient rehabilitation  (Outpatient Vision rehab)   AM-[facility] Daily Activity Inpatient   Lower Body Dressing 4   Bathing 4   Toileting 4   Upper Body Dressing 4   Grooming 4   Eating 4   Daily Activity Raw Score 24   Daily Activity Standardized Score (Calc for Raw Score >=11) 57.54   AM-PAC Applied Cognition Inpatient   Following a Speech/Presentation 4   Understanding Ordinary Conversation 4   Taking Medications 4   Remembering Where Things Are Placed or Put Away 4   Remembering List of 4-5 Errands 4   Taking Care of Complicated Tasks 4   Applied Cognition Raw Score 24   Applied Cognition Standardized Score 62.21

## 2023-09-13 NOTE — CASE MANAGEMENT
Case Management Assessment & Discharge Planning Note    Patient name Jonas Anna  Location ICU 10/ICU 10 MRN [id number]  : 1960 Date 2023       Current Admission Date: 2023  Current Admission Diagnosis:Vision loss of right eye   Patient Active Problem List    Diagnosis Date Noted   • Actinic keratosis 2023   • Sprain of right rotator cuff capsule 04/10/2023   • Need for diphtheria-tetanus-pertussis (Tdap) vaccine 04/10/2023   • Skin inflammation 2023   • Neck pain 2022   • Right foot pain 2022   • Pilonidal cyst 2022   • Blisters of multiple sites 2022   • Heat rash 2022   • COVID-19 virus infection 2022   • Sciatica, left side 2022   • Right elbow pain 2021   • Hyperglycemia 2021   • Allergic contact dermatitis due to plants, except food 2020   • Annual physical exam 2020   • Acute URI 2020   • Non-allergic rhinitis 2019   • Right groin pain 2019   • Nocturia more than twice per night 2018   • Obstructive sleep apnea 2018   • Chronic GERD 2018   • Chest pain 2016   • Hyperlipidemia 2016   • Anxiety 2016      LOS (days): 0  Geometric Mean LOS (GMLOS) (days):   Days to GMLOS:     OBJECTIVE:    Risk of Unplanned Readmission Score: 6.61         Current admission status: Inpatient       Preferred Pharmacy:   [facility] [address] [facility], [address] [address]  Phone: [phone] Fax: [phone],  [facility],[address]  [address] [facility]  [address] [place] [address]  Phone: [phone] Fax: [phone]    Primary Care Provider: Lori Richardson DO    Primary Insurance: BLUE CROSS  Secondary Insurance:     ASSESSMENT:  [place] Proxies    There are no active Health Care Proxies on file.        Advance Directives  Does patient have a [address] [place] [address] Avenue?: No  Does patient have Advance Directives?: No              Patient Information  Admitted from[de-identified] Facility  Mental Status: Alert  During Assessment patient was accompanied by: Son  Assessment information provided by[de-identified] Son  Primary Caregiver: Self  Support Systems: Spouse/significant other, Son, Family members  [place] of Residence: [facility] entry access options.  Select all that apply.: No steps to enter home  Type of Current Residence: 2 story home  Upon entering residence, is there a bedroom on the main floor (no further steps)?: No  A bedroom is located on the following floor levels of residence (select all that apply):: 2nd Floor  Upon entering residence, is there a bathroom on the main floor (no further steps)?: Yes  Number of steps to 2nd floor from main floor: One Flight  In the last 12 months, was there a time when you were not able to pay the mortgage or rent on time?: No  In the last 12 months, how many places have you lived?: 1  In the last 12 months, was there a time when you did not have a steady place to sleep or slept in a shelter (including now)?: No  Living Arrangements: Lives w/ Spouse/significant other    Activities of Daily Living Prior to Admission  Functional Status: Independent  Completes ADLs independently?: Yes  Ambulates independently?: Yes  Does patient use assisted devices?: No  Does patient currently own DME?: No  Does patient have a history of Outpatient Therapy (PT/OT)?: No  Does the patient have a history of Short-Term Rehab?: No  Does patient have a history of HHC?: No         Patient Information Continued  Income Source: Employed  Does patient have prescription coverage?: Yes  Within the past 12 months, you worried that your food would run out before you got the money to buy more.: Never true  Within the past 12 months, the food you bought just didn't last and you didn't have money to get more.: Never true  Does patient receive dialysis treatments?: No  Does patient have a history of substance abuse?: No  Does patient have a history of Mental Health Diagnosis?: No         Means of Transportation  Means of Transport to Appts[de-identified] Drives Self  In the past 12 months, has lack of transportation kept you from medical appointments or from getting medications?: No  In the past 12 months, has lack of transportation kept you from meetings, work, or from getting things needed for daily living?: No        DISCHARGE DETAILS:    Discharge planning discussed with[de-identified] son  Freedom of Choice: Yes                   Contacts  Patient Contacts: wife Belle aCstañeda  Relationship to Patient[de-identified] Family  Contact Method: Phone  Phone Number: [phone]  Reason/Outcome: Continuity of Care, Emergency Contact, Discharge Planning                   Would you like to participate in our [address] service program?  : No - Declined    Treatment Team Recommendation: Home  Discharge Destination Plan[de-identified] Home

## 2023-09-13 NOTE — CONSULTS
Consultation - Neurology   Mary Avalos 66 y.o. male MRN: [id number]  Unit/Bed#: [facility] 713-01 Encounter: [id number]      Assessment/Plan     Vision problem  Assessment & Plan  66 y.o.  male with HLD, who presented to [facility] on 9/13/23 for R eye painless central vision loss. A stroke alert was initiated in the ED. LKW just prior to 2200 on 9/12/23. BP on arrival 147/72. CTH revealed no hemorrhage. CTA H/N wwo contrast revealed no IR target. Pt initially refused TNK but later accepted TNK. TNK administered at 0113 9/13/23. Pt on ASA 81 mg daily PTA. Imaging  - CTH wo contrast 9/12/23:  "No acute intracranial hemorrhage, midline shift, or mass effect. If there is continued clinical concern for acute infarct, further evaluation with MRI may be obtained which is more sensitive."  - CTA H/N wwo contrast 9/12/23:  "1.  No hemodynamically significant stenosis of the arteries of the neck. 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 2.  No high-grade proximal stenosis of the visualized [place] of [place]."    Concern for originally CRAO breaking off into BRAO     Plan   - TPA Stroke Pathway  • Repeat CT Head at 24 hours post TNK (approximately: 9/14/23 0115 AM )  • MRI brain pending  • Carotid doppler pending  • Echo completed, EF 70% and left atrium normal sized   • Recommend Zio patch at discharge   • recommend outpatient follow up with vascular surgery  • Hemoglobin A1c pending  • Lipid panel pending  • ESR 8  • Strict BP control <180/105 mmHg  • Euglycemic, normothermic goal  • Holding all antiplatelet and anticoagulation for 24 hours post TNK. After 24 hours post TNK, recommend loading with Plavix 300 mg x1 then Plavix 75 mg daily assuming imaging without bleed. • Continue Atorvastatin 40 mg daily   • Telemetry monitoring  • PT/OT/ST  • STAT CT Head for acute changes  • Frequent neuro checks. Continue to monitor and notify neurology with any changes.   • Stroke education to be provided   - Medical management and supportive care per primary team.  Correction of any metabolic or infectious disturbances. Bryce Najera will need follow up in in 6 weeks with neurovascular attending or advance practitioner. He will not require outpatient neurological testing. Case and plan discussed with attending neurologist.  Please see attending attestation for any further recommendations and/or changes to plan. History of Present Illness     Reason for Consult / Principal Problem: R eye vision loss   Hx and PE limited by: N/A   HPI: Bryce Najera is a 66 y.o.  male with HLD, who presented to [facility] on 9/13/23 for R eye painless central vision loss. A stroke alert was initiated in the ED. LKW just prior to 2200 on 9/12/23. BP on arrival 147/72. CTH revealed no hemorrhage. CTA H/N wwo contrast revealed no IR target. Pt initially refused TNK but later accepted TNK. TNK administered at 0113 9/13/23. Pt on ASA 81 mg daily PTA. Pt reports on 9/1/23, he looked up at a very bright light. Following this, his R eye vision was all white. Pt reports he was able to see shadows out of his right eye. Pt reports this lasted for about an hour. Pt reports his vision then returned to normal. Pt reports he had an episode of vertigo on 9/9/23. Pt reports this was not a bad episode of vertigo. Pt reports his last episode of vertigo was about 11 years ago. On 9/12/23, pt reports he was putting a tool away in his garage and looked up at a bright spot light. After doing so, pt's vision in his R eye went completely white and then had gray spots. Pt could only see out of the lower temporal quadrant. Pt reports his vision in his R eye did not improve, so he presented to [facility] ED. Pt reports he was offered TNK and his R eye vision started to improve, so he declined TNK. However, shortly after, pt's R vision started worsening again, so he eventually accepted TNK. Pt was transferred from [facility] to [facility] for ophthalmology evaluation. Today, pt reports he sees a gray [place]ht[place] in the center of his vision. When pt looks at a clock with his R eye and focuses on the center of the clock, he is unable to see the hands of the clock and can only see the outer part of the numbers of the clock and the rest of the outside of the clock. Pt reports the gray Cahto appears "3D." Pt reports no issues with his L eye vision. Pt denies weakness, dizziness, numbness/tingling, speech difficulty, and headache. Pt is independent with ADLs and IADLs. Pt drives. Pt is very active. Pt takes ASA 81 mg PO QD PTA. Pt denies history of stroke, seizure,  And/or cardiac issues. Consult to Neurology  Consult performed by: Malvin Bryant PA-C  Consult ordered by: Remi Wooten PA-C    Inpatient consult to Neurology  Consult performed by: Malvin Bryant PA-C  Consult ordered by: Remi Wooten PA-C          Review of Systems   Eyes: Positive for visual disturbance. Neurological: Negative for dizziness, facial asymmetry, speech difficulty, weakness, numbness and headaches. Historical Information   Past Medical History:   Diagnosis Date   • Chronic low back pain    • GERD (gastroesophageal reflux disease)    • Hyperlipidemia    • Sleep apnea    • Verruca 2 yrs     Past Surgical History:   Procedure Laterality Date   • COLONOSCOPY     • ESOPHAGOGASTRODUODENOSCOPY     • [place] ESOPHAGOGASTRODUODENOSCOPY TRANSORAL DIAGNOSTIC N/A 5/24/2018    Procedure: ESOPHAGOGASTRODUODENOSCOPY (EGD); Surgeon: Stevie Huynh MD;  Location: [facility];   Service: Gastroenterology     Social History   Social History     Substance and Sexual Activity   Alcohol Use Yes   • Alcohol/week: 4.0 standard drinks of alcohol   • Types: 2 Glasses of wine, 2 Cans of beer per week    Comment: social     Social History     Substance and Sexual Activity   Drug Use No     E-Cigarette/Vaping   • E-Cigarette Use Never User      E-Cigarette/Vaping Substances   • Nicotine No    • THC No    • CBD No    • Flavoring No    • Other No    • Unknown No      Social History     Tobacco Use   Smoking Status Never   Smokeless Tobacco Never     Family History:   Family History   Problem Relation Age of Onset   • Diabetes Mother    • Lung cancer Father    • Cancer Father    • Cancer Brother         Bladder Cancer and a stroke   • No Known Problems Daughter    • No Known Problems Son        Review of previous medical records was  completed. Meds/Allergies   all current active meds have been reviewed, current meds:   Current Facility-Administered Medications   Medication Dose Route Frequency   • atorvastatin (LIPITOR) tablet 40 mg  40 mg Oral QPM   • chlorhexidine (PERIDEX) 0.12 % oral rinse 15 mL  15 mL Mouth/Throat Q12H [address]   • labetalol (NORMODYNE) injection 10 mg  10 mg Intravenous Q4H PRN   • pantoprazole (PROTONIX) EC tablet 40 mg  40 mg Oral Daily    and PTA meds:   Prior to Admission Medications   Prescriptions Last Dose Informant Patient Reported? Taking?   aspirin (ECOTRIN LOW STRENGTH) 81 mg EC tablet 9/13/2023 Self Yes Yes   Sig: Take 81 mg by mouth daily   pantoprazole (PROTONIX) 40 mg tablet Not Taking  No No   Sig: TAKE 1 TABLET DAILY   Patient not taking: Reported on 8/11/2023   rosuvastatin (CRESTOR) 5 mg tablet 9/13/2023  No Yes   Sig: TAKE 1 TABLET DAILY      Facility-Administered Medications: None       No Known Allergies    Objective   Vitals:Blood pressure 136/63, pulse 66, temperature 98.8 °F (37.1 °C), resp. rate 15, height 5' 4" (1.626 m), weight 72 kg (158 lb 11.7 oz), SpO2 97 %. ,Body mass index is 27.25 kg/m². Intake/Output Summary (Last 24 hours) at 9/13/2023 1521  Last data filed at 9/13/2023 1220  Gross per 24 hour   Intake 240 ml   Output 1425 ml   Net -1185 ml       Invasive Devices:    Invasive Devices     Peripheral Intravenous Line  Duration           Peripheral IV 09/30/22 Left Antecubital 348 days    Peripheral IV 09/12/23 Distal;Left;Upper;Ventral (anterior) Arm <1 day Physical Exam  Vitals and nursing note reviewed. Exam conducted with a chaperone present (pt's son). Constitutional:       General: He is not in acute distress. Appearance: He is normal weight. He is not diaphoretic. HENT:      Head: Normocephalic and atraumatic. Nose: Nose normal.      Mouth/Throat:      Mouth: Mucous membranes are moist.   Eyes:      General: No scleral icterus. Right eye: No discharge. Left eye: No discharge. Extraocular Movements: Extraocular movements intact and EOM normal.      Conjunctiva/sclera: Conjunctivae normal.   Cardiovascular:      Rate and Rhythm: Normal rate. Pulmonary:      Effort: Pulmonary effort is normal.   Neurological:      Mental Status: He is alert. Coordination: Finger-Nose-Finger Test normal.   Psychiatric:      Comments: Pleasant and cooperative during exam       Neurologic Exam     Mental Status   Follows 2 step commands. Attention: appropriately attends to provider. Concentration: no redirection or reorientation required during exam.   Speech: (No dysarthria appreciated on exam. Speech fluent. )  Level of consciousness: alert  Able to name object (glove, pen, watch). Able to read. Able to repeat (NIHSS  phrases). -oriented to self   -oriented to month and year   -Oriented to place "[facility]"     Cranial Nerves     CN II   Visual acuity: (grossly intact)  Right visual field deficit: none  Left visual field deficit: none     CN III, IV, VI   Extraocular motions are normal.   Right pupil: Size: 3 mm. Shape: regular. Left pupil: Size: 3 mm. Shape: regular. Nystagmus: none   Conjugate gaze: present    CN V   Facial sensation intact. Right facial sensation deficit: none  Left facial sensation deficit: none    CN VII   Facial expression full, symmetric.    Right facial weakness: none  Left facial weakness: none    CN VIII   Hearing impaired: grossly intact     CN XI   Right trapezius strength: normal  Left trapezius strength: normal    CN XII   CN XII normal.   Tongue: not atrophic  Fasciculations: absent  Tongue deviation: none  - pt with central vision loss of R eye      Motor Exam   Muscle bulk: normal  Overall muscle tone: normal Strength to confrontation testing:  -Bilateral hand  5/5   -bilateral elbow flexion and extension 5/5   -Bilateral shoulder abduction 5/5   -Bilateral plantar flexion and dorsiflexion 5/5   -bilateral knee flexion and extension 5/5   -Bilateral hip flexion 5/5     Sensory Exam   Light touch normal.   Right arm light touch: normal  Left arm light touch: normal  Right leg light touch: normal  Left leg light touch: normal   -extinction absent     Gait, Coordination, and Reflexes     Gait  Gait: (deferred for patient safety)    Coordination   Finger to nose coordination: normal      Lab Results:   I have personally reviewed pertinent reports. , CBC:   Results from last 7 days   Lab Units 09/12/23  2332   WBC Thousand/uL 7.18   RBC Million/uL 4.46   HEMOGLOBIN g/dL 14.8   HEMATOCRIT % 42.9   MCV fL 96   PLATELETS Thousands/uL 147*   , BMP/CMP:   Results from last 7 days   Lab Units 09/12/23  2332   SODIUM mmol/L 140   POTASSIUM mmol/L 3.6   CHLORIDE mmol/L 103   CO2 mmol/L 28   BUN mg/dL 24   CREATININE mg/dL 1.11   CALCIUM mg/dL 9.4   AST U/L 25   ALT U/L 30   ALK PHOS U/L 42   EGFR ml/min/1.73sq m 71   , Vitamin B12:   , HgBA1C:   , TSH:   , Coagulation:   Results from last 7 days   Lab Units 09/12/23  2332   INR  1.05   , Lipid Profile:   , Ammonia:   , Urinalysis:       Invalid input(s): "URIBILINOGEN", Drug Screen:   , Medication Drug Levels:       Invalid input(s): "CARBAMAZEPINE", "LACOSAMIDE", "OXCARBAZEPINE"  Imaging Studies: I have personally reviewed pertinent reports. and I have personally reviewed pertinent films in PACS CTA H/N wwo contrast 9/12/23, CTH wo contrast 9/12/23  EKG, Pathology, and Other Studies: I have personally reviewed pertinent reports.     VTE Prophylaxis: Sequential compression device (Venodyne)     Code Status: Level 1 - Full Code  Advance Directive and Living Will:      Power of :    POLST:      Counseling / Coordination of Care  I have spent a total time of 60 minutes on 09/13/23 in caring for this patient including Diagnostic results, Prognosis, Risks and benefits of tx options, Instructions for management, Patient and family education, Importance of tx compliance, Risk factor reductions, Impressions, Counseling / Coordination of care, Documenting in the medical record, Reviewing / ordering tests, medicine, procedures  , Obtaining or reviewing history   and Communicating with other healthcare professionals . This note was completed in part utilizing [address] [place] [address]. Grammatical errors, random word insertions, spelling mistakes, and incomplete sentences may be an occasional consequence of this system secondary to software limitations, ambient noise, and hardware issues. If you have any questions or concerns about the content, text, or information contained within the body of this dictation, please contact the provider for clarification.

## 2023-09-13 NOTE — ASSESSMENT & PLAN NOTE
66 y.o.  male with HLD, who presented to [facility] on 9/13/23 for R eye painless central vision loss. A stroke alert was initiated in the ED. LKW just prior to 2200 on 9/12/23. BP on arrival 147/72. CTH revealed no hemorrhage. CTA H/N wwo contrast revealed no IR target. Pt initially refused TNK but later accepted TNK. TNK administered at 0113 9/13/23. Pt on ASA 81 mg daily PTA. Imaging  - CTH wo contrast 9/12/23:  "No acute intracranial hemorrhage, midline shift, or mass effect. If there is continued clinical concern for acute infarct, further evaluation with MRI may be obtained which is more sensitive."  - CTA H/N wwo contrast 9/12/23:  "1.  No hemodynamically significant stenosis of the arteries of the neck. 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 2.  No high-grade proximal stenosis of the visualized [place] of [place]. "  - MRI brain wo contrast 9/13/23:  "1. No acute infarction, edema, or mass effect. 2. Chronic microangiopathic ischemic changes. "  - Repeat CTH wo contrast 9/14/23:  "No acute intracranial abnormality."  - Carotid doppler 9/14/23: There is <50% stenosis noted in the internal carotid artery b/l  - Echo: EF 70% and left atrium normal sized    No CRAO or BRAO noted in opthamology exam.     Plan   • Repeat CT Head at 24 hours post TNK (approximately: 9/14/23 0115 AM )  • MRI brain wo contrast completed   • MRI brain and orbits wwo contrast pending   o If negative, presume NAION. Despite lack of evidence, recommend prednisone course, 60 mg daily and start tapering after 1 week (decrease by 10 mg weekly). Then, recommend outpatient follow-up with [facility]   o If reveals optic neuritis, will plan for LP 9/15/23, followed by high dose Solumedrol.    • Carotid doppler completed   • Echo completed   • No need for Zio patch at discharge   • recommend outpatient follow up with vascular surgery  • Hemoglobin A1c 5.9  • Lipid panel: total cholesterol 146, LDL 86  • ESR 8  • CRP pending  • Goal normotension  • Euglycemic, normothermic goal  • May continue with patient's home ASA 81 mg daily   • May restart patient's home cholesterol medication  • Telemetry monitoring  • PT/OT/ST  • STAT CT Head for acute changes  • Frequent neuro checks. Continue to monitor and notify neurology with any changes. • Stroke education to be provided   - Medical management and supportive care per primary team.  Correction of any metabolic or infectious disturbances.

## 2023-09-13 NOTE — CONSULTS
[address], [place]. Patient name Xi Horton  Age: 66 y.o.  : 1960  MRN: [id number]    Hospital:      [facility]    Date of Consultation:2023  Referred Mariely Luong DO  Reason for Consult: Pt is 67 yo with TONIA and HLD who presents with acute onset right eye vision loss. Onset 1.5 hours ago(2200 on ). Patient is only able to see flashes of light and through right lower quadrant of the right eye. Normal vision on left eye and no other focal deficits. No headaches. No eye pain ( lost all VF except "down and out right eye, which has since returned , but not completely with diffuse paracentral/central scotomas  Patient has similar episode one week ago, that is self resolving within 1 hr.Pt is 67 yo with TONIA and HLD who presents with acute onset right eye vision loss. Onset 1.5 hours ago(2200 on ). Patient is only able to see flashes of light and through right lower quadrant of the right eye. Normal vision on left eye and no other focal deficits. No headaches. Patient has similar episode one week ago, that is self resolving within 1 hr. Chief complaint: transient vision loss right x2  History of illness: see above Vision in right eye has not completely returned and has diffuse patchy blind spots throughout vision  Relevant past ocular history/ Eye  Meds: moderate/high hyperope  Relevant chart review findings from below:   PMH/Chart reviewed in Medical record:   Past Medical History:   Diagnosis Date   • Chronic low back pain    • GERD (gastroesophageal reflux disease)    • Hyperlipidemia    • Sleep apnea    • Verruca 2 yrs     Past Surgical History:   Procedure Laterality Date   • COLONOSCOPY     • ESOPHAGOGASTRODUODENOSCOPY     • [place] ESOPHAGOGASTRODUODENOSCOPY TRANSORAL DIAGNOSTIC N/A 2018    Procedure: ESOPHAGOGASTRODUODENOSCOPY (EGD); Surgeon: Jennifer Jarvis MD;  Location: [facility];   Service: Gastroenterology      Social History    Social History     Substance and Sexual Activity   Alcohol Use Yes   • Alcohol/week: 4.0 standard drinks of alcohol   • Types: 2 Glasses of wine, 2 Cans of beer per week    Comment: social     Social History     Tobacco Use   Smoking Status Never   Smokeless Tobacco Never     Social History     Substance and Sexual Activity   Drug Use No     [email or web address]  Allergies:No Known Allergies  Ros: Reviewed in Medical Record  S. H.shoulder  F.H. Family History   Problem Relation Age of Onset   • Diabetes Mother    • Lung cancer Father    • Cancer Father    • Cancer Brother         Bladder Cancer and a stroke   • No Known Problems Daughter    • No Known Problems Son      Dilated: 1% Mydriacyl  Mental status: ____AAO   Radiologic studies:    CTA  CERVICAL VASCULATURE  AORTIC ARCH AND GREAT VESSELS:  Normal aortic arch and great vessel origins. Normal visualized subclavian vessels.     RIGHT VERTEBRAL ARTERY CERVICAL SEGMENT:  Normal origin. The vessel is diminutive in caliber throughout the neck.     LEFT VERTEBRAL ARTERY CERVICAL SEGMENT:  Normal origin. The vessel is normal in caliber throughout the neck. Dominant left vertebral artery.     RIGHT EXTRACRANIAL CAROTID SEGMENT:  Mild atherosclerotic disease of the distal common carotid artery and proximal cervical internal carotid artery without significant stenosis compared to the more distal ICA.     LEFT EXTRACRANIAL CAROTID SEGMENT:  Normal caliber common carotid artery. Normal bifurcation and cervical internal carotid artery. No stenosis or dissection.     NASCET criteria was used to determine the degree of internal carotid artery diameter stenosis.       INTRACRANIAL VASCULATURE  INTERNAL CAROTID ARTERIES:  Normal enhancement of the intracranial portions of the internal carotid arteries. Normal ophthalmic artery origins. Normal ICA terminus.     ANTERIOR CIRCULATION:  Symmetric A1 segments and anterior cerebral arteries with normal enhancement.   Normal anterior communicating artery.     MIDDLE CEREBRAL ARTERY CIRCULATION:  M1 segment and middle cerebral artery branches demonstrate normal enhancement bilaterally.     DISTAL VERTEBRAL ARTERIES: Hypoplastic distal right vertebral artery terminates in the inferior cerebellar artery. Dominant distal left vertebral artery supplies the basilar artery.     BASILAR ARTERY:  Basilar artery is normal in caliber. Normal superior cerebellar arteries.     POSTERIOR CEREBRAL ARTERIES: Both posterior cerebral arteries arises from the basilar tip. Both arteries demonstrate normal enhancement. Hypoplastic or absent posterior communicating arteries.     VENOUS STRUCTURES:  Normal.        NON VASCULAR ANATOMY  BONY STRUCTURES:  No acute osseous abnormality. Multilevel degenerative change of the cervical spine.     SOFT TISSUES OF THE NECK:  Normal.     THORACIC INLET:  Unremarkable.        IMPRESSION:     1. No hemodynamically significant stenosis of the arteries of the neck. 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  2.  No high-grade proximal stenosis of the visualized [place] of [place].       MRI /CT pending        Va    Right 20/400 ph ni       Cc hyperope near PH    Left    20/50  Ta       20  /20  [place]front.  Visual Fields Normal left:patchy paracentral scotomas but peripheral nl  External Normal   Pupils Round symmetric dilated  Motility Versions Full   Anterior Segment: Conjunctiva Normal   Sclera Intact   Cornea (epithel,stroma,endothel) Clear   Anterior chamber narrow angle  and Clear ou  lens  1+ ns  Posterior Segment:  Vitreous Clear   Optic nerve:  Disc sharp, 0.1-2 cup  Ou , no edema  Macula Normal ou ( bilateral soft drusen PMB)  Vessels Normal ou  Periphery Normal ou  Dx: Painless Transient obscuration vision R        Painless Monocular vision loss R        Visual field defect right  Optic nerve is not swollen  No obvious emboli or vascular occlusion  R/O NAION ( ischemic optic neuropathy), giant cell arteritis ( westergren sed rate, C reactive protein, temporal artery biopsy if indicated , retrobulbar optic neuritis  Etc  No evidence angle closure ie narrow angle but no pain/headache corneal edema or elevated eye pressure  No retinal detachment  Atypical for migraine due to longevity of episodes and fact vision   Has not returned to normal  Mild nuclear sclerosis    Rx:Neuroimaging pending.  Would strongly consider transfer to tertiary care ie [facility] ([facility]) or [facility])  For neuro-ophthalmology evaluation  Follow up:  call office [phone]   after discharge  Bernie Rainey MD

## 2023-09-13 NOTE — UTILIZATION REVIEW
NOTIFICATION OF INPATIENT ADMISSION   AUTHORIZATION REQUEST   SERVICING FACILITY:   [address] [place] [address]  Address: [address] [address]  Tax ID: [id number]  NPI: [id number] ATTENDING PROVIDER:  Attending Name and NPI#: Deb Miguel [[id number]]  Address: [address] [place] [address]  Phone: [phone]   ADMISSION INFORMATION:  Place of Service: [facility]patient [facility]   Place of Service Code: 21  Inpatient Admission Date/Time: 9/13/23  5:04 AM  Discharge Date/Time: No discharge date for patient encounter. Admitting Diagnosis Code/Description:  Vision loss of right eye [H54.61]     UTILIZATION REVIEW CONTACT:  Maite Jones Utilization   Network Utilization Review Department  Phone: [phone]  Fax: [phone]  Email: [email or web address]  Contact for approvals/pending authorizations, clinical reviews, and discharge. PHYSICIAN ADVISORY SERVICES:  Medical Necessity Denial & Rnca-gh-Tryr Review  Phone: [phone]  Fax: [phone]  Email: [email or web address]

## 2023-09-13 NOTE — ED NOTES
FROM: [address] FT 01-FT 01 ([facility] CT SCAN)  TO: [address] [place] [address] Intensive Care Unit [address] [place] [address] Intensive Care Unit, ICU 10, ICU 10  DX: Vision loss of right eye  EMS Tx Reason: [place]  Transfer Priority Level (1,2,3): 2  Referring: Ayla Zambrano MD  Accepting: Dr. Nikolas Mcclain (ALS/BLS, etc): CCT  Reason for ALS/CCT if applicable (O2, tele, IVF, meds): TNK given at 4130, cardiac monitor  P/U Time: 0400  Number for Report:  1145 MARLEY Posada, RN  09/13/23 [facility], [address] [place] [address]  09/13/23 0614

## 2023-09-13 NOTE — H&P
[address] [facility]  H&P: Critical Care  Name: Renetta Phillip 66 y.o. male I MRN: [id number]  Unit/Bed#: ICU 10 I Date of Admission: (Not on file)   Date of Service: 9/13/2023 I Hospital Day: 0      Assessment/Plan   Neuro:   · Diagnosis: Acute right sided vision loss s/p TNK administration  Patient presented to [facility] with acute right eye vision loss about 1.5 hours prior to arrival. He was only able to see flashes of light through the left lower quadrant of the right eye. No pain, no issues with left eye, no other focal deficits, no headaches. He had a similar episode 1 week ago that resolved within 1 hour  NIHSS 1  Initial CTH   No intracranial abnormality  Initial CTA :  "hypoplastic right vert, mild stenosis of WIL[place], non flow limiting"  TNK given at 0113; initially patient declined as symptoms were improving  Neurology following  Current Neuro Exam:  Non-focal. Describes a "gray area" of vision that moves with his eye that is almost as if it's a fog    Differential wide: CRAO vs intraocular issues vs migraines  Dr. Domenico Chauhan with Ophthalmology contacted and initially recommended transfer to [facility]. [facility] did not have a bed and did not feel as though anything would be done for him tonight even if retinal issue due to TNK administration  They recommended checking ESR to rule out giant cell arteritis which the [facility] ED did and was negative. Feel as though this could be consistent with anterior ischemic optic neuropathy.   After reaching back out to Dr. Domenico Chauhan with Ophthomology, he requested [facility] for evaluation this AM  Plan:  Continue stroke pathway  MRI pending  Antiplatelet Plan TBD  TTE pending  High Intensity Statin  Lipid Panel Pending  Blood pressure goal:  < 180  Continue telemetry  PT/OT/Speech following  PMR consult  Hemoglobin A1C: Pending  Report any visual field changes to ophthalmology ASAP      CV:   · Diagnosis: HLD  · Home medication: Crestor 5  · Plan: Obtain lipid panel, increase intensity once resulted      Pulm:  No active issues    GI:   · Diagnosis: GERD  · Plan:   · Continue protonic      :   No active issues    F/E/N:    Fluids: None  Electrolytes: Replete for K >4, <5 ; Phos > 3; and Mag > 2  · Nutrition: Bedside dysphagia assessment prior to diet    Heme/Onc:   No active issues    Endo:   No active issues    ID:   No active issues    MSK/Skin:   No active issues    Disposition: Critical care       History of Present Illness     HPI: Pérez Villanueva is a 66 y.o. who presented to [facility] following acute loss of vision to his left eye. He has a past medical history of GERD and hyperlipidemia. He also takes a baby aspirin. Patient states that about 1 week ago, he had similar symptoms brought on by looking into a bright light. Symptoms got better within an hour. Today, he was returning a screwdriver to his garage and turned on his garage late which is very bright and he experienced almost complete vision loss, was only able to see out of the right lower quadrant of his right eye with low acuity. He waited at home, however symptoms did not get better and he presented to [facility] emergency department. He was a stroke alert. He had a negative CT head and negative CTA. His symptoms got slightly better, so therefore TNK was initially discussed but declined, however symptoms started getting worse once again, so he agreed to get TNK. This was given at 1:13 AM.  He did notice that his vision almost immediately got slightly better. The emergency department physician at [facility] spoke with Dr. Vibha Hendrickson of ophthalmology who initially recommended transfer to [address] [place] [address]. there was no bed availability, and they did not feel like an acute intervention was able to be offered, so recommended admission to [address] [place] [address] with ophthalmology  consultation.   They also recommended obtaining sed rate to help rule out giant cell arteritis which was thankfully negative. In speaking with Dr. Zack Osman of ophthalmology, he requested patient get transferred to [address] [place] [address] for urgent ophthalmology evaluation this AM.    On arrival here, patient's vision has improved. He now describes a gray area of "fog" that moves with his visual field. Around this fog, he is able to see somewhat normally. History obtained from chart review and the patient. Review of Systems   Historical Information   Past Medical History:  No date: Chronic low back pain  No date: GERD (gastroesophageal reflux disease)  No date: Hyperlipidemia  No date: Sleep apnea  2 yrs: Verruca Past Surgical History:  No date: COLONOSCOPY  No date: ESOPHAGOGASTRODUODENOSCOPY  5/24/2018: NE ESOPHAGOGASTRODUODENOSCOPY TRANSORAL DIAGNOSTIC; N/A      Comment:  Procedure: ESOPHAGOGASTRODUODENOSCOPY (EGD); Surgeon:                Kyle Cain MD;  Location: [facility]; Service:                Gastroenterology   Current Outpatient Medications   Medication Instructions   • aspirin (ECOTRIN LOW STRENGTH) 81 mg, Oral, Daily   • pantoprazole (PROTONIX) 40 mg tablet TAKE 1 TABLET DAILY   • rosuvastatin (CRESTOR) 5 mg tablet TAKE 1 TABLET DAILY    No Known Allergies   Social History     Tobacco Use   • Smoking status: Never   • Smokeless tobacco: Never   Vaping Use   • Vaping Use: Never used   Substance Use Topics   • Alcohol use:  Yes     Alcohol/week: 4.0 standard drinks of alcohol     Types: 2 Glasses of wine, 2 Cans of beer per week     Comment: social   • Drug use: No    Family History   Problem Relation Age of Onset   • Diabetes Mother    • Lung cancer Father    • Cancer Father    • Cancer Brother         Bladder Cancer and a stroke   • No Known Problems Daughter    • No Known Problems Son           Objective                            Vitals I/O      Most Recent Min/Max in 24hrs   Temp   Temp  Min: 98.7 °F (37.1 °C)  Max: 98.7 °F (37.1 °C) Pulse   Pulse  Min: 79  Max: 106   Resp   Resp  Min: 16  Max: 20   BP   BP  Min: 104/58  Max: 159/77   O2 Sat   SpO2  Min: 95 %  Max: 99 %    No intake or output data in the 24 hours ending 09/13/23 0406      No diet orders on file     Invasive Monitoring Physical exam    Physical Exam  Eyes:      General: No dysconjugate gazeNo visual field deficit. Extraocular Movements: Extraocular movements intact. Conjunctiva/sclera: Conjunctivae normal.      Pupils: Pupils are equal, round, and reactive to light. Comments: See neuro section   Skin:     General: Skin is warm and dry. Capillary Refill: Capillary refill takes less than 2 seconds. HENT:      Head: Normocephalic and atraumatic. Mouth/Throat:      Mouth: Mucous membranes are moist.   Neck:     Vascular: No JVD. Cardiovascular:      Rate and Rhythm: Normal rate and regular rhythm. Pulses: Normal pulses. Musculoskeletal:         General: No swelling. Normal range of motion. Abdominal:      Palpations: Abdomen is soft. Tenderness: There is no abdominal tenderness. Constitutional:       Appearance: He is well-developed and well-nourished. Neurological:      General: No focal deficit present. Mental Status: He is alert and oriented to person, place and time. Mental status is at baseline. Sensory: Sensation is intact. Motor: gross motor function is at baseline for patient. Strength full and intact in all extremities. Comments: Right eye with a "gray fog" that travels along his field of vision   Genitourinary/Anorectal:     Comments: Deferred  Vitals and nursing note reviewed. Diagnostic Studies      EKG:   Imaging:  I have personally reviewed pertinent films in PACS     Medications:  Scheduled PRN        Continuous    No current facility-administered medications for this encounter.        Labs:    CBC    Recent Labs     09/12/23  2332   WBC 7.18   HGB 14.8   HCT 42.9   * BMP    Recent Labs     09/12/23  2332   SODIUM 140   K 3.6      CO2 28   AGAP 9   BUN 24   CREATININE 1.11   CALCIUM 9.4       Coags    Recent Labs     09/12/23  2332   INR 1.05   PTT 26        Additional Electrolytes  No recent results       Blood Gas    No recent results  No recent results LFTs  Recent Labs     09/12/23  2332   ALT 30   AST 25   ALKPHOS 42   ALB 4.6   TBILI 0.58       Infectious  No recent results  Glucose  Recent Labs     09/12/23  2332   GLUC 99                 Anibal Alexander PA-C

## 2023-09-14 ENCOUNTER — APPOINTMENT (OUTPATIENT)
Dept: RADIOLOGY | Facility: HOSPITAL | Age: 63
End: 2023-09-14
Payer: COMMERCIAL

## 2023-09-14 ENCOUNTER — APPOINTMENT (INPATIENT)
Dept: RADIOLOGY | Facility: HOSPITAL | Age: 63
End: 2023-09-14
Payer: COMMERCIAL

## 2023-09-14 LAB
ANION GAP SERPL CALCULATED.3IONS-SCNC: 7 MMOL/L
BUN SERPL-MCNC: 12 MG/DL (ref 5–25)
CA-I BLD-SCNC: 1.17 MMOL/L (ref 1.12–1.32)
CALCIUM SERPL-MCNC: 9 MG/DL (ref 8.4–10.2)
CHLORIDE SERPL-SCNC: 106 MMOL/L (ref 96–108)
CHOLEST SERPL-MCNC: 146 MG/DL
CO2 SERPL-SCNC: 27 MMOL/L (ref 21–32)
CREAT SERPL-MCNC: 0.8 MG/DL (ref 0.6–1.3)
CRP SERPL QL: <1 MG/L
ERYTHROCYTE [DISTWIDTH] IN BLOOD BY AUTOMATED COUNT: 12 % (ref 11.6–15.1)
EST. AVERAGE GLUCOSE BLD GHB EST-MCNC: 123 MG/DL
GFR SERPL CREATININE-BSD FRML MDRD: 96 ML/MIN/1.73SQ M
GLUCOSE SERPL-MCNC: 98 MG/DL (ref 65–140)
HBA1C MFR BLD: 5.9 %
HCT VFR BLD AUTO: 43.9 % (ref 36.5–49.3)
HDLC SERPL-MCNC: 48 MG/DL
HGB BLD-MCNC: 14.9 G/DL (ref 12–17)
LDLC SERPL CALC-MCNC: 86 MG/DL (ref 0–100)
MAGNESIUM SERPL-MCNC: 2.2 MG/DL (ref 1.9–2.7)
MCH RBC QN AUTO: 33.1 PG (ref 26.8–34.3)
MCHC RBC AUTO-ENTMCNC: 33.9 G/DL (ref 31.4–37.4)
MCV RBC AUTO: 98 FL (ref 82–98)
PHOSPHATE SERPL-MCNC: 3.4 MG/DL (ref 2.3–4.1)
PLATELET # BLD AUTO: 138 THOUSANDS/UL (ref 149–390)
PMV BLD AUTO: 11.8 FL (ref 8.9–12.7)
POTASSIUM SERPL-SCNC: 4.1 MMOL/L (ref 3.5–5.3)
RBC # BLD AUTO: 4.5 MILLION/UL (ref 3.88–5.62)
SODIUM SERPL-SCNC: 140 MMOL/L (ref 135–147)
TRIGL SERPL-MCNC: 58 MG/DL
WBC # BLD AUTO: 6.29 THOUSAND/UL (ref 4.31–10.16)

## 2023-09-14 PROCEDURE — 83735 ASSAY OF MAGNESIUM: CPT

## 2023-09-14 PROCEDURE — NC001 PR NO CHARGE: Performed by: INTERNAL MEDICINE

## 2023-09-14 PROCEDURE — G1004 CDSM NDSC: HCPCS

## 2023-09-14 PROCEDURE — 70450 CT HEAD/BRAIN W/O DYE: CPT

## 2023-09-14 PROCEDURE — 93880 EXTRACRANIAL BILAT STUDY: CPT | Performed by: SURGERY

## 2023-09-14 PROCEDURE — 99233 SBSQ HOSP IP/OBS HIGH 50: CPT | Performed by: INTERNAL MEDICINE

## 2023-09-14 PROCEDURE — 83036 HEMOGLOBIN GLYCOSYLATED A1C: CPT | Performed by: PHYSICIAN ASSISTANT

## 2023-09-14 PROCEDURE — 80048 BASIC METABOLIC PNL TOTAL CA: CPT

## 2023-09-14 PROCEDURE — 86140 C-REACTIVE PROTEIN: CPT | Performed by: STUDENT IN AN ORGANIZED HEALTH CARE EDUCATION/TRAINING PROGRAM

## 2023-09-14 PROCEDURE — 82330 ASSAY OF CALCIUM: CPT

## 2023-09-14 PROCEDURE — A9585 GADOBUTROL INJECTION: HCPCS | Performed by: INTERNAL MEDICINE

## 2023-09-14 PROCEDURE — 70543 MRI ORBT/FAC/NCK W/O &W/DYE: CPT

## 2023-09-14 PROCEDURE — 84100 ASSAY OF PHOSPHORUS: CPT

## 2023-09-14 PROCEDURE — 85027 COMPLETE CBC AUTOMATED: CPT

## 2023-09-14 PROCEDURE — 80061 LIPID PANEL: CPT | Performed by: PHYSICIAN ASSISTANT

## 2023-09-14 PROCEDURE — 99232 SBSQ HOSP IP/OBS MODERATE 35: CPT | Performed by: STUDENT IN AN ORGANIZED HEALTH CARE EDUCATION/TRAINING PROGRAM

## 2023-09-14 PROCEDURE — 70553 MRI BRAIN STEM W/O & W/DYE: CPT

## 2023-09-14 RX ORDER — CLOPIDOGREL BISULFATE 75 MG/1
75 TABLET ORAL DAILY
Status: DISCONTINUED | OUTPATIENT
Start: 2023-09-15 | End: 2023-09-14

## 2023-09-14 RX ORDER — CLOPIDOGREL BISULFATE 75 MG/1
75 TABLET ORAL DAILY
Status: CANCELLED | OUTPATIENT
Start: 2023-09-14

## 2023-09-14 RX ORDER — LORAZEPAM 2 MG/ML
1 INJECTION INTRAMUSCULAR ONCE AS NEEDED
Status: COMPLETED | OUTPATIENT
Start: 2023-09-14 | End: 2023-09-14

## 2023-09-14 RX ORDER — CLOPIDOGREL BISULFATE 75 MG/1
300 TABLET ORAL ONCE
Status: COMPLETED | OUTPATIENT
Start: 2023-09-14 | End: 2023-09-14

## 2023-09-14 RX ORDER — GADOBUTROL 604.72 MG/ML
7 INJECTION INTRAVENOUS
Status: COMPLETED | OUTPATIENT
Start: 2023-09-14 | End: 2023-09-14

## 2023-09-14 RX ADMIN — LORAZEPAM 1 MG: 2 INJECTION INTRAMUSCULAR; INTRAVENOUS at 19:24

## 2023-09-14 RX ADMIN — CLOPIDOGREL BISULFATE 300 MG: 75 TABLET ORAL at 08:49

## 2023-09-14 RX ADMIN — GADOBUTROL 7 ML: 604.72 INJECTION INTRAVENOUS at 20:38

## 2023-09-14 RX ADMIN — PANTOPRAZOLE SODIUM 40 MG: 40 TABLET, DELAYED RELEASE ORAL at 08:49

## 2023-09-14 RX ADMIN — ATORVASTATIN CALCIUM 40 MG: 40 TABLET, FILM COATED ORAL at 17:10

## 2023-09-14 NOTE — PLAN OF CARE
Problem: PAIN - ADULT  Goal: Verbalizes/displays adequate comfort level or baseline comfort level  Description: Interventions:  - Encourage patient to monitor pain and request assistance  - Assess pain using appropriate pain scale  - Administer analgesics based on type and severity of pain and evaluate response  - Implement non-pharmacological measures as appropriate and evaluate response  - Consider cultural and social influences on pain and pain management  - Notify physician/advanced practitioner if interventions unsuccessful or patient reports new pain  Outcome: Progressing     Problem: INFECTION - ADULT  Goal: Absence or prevention of progression during hospitalization  Description: INTERVENTIONS:  - Assess and monitor for signs and symptoms of infection  - Monitor lab/diagnostic results  - Monitor all insertion sites, i.e. indwelling lines, tubes, and drains  - Monitor endotracheal if appropriate and nasal secretions for changes in amount and color  - [place] appropriate cooling/warming therapies per order  - Administer medications as ordered  - Instruct and encourage patient and family to use good hand hygiene technique  - Identify and instruct in appropriate isolation precautions for identified infection/condition  Outcome: Progressing     Problem: SAFETY ADULT  Goal: Patient will remain free of falls  Description: INTERVENTIONS:  - Educate patient/family on patient safety including physical limitations  - Instruct patient to call for assistance with activity   - Consult OT/PT to assist with strengthening/mobility   - Keep Call bell within reach  - Keep bed low and locked with side rails adjusted as appropriate  - Keep care items and personal belongings within reach  - Initiate and maintain comfort rounds  - Make Fall Risk Sign visible to staff  - Apply yellow socks and bracelet for high fall risk patients  - Consider moving patient to room near nurses station  Outcome: Progressing     Problem: NEUROSENSORY - ADULT  Goal: Achieves stable or improved neurological status  Description: INTERVENTIONS  - Monitor and report changes in neurological status  - Monitor vital signs such as temperature, blood pressure, glucose, and any other labs ordered   - Initiate measures to prevent increased intracranial pressure  - Monitor for seizure activity and implement precautions if appropriate      Outcome: Progressing     Problem: CARDIOVASCULAR - ADULT  Goal: Maintains optimal cardiac output and hemodynamic stability  Description: INTERVENTIONS:  - Monitor I/O, vital signs and rhythm  - Monitor for S/S and trends of decreased cardiac output  - Administer and titrate ordered vasoactive medications to optimize hemodynamic stability  - Assess quality of pulses, skin color and temperature  - Assess for signs of decreased coronary artery perfusion  - Instruct patient to report change in severity of symptoms  Outcome: Progressing     Problem: RESPIRATORY - ADULT  Goal: Achieves optimal ventilation and oxygenation  Description: INTERVENTIONS:  - Assess for changes in respiratory status  - Assess for changes in mentation and behavior  - Position to facilitate oxygenation and minimize respiratory effort  - Oxygen administered by appropriate delivery if ordered  - Initiate smoking cessation education as indicated  - Encourage broncho-pulmonary hygiene including cough, deep breathe, Incentive Spirometry  - Assess the need for suctioning and aspirate as needed  - Assess and instruct to report SOB or any respiratory difficulty  - Respiratory Therapy support as indicated  Outcome: Progressing

## 2023-09-14 NOTE — PROGRESS NOTES
Progress Note - Neurology   Anisa Brasher 66 y.o. male [id number]  Unit/Bed#: [facility] 705/[facility] 705-01    Assessment/Plan:    * Vision problem  Assessment & Plan  66 y.o.  male with HLD, who presented to [facility] on 9/13/23 for R eye painless central vision loss. A stroke alert was initiated in the ED. LKW just prior to 2200 on 9/12/23. BP on arrival 147/72. CTH revealed no hemorrhage. CTA H/N wwo contrast revealed no IR target. Pt initially refused TNK but later accepted TNK. TNK administered at 0113 9/13/23. Pt on ASA 81 mg daily PTA. Imaging  - CTH wo contrast 9/12/23:  "No acute intracranial hemorrhage, midline shift, or mass effect. If there is continued clinical concern for acute infarct, further evaluation with MRI may be obtained which is more sensitive."  - CTA H/N wwo contrast 9/12/23:  "1.  No hemodynamically significant stenosis of the arteries of the neck. 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 2.  No high-grade proximal stenosis of the visualized Hus[place] of [place]. "  - MRI brain wo contrast 9/13/23:  "1. No acute infarction, edema, or mass effect. 2. Chronic microangiopathic ischemic changes. "  - Repeat CTH wo contrast 9/14/23:  "No acute intracranial abnormality."  - Carotid doppler 9/14/23: There is <50% stenosis noted in the internal carotid artery b/l  - Echo: EF 70% and left atrium normal sized    No CRAO or BRAO noted in opthamology exam.     Plan   • Repeat CT Head at 24 hours post TNK (approximately: 9/14/23 0115 AM )  • MRI brain wo contrast completed   • MRI brain and orbits wwo contrast pending   o If negative, presume NAION. Despite lack of evidence, recommend prednisone course, 60 mg daily and start tapering after 1 week (decrease by 10 mg weekly). Then, recommend outpatient follow-up with [facility]   o If reveals optic neuritis, will plan for LP 9/15/23, followed by high dose Solumedrol.    • Carotid doppler completed   • Echo completed   • No need for Zio patch at discharge   • recommend outpatient follow up with vascular surgery  • Hemoglobin A1c 5.9  • Lipid panel: total cholesterol 146, LDL 86  • ESR 8  • CRP pending  • Goal normotension  • Euglycemic, normothermic goal  • May continue with patient's home ASA 81 mg daily   • May restart patient's home cholesterol medication  • Telemetry monitoring  • PT/OT/ST  • STAT CT Head for acute changes  • Frequent neuro checks. Continue to monitor and notify neurology with any changes. • Stroke education to be provided   - Medical management and supportive care per primary team.  Correction of any metabolic or infectious disturbances. Recommendations for outpatient neurological follow up have yet to be determined. Case and plan discussed with attending neurologist.  Please see attending attestation for any further recommendations and/or changes to plan. Subjective:   Pt reports no changes in R eye vision today. Past Medical History:   Diagnosis Date   • Chronic low back pain    • GERD (gastroesophageal reflux disease)    • Hyperlipidemia    • Sleep apnea    • Verruca 2 yrs     Past Surgical History:   Procedure Laterality Date   • COLONOSCOPY     • ESOPHAGOGASTRODUODENOSCOPY     • MT ESOPHAGOGASTRODUODENOSCOPY TRANSORAL DIAGNOSTIC N/A 5/24/2018    Procedure: ESOPHAGOGASTRODUODENOSCOPY (EGD); Surgeon: Jennifer Jarvis MD;  Location: [facility];   Service: Gastroenterology     Family History   Problem Relation Age of Onset   • Diabetes Mother    • Lung cancer Father    • Cancer Father    • Cancer Brother         Bladder Cancer and a stroke   • No Known Problems Daughter    • No Known Problems Son      Social History     Socioeconomic History   • Marital status: /Civil Union     Spouse name: Not on file   • Number of children: Not on file   • Years of education: Not on file   • Highest education level: Not on file   Occupational History   • Not on file   Tobacco Use   • Smoking status: Never • Smokeless tobacco: Never   Vaping Use   • Vaping Use: Never used   Substance and Sexual Activity   • Alcohol use: Yes     Alcohol/week: 4.0 standard drinks of alcohol     Types: 2 Glasses of wine, 2 Cans of beer per week     Comment: social   • Drug use: No   • Sexual activity: Yes     Partners: Female   Other Topics Concern   • Not on file   Social History Narrative   • Not on file     Social Determinants of Health     Financial Resource Strain: Not on file   Food Insecurity: No Food Insecurity (9/13/2023)    Hunger Vital Sign    • Worried About Running Out of Food in the Last Year: Never true    • Ran Out of Food in the Last Year: Never true   Transportation Needs: No Transportation Needs (9/13/2023)    PRAPARE - Transportation    • Lack of Transportation (Medical): No    • Lack of Transportation (Non-Medical): No   Physical Activity: Not on file   Stress: Not on file   Social Connections: Not on file   Intimate Partner Violence: Not on file   Housing Stability: Low Risk  (9/13/2023)    Housing Stability Vital Sign    • Unable to Pay for Housing in the Last Year: No    • Number of Places Lived in the Last Year: 1    • Unstable Housing in the Last Year: No     E-Cigarette/Vaping   • E-Cigarette Use Never User      E-Cigarette/Vaping Substances   • Nicotine No    • THC No    • CBD No    • Flavoring No    • Other No    • Unknown No          Medications:   All current active meds have been reviewed and current meds:  Scheduled Meds:  Current Facility-Administered Medications   Medication Dose Route Frequency Provider Last Rate   • [START ON 9/15/2023] aspirin  81 mg Oral Daily Kimberly Holder PA-C     • atorvastatin  40 mg Oral QPM Sarah Arriaga MD     • labetalol  10 mg Intravenous Q4H PRN Sarah Arriaga MD     • LORazepam  1 mg Intravenous Once PRN Augusto Jackson MD     • pantoprazole  40 mg Oral Daily Sarah Arriaga MD       Continuous Infusions:   PRN Meds:.•  labetalol  •  LORazepam       ROS:   Review of Systems Neurological: Negative for dizziness, speech difficulty, weakness, numbness and headaches. Vitals:   BP 99/68   Pulse 87   Temp 99.4 °F (37.4 °C)   Resp 16   Ht 5' 4" (1.626 m)   Wt 73.1 kg (161 lb 2.5 oz)   SpO2 94%   BMI 27.66 kg/m²     Physical Exam:   Physical Exam  Vitals and nursing note reviewed. HENT:      Head: Normocephalic and atraumatic. Nose: Nose normal.      Mouth/Throat:      Mouth: Mucous membranes are moist.   Eyes:      General: No scleral icterus. Right eye: No discharge. Left eye: No discharge. Extraocular Movements: Extraocular movements intact and EOM normal.      Conjunctiva/sclera: Conjunctivae normal.   Cardiovascular:      Rate and Rhythm: Normal rate. Pulmonary:      Effort: Pulmonary effort is normal.   Neurological:      Mental Status: He is alert. Coordination: Heel to Shin Test normal.   Psychiatric:      Comments: Pleasant and cooperative during exam        Neurologic Exam     Mental Status   Follows 2 step commands. Attention: appropriately attends to provider. Concentration: no redirection or reorientation required during exam.   Speech: (No dysarthria appreciated on exam. Speech fluent. )  Level of consciousness: alert  Able to name object (glove, glasses, watch). -oriented to self   -oriented to month and year   -Oriented to place "[facility]"     Cranial Nerves     CN II   Right visual field deficit: none  Left visual field deficit: none     CN III, IV, VI   Extraocular motions are normal.   Right pupil: Size: 3 mm. Shape: regular. Left pupil: Size: 3 mm. Shape: regular. Nystagmus: none   Conjugate gaze: present    CN V   Facial sensation intact. Right facial sensation deficit: none  Left facial sensation deficit: none    CN VII   Facial expression full, symmetric.    Right facial weakness: none  Left facial weakness: none    CN VIII   Hearing impaired: grossly intact     CN XII   CN XII normal. Tongue: not atrophic  Fasciculations: absent  Tongue deviation: none  Vision grossly intact with both eyes open and when R eye is closed. When L eye is closed, pt sees gray patches in the R eye. Motor Exam   Muscle bulk: normal  Overall muscle tone: normal Strength to confrontation testing:  -Bilateral hand  5/5   -bilateral elbow flexion and extension 5/5   -Bilateral shoulder abduction 5/5   -Bilateral plantar flexion and dorsiflexion 5/5   -bilateral knee flexion and extension 5/5   -Bilateral hip flexion 5/5     Sensory Exam   Light touch normal.   Right arm light touch: normal  Left arm light touch: normal  Right leg light touch: normal  Left leg light touch: normal   -extinction absent     Gait, Coordination, and Reflexes     Gait  Gait: (deferred for patient safety)    Coordination   Heel to shin coordination: normal          Labs: I have personally reviewed pertinent reports. Recent Results (from the past 24 hour(s))   Hemoglobin A1c w/EAG Estimation    Collection Time: 09/14/23  5:57 AM   Result Value Ref Range    Hemoglobin A1C 5.9 (H) Normal 4.0-5.6%; PreDiabetic 5.7-6.4%;  Diabetic >=6.5%; Glycemic control for adults with diabetes <7.0% %     mg/dl   Basic metabolic panel    Collection Time: 09/14/23  5:57 AM   Result Value Ref Range    Sodium 140 135 - 147 mmol/L    Potassium 4.1 3.5 - 5.3 mmol/L    Chloride 106 96 - 108 mmol/L    CO2 27 21 - 32 mmol/L    ANION GAP 7 mmol/L    BUN 12 5 - 25 mg/dL    Creatinine 0.80 0.60 - 1.30 mg/dL    Glucose 98 65 - 140 mg/dL    Calcium 9.0 8.4 - 10.2 mg/dL    eGFR 96 ml/min/1.73sq m   CBC and Platelet    Collection Time: 09/14/23  5:57 AM   Result Value Ref Range    WBC 6.29 4.31 - 10.16 Thousand/uL    RBC 4.50 3.88 - 5.62 Million/uL    Hemoglobin 14.9 12.0 - 17.0 g/dL    Hematocrit 43.9 36.5 - 49.3 %    MCV 98 82 - 98 fL    MCH 33.1 26.8 - 34.3 pg    MCHC 33.9 31.4 - 37.4 g/dL    RDW 12.0 11.6 - 15.1 %    Platelets 093 (L) 408 - 390 Thousands/uL MPV 11.8 8.9 - 12.7 fL   Magnesium    Collection Time: 09/14/23  5:57 AM   Result Value Ref Range    Magnesium 2.2 1.9 - 2.7 mg/dL   Phosphorus    Collection Time: 09/14/23  5:57 AM   Result Value Ref Range    Phosphorus 3.4 2.3 - 4.1 mg/dL   Calcium, ionized    Collection Time: 09/14/23  5:57 AM   Result Value Ref Range    Calcium, Ionized 1.17 1.12 - 1.32 mmol/L   Lipid Panel with Direct LDL reflex    Collection Time: 09/14/23  6:00 AM   Result Value Ref Range    Cholesterol 146 See Comment mg/dL    Triglycerides 58 See Comment mg/dL    HDL, Direct 48 >=40 mg/dL    LDL Calculated 86 0 - 100 mg/dL   C-reactive protein    Collection Time: 09/14/23  6:00 AM   Result Value Ref Range    CRP <1.0 <3.0 mg/L       Imaging: I have personally reviewed pertinent imaging in PACS, including MRI brain wo contrast 9/13/23, CTH wo contrast 9/14/23,  and I have personally reviewed PACS reports. EKG, Pathology, and Other Studies: I have personally reviewed pertinent reports. VTE Prophylaxis: Sequential compression device (Venodyne)       Counseling / Coordination of Care  I have spent a total time of 35 minutes on 09/14/23 in caring for this patient including Diagnostic results, Prognosis, Risks and benefits of tx options, Instructions for management, Patient and family education, Importance of tx compliance, Risk factor reductions, Impressions, Counseling / Coordination of care, Documenting in the medical record, Reviewing / ordering tests, medicine, procedures  , Obtaining or reviewing history   and Communicating with other healthcare professionals . This note was completed in part utilizing [address] [place] [address]. Grammatical errors, random word insertions, spelling mistakes, and incomplete sentences may be an occasional consequence of this system secondary to software limitations, ambient noise, and hardware issues.   If you have any questions or concerns about the content, text, or information contained within the body of this dictation, please contact the provider for clarification.

## 2023-09-14 NOTE — QUICK NOTE
Received a call from PACS and spoke with neuro ophthalmology and hospitalist service at [facility]. Based on the current clinical findings on imaging and orbital examination an outpatient evaluation would be appropriate. They recommended MRI of the brain and orbits with contrast as the next step in evaluation and if there are abnormal findings to call back for further discussion. I discussed this with the patient and multiple family members at the bedside and he was agreeable to obtain a MRI of the brain and orbits with contrast. He is requesting sedation prior due to claustrophobia which will be provided.

## 2023-09-14 NOTE — PROGRESS NOTES
Called in refills for Keppra and Lamictal to [facility] with 2 refills each. Pt needs to make appointment    Critical Care Interval Transfer Note:    Please refer to progress note from earlier today for full details. • Patient presented to [facility] with acute right eye vision loss about 1.5 hours prior to arrival. He was only able to see flashes of light through the left lower quadrant of the right eye. No pain, no issues with left eye, no other focal deficits, no headaches. He had a similar episode 1 week ago that resolved within 1 hour  • NIHSS 1  • Initial CTH   • No intracranial abnormality  • Initial CTA :  • "hypoplastic right vert, mild stenosis of WILNER, non flow limiting"  • TNK given, follow up [address] at 24 hours was negative   • Current Neuro Exam:  • Non-focal. Describes a "gray area" of vision that moves with his eye that is almost as if it's a fog  • ophthalmology saw him and has floated the idea of transferring to to [facility] or [facility] for neuro-ophthamology eval     Barriers to discharge:   · Persistent right monocular vision loss     Consults: IP CONSULT TO NEUROLOGY  IP CONSULT TO CASE MANAGEMENT  IP CONSULT TO PHYSICAL MEDICINE REHAB  IP CONSULT TO NEUROLOGY  IP CONSULT TO OPHTHALMOLOGY  IP CONSULT TO CASE MANAGEMENT  IP CONSULT TO [address] [place] [address]    Recommended to review admission imaging for incidental findings and document in discharge navigator: Chart reviewed, no known incidental findings noted at this time. Discharge Plan: Anticipate discharge in 24-48 hrs to discharge location to be determined pending rehab evaluations. PT Recommendations: No rehabilitation needs  OT Recommendations: Home with outpatient rehabilitation      Patient seen and evaluated by Critical Care today and deemed to be appropriate for transfer to [facility] with Telemetry. Spoke to Dr. May Bender  from [facility] to accept transfer. Critical care can be contacted via [employer] with any questions or concerns.

## 2023-09-14 NOTE — UTILIZATION REVIEW
Initial Clinical Review    Admission: Date/Time/Statement:   Admission Orders (From admission, onward)     Ordered        09/13/23 0507  Inpatient Admission  Once                      Orders Placed This Encounter   Procedures   • Inpatient Admission     Standing Status:   Standing     Number of Occurrences:   1     Order Specific Question:   Level of Care     Answer:   Critical Care [15]     Order Specific Question:   Estimated length of stay     Answer:   More than 2 Midnights     Order Specific Question:   Certification     Answer:   I certify that inpatient services are medically necessary for this patient for a duration of greater than two midnights. See H&P and MD Progress Notes for additional information about the patient's course of treatment. TRANSFERRED FROM [facility] TO [facility] FOR URGENT OPHTHALMOLOGY EVALUATION    Initial Presentation: 66 y.o. male to ED at [address] [facility] [facility] following acute loss of vision to his right eye about 1.5 hours prior to arrival. When looking at a very bright light he experienced almost complete vision loss, was only able to see out of the right lower quadrant of his right eye with low acuity. Stroke alert was called. agreed to get TNK. This was given at 1:13 AM. He did notice that his vision almost immediately got slightly better. transferred to [address] [place] [address] for urgent ophthalmology evaluation this AM.    On arrival here, patient's vision has improved. He now describes a gray area of "fog" that moves with his visual field. Around this fog, he is able to see somewhat normally. PMHX GERD and hyperlipidemia.    Admitted to ICU with DX: Vision Loss  on exam: NIHSS 1;  Right eye with a "gray fog" that travels along his field of vision   CTA : "hypoplastic right vert, mild stenosis of WILNER, non flow limiting"  PLAN: neuro checks q1 hr;  f/u MRI; f/u TTE; cont statin / asa; Cardiopulmonary monitoring; PT /OT / ST eval - tx; monitor for vision changes;  Monitor labs; neurology consult; ophthalmology consulted       NEUROLOGY CONSULT:   9/1/2023 after looking at a bright light developed josef out of his vision of his right eye. Over the course of an hour that faded and his vision returned to normal.  At the time he did not have any significant headache, jaw claudication, fevers, chills, shoulder or hip girdle pain, double vision, speech change, facial asymmetry, focal weakness, numbness/tingling, gait instability, or LOC. Between 9/1 and 9/12/2023 his vision remained normal.  On 9/12 during a period of intense stress he again looked at a bright light and developed graying out of his right eye vision. At its worst he could only see out of the inferotemporal aspect of his right eye. Again no pain or other symptoms. Left eye vision was completely intact. He presented to the ED where stroke alert was activated and he ultimately received TNK. At time my evaluation today he reports that his right eye vision has improved to the point that he can see and all the periphery, but still has a large gray blob obscuring the center of his right eye vision. monitored post TNK on the assumption that his presentation does represent an ischemic optic neuropathy. Plan: f/u carotid U/S; f/u MRI; If 24-hour imaging shows no interval hemorrhage, he can be started on pharmacologic DVT prophylaxis and Plavix (switching from PTA aspirin). Should also increase his rosuvastatin dose from 5 mg to 20 mg for secondary stroke prevention. CTA shows very mild plaque at proximal R ICA without concerning features, so he likely would not be a surgical candidate. He will also need a Zio patch at discharge.       OPHTHALMOLOGY CONSULT: transient vision loss right x2  Vision in right eye has not completely returned and has diffuse patchy blind spots throughout vision  Dx: Painless Transient obscuration vision R /       Painless Monocular vision loss R / Visual field defect right  Optic nerve is not swollen. No obvious emboli or vascular occlusion. R/O NAION ( ischemic optic neuropathy), giant cell arteritis ( westergren sed rate, C reactive protein, temporal artery biopsy if indicated , retrobulbar optic neuritis  Etc  No evidence angle closure ie narrow angle but no pain/headache corneal edema or elevated eye pressure. No retinal detachment. Atypical for migraine due to longevity of episodes and fact vision . Has not returned to normal.  Mild nuclear sclerosis  Rx:Neuroimaging pending. Would strongly consider transfer to tertiary care ie [facility] ([facility] covering) or [facility] For neuro-ophthalmology evaluation      Date: 9/14/23    Day 2  no acute events overnight; no vision changes - Visual fields intact, peripheral vision intact;  Right sided blurriness centrally which clears towards peripheral vision; Visual fields intact, peripheral vision intact, right eye has "haze/fog" making it hard to see out of top half of visual field of right eye   Plan: neuro checks q1 hr;  f/u MRI; f/u TTE; cont statin / asa; Cardiopulmonary monitoring; PT /OT / ST eval - tx; monitor for vision changes;  Monitor labs        ED Triage Vitals [09/13/23 0512]   Temperature Pulse Respirations Blood Pressure SpO2   98.7 °F (37.1 °C) 85 20 141/73 98 %      Temp Source Heart Rate Source Patient Position - Orthostatic VS BP Location FiO2 (%)   Oral Monitor Sitting Right arm --      Pain Score       No Pain          Wt Readings from Last 1 Encounters:   09/14/23 73.1 kg (161 lb 2.5 oz)     Additional Vital Signs:   Date/Time Temp Pulse Resp BP MAP (mmHg) SpO2 O2 Device Patient Position - Orthostatic VS   09/14/23 10:51:12 98.5 °F (36.9 °C) 80 -- 125/77 93 96 % -- --   09/14/23 08:52:32 97.3 °F (36.3 °C) Abnormal  -- -- 120/79 93 -- -- --   09/14/23 0800 -- -- -- -- -- -- None (Room air) --   09/14/23 0300 -- 72 18 95/63 73 -- -- --   09/14/23 0200 -- 70 15 105/59 76 -- -- -- 09/14/23 0100 -- 73 17 100/58 74 -- -- --   09/14/23 0000 -- 86 19 114/61 80 -- -- --   09/13/23 2230 -- 74 23 Abnormal  107/65 82 -- -- --   09/13/23 2200 -- 74 18 105/65 80 -- -- --   09/13/23 2100 -- 72 16 105/65 80 96 % -- --   09/13/23 2000 -- 79 22 111/59 80 -- None (Room air) Lying   09/13/23 1900 -- 84 20 117/69 -- 96 % -- --   09/13/23 1800 -- 78 -- 117/69 -- -- -- --   09/13/23 1714 -- 83 -- 118/70 -- 93 % -- --   09/13/23 16:42:18 98.8 °F (37.1 °C) -- -- -- -- -- -- --   09/13/23 1600 -- 86 -- 119/79 -- -- -- --   09/13/23 15:07:43 98.8 °F (37.1 °C) 79 -- 102/60 -- -- -- --   09/13/23 1400 -- 66 -- 136/63 -- -- -- --   09/13/23 1300 -- 80 15 108/62 76 97 % -- --   09/13/23 1200 -- 80 -- 120/70 86 97 % None (Room air) --   09/13/23 1100 -- 82 20 97/61 73 96 % -- --   09/13/23 1000 -- 96 15 110/73 89 97 % -- --   09/13/23 0900 -- 84 20 130/69 89 98 % -- --   09/13/23 0830 -- 86 23 Abnormal  132/73 90 97 % -- --   09/13/23 0800 98.2 °F (36.8 °C) 88 15 -- -- 98 % None (Room air) --   09/13/23 0730 -- 92 27 Abnormal  130/69 -- 99 % -- --   09/13/23 0700 -- 84 14 -- -- 98 % -- --   09/13/23 0630 -- 86 8 Abnormal  127/80 99 98 % None (Room air) Lying   09/13/23 0600 -- 82 11 Abnormal  117/63 91 98 % None (Room air) Lying   09/13/23 0530 -- 88 22 131/75 108 97 % None (Room air) Lying   09/13/23 0512 98.7 °F (37.1 °C) 85 20 141/73 90 98 % None (Room air) Sitting         EKG: Sinus tachycardia   Otherwise normal ECG      Pertinent Labs/Diagnostic Test Results:   CT head wo contrast   Final Result by Kamille Blue MD (09/14 0801)      No acute intracranial abnormality. Workstation performed: [id number]         VAS carotid complete study   Final Result by Kami Maria MD (17/22 0845)      MRI brain wo contrast   Final Result by Maria Del Rosario Cannon MD (09/14 0734)      1. No acute infarction, edema, or mass effect. 2. Chronic microangiopathic ischemic changes.          Workstation performed: [id number]           Results from last 7 days   Lab Units 09/12/23 2332   SARS-COV-2  Negative     Results from last 7 days   Lab Units 09/14/23  0557 09/12/23 2332   WBC Thousand/uL 6.29 7.18   HEMOGLOBIN g/dL 14.9 14.8   HEMATOCRIT % 43.9 42.9   PLATELETS Thousands/uL 138* 147*   NEUTROS ABS Thousands/µL  --  4.14       Results from last 7 days   Lab Units 09/14/23  0557 09/12/23 2332   SODIUM mmol/L 140 140   POTASSIUM mmol/L 4.1 3.6   CHLORIDE mmol/L 106 103   CO2 mmol/L 27 28   ANION GAP mmol/L 7 9   BUN mg/dL 12 24   CREATININE mg/dL 0.80 1.11   EGFR ml/min/1.73sq m 96 71   CALCIUM mg/dL 9.0 9.4   CALCIUM, IONIZED mmol/L 1.17  --    MAGNESIUM mg/dL 2.2  --    PHOSPHORUS mg/dL 3.4  --      Results from last 7 days   Lab Units 09/12/23  2332   AST U/L 25   ALT U/L 30   ALK PHOS U/L 42   TOTAL PROTEIN g/dL 7.1   ALBUMIN g/dL 4.6   TOTAL BILIRUBIN mg/dL 0.58     Results from last 7 days   Lab Units 09/12/23  2331   POC GLUCOSE mg/dl 93     Results from last 7 days   Lab Units 09/14/23  0557 09/12/23  2332   GLUCOSE RANDOM mg/dL 98 99       Results from last 7 days   Lab Units 09/14/23  0557   HEMOGLOBIN A1C % 5.9*   EAG mg/dl 123       Results from last 7 days   Lab Units 09/13/23  0126 09/12/23 2332   HS TNI 0HR ng/L  --  4   HS TNI 2HR ng/L 5  --    HSTNI D2 ng/L 1  --          Results from last 7 days   Lab Units 09/12/23 2332   PROTIME seconds 14.3   INR  1.05   PTT seconds 26       Results from last 7 days   Lab Units 09/12/23 2332   SED RATE mm/hour 8       Results from last 7 days   Lab Units 09/12/23 2332   INFLUENZA A PCR  Negative   INFLUENZA B PCR  Negative   RSV PCR  Negative         Admitting Diagnosis: Vision loss of right eye [H54.61]     Age/Sex: 66 y.o. male     Admission Orders: SCDs; neuro checks; I/O; daily wts; I/S; Cardiopulmonary monitoring; regular diet      Scheduled Medications:  atorvastatin, 40 mg, Oral, QPM  pantoprazole, 40 mg, Oral, Daily    tenecteplase (TNKase) injection 18 mg  Dose: 0.25 mg/kg  Weight Dosing Info: 73.3 kg  Freq: Once Route: IV  Start: 09/13/23 0100 End: 09/13/23 0113      Continuous IV Infusions:       PRN Meds:  labetalol, 10 mg, Intravenous, Q4H PRN        IP CONSULT TO NEUROLOGY  IP CONSULT TO CASE MANAGEMENT  IP CONSULT TO PHYSICAL MEDICINE REHAB  IP CONSULT TO NEUROLOGY  IP CONSULT TO OPHTHALMOLOGY  IP CONSULT TO CASE MANAGEMENT  IP CONSULT TO NUTRITION SERVICES    Network Utilization Review Department  ATTENTION: Please call with any questions or concerns to [phone] and carefully listen to the prompts so that you are directed to the right person. All voicemails are confidential.  Lorna Bajwas all requests for admission clinical reviews, approved or denied determinations and any other requests to dedicated fax number below belonging to the campus where the patient is receiving treatment.  List of dedicated fax numbers for the Facilities:  [place] DENIALS (Administrative/Medical Necessity) [phone] 230[address] [facility] (Maternity/NICU/Pediatrics) [phone]   [address] [place] [address] [phone]   [facility] [address] [facility] [phone] 150[address] [facility] [address] [facility] [facility] [address] [place] [address] Street [address] [facility] [phone]   [address] [facility] [address] [facility]  [facility] [phone]

## 2023-09-14 NOTE — PROGRESS NOTES
[address] [facility]  Progress Note: Critical Care  Name: Renetta Phillip 66 y.o. male I MRN: [id number]  Unit/Bed#: [facility] 705-01 I Date of Admission: 9/13/2023   Date of Service: 9/14/2023 I Hospital Day: 1    Assessment/Plan   Neuro:   • Diagnosis: Acute right sided vision loss s/p TNK administration  • Patient presented to [address] [place] [address] with acute right eye vision loss about 1.5 hours prior to arrival. He was only able to see flashes of light through the left lower quadrant of the right eye. No pain, no issues with left eye, no other focal deficits, no headaches. He had a similar episode 1 week ago that resolved within 1 hour  • NIHSS 1  • Initial CTH   • No intracranial abnormality  • Initial CTA :  • "hypoplastic right vert, mild stenosis of WILNER, non flow limiting"  • TNK given at 0113; initially patient declined as symptoms were improving  • Neurology following  • Current Neuro Exam:  • Non-focal. Describes a "gray area" of vision that moves with his eye that is almost as if it's a fog     • Differential wide: CRAO vs intraocular issues vs migraines  • Dr. Domenico hCauhan with Ophthalmology contacted and initially recommended transfer to [facility]. [facility] did not have a bed and did not feel as though anything would be done for him tonight even if retinal issue due to TNK administration  • They recommended checking ESR to rule out giant cell arteritis which the [place] ED did and was negative. Feel as though this could be consistent with anterior ischemic optic neuropathy. • After reaching back out to Dr. Domenico Chauhan with Ophthomology, he requested transfer to [facility] for evaluation this AM   • Ophtho - Rx:Neuroimaging pending.  Would strongly consider transfer to tertiary care [facility] ([facility] or [facility]) for neuro-ophthalmology evaluation    • Plan:  • Continue stroke pathway  • MRI pending  • DVT prophylaxis  • Plavix 75 daily  • TTE pending  • Atorvastatin 40mg  • Lipid Panel Pending  • Blood pressure goal:  • < 180  • Continue telemetry  • PT/OT/Speech following  • PMR consult  • Hemoglobin A1C: Pending  • Report any visual field changes to ophthalmology ASAP        CV:   • Diagnosis: HLD  ? Home medication: Crestor 5  ? Plan:   ? lipid panel pending  ? Atorvastatin 40mg daily         Pulm:  No active issues     GI:   • Diagnosis: GERD  ? Plan:   - Continue protonix        :   No active issues     F/E/N:    • Fluids: None  • Electrolytes: Replete for K >4, <5 ; Phos > 3; and Mag > 2  • Nutrition: regular diet      Heme/Onc:   No active issues     Endo:   No active issues     ID:   No active issues     MSK/Skin:   No active issues     Disposition: stable for downgrade      ICU Core Measures     A: Assess, Prevent, and Manage Pain · Has pain been assessed? Yes  · Need for changes to pain regimen? No   B: Both SAT/SAT  · N/A   C: Choice of Sedation · RASS Goal: 0 Alert and Calm  · Need for changes to sedation or analgesia regimen? No   D: Delirium · CAM-ICU: Negative   E: Early Mobility  · Plan for early mobility? Yes   F: Family Engagement · Plan for family engagement today? Yes         Prophylaxis:  VTE Restart today   Stress Ulcer  covered bypantoprazole (PROTONIX) EC tablet 40 mg [[id number]]          Subjective   HPI/24hr events: no acute events overnight    Review of Systems   Constitutional: Negative for chills and fever. Eyes: Positive for visual disturbance. Negative for photophobia, pain and redness. Respiratory: Negative for shortness of breath and wheezing. Cardiovascular: Negative for chest pain and palpitations. Gastrointestinal: Negative for abdominal pain, nausea and vomiting. Genitourinary: Negative for dysuria and frequency. Musculoskeletal: Negative for gait problem. Neurological: Negative for dizziness, facial asymmetry, light-headedness, numbness and headaches.                Objective                            Vitals I/O      Most Recent Min/Max in 24hrs   Temp 98.8 °F (37.1 °C) Temp  Min: 98.2 °F (36.8 °C)  Max: 98.8 °F (37.1 °C)   Pulse 72 Pulse  Min: 66  Max: 96   Resp 18 Resp  Min: 15  Max: 23   BP 95/63 BP  Min: 95/63  Max: 136/63   O2 Sat 96 % SpO2  Min: 93 %  Max: 98 %      Intake/Output Summary (Last 24 hours) at 9/14/2023 0755  Last data filed at 9/13/2023 1220  Gross per 24 hour   Intake 240 ml   Output 1025 ml   Net -785 ml         Diet Regular; Regular House     Invasive Monitoring Physical exam    Physical Exam  Eyes:      Extraocular Movements: Extraocular movements intact. Pupils: Pupils are equal, round, and reactive to light. Skin:     General: Skin is warm and dry. Capillary Refill: Capillary refill takes less than 2 seconds. Cardiovascular:      Rate and Rhythm: Normal rate and regular rhythm. Heart sounds: Normal heart sounds. Musculoskeletal:      Right lower leg: No edema. Left lower leg: No edema. Abdominal:      Palpations: Abdomen is soft. Tenderness: There is no abdominal tenderness. Constitutional:       General: He is not in acute distress. Appearance: He is not ill-appearing. Pulmonary:      Effort: Pulmonary effort is normal.      Breath sounds: Normal breath sounds. No wheezing. Neurological:      Comments: AOX3    CNII-XII intact    Visual fields intact, peripheral vision intact  Right sided blurriness centrally which clears towards peripheral vision    Intact sensation in all extremities    Motor strength 5/5 in all extremities  and AOX3    Visual fields intact, peripheral vision intact, right eye has "haze/fog" making it hard to see out of top half of visual field of right eye     CNII-XII intact    Sensation intact in all extremities    Muscle strength 5/5 in all extremities             Diagnostic Studies      EKG: Sinus tachycardia  Imaging:     CTA:  IMPRESSION:     1. No hemodynamically significant stenosis of the arteries of the neck.  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  2.  No high-grade proximal stenosis of the visualized [place] of [place]. CTH:  IMPRESSION:     No acute intracranial hemorrhage, midline shift, or mass effect. If there is continued clinical concern for acute infarct, further evaluation with MRI may be obtained which is more sensitive. [address] follow up: pending    MRI: pending      I have personally reviewed pertinent reports.        Medications:  Scheduled PRN   atorvastatin, 40 mg, QPM  pantoprazole, 40 mg, Daily      labetalol, 10 mg, Q4H PRN       Continuous          Labs:    CBC    Recent Labs     09/12/23 2332 09/14/23  0557   WBC 7.18 6.29   HGB 14.8 14.9   HCT 42.9 43.9   * 138*     BMP    Recent Labs     09/12/23  2332   SODIUM 140   K 3.6      CO2 28   AGAP 9   BUN 24   CREATININE 1.11   CALCIUM 9.4       Coags    Recent Labs     09/12/23  2332   INR 1.05   PTT 26        Additional Electrolytes  Recent Labs     09/14/23  0557   CAIONIZED 1.17          Blood Gas    No recent results  No recent results LFTs  Recent Labs     09/12/23  2332   ALT 30   AST 25   ALKPHOS 42   ALB 4.6   TBILI 0.58       Infectious  No recent results  Glucose  Recent Labs     09/12/23  2332   GLUC 99             Baby Rubinstein, MD

## 2023-09-14 NOTE — RESTORATIVE TECHNICIAN NOTE
Restorative Technician Note      Patient Name: Mary Avalos     Note Type: Mobility  Patient Position Upon Consult: Supine  Activity Performed: Ambulated; Dangled; Stood  Assistive Device: Other (Comment) (none)  Education Provided: Yes  Patient Position at End of Consult: Supine;  All needs within reach    [facility] [place], Restorative Technician, [employer]

## 2023-09-15 VITALS
HEIGHT: 64 IN | RESPIRATION RATE: 16 BRPM | TEMPERATURE: 98.1 F | HEART RATE: 78 BPM | SYSTOLIC BLOOD PRESSURE: 117 MMHG | WEIGHT: 161.16 LBS | DIASTOLIC BLOOD PRESSURE: 73 MMHG | BODY MASS INDEX: 27.51 KG/M2 | OXYGEN SATURATION: 97 %

## 2023-09-15 PROCEDURE — 99239 HOSP IP/OBS DSCHRG MGMT >30: CPT | Performed by: FAMILY MEDICINE

## 2023-09-15 RX ORDER — PREDNISONE 10 MG/1
TABLET ORAL
Qty: 147 TABLET | Refills: 0 | Status: SHIPPED | OUTPATIENT
Start: 2023-09-15 | End: 2023-10-27

## 2023-09-15 RX ADMIN — PANTOPRAZOLE SODIUM 40 MG: 40 TABLET, DELAYED RELEASE ORAL at 09:51

## 2023-09-15 RX ADMIN — ASPIRIN 81 MG: 81 TABLET, COATED ORAL at 09:51

## 2023-09-15 NOTE — ASSESSMENT & PLAN NOTE
Patient had extensive work-up done with collaboration of critical care as well as ophthalmology.   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

## 2023-09-15 NOTE — PLAN OF CARE
Problem: PAIN - ADULT  Goal: Verbalizes/displays adequate comfort level or baseline comfort level  Description: Interventions:  - Encourage patient to monitor pain and request assistance  - Assess pain using appropriate pain scale  - Administer analgesics based on type and severity of pain and evaluate response  - Implement non-pharmacological measures as appropriate and evaluate response  - Consider cultural and social influences on pain and pain management  - Notify physician/advanced practitioner if interventions unsuccessful or patient reports new pain  Outcome: Progressing     Problem: INFECTION - ADULT  Goal: Absence or prevention of progression during hospitalization  Description: INTERVENTIONS:  - Assess and monitor for signs and symptoms of infection  - Monitor lab/diagnostic results  - Monitor all insertion sites, i.e. indwelling lines, tubes, and drains  - Monitor endotracheal if appropriate and nasal secretions for changes in amount and color  - [place] appropriate cooling/warming therapies per order  - Administer medications as ordered  - Instruct and encourage patient and family to use good hand hygiene technique  - Identify and instruct in appropriate isolation precautions for identified infection/condition  Outcome: Progressing  Goal: Absence of fever/infection during neutropenic period  Description: INTERVENTIONS:  - Monitor WBC    Outcome: Progressing     Problem: SAFETY ADULT  Goal: Patient will remain free of falls  Description: INTERVENTIONS:  - Educate patient/family on patient safety including physical limitations  - Instruct patient to call for assistance with activity   - Consult OT/PT to assist with strengthening/mobility   - Keep Call bell within reach  - Keep bed low and locked with side rails adjusted as appropriate  - Keep care items and personal belongings within reach  - Initiate and maintain comfort rounds  - Make Fall Risk Sign visible to staff  - Offer Toileting every 1 Hours, in advance of need  - Initiate/Maintain alarm  - Obtain necessary fall risk management equipment  - Apply yellow socks and bracelet for high fall risk patients  - Consider moving patient to room near nurses station  Outcome: Progressing  Goal: Maintain or return to baseline ADL function  Description: INTERVENTIONS:  -  Assess patient's ability to carry out ADLs; assess patient's baseline for ADL function and identify physical deficits which impact ability to perform ADLs (bathing, care of mouth/teeth, toileting, grooming, dressing, etc.)  - Assess/evaluate cause of self-care deficits   - Assess range of motion  - Assess patient's mobility; develop plan if impaired  - Assess patient's need for assistive devices and provide as appropriate  - Encourage maximum independence but intervene and supervise when necessary  - Involve family in performance of ADLs  - Assess for home care needs following discharge   - Consider OT consult to assist with ADL evaluation and planning for discharge  - Provide patient education as appropriate  Outcome: Progressing  Goal: Maintains/Returns to pre admission functional level  Description: INTERVENTIONS:  - Perform BMAT or MOVE assessment daily.   - Set and communicate daily mobility goal to care team and patient/family/caregiver. - Collaborate with rehabilitation services on mobility goals if consulted  - Perform Range of Motion 3 times a day. - Reposition patient every 2 hours.   - Dangle patient 3 times a day  - Stand patient 3 times a day  - Ambulate patient 3 times a day  - Out of bed to chair 3 times a day   - Out of bed for meals 3 times a day  - Out of bed for toileting  - Record patient progress and toleration of activity level   Outcome: Progressing     Problem: DISCHARGE PLANNING  Goal: Discharge to home or other facility with appropriate resources  Description: INTERVENTIONS:  - Identify barriers to discharge w/patient and caregiver  - Arrange for needed discharge resources and transportation as appropriate  - Identify discharge learning needs (meds, wound care, etc.)  - Arrange for interpretive services to assist at discharge as needed  - Refer to Case Management Department for coordinating discharge planning if the patient needs post-hospital services based on physician/advanced practitioner order or complex needs related to functional status, cognitive ability, or social support system  Outcome: Progressing     Problem: Knowledge Deficit  Goal: Patient/family/caregiver demonstrates understanding of disease process, treatment plan, medications, and discharge instructions  Description: Complete learning assessment and assess knowledge base.   Interventions:  - Provide teaching at level of understanding  - Provide teaching via preferred learning methods  Outcome: Progressing     Problem: NEUROSENSORY - ADULT  Goal: Achieves stable or improved neurological status  Description: INTERVENTIONS  - Monitor and report changes in neurological status  - Monitor vital signs such as temperature, blood pressure, glucose, and any other labs ordered   - Initiate measures to prevent increased intracranial pressure  - Monitor for seizure activity and implement precautions if appropriate      Outcome: Progressing  Goal: Remains free of injury related to seizures activity  Description: INTERVENTIONS  - Maintain airway, patient safety  and administer oxygen as ordered  - Monitor patient for seizure activity, document and report duration and description of seizure to physician/advanced practitioner  - If seizure occurs,  ensure patient safety during seizure  - Reorient patient post seizure  - Seizure pads on all 4 side rails  - Instruct patient/family to notify RN of any seizure activity including if an aura is experienced  - Instruct patient/family to call for assistance with activity based on nursing assessment  - Administer anti-seizure medications if ordered    Outcome: Progressing  Goal: Achieves maximal functionality and self care  Description: INTERVENTIONS  - Monitor swallowing and airway patency with patient fatigue and changes in neurological status  - Encourage and assist patient to increase activity and self care.    - Encourage visually impaired, hearing impaired and aphasic patients to use assistive/communication devices  Outcome: Progressing     Problem: CARDIOVASCULAR - ADULT  Goal: Maintains optimal cardiac output and hemodynamic stability  Description: INTERVENTIONS:  - Monitor I/O, vital signs and rhythm  - Monitor for S/S and trends of decreased cardiac output  - Administer and titrate ordered vasoactive medications to optimize hemodynamic stability  - Assess quality of pulses, skin color and temperature  - Assess for signs of decreased coronary artery perfusion  - Instruct patient to report change in severity of symptoms  Outcome: Progressing  Goal: Absence of cardiac dysrhythmias or at baseline rhythm  Description: INTERVENTIONS:  - Continuous cardiac monitoring, vital signs, obtain 12 lead EKG if ordered  - Administer antiarrhythmic and heart rate control medications as ordered  - Monitor electrolytes and administer replacement therapy as ordered  Outcome: Progressing     Problem: RESPIRATORY - ADULT  Goal: Achieves optimal ventilation and oxygenation  Description: INTERVENTIONS:  - Assess for changes in respiratory status  - Assess for changes in mentation and behavior  - Position to facilitate oxygenation and minimize respiratory effort  - Oxygen administered by appropriate delivery if ordered  - Initiate smoking cessation education as indicated  - Encourage broncho-pulmonary hygiene including cough, deep breathe, Incentive Spirometry  - Assess the need for suctioning and aspirate as needed  - Assess and instruct to report SOB or any respiratory difficulty  - Respiratory Therapy support as indicated  Outcome: Progressing     Problem: GASTROINTESTINAL - ADULT  Goal: Minimal or absence of nausea and/or vomiting  Description: INTERVENTIONS:  - Administer IV fluids if ordered to ensure adequate hydration  - Maintain NPO status until nausea and vomiting are resolved  - Nasogastric tube if ordered  - Administer ordered antiemetic medications as needed  - Provide nonpharmacologic comfort measures as appropriate  - Advance diet as tolerated, if ordered  - Consider nutrition services referral to assist patient with adequate nutrition and appropriate food choices  Outcome: Progressing  Goal: Maintains or returns to baseline bowel function  Description: INTERVENTIONS:  - Assess bowel function  - Encourage oral fluids to ensure adequate hydration  - Administer IV fluids if ordered to ensure adequate hydration  - Administer ordered medications as needed  - Encourage mobilization and activity  - Consider nutritional services referral to assist patient with adequate nutrition and appropriate food choices  Outcome: Progressing  Goal: Maintains adequate nutritional intake  Description: INTERVENTIONS:  - Monitor percentage of each meal consumed  - Identify factors contributing to decreased intake, treat as appropriate  - Assist with meals as needed  - Monitor I&O, weight, and lab values if indicated  - Obtain nutrition services referral as needed  Outcome: Progressing  Goal: Establish and maintain optimal ostomy function  Description: INTERVENTIONS:  - Assess bowel function  - Encourage oral fluids to ensure adequate hydration  - Administer IV fluids if ordered to ensure adequate hydration   - Administer ordered medications as needed  - Encourage mobilization and activity  - Nutrition services referral to assist patient with appropriate food choices  - Assess stoma site  - Consider wound care consult   Outcome: Progressing  Goal: Oral mucous membranes remain intact  Description: INTERVENTIONS  - Assess oral mucosa and hygiene practices  - Implement preventative oral hygiene regimen  - Implement oral medicated treatments as ordered  - Initiate Nutrition services referral as needed  Outcome: Progressing     Problem: GENITOURINARY - ADULT  Goal: Maintains or returns to baseline urinary function  Description: INTERVENTIONS:  - Assess urinary function  - Encourage oral fluids to ensure adequate hydration if ordered  - Administer IV fluids as ordered to ensure adequate hydration  - Administer ordered medications as needed  - Offer frequent toileting  - Follow urinary retention protocol if ordered  Outcome: Progressing  Goal: Absence of urinary retention  Description: INTERVENTIONS:  - Assess patient's ability to void and empty bladder  - Monitor I/O  - Bladder scan as needed  - Discuss with physician/AP medications to alleviate retention as needed  - Discuss catheterization for long term situations as appropriate  Outcome: Progressing  Goal: Urinary catheter remains patent  Description: INTERVENTIONS:  - Assess patency of urinary catheter  - If patient has a chronic manning, consider changing catheter if non-functioning  - Follow guidelines for intermittent irrigation of non-functioning urinary catheter  Outcome: Progressing     Problem: METABOLIC, FLUID AND ELECTROLYTES - ADULT  Goal: Electrolytes maintained within normal limits  Description: INTERVENTIONS:  - Monitor labs and assess patient for signs and symptoms of electrolyte imbalances  - Administer electrolyte replacement as ordered  - Monitor response to electrolyte replacements, including repeat lab results as appropriate  - Instruct patient on fluid and nutrition as appropriate  Outcome: Progressing  Goal: Fluid balance maintained  Description: INTERVENTIONS:  - Monitor labs   - Monitor I/O and WT  - Instruct patient on fluid and nutrition as appropriate  - Assess for signs & symptoms of volume excess or deficit  Outcome: Progressing  Goal: Glucose maintained within target range  Description: INTERVENTIONS:  - Monitor Blood Glucose as ordered  - Assess for signs and symptoms of hyperglycemia and hypoglycemia  - Administer ordered medications to maintain glucose within target range  - Assess nutritional intake and initiate nutrition service referral as needed  Outcome: Progressing     Problem: SKIN/TISSUE INTEGRITY - ADULT  Goal: Skin Integrity remains intact(Skin Breakdown Prevention)  Description: Assess:  -Perform Black assessment  -Clean and moisturize skin  -Inspect skin when repositioning, toileting, and assisting with ADLS  -Assess under medical devices  -Assess extremities for adequate circulation and sensation     Bed Management:  -Have minimal linens on bed & keep smooth, unwrinkled  -Change linens as needed when moist or perspiring  -Avoid sitting or lying in one position for more than 2 hours while in bed  -Keep HOB at 30 degrees     Toileting:  -Offer bedside commode  -Assess for incontinence  -Use incontinent care products after each incontinent episode     Activity:  -Mobilize patient 3 times a day  -Encourage activity and walks on unit  -Encourage or provide ROM exercises   -Turn and reposition patient every 2 Hours  -Use appropriate equipment to lift or move patient in bed  -Instruct/ Assist with weight shifting every 15 min when out of bed in chair  -Consider limitation of chair time 1 hour intervals    Skin Care:  -Avoid use of baby powder, tape, friction and shearing, hot water or constrictive clothing  -Relieve pressure over bony prominences   -Do not massage red bony areas    Next Steps:  -Teach patient strategies to minimize risks    -Consider consults to  interdisciplinary teams  Outcome: Progressing  Goal: Incision(s), wounds(s) or drain site(s) healing without S/S of infection  Description: INTERVENTIONS  - Assess and document dressing, incision, wound bed, drain sites and surrounding tissue  - Provide patient and family education  - Perform skin care/dressing changes  Outcome: Progressing  Goal: Pressure injury heals and does not worsen  Description: Interventions:  - Implement low air loss mattress or specialty surface (Criteria met)  - Apply silicone foam dressing  - Instruct/assist with weight shifting every 15 minutes when in chair   - Limit chair time to 1 hour intervals  - Use special pressure reducing interventions such as soft-care cushion when in chair   - Apply fecal or urinary incontinence containment device   - Perform passive or active ROM   - Turn and reposition patient & offload bony prominences every 2 hours   - Utilize friction reducing device or surface for transfers   - Consider consults to  interdisciplinary teams   - Use incontinent care products after each incontinent episode  - Consider nutrition services referral as needed  Outcome: Progressing     Problem: HEMATOLOGIC - ADULT  Goal: Maintains hematologic stability  Description: INTERVENTIONS  - Assess for signs and symptoms of bleeding or hemorrhage  - Monitor labs  - Administer supportive blood products/factors as ordered and appropriate  Outcome: Progressing     Problem: MUSCULOSKELETAL - ADULT  Goal: Maintain or return mobility to safest level of function  Description: INTERVENTIONS:  - Assess patient's ability to carry out ADLs; assess patient's baseline for ADL function and identify physical deficits which impact ability to perform ADLs (bathing, care of mouth/teeth, toileting, grooming, dressing, etc.)  - Assess/evaluate cause of self-care deficits   - Assess range of motion  - Assess patient's mobility  - Assess patient's need for assistive devices and provide as appropriate  - Encourage maximum independence but intervene and supervise when necessary  - Involve family in performance of ADLs  - Assess for home care needs following discharge   - Consider OT consult to assist with ADL evaluation and planning for discharge  - Provide patient education as appropriate  Outcome: Progressing  Goal: Maintain proper alignment of affected body part  Description: INTERVENTIONS:  - Support, maintain and protect limb and body alignment  - Provide patient/ family with appropriate education  Outcome: Progressing     Problem: Neurological Deficit  Goal: Neurological status is stable or improving  Description: Interventions:  - Monitor and assess patient's level of consciousness, motor function, sensory function, and level of assistance needed for ADLs. - Monitor and report changes from baseline. Collaborate with interdisciplinary team to initiate plan and implement interventions as ordered. - Provide and maintain a safe environment. - Consider seizure precautions. - Consider fall precautions. - Consider aspiration precautions. - Consider bleeding precautions. Outcome: Progressing     Problem: Activity Intolerance/Impaired Mobility  Goal: Mobility/activity is maintained at optimum level for patient  Description: Interventions:  - Assess and monitor patient  barriers to mobility and need for assistive/adaptive devices. - Assess patient's emotional response to limitations. - Collaborate with interdisciplinary team and initiate plans and interventions as ordered. - Encourage independent activity per ability.  - Maintain proper body alignment. - Perform active/passive rom as tolerated/ordered. - Plan activities to conserve energy.  - Turn patient as appropriate  Outcome: Progressing     Problem: Communication Impairment  Goal: Ability to express needs and understand communication  Description: Assess patient's communication skills and ability to understand information. Patient will demonstrate use of effective communication techniques, alternative methods of communication and understanding even if not able to speak. - Encourage communication and provide alternate methods of communication as needed. - Collaborate with case management/ for discharge needs. - Include patient/family/caregiver in decisions related to communication.   Outcome: Progressing     Problem: Potential for Aspiration  Goal: Non-ventilated patient's risk of aspiration is minimized  Description: Assess and monitor vital signs, respiratory status, and labs (WBC). Monitor for signs of aspiration (tachypnea, cough, rales, wheezing, cyanosis, fever). - Assess and monitor patient's ability to swallow. - Place patient up in chair to eat if possible. - HOB up at 90 degrees to eat if unable to get patient up into chair.  - Supervise patient during oral intake. - Instruct patient/ family to take small bites. - Instruct patient/ family to take small single sips when taking liquids. - Follow patient-specific strategies generated by speech pathologist.  Outcome: Progressing     Problem: Nutrition  Goal: Nutrition/Hydration status is improving  Description: Monitor and assess patient's nutrition/hydration status for malnutrition (ex- brittle hair, bruises, dry skin, pale skin and conjunctiva, muscle wasting, smooth red tongue, and disorientation). Collaborate with interdisciplinary team and initiate plan and interventions as ordered. Monitor patient's weight and dietary intake as ordered or per policy. Utilize nutrition screening tool and intervene per policy. Determine patient's food preferences and provide high-protein, high-caloric foods as appropriate. - Assist patient with eating.  - Allow adequate time for meals.  - Encourage patient to take dietary supplement as ordered. - Collaborate with clinical nutritionist.  - Include patient/family/caregiver in decisions related to nutrition.   Outcome: Progressing

## 2023-09-15 NOTE — PLAN OF CARE
Problem: PAIN - ADULT  Goal: Verbalizes/displays adequate comfort level or baseline comfort level  Description: Interventions:  - Encourage patient to monitor pain and request assistance  - Assess pain using appropriate pain scale  - Administer analgesics based on type and severity of pain and evaluate response  - Implement non-pharmacological measures as appropriate and evaluate response  - Consider cultural and social influences on pain and pain management  - Notify physician/advanced practitioner if interventions unsuccessful or patient reports new pain  9/15/2023 1530 by Bautista Hilario RN  Outcome: Adequate for Discharge  9/15/2023 1357 by Bautista Hilario RN  Outcome: Progressing     Problem: INFECTION - ADULT  Goal: Absence or prevention of progression during hospitalization  Description: INTERVENTIONS:  - Assess and monitor for signs and symptoms of infection  - Monitor lab/diagnostic results  - Monitor all insertion sites, i.e. indwelling lines, tubes, and drains  - Monitor endotracheal if appropriate and nasal secretions for changes in amount and color  - [place] appropriate cooling/warming therapies per order  - Administer medications as ordered  - Instruct and encourage patient and family to use good hand hygiene technique  - Identify and instruct in appropriate isolation precautions for identified infection/condition  9/15/2023 1530 by Bautista Hilario RN  Outcome: Adequate for Discharge  9/15/2023 1357 by Bautista Hilario RN  Outcome: Progressing  Goal: Absence of fever/infection during neutropenic period  Description: INTERVENTIONS:  - Monitor WBC    9/15/2023 1530 by Bautista Hilario RN  Outcome: Adequate for Discharge  9/15/2023 1357 by Bautista Hilario RN  Outcome: Progressing     Problem: SAFETY ADULT  Goal: Patient will remain free of falls  Description: INTERVENTIONS:  - Educate patient/family on patient safety including physical limitations  - Instruct patient to call for assistance with activity   - Consult OT/PT to assist with strengthening/mobility   - Keep Call bell within reach  - Keep bed low and locked with side rails adjusted as appropriate  - Keep care items and personal belongings within reach  - Initiate and maintain comfort rounds  - Make Fall Risk Sign visible to staff  - Offer Toileting every 1 Hours, in advance of need  - Initiate/Maintain alarm  - Obtain necessary fall risk management equipment  - Apply yellow socks and bracelet for high fall risk patients  - Consider moving patient to room near nurses station  9/15/2023 1530 by Abdiel Bill RN  Outcome: Adequate for Discharge  9/15/2023 1357 by Abdiel Bill RN  Outcome: Progressing  Goal: Maintain or return to baseline ADL function  Description: INTERVENTIONS:  -  Assess patient's ability to carry out ADLs; assess patient's baseline for ADL function and identify physical deficits which impact ability to perform ADLs (bathing, care of mouth/teeth, toileting, grooming, dressing, etc.)  - Assess/evaluate cause of self-care deficits   - Assess range of motion  - Assess patient's mobility; develop plan if impaired  - Assess patient's need for assistive devices and provide as appropriate  - Encourage maximum independence but intervene and supervise when necessary  - Involve family in performance of ADLs  - Assess for home care needs following discharge   - Consider OT consult to assist with ADL evaluation and planning for discharge  - Provide patient education as appropriate  9/15/2023 1530 by Abdiel Bill RN  Outcome: Adequate for Discharge  9/15/2023 1357 by Abdiel Bill RN  Outcome: Progressing  Goal: Maintains/Returns to pre admission functional level  Description: INTERVENTIONS:  - Perform BMAT or MOVE assessment daily.   - Set and communicate daily mobility goal to care team and patient/family/caregiver. - Collaborate with rehabilitation services on mobility goals if consulted  - Perform Range of Motion 3 times a day.   - Reposition patient every 2 hours.  - Dangle patient 3 times a day  - Stand patient 3 times a day  - Ambulate patient 3 times a day  - Out of bed to chair 3 times a day   - Out of bed for meals 3 times a day  - Out of bed for toileting  - Record patient progress and toleration of activity level   9/15/2023 1530 by Gifty Sotelo RN  Outcome: Adequate for Discharge  9/15/2023 1357 by Gifty Sotelo RN  Outcome: Progressing     Problem: DISCHARGE PLANNING  Goal: Discharge to home or other facility with appropriate resources  Description: INTERVENTIONS:  - Identify barriers to discharge w/patient and caregiver  - Arrange for needed discharge resources and transportation as appropriate  - Identify discharge learning needs (meds, wound care, etc.)  - Arrange for interpretive services to assist at discharge as needed  - Refer to Case Management Department for coordinating discharge planning if the patient needs post-hospital services based on physician/advanced practitioner order or complex needs related to functional status, cognitive ability, or social support system  9/15/2023 1530 by Gifty Sotelo RN  Outcome: Adequate for Discharge  9/15/2023 1357 by Gifty Sotelo RN  Outcome: Progressing     Problem: Knowledge Deficit  Goal: Patient/family/caregiver demonstrates understanding of disease process, treatment plan, medications, and discharge instructions  Description: Complete learning assessment and assess knowledge base.   Interventions:  - Provide teaching at level of understanding  - Provide teaching via preferred learning methods  9/15/2023 1530 by Gifty Sotelo RN  Outcome: Adequate for Discharge  9/15/2023 1357 by Gifty Sotelo RN  Outcome: Progressing     Problem: NEUROSENSORY - ADULT  Goal: Achieves stable or improved neurological status  Description: INTERVENTIONS  - Monitor and report changes in neurological status  - Monitor vital signs such as temperature, blood pressure, glucose, and any other labs ordered   - Initiate measures to prevent increased intracranial pressure  - Monitor for seizure activity and implement precautions if appropriate      9/15/2023 1530 by Kun Grubbs RN  Outcome: Adequate for Discharge  9/15/2023 1357 by Kun Grubbs RN  Outcome: Progressing  Goal: Remains free of injury related to seizures activity  Description: INTERVENTIONS  - Maintain airway, patient safety  and administer oxygen as ordered  - Monitor patient for seizure activity, document and report duration and description of seizure to physician/advanced practitioner  - If seizure occurs,  ensure patient safety during seizure  - Reorient patient post seizure  - Seizure pads on all 4 side rails  - Instruct patient/family to notify RN of any seizure activity including if an aura is experienced  - Instruct patient/family to call for assistance with activity based on nursing assessment  - Administer anti-seizure medications if ordered    9/15/2023 1530 by Kun Grubbs RN  Outcome: Adequate for Discharge  9/15/2023 1357 by Kun Grubbs RN  Outcome: Progressing  Goal: Achieves maximal functionality and self care  Description: INTERVENTIONS  - Monitor swallowing and airway patency with patient fatigue and changes in neurological status  - Encourage and assist patient to increase activity and self care.    - Encourage visually impaired, hearing impaired and aphasic patients to use assistive/communication devices  9/15/2023 1530 by Kun Grubbs RN  Outcome: Adequate for Discharge  9/15/2023 1357 by Kun Grubbs RN  Outcome: Progressing     Problem: CARDIOVASCULAR - ADULT  Goal: Maintains optimal cardiac output and hemodynamic stability  Description: INTERVENTIONS:  - Monitor I/O, vital signs and rhythm  - Monitor for S/S and trends of decreased cardiac output  - Administer and titrate ordered vasoactive medications to optimize hemodynamic stability  - Assess quality of pulses, skin color and temperature  - Assess for signs of decreased coronary artery perfusion  - Instruct patient to report change in severity of symptoms  9/15/2023 1530 by Shahid Stover RN  Outcome: Adequate for Discharge  9/15/2023 1357 by Shahid Stover RN  Outcome: Progressing  Goal: Absence of cardiac dysrhythmias or at baseline rhythm  Description: INTERVENTIONS:  - Continuous cardiac monitoring, vital signs, obtain 12 lead EKG if ordered  - Administer antiarrhythmic and heart rate control medications as ordered  - Monitor electrolytes and administer replacement therapy as ordered  9/15/2023 1530 by Shahid Stover RN  Outcome: Adequate for Discharge  9/15/2023 1357 by Shahid Stover RN  Outcome: Progressing     Problem: RESPIRATORY - ADULT  Goal: Achieves optimal ventilation and oxygenation  Description: INTERVENTIONS:  - Assess for changes in respiratory status  - Assess for changes in mentation and behavior  - Position to facilitate oxygenation and minimize respiratory effort  - Oxygen administered by appropriate delivery if ordered  - Initiate smoking cessation education as indicated  - Encourage broncho-pulmonary hygiene including cough, deep breathe, Incentive Spirometry  - Assess the need for suctioning and aspirate as needed  - Assess and instruct to report SOB or any respiratory difficulty  - Respiratory Therapy support as indicated  9/15/2023 1530 by Shahid Stover RN  Outcome: Adequate for Discharge  9/15/2023 1357 by Shahid Stover RN  Outcome: Progressing     Problem: GASTROINTESTINAL - ADULT  Goal: Minimal or absence of nausea and/or vomiting  Description: INTERVENTIONS:  - Administer IV fluids if ordered to ensure adequate hydration  - Maintain NPO status until nausea and vomiting are resolved  - Nasogastric tube if ordered  - Administer ordered antiemetic medications as needed  - Provide nonpharmacologic comfort measures as appropriate  - Advance diet as tolerated, if ordered  - Consider nutrition services referral to assist patient with adequate nutrition and appropriate food choices  9/15/2023 1530 by Pravin Quinn KEYONA Munson  Outcome: Adequate for Discharge  9/15/2023 1357 by Francesco Garcia RN  Outcome: Progressing  Goal: Maintains or returns to baseline bowel function  Description: INTERVENTIONS:  - Assess bowel function  - Encourage oral fluids to ensure adequate hydration  - Administer IV fluids if ordered to ensure adequate hydration  - Administer ordered medications as needed  - Encourage mobilization and activity  - Consider nutritional services referral to assist patient with adequate nutrition and appropriate food choices  9/15/2023 1530 by Francesco Garcia RN  Outcome: Adequate for Discharge  9/15/2023 1357 by Francesco Garcia RN  Outcome: Progressing  Goal: Maintains adequate nutritional intake  Description: INTERVENTIONS:  - Monitor percentage of each meal consumed  - Identify factors contributing to decreased intake, treat as appropriate  - Assist with meals as needed  - Monitor I&O, weight, and lab values if indicated  - Obtain nutrition services referral as needed  9/15/2023 1530 by Francesco Garcia RN  Outcome: Adequate for Discharge  9/15/2023 1357 by Francesco Garcia RN  Outcome: Progressing  Goal: Establish and maintain optimal ostomy function  Description: INTERVENTIONS:  - Assess bowel function  - Encourage oral fluids to ensure adequate hydration  - Administer IV fluids if ordered to ensure adequate hydration   - Administer ordered medications as needed  - Encourage mobilization and activity  - Nutrition services referral to assist patient with appropriate food choices  - Assess stoma site  - Consider wound care consult   9/15/2023 1530 by Francesco Garcia RN  Outcome: Adequate for Discharge  9/15/2023 1357 by Francesco Garcia RN  Outcome: Progressing  Goal: Oral mucous membranes remain intact  Description: INTERVENTIONS  - Assess oral mucosa and hygiene practices  - Implement preventative oral hygiene regimen  - Implement oral medicated treatments as ordered  - Initiate Nutrition services referral as needed  9/15/2023 1530 by James Marcos RN  Outcome: Adequate for Discharge  9/15/2023 1357 by James Marcos RN  Outcome: Progressing     Problem: GENITOURINARY - ADULT  Goal: Maintains or returns to baseline urinary function  Description: INTERVENTIONS:  - Assess urinary function  - Encourage oral fluids to ensure adequate hydration if ordered  - Administer IV fluids as ordered to ensure adequate hydration  - Administer ordered medications as needed  - Offer frequent toileting  - Follow urinary retention protocol if ordered  9/15/2023 1530 by James Marcos RN  Outcome: Adequate for Discharge  9/15/2023 1357 by James Marcos RN  Outcome: Progressing  Goal: Absence of urinary retention  Description: INTERVENTIONS:  - Assess patient's ability to void and empty bladder  - Monitor I/O  - Bladder scan as needed  - Discuss with physician/AP medications to alleviate retention as needed  - Discuss catheterization for long term situations as appropriate  9/15/2023 1530 by James Marcos RN  Outcome: Adequate for Discharge  9/15/2023 1357 by James Marcos RN  Outcome: Progressing  Goal: Urinary catheter remains patent  Description: INTERVENTIONS:  - Assess patency of urinary catheter  - If patient has a chronic manning, consider changing catheter if non-functioning  - Follow guidelines for intermittent irrigation of non-functioning urinary catheter  9/15/2023 1530 by James Marcos RN  Outcome: Adequate for Discharge  9/15/2023 1357 by James Marcos RN  Outcome: Progressing     Problem: METABOLIC, FLUID AND ELECTROLYTES - ADULT  Goal: Electrolytes maintained within normal limits  Description: INTERVENTIONS:  - Monitor labs and assess patient for signs and symptoms of electrolyte imbalances  - Administer electrolyte replacement as ordered  - Monitor response to electrolyte replacements, including repeat lab results as appropriate  - Instruct patient on fluid and nutrition as appropriate  9/15/2023 1530 by James Marcos RN  Outcome: Adequate for Discharge  9/15/2023 1357 by Bautista Hilario RN  Outcome: Progressing  Goal: Fluid balance maintained  Description: INTERVENTIONS:  - Monitor labs   - Monitor I/O and WT  - Instruct patient on fluid and nutrition as appropriate  - Assess for signs & symptoms of volume excess or deficit  9/15/2023 1530 by Bautista Hilario RN  Outcome: Adequate for Discharge  9/15/2023 1357 by Bautista Hilario RN  Outcome: Progressing  Goal: Glucose maintained within target range  Description: INTERVENTIONS:  - Monitor Blood Glucose as ordered  - Assess for signs and symptoms of hyperglycemia and hypoglycemia  - Administer ordered medications to maintain glucose within target range  - Assess nutritional intake and initiate nutrition service referral as needed  9/15/2023 1530 by Bauitsta Hilario RN  Outcome: Adequate for Discharge  9/15/2023 1357 by Bautista Hilario RN  Outcome: Progressing     Problem: SKIN/TISSUE INTEGRITY - ADULT  Goal: Skin Integrity remains intact(Skin Breakdown Prevention)  Description: Assess:  -Perform Black assessment  -Clean and moisturize skin   -Inspect skin when repositioning, toileting, and assisting with ADLS  -Assess under medical devices  -Assess extremities for adequate circulation and sensation     Bed Management:  -Have minimal linens on bed & keep smooth, unwrinkled  -Change linens as needed when moist or perspiring  -Avoid sitting or lying in one position for more than 2 hours while in bed  -Keep HOB at 30 degrees     Toileting:  -Offer bedside commode  -Assess for incontinence  -Use incontinent care products after each incontinent episode    Activity:  -Mobilize patient 3 times a day  -Encourage activity and walks on unit  -Encourage or provide ROM exercises   -Turn and reposition patient every 1 Hours  -Use appropriate equipment to lift or move patient in bed  -Instruct/ Assist with weight shifting every 15 min when out of bed in chair  -Consider limitation of chair time 1 hour intervals    Skin Care:  -Avoid use of baby powder, tape, friction and shearing, hot water or constrictive clothing  -Relieve pressure over bony prominences  -Do not massage red bony areas    Next Steps:  -Teach patient strategies to minimize risks   -Consider consults to  interdisciplinary teams  9/15/2023 1530 by Guru Saldivar RN  Outcome: Adequate for Discharge  9/15/2023 1357 by Guru Saldivar RN  Outcome: Progressing  Goal: Incision(s), wounds(s) or drain site(s) healing without S/S of infection  Description: INTERVENTIONS  - Assess and document dressing, incision, wound bed, drain sites and surrounding tissue  - Provide patient and family education  - Perform skin care/dressing changes  9/15/2023 1530 by Guru Saldivar RN  Outcome: Adequate for Discharge  9/15/2023 1357 by Guru Saldivar RN  Outcome: Progressing  Goal: Pressure injury heals and does not worsen  Description: Interventions:  - Implement low air loss mattress or specialty surface (Criteria met)  - Apply silicone foam dressing  - Instruct/assist with weight shifting every 15 minutes when in chair   - Limit chair time to 2 hour intervals  - Use special pressure reducing interventions such as cushions  when in chair   - Apply fecal or urinary incontinence containment device   - Perform passive or active ROM  - Turn and reposition patient & offload bony prominences every 2 hours   - Utilize friction reducing device or surface for transfers   - Consider consults to  interdisciplinary teams   - Use incontinent care products after each incontinent episode  - Consider nutrition services referral as needed  9/15/2023 1530 by Guru Saldivar RN  Outcome: Adequate for Discharge  9/15/2023 1357 by Guru Saldivar RN  Outcome: Progressing     Problem: HEMATOLOGIC - ADULT  Goal: Maintains hematologic stability  Description: INTERVENTIONS  - Assess for signs and symptoms of bleeding or hemorrhage  - Monitor labs  - Administer supportive blood products/factors as ordered and appropriate  9/15/2023 1530 by Guru Saldivar RN  Outcome: Adequate for Discharge  9/15/2023 1357 by Rufina Epley, RN  Outcome: Progressing     Problem: MUSCULOSKELETAL - ADULT  Goal: Maintain or return mobility to safest level of function  Description: INTERVENTIONS:  - Assess patient's ability to carry out ADLs; assess patient's baseline for ADL function and identify physical deficits which impact ability to perform ADLs (bathing, care of mouth/teeth, toileting, grooming, dressing, etc.)  - Assess/evaluate cause of self-care deficits   - Assess range of motion  - Assess patient's mobility  - Assess patient's need for assistive devices and provide as appropriate  - Encourage maximum independence but intervene and supervise when necessary  - Involve family in performance of ADLs  - Assess for home care needs following discharge   - Consider OT consult to assist with ADL evaluation and planning for discharge  - Provide patient education as appropriate  9/15/2023 1530 by Rufina Epley, RN  Outcome: Adequate for Discharge  9/15/2023 1357 by Rufina Epley, RN  Outcome: Progressing  Goal: Maintain proper alignment of affected body part  Description: INTERVENTIONS:  - Support, maintain and protect limb and body alignment  - Provide patient/ family with appropriate education  9/15/2023 1530 by Rufina Epley, RN  Outcome: Adequate for Discharge  9/15/2023 1357 by Rufina Epley, RN  Outcome: Progressing     Problem: Neurological Deficit  Goal: Neurological status is stable or improving  Description: Interventions:  - Monitor and assess patient's level of consciousness, motor function, sensory function, and level of assistance needed for ADLs. - Monitor and report changes from baseline. Collaborate with interdisciplinary team to initiate plan and implement interventions as ordered. - Provide and maintain a safe environment. - Consider seizure precautions. - Consider fall precautions. - Consider aspiration precautions. - Consider bleeding precautions.   9/15/2023 1530 by Guru Saldivar RN  Outcome: Adequate for Discharge  9/15/2023 1357 by Guru Saldivar RN  Outcome: Progressing     Problem: Activity Intolerance/Impaired Mobility  Goal: Mobility/activity is maintained at optimum level for patient  Description: Interventions:  - Assess and monitor patient  barriers to mobility and need for assistive/adaptive devices. - Assess patient's emotional response to limitations. - Collaborate with interdisciplinary team and initiate plans and interventions as ordered. - Encourage independent activity per ability.  - Maintain proper body alignment. - Perform active/passive rom as tolerated/ordered. - Plan activities to conserve energy.  - Turn patient as appropriate  9/15/2023 1530 by Guru Saldivar RN  Outcome: Adequate for Discharge  9/15/2023 1357 by Guru Saldivar RN  Outcome: Progressing     Problem: Communication Impairment  Goal: Ability to express needs and understand communication  Description: Assess patient's communication skills and ability to understand information. Patient will demonstrate use of effective communication techniques, alternative methods of communication and understanding even if not able to speak. - Encourage communication and provide alternate methods of communication as needed. - Collaborate with case management/ for discharge needs. - Include patient/family/caregiver in decisions related to communication. 9/15/2023 1530 by Guru Saldivar RN  Outcome: Adequate for Discharge  9/15/2023 1357 by Guru Saldivar RN  Outcome: Progressing     Problem: Potential for Aspiration  Goal: Non-ventilated patient's risk of aspiration is minimized  Description: Assess and monitor vital signs, respiratory status, and labs (WBC). Monitor for signs of aspiration (tachypnea, cough, rales, wheezing, cyanosis, fever). - Assess and monitor patient's ability to swallow. - Place patient up in chair to eat if possible.   - HOB up at 90 degrees to eat if unable to get patient up into chair.  - Supervise patient during oral intake. - Instruct patient/ family to take small bites. - Instruct patient/ family to take small single sips when taking liquids. - Follow patient-specific strategies generated by speech pathologist.  9/15/2023 1530 by Efraín Joe RN  Outcome: Adequate for Discharge  9/15/2023 1357 by Efraín Joe RN  Outcome: Progressing     Problem: Nutrition  Goal: Nutrition/Hydration status is improving  Description: Monitor and assess patient's nutrition/hydration status for malnutrition (ex- brittle hair, bruises, dry skin, pale skin and conjunctiva, muscle wasting, smooth red tongue, and disorientation). Collaborate with interdisciplinary team and initiate plan and interventions as ordered. Monitor patient's weight and dietary intake as ordered or per policy. Utilize nutrition screening tool and intervene per policy. Determine patient's food preferences and provide high-protein, high-caloric foods as appropriate. - Assist patient with eating.  - Allow adequate time for meals.  - Encourage patient to take dietary supplement as ordered. - Collaborate with clinical nutritionist.  - Include patient/family/caregiver in decisions related to nutrition.   9/15/2023 1530 by Efraín Joe RN  Outcome: Adequate for Discharge  9/15/2023 1357 by Efraín Joe RN  Outcome: Progressing

## 2023-09-15 NOTE — PLAN OF CARE
Problem: Knowledge Deficit  Goal: Patient/family/caregiver demonstrates understanding of disease process, treatment plan, medications, and discharge instructions  Description: Complete learning assessment and assess knowledge base. Interventions:  - Provide teaching at level of understanding  - Provide teaching via preferred learning methods  Outcome: Progressing     Problem: NEUROSENSORY - ADULT  Goal: Achieves stable or improved neurological status  Description: INTERVENTIONS  - Monitor and report changes in neurological status  - Monitor vital signs such as temperature, blood pressure, glucose, and any other labs ordered   - Initiate measures to prevent increased intracranial pressure  - Monitor for seizure activity and implement precautions if appropriate      Outcome: Progressing  Goal: Remains free of injury related to seizures activity  Description: INTERVENTIONS  - Maintain airway, patient safety  and administer oxygen as ordered  - Monitor patient for seizure activity, document and report duration and description of seizure to physician/advanced practitioner  - If seizure occurs,  ensure patient safety during seizure  - Reorient patient post seizure  - Seizure pads on all 4 side rails  - Instruct patient/family to notify RN of any seizure activity including if an aura is experienced  - Instruct patient/family to call for assistance with activity based on nursing assessment  - Administer anti-seizure medications if ordered    Outcome: Progressing  Goal: Achieves maximal functionality and self care  Description: INTERVENTIONS  - Monitor swallowing and airway patency with patient fatigue and changes in neurological status  - Encourage and assist patient to increase activity and self care.    - Encourage visually impaired, hearing impaired and aphasic patients to use assistive/communication devices  Outcome: Progressing

## 2023-09-15 NOTE — DISCHARGE SUMMARY
[facility]  Discharge- Bella Gutierrez 1960, 66 y.o. male MRN: [id number]  Unit/Bed#: [facility] 705-01 Encounter: [id number]  Primary Care Provider: Linda Santana DO   Date and time admitted to hospital: 9/13/2023  5:04 AM    * Vision problem  Assessment & Plan  Patient had extensive work-up done with collaboration of critical care as well as ophthalmology. MRI brain and orbits done 9/14/2023 was unremarkable  Presumed diagnosis at this point is Non-arteritic anterior ischemic optic neuropathy, patient will be discharged with a prednisone course with strict follow-up with ophthalmology and neuro-ophthalmology  Patient will take 60 mg of prednisone for 7 days then taper down by 10 mg every 7 days  We will provide patient with letter for work  We will try to coordinate appointment for patient with ophthalmology at [facility]      Chronic GERD  Assessment & Plan  Well-controlled with Protonix 40 mg daily    Anxiety  Assessment & Plan  Ativan 1 mg as needed    Hyperlipidemia  Assessment & Plan  Patient is on atorvastatin 40 mg      Medical Problems     Resolved Problems  Date Reviewed: 9/15/2023   None       Discharging Physician / Practitioner: Tracey Li MD  PCP: Linda Santana DO  Admission Date:   Admission Orders (From admission, onward)     Ordered        09/13/23 0507  Inpatient Admission  Once                      Discharge Date: 09/15/23    Consultations During Hospital Stay:  · Critical care  · Occupational Therapy/physical therapy/speech therapy  · Neurology  · Ophthalmology    Procedures Performed:   · Patient given TNK    Significant Findings / Test Results:   · N/A    Incidental Findings:   · N/A     Test Results Pending at Discharge (will require follow up):    · None     Outpatient Tests Requested:  · None    Complications: None    Reason for Admission: Loss of vision    Hospital Course:   Bella Gutierrez is a 66 y.o. male patient who originally presented to the hospital on 9/13/2023 due to acute right eye vision loss. Patient was started on stroke pathway. He met criteria for TNK and ultimately received it. Patient was transferred from [address] [place] [address] to [address] [place] [address] and was taking care of in the ICU with collaboration with neurology and ophthalmology ( [facility] specialist) Gareth. Extensive work-up including multiple MRIs were largely unremarkable. Leading diagnosis at this time is Non- arteritic anterior ischemic optic neuropathy. Patient will be discharged on a prednisone course with close follow-up. Please see above list of diagnoses and related plan for additional information. Condition at Discharge: stable    Discharge Day Visit / Exam:   Subjective: Patient was seen and evaluated today at bedside. Patient has no new plaints at this time. Vitals: Blood Pressure: 117/73 (09/15/23 1515)  Pulse: 78 (09/15/23 0722)  Temperature: 98.1 °F (36.7 °C) (09/15/23 1515)  Temp Source: Oral (09/13/23 0800)  Respirations: 16 (09/15/23 1515)  Height: 5' 4" (162.6 cm) (09/13/23 0730)  Weight - Scale: 73.1 kg (161 lb 2.5 oz) (09/14/23 0551)  SpO2: 97 % (09/15/23 0722)  Exam:   Physical Exam  Vitals reviewed. Constitutional:       General: He is not in acute distress. Appearance: He is well-developed. HENT:      Head: Normocephalic and atraumatic. Eyes:      Extraocular Movements: Extraocular movements intact. Conjunctiva/sclera: Conjunctivae normal.   Cardiovascular:      Rate and Rhythm: Normal rate and regular rhythm. Heart sounds: No murmur heard. Pulmonary:      Effort: Pulmonary effort is normal. No respiratory distress. Breath sounds: Normal breath sounds. Abdominal:      Palpations: Abdomen is soft. Tenderness: There is no abdominal tenderness. Musculoskeletal:         General: No swelling. Normal range of motion. Cervical back: Normal range of motion and neck supple.    Skin:     General: Skin is warm and dry.      Capillary Refill: Capillary refill takes less than 2 seconds. Neurological:      Mental Status: He is alert and oriented to person, place, and time. Mental status is at baseline. Motor: No weakness. Psychiatric:         Mood and Affect: Mood normal.          Discussion with Family: Patient declined call to . Discharge instructions/Information to patient and family:   See after visit summary for information provided to patient and family. Provisions for Follow-Up Care:  See after visit summary for information related to follow-up care and any pertinent home health orders. Disposition:   Home    Planned Readmission: None     Discharge Statement:  I spent 45 minutes discharging the patient. This time was spent on the day of discharge. I had direct contact with the patient on the day of discharge. Greater than 50% of the total time was spent examining patient, answering all patient questions, arranging and discussing plan of care with patient as well as directly providing post-discharge instructions. Additional time then spent on discharge activities. Discharge Medications:  See after visit summary for reconciled discharge medications provided to patient and/or family.       **Please Note: This note may have been constructed using a voice recognition system**

## 2023-09-18 ENCOUNTER — TRANSITIONAL CARE MANAGEMENT (OUTPATIENT)
Dept: FAMILY MEDICINE CLINIC | Facility: CLINIC | Age: 63
End: 2023-09-18

## 2023-09-18 NOTE — UTILIZATION REVIEW
NOTIFICATION OF ADMISSION DISCHARGE   This is a Notification of Discharge from [address] [place] [address]. Please be advised that this patient has been discharge from our facility. Below you will find the admission and discharge date and time including the patient’s disposition. UTILIZATION REVIEW CONTACT:  Micah Snow  Utilization   Network Utilization Review Department  Phone: [phone] x carefully listen to the prompts. All voicemails are confidential.  Email: [email or web address]     ADMISSION INFORMATION  PRESENTATION DATE: 9/13/2023  5:04 AM  OBERVATION ADMISSION DATE:   INPATIENT ADMISSION DATE: 9/13/23  5:04 AM   DISCHARGE DATE: 9/15/2023  6:22 PM   DISPOSITION:Home/Self Care    IMPORTANT INFORMATION:  Send all requests for admission clinical reviews, approved or denied determinations and any other requests to dedicated fax number below belonging to the campus where the patient is receiving treatment.  List of dedicated fax numbers:  [place] DENIALS (Administrative/Medical Necessity) [phone] [address] [facility] (Maternity/NICU/Pediatrics) [phone]   [facility] [phone]   [facility] [phone] [address] [facility] [phone]   [address] [place] [address] [phone]   [facility] [phone]   [address] [place] [address] [address] [facility] [phone]   [address] [place] [address] [phone] [address] [facility] [phone] [address] [facility] [address] [facility] [phone]

## 2023-09-20 ENCOUNTER — OFFICE VISIT (OUTPATIENT)
Dept: FAMILY MEDICINE CLINIC | Facility: CLINIC | Age: 63
End: 2023-09-20
Payer: COMMERCIAL

## 2023-09-20 VITALS
WEIGHT: 159.4 LBS | SYSTOLIC BLOOD PRESSURE: 132 MMHG | TEMPERATURE: 98.8 F | OXYGEN SATURATION: 98 % | DIASTOLIC BLOOD PRESSURE: 60 MMHG | BODY MASS INDEX: 27.21 KG/M2 | HEART RATE: 83 BPM | HEIGHT: 64 IN | RESPIRATION RATE: 18 BRPM

## 2023-09-20 DIAGNOSIS — G47.33 OBSTRUCTIVE SLEEP APNEA: ICD-10-CM

## 2023-09-20 DIAGNOSIS — F41.9 ANXIETY: ICD-10-CM

## 2023-09-20 DIAGNOSIS — H54.7 VISION PROBLEM: Primary | ICD-10-CM

## 2023-09-20 PROCEDURE — 99496 TRANSJ CARE MGMT HIGH F2F 7D: CPT | Performed by: FAMILY MEDICINE

## 2023-09-20 RX ORDER — CLONAZEPAM 0.5 MG/1
0.5 TABLET ORAL 2 TIMES DAILY
Qty: 30 TABLET | Refills: 0 | Status: SHIPPED | OUTPATIENT
Start: 2023-09-20

## 2023-09-20 NOTE — ASSESSMENT & PLAN NOTE
Anxiety recently to multiple factors and patient subsequently developed an acute visual change in the right eye stroke was ruled out after stroke protocol followed through hospitalization. Patient seen in transition of care management at this time he still is clinically stressed with anxiety and requesting mild medication to help this overall as I feel it would be beneficial to help his situation with the as well while he tapers off prednisone.   We will provide him with a short course of medication for now and follow-up closely forms for his occupation and jury duty completed today and will be followed up with additional forms as needed

## 2023-09-20 NOTE — PROGRESS NOTES
Assessment & Plan     1. Vision problem  Assessment & Plan:  Patient presents today for transition of care management posthospitalization for vision disturbance which occurred suddenly in his right eye he had 2 separate episodes about 12 days apart is causing near blindness in his right eye as a result of a bright light and additionally from extreme stress reaction. He was worked up at the hospital with MRI and CAT scan and neurology as well as ophthalmology. On discharge he was given prednisone at a tapering dose now and will need follow-up at [facility] in [place]. At this point he cannot look at a computer screen or bright lights cannot drive safely and will need further work-up prior to returning to work. Diagnosis has not been established    Orders:  -     clonazePAM (KlonoPIN) 0.5 mg tablet; Take 1 tablet (0.5 mg total) by mouth 2 (two) times a day    2. Anxiety  Assessment & Plan:  Anxiety recently to multiple factors and patient subsequently developed an acute visual change in the right eye stroke was ruled out after stroke protocol followed through hospitalization. Patient seen in transition of care management at this time he still is clinically stressed with anxiety and requesting mild medication to help this overall as I feel it would be beneficial to help his situation with the as well while he tapers off prednisone. We will provide him with a short course of medication for now and follow-up closely forms for his occupation and jury duty completed today and will be followed up with additional forms as needed    Orders:  -     clonazePAM (KlonoPIN) 0.5 mg tablet; Take 1 tablet (0.5 mg total) by mouth 2 (two) times a day    3. Obstructive sleep apnea  Assessment & Plan:  Follow-up sleep study as it has been 5 years and patient has lost weight    Orders:  -     Home Study;  Future       Subjective     Transitional Care Management Review:   Mitul Mendez is a 66 y.o. male here for TCM follow up. During the TCM phone call patient stated:  TCM Call     Date and time call was made  9/18/2023  8:56 AM    Hospital care reviewed  Records reviewed    Patient was hospitialized at  [address] [place] [address]    Date of Admission  09/13/23    Date of discharge  09/15/23    Diagnosis  Vision problem    Disposition  Home    Were the patients medications reviewed and updated  No    Current Symptoms  None      TCM Call     Post hospital issues  None    Should patient be enrolled in anticoag monitoring? No    Scheduled for follow up? Yes    Did you obtain your prescribed medications  Yes    Do you need help managing your prescriptions or medications  No    Is transportation to your appointment needed  No    I have advised the patient to call PCP with any new or worsening symptoms  uri cuevastz    Living Arrangements  Family members        TCM from hospital event loss of vision right eye Photophobia and " gray spots blotches" now as partial vision loss. Work up non diagnostic at hospital and needs follow up in 2 weeks [place]. Review of Systems   Constitutional: Negative for chills, fatigue and fever. HENT: Negative for congestion, nosebleeds, rhinorrhea, sinus pressure and sore throat. Eyes: Positive for photophobia and visual disturbance. Negative for discharge and redness. Respiratory: Negative for cough and shortness of breath. Cardiovascular: Negative for chest pain, palpitations and leg swelling. Gastrointestinal: Negative for abdominal pain, blood in stool and nausea. Endocrine: Negative for cold intolerance, heat intolerance and polyuria. Genitourinary: Negative for dysuria and frequency. Musculoskeletal: Negative for arthralgias, back pain and myalgias. Skin: Negative for rash. Neurological: Negative for dizziness, weakness and headaches. Hematological: Negative for adenopathy. Psychiatric/Behavioral: Negative for behavioral problems and sleep disturbance.  The patient is not nervous/anxious. Objective     /60 (BP Location: Left arm, Patient Position: Sitting, Cuff Size: Large)   Pulse 83   Temp 98.8 °F (37.1 °C) (Tympanic)   Resp 18   Ht 5' 4" (1.626 m)   Wt 72.3 kg (159 lb 6.4 oz)   SpO2 98%   BMI 27.36 kg/m²      Physical Exam  Vitals and nursing note reviewed. Constitutional:       General: He is not in acute distress. Appearance: Normal appearance. He is well-developed and normal weight. HENT:      Head: Normocephalic and atraumatic. Right Ear: Tympanic membrane, ear canal and external ear normal.      Left Ear: Tympanic membrane, ear canal and external ear normal.      Nose: Nose normal.      Mouth/Throat:      Mouth: Mucous membranes are moist.      Pharynx: Oropharynx is clear. Eyes:      Extraocular Movements: Extraocular movements intact. Conjunctiva/sclera: Conjunctivae normal.      Pupils: Pupils are equal, round, and reactive to light. Comments: Vision reduction right eye   Cardiovascular:      Rate and Rhythm: Normal rate and regular rhythm. Pulses: Normal pulses. Heart sounds: Normal heart sounds. No murmur heard. Pulmonary:      Effort: Pulmonary effort is normal. No respiratory distress. Breath sounds: Normal breath sounds. Abdominal:      General: Bowel sounds are normal.      Palpations: Abdomen is soft. Tenderness: There is no abdominal tenderness. Musculoskeletal:         General: No swelling. Normal range of motion. Cervical back: Normal range of motion and neck supple. Skin:     General: Skin is warm and dry. Capillary Refill: Capillary refill takes less than 2 seconds. Neurological:      General: No focal deficit present. Mental Status: He is alert and oriented to person, place, and time. Mental status is at baseline. Psychiatric:         Mood and Affect: Mood normal.         Behavior: Behavior normal.         Thought Content:  Thought content normal.         Judgment: Judgment normal.       Medications have been reviewed by provider in current encounter    Anaid Carrier, DO

## 2023-09-20 NOTE — ASSESSMENT & PLAN NOTE
Patient presents today for transition of care management posthospitalization for vision disturbance which occurred suddenly in his right eye he had 2 separate episodes about 12 days apart is causing near blindness in his right eye as a result of a bright light and additionally from extreme stress reaction. He was worked up at the hospital with MRI and CAT scan and neurology as well as ophthalmology. On discharge he was given prednisone at a tapering dose now and will need follow-up at [facility] in [place]. At this point he cannot look at a computer screen or bright lights cannot drive safely and will need further work-up prior to returning to work.   Diagnosis has not been established

## 2023-10-09 ENCOUNTER — TELEPHONE (OUTPATIENT)
Dept: SLEEP CENTER | Facility: CLINIC | Age: 63
End: 2023-10-09

## 2023-10-09 NOTE — TELEPHONE ENCOUNTER
----- Message from Dominick Rojas MD sent at 10/8/2023  8:47 PM EDT -----  approved  ----- Message -----  From: Jamila Blandon  Sent: 10/3/2023   8:57 AM EDT  To: Sleep Medicine [facility] Provider    This Home sleep study needs approval.     If approved please sign and return to clerical pool. If denied please include reasons why. Also provide alternative testing if warranted. Please sign and return to clerical pool.

## 2023-10-09 NOTE — TELEPHONE ENCOUNTER
----- Message from Jono Solis MD sent at 10/8/2023  8:47 PM EDT -----  approved  ----- Message -----  From: Nils March  Sent: 10/3/2023   8:57 AM EDT  To: Sleep Medicine [facility] Provider    This Home sleep study needs approval.     If approved please sign and return to clerical pool. If denied please include reasons why. Also provide alternative testing if warranted. Please sign and return to clerical pool.

## 2023-10-11 ENCOUNTER — OFFICE VISIT (OUTPATIENT)
Dept: FAMILY MEDICINE CLINIC | Facility: CLINIC | Age: 63
End: 2023-10-11
Payer: COMMERCIAL

## 2023-10-11 VITALS
HEART RATE: 74 BPM | DIASTOLIC BLOOD PRESSURE: 68 MMHG | OXYGEN SATURATION: 97 % | HEIGHT: 64 IN | SYSTOLIC BLOOD PRESSURE: 110 MMHG | BODY MASS INDEX: 26.4 KG/M2 | TEMPERATURE: 98.3 F | WEIGHT: 154.6 LBS

## 2023-10-11 DIAGNOSIS — E78.2 MIXED HYPERLIPIDEMIA: ICD-10-CM

## 2023-10-11 DIAGNOSIS — K21.9 CHRONIC GERD: ICD-10-CM

## 2023-10-11 DIAGNOSIS — H34.11 CENTRAL RETINAL ARTERY OCCLUSION, RIGHT EYE: Primary | ICD-10-CM

## 2023-10-11 PROCEDURE — 99214 OFFICE O/P EST MOD 30 MIN: CPT | Performed by: FAMILY MEDICINE

## 2023-10-11 RX ORDER — CLOPIDOGREL BISULFATE 75 MG/1
75 TABLET ORAL DAILY
COMMUNITY
Start: 2023-10-11 | End: 2023-11-10

## 2023-10-11 RX ORDER — PANTOPRAZOLE SODIUM 40 MG/1
TABLET, DELAYED RELEASE ORAL
COMMUNITY
Start: 2023-10-10

## 2023-10-11 NOTE — PROGRESS NOTES
Assessment/Plan:       Problem List Items Addressed This Visit          Digestive    Chronic GERD     Continue with pantoprazole         Relevant Medications    pantoprazole (PROTONIX) 40 mg tablet       Cardiovascular and Mediastinum    Central retinal artery occlusion, right eye - Primary     Reviewed all reports from [address] [place] [address] and discussed medical condition with patient filling out [facility] paperwork forms at this time. He has multiple subsequent follow-up appointments with ophthalmology now and is awaiting cardiology appointment for potentially a Zio patch to rule out any arrhythmias or atrial fibrillation not seen on hospital monitor at hospitalization at [facility]            Other    Hyperlipidemia     Increased dose of Crestor up to 40 mg daily now follow-up laboratory work and monitor liver function testing as scheduled              Subjective:      Patient ID: Kira Tee is a 66 y.o. male. Patient presents for [facility] paperwork form and follow-up evaluation after vision loss acute right eye        The following portions of the patient's history were reviewed and updated as appropriate: allergies, current medications, past family history, past medical history, past social history, past surgical history and problem list.    Review of Systems   Constitutional:  Negative for chills, fatigue and fever. HENT:  Negative for congestion, nosebleeds, rhinorrhea, sinus pressure and sore throat. Eyes:  Negative for discharge and redness. Respiratory:  Negative for cough and shortness of breath. Cardiovascular:  Negative for chest pain, palpitations and leg swelling. Gastrointestinal:  Negative for abdominal pain, blood in stool and nausea. Endocrine: Negative for cold intolerance, heat intolerance and polyuria. Genitourinary:  Negative for dysuria and frequency. Musculoskeletal:  Negative for arthralgias, back pain and myalgias. Skin:  Negative for rash. Neurological:  Negative for dizziness, weakness and headaches. Hematological:  Negative for adenopathy. Psychiatric/Behavioral:  Negative for behavioral problems and sleep disturbance. The patient is not nervous/anxious. Objective:      /68 (BP Location: Left arm, Patient Position: Sitting)   Pulse 74   Temp 98.3 °F (36.8 °C)   Ht 5' 4" (1.626 m)   Wt 70.1 kg (154 lb 9.6 oz)   SpO2 97%   BMI 26.54 kg/m²        Physical Exam  Vitals and nursing note reviewed. Constitutional:       Appearance: Normal appearance. He is well-developed and normal weight. HENT:      Head: Normocephalic and atraumatic. Right Ear: Tympanic membrane and external ear normal.      Left Ear: Tympanic membrane and external ear normal.      Nose: Nose normal.      Mouth/Throat:      Mouth: Mucous membranes are moist.   Eyes:      General: No scleral icterus. Conjunctiva/sclera: Conjunctivae normal.      Pupils: Pupils are equal, round, and reactive to light. Neck:      Thyroid: No thyromegaly. Vascular: No JVD. Cardiovascular:      Rate and Rhythm: Normal rate and regular rhythm. Pulses: Normal pulses. Heart sounds: Normal heart sounds. No murmur heard. Pulmonary:      Effort: Pulmonary effort is normal.      Breath sounds: Normal breath sounds. No wheezing or rales. Chest:      Chest wall: No tenderness. Abdominal:      General: Bowel sounds are normal. There is no distension. Palpations: Abdomen is soft. There is no mass. Tenderness: There is no abdominal tenderness. There is no guarding or rebound. Musculoskeletal:         General: No tenderness or deformity. Normal range of motion. Cervical back: Normal range of motion and neck supple. Lymphadenopathy:      Cervical: No cervical adenopathy. Skin:     General: Skin is warm and dry. Findings: No erythema or rash. Neurological:      General: No focal deficit present.       Mental Status: He is alert and oriented to person, place, and time. Cranial Nerves: No cranial nerve deficit. Deep Tendon Reflexes: Reflexes are normal and symmetric. Reflexes normal.   Psychiatric:         Mood and Affect: Mood normal.         Behavior: Behavior normal.         Thought Content: Thought content normal.         Judgment: Judgment normal.        I have spent a total time of 50 minutes on 10/11/23 in caring for this patient including Diagnostic results, Prognosis, Risks and benefits of tx options, Instructions for management, Patient and family education, Importance of tx compliance, Risk factor reductions, Impressions, Counseling / Coordination of care, Documenting in the medical record, Reviewing / ordering tests, medicine, procedures  , Obtaining or reviewing history  , and Communicating with other healthcare professionals . Data:    Laboratory Results: I have personally reviewed the pertinent laboratory results/reports   Radiology/Other Diagnostic Testing Results: I have personally reviewed pertinent reports.        Lab Results   Component Value Date    WBC 6.29 09/14/2023    HGB 14.9 09/14/2023    HCT 43.9 09/14/2023    MCV 98 09/14/2023     (L) 09/14/2023     Lab Results   Component Value Date    K 4.1 09/14/2023     09/14/2023    CO2 27 09/14/2023    BUN 12 09/14/2023    CREATININE 0.80 09/14/2023    GLUF 94 08/04/2023    CALCIUM 9.0 09/14/2023    AST 25 09/12/2023    ALT 30 09/12/2023    ALKPHOS 42 09/12/2023    EGFR 96 09/14/2023     Lab Results   Component Value Date    CHOLESTEROL 146 09/14/2023    CHOLESTEROL 138 08/04/2023    CHOLESTEROL 161 03/25/2023     Lab Results   Component Value Date    HDL 48 09/14/2023    HDL 56 08/04/2023    HDL 56 03/25/2023     Lab Results   Component Value Date    LDLCALC 86 09/14/2023    LDLCALC 77 08/04/2023    LDLCALC 93 03/25/2023     Lab Results   Component Value Date    TRIG 58 09/14/2023    TRIG 26 08/04/2023    TRIG 61 03/25/2023     No results found for: "CHOLHDL"  Lab Results   Component Value Date    PGW6REOYWKMW 2.010 08/04/2023     Lab Results   Component Value Date    HGBA1C 5.9 (H) 09/14/2023     Lab Results   Component Value Date    PSA 0.7 03/25/2023       Leonela Reyna DO

## 2023-10-11 NOTE — ASSESSMENT & PLAN NOTE
Increased dose of Crestor up to 40 mg daily now follow-up laboratory work and monitor liver function testing as scheduled

## 2023-10-17 DIAGNOSIS — H34.11 CENTRAL RETINAL ARTERY OCCLUSION OF RIGHT EYE: Primary | ICD-10-CM

## 2023-10-18 ENCOUNTER — CLINICAL SUPPORT (OUTPATIENT)
Dept: CARDIOLOGY CLINIC | Facility: CLINIC | Age: 63
End: 2023-10-18
Payer: COMMERCIAL

## 2023-10-18 ENCOUNTER — OFFICE VISIT (OUTPATIENT)
Dept: CARDIOLOGY CLINIC | Facility: CLINIC | Age: 63
End: 2023-10-18
Payer: COMMERCIAL

## 2023-10-18 VITALS
WEIGHT: 155 LBS | SYSTOLIC BLOOD PRESSURE: 142 MMHG | HEART RATE: 88 BPM | BODY MASS INDEX: 26.46 KG/M2 | HEIGHT: 64 IN | DIASTOLIC BLOOD PRESSURE: 76 MMHG

## 2023-10-18 DIAGNOSIS — R00.2 INTERMITTENT PALPITATIONS: ICD-10-CM

## 2023-10-18 DIAGNOSIS — R07.9 CHEST PAIN, UNSPECIFIED TYPE: ICD-10-CM

## 2023-10-18 DIAGNOSIS — R00.2 PALPITATIONS: Primary | ICD-10-CM

## 2023-10-18 DIAGNOSIS — H34.11 CENTRAL RETINAL ARTERY OCCLUSION, RIGHT EYE: ICD-10-CM

## 2023-10-18 DIAGNOSIS — H34.11 CENTRAL RETINAL ARTERY OCCLUSION OF RIGHT EYE: Primary | ICD-10-CM

## 2023-10-18 PROCEDURE — 93000 ELECTROCARDIOGRAM COMPLETE: CPT | Performed by: INTERNAL MEDICINE

## 2023-10-18 PROCEDURE — 99204 OFFICE O/P NEW MOD 45 MIN: CPT | Performed by: INTERNAL MEDICINE

## 2023-10-18 NOTE — PROGRESS NOTES
Patient ID: New Ely is a 66 y.o. male. Plan:      Central retinal artery occlusion, right eye  Doesn't sound to me as if he is having atrial fib but ziio patch is being placed and Kardia brennen was suggested. Intermittent palpitations  Don't sound sustained enough to be c/w atrial fibrillation. Patient is concerned it is related to the recent addition of Plavix but I find that unlikely. Chest pain  No symptoms consistent with angina. Follow up Plan/Other summary comments:  I will see the patient again if a new issue arises or if abnormalities are present on his Zio patch or Kardia brennen. HPI: Patient is seen today in consultation from Dr. Kelly Mendez regarding palpitations and recent visual problems. He was hospitalized at [facility] and then at [facility]. He is thought to have a retinal occlusion of some sort. There was concern regarding atrial fibrillation and patient was placed on Plavix. It is notable that no atrial fibrillation was seen. It was recommended that he have a longer term patch on an that is the reason for today's visit. Since that recommendation was made the patient has had some feelings of palpitations. Palpitations only last for a second or so. They are never more sustained. No recent change in exertional capacity. Although the blood pressure is slightly elevated today it has not been elevated in the past.  No syncope or near syncope.     Imaging studies were reviewed including a normal transesophageal echo without evidence for PFO etc.      Results for orders placed or performed in visit on 10/18/23   POCT ECG    Impression    Normal sinus rhythm at 88 bpm.  Normal.         Most recent or relevant cardiac/vascular testing:    DHRUV 10/10/2023: Normal.      Past Surgical History:   Procedure Laterality Date    COLONOSCOPY      ESOPHAGOGASTRODUODENOSCOPY      SC ESOPHAGOGASTRODUODENOSCOPY TRANSORAL DIAGNOSTIC N/A 5/24/2018    Procedure: ESOPHAGOGASTRODUODENOSCOPY (EGD); Surgeon: Ashley Deluca MD;  Location: [facility]; Service: Gastroenterology       Lipid Profile:   Lab Results   Component Value Date    TRIG 58 09/14/2023    HDL 48 09/14/2023         Review of Systems   10  point ROS  was otherwise non pertinent or negative except as per HPI or as below. Gait: Normal.        Objective:     /76   Pulse 88   Ht 5' 4" (1.626 m)   Wt 70.3 kg (155 lb)   BMI 26.61 kg/m²     PHYSICAL EXAM:    General:  Normal appearance in no distress. Eyes:  Anicteric. Oral mucosa:  Moist.  Neck:  No JVD. Carotid upstrokes are brisk without bruits. No masses. Chest:  Clear to auscultation. Cardiac:  No palpable PMI. Normal S1 and S2. No murmur gallop or rub. Abdomen:  Soft and nontender. No palpable organomegaly or aortic enlargement. Extremities:  No peripheral edema. Musculoskeletal:  Symmetric. Vascular:  Femoral pulses are brisk without bruits. Popliteal pulses are intact bilaterally. Pedal pulses are intact. Neuro:  Grossly symmetric. Psych:  Alert and oriented x3. Current Outpatient Medications:     aspirin (ECOTRIN LOW STRENGTH) 81 mg EC tablet, Take 81 mg by mouth daily, Disp: , Rfl:     clonazePAM (KlonoPIN) 0.5 mg tablet, Take 1 tablet (0.5 mg total) by mouth 2 (two) times a day, Disp: 30 tablet, Rfl: 0    clopidogrel (PLAVIX) 75 mg tablet, Take 75 mg by mouth daily, Disp: , Rfl:     pantoprazole (PROTONIX) 40 mg tablet, , Disp: , Rfl:     predniSONE 10 mg tablet, Take 6 tablets (60 mg total) by mouth daily for 7 days, THEN 5 tablets (50 mg total) daily for 7 days, THEN 4 tablets (40 mg total) daily for 7 days, THEN 3 tablets (30 mg total) daily for 7 days, THEN 2 tablets (20 mg total) daily for 7 days, THEN 1 tablet (10 mg total) daily for 7 days. , Disp: 147 tablet, Rfl: 0    rosuvastatin (CRESTOR) 5 mg tablet, TAKE 1 TABLET DAILY (Patient taking differently: Take 40 mg by mouth daily), Disp: 90 tablet, Rfl: 1  No Known Allergies  Past Medical History:   Diagnosis Date    Chronic low back pain     GERD (gastroesophageal reflux disease)     Hyperlipidemia     Sleep apnea     Verruca 2 yrs           Social History     Tobacco Use   Smoking Status Never   Smokeless Tobacco Never

## 2023-10-18 NOTE — ASSESSMENT & PLAN NOTE
Doesn't sound to me as if he is having atrial fib but ziio patch is being placed and [employer] brennen was suggested.

## 2023-10-18 NOTE — PATIENT INSTRUCTIONS
Patient instructed on how to properly care of ZioXT patch while attached to skin. Patient expressed understanding and will send back after removal after completion of 14 days.

## 2023-10-18 NOTE — ASSESSMENT & PLAN NOTE
Don't sound sustained enough to be c/w atrial fibrillation. Patient is concerned it is related to the recent addition of Plavix but I find that unlikely.

## 2023-10-20 DIAGNOSIS — E78.2 MIXED HYPERLIPIDEMIA: ICD-10-CM

## 2023-10-23 RX ORDER — ROSUVASTATIN CALCIUM 5 MG/1
TABLET, COATED ORAL
Qty: 90 TABLET | Refills: 3 | Status: SHIPPED | OUTPATIENT
Start: 2023-10-23

## 2023-11-01 ENCOUNTER — HOSPITAL ENCOUNTER (OUTPATIENT)
Dept: SLEEP CENTER | Facility: CLINIC | Age: 63
Discharge: HOME | End: 2023-11-01
Payer: COMMERCIAL

## 2023-11-01 DIAGNOSIS — G47.33 OBSTRUCTIVE SLEEP APNEA: ICD-10-CM

## 2023-11-01 PROCEDURE — G0399 HOME SLEEP TEST/TYPE 3 PORTA: HCPCS

## 2023-11-01 NOTE — PROGRESS NOTES
Home Sleep Study Documentation    HOME STUDY DEVICE: Noxturnal no                                           Kristal G3 yes      Pre-Sleep Home Study:    Set-up and instructions performed by: Nereida Murillo    Technician performed demonstration for Patient: yes    Return demonstration performed by Patient: yes    Written instructions provided to Patient: yes    Patient signed consent form: yes        Post-Sleep Home Study:    Additional comments by Patient: pending    Home Sleep Study Failed:pending    Failure reason: pending    Reported or Detected: pending    Scored by: pending

## 2023-11-03 DIAGNOSIS — E78.2 MIXED HYPERLIPIDEMIA: ICD-10-CM

## 2023-11-03 DIAGNOSIS — H34.11 CENTRAL RETINAL ARTERY OCCLUSION, RIGHT EYE: Primary | ICD-10-CM

## 2023-11-03 RX ORDER — CLOPIDOGREL BISULFATE 75 MG/1
75 TABLET ORAL DAILY
Qty: 30 TABLET | Refills: 0 | Status: SHIPPED | OUTPATIENT
Start: 2023-11-03 | End: 2023-12-03

## 2023-11-08 ENCOUNTER — CLINICAL SUPPORT (OUTPATIENT)
Dept: CARDIOLOGY CLINIC | Facility: CLINIC | Age: 63
End: 2023-11-08
Payer: COMMERCIAL

## 2023-11-08 DIAGNOSIS — H34.11 CENTRAL RETINAL ARTERY OCCLUSION OF RIGHT EYE: ICD-10-CM

## 2023-11-08 DIAGNOSIS — I63.9 CEREBROVASCULAR ACCIDENT (CVA), UNSPECIFIED MECHANISM (HCC): Primary | ICD-10-CM

## 2023-11-08 PROCEDURE — 93248 EXT ECG>7D<15D REV&INTERPJ: CPT | Performed by: INTERNAL MEDICINE

## 2023-11-09 NOTE — RESULT ENCOUNTER NOTE
This extended monitor recording was for 13 days and 20 hours. The basic mechanism was sinus with rates ranging from 56 - 140 beats per minute while in sinus rhythm. The average heart rate was 80 beats per minute. Atrial ectopy was uncommon and included a single very short run. Ventricular ectopy was rare and there were no couplets or runs. No pauses were present. No symptoms were reported.       Reatha Shone, MD

## 2023-11-15 ENCOUNTER — OFFICE VISIT (OUTPATIENT)
Dept: FAMILY MEDICINE CLINIC | Facility: CLINIC | Age: 63
End: 2023-11-15
Payer: COMMERCIAL

## 2023-11-15 VITALS
DIASTOLIC BLOOD PRESSURE: 60 MMHG | WEIGHT: 159.6 LBS | HEART RATE: 74 BPM | OXYGEN SATURATION: 98 % | HEIGHT: 64 IN | RESPIRATION RATE: 18 BRPM | TEMPERATURE: 96.6 F | BODY MASS INDEX: 27.25 KG/M2 | SYSTOLIC BLOOD PRESSURE: 138 MMHG

## 2023-11-15 DIAGNOSIS — E78.2 MIXED HYPERLIPIDEMIA: ICD-10-CM

## 2023-11-15 DIAGNOSIS — K21.9 CHRONIC GERD: ICD-10-CM

## 2023-11-15 DIAGNOSIS — H34.11 CENTRAL RETINAL ARTERY OCCLUSION, RIGHT EYE: Primary | ICD-10-CM

## 2023-11-15 DIAGNOSIS — Z00.00 ANNUAL PHYSICAL EXAM: ICD-10-CM

## 2023-11-15 PROCEDURE — 99396 PREV VISIT EST AGE 40-64: CPT | Performed by: FAMILY MEDICINE

## 2023-11-15 RX ORDER — CLOPIDOGREL BISULFATE 75 MG/1
75 TABLET ORAL DAILY
Qty: 90 TABLET | Refills: 3 | Status: SHIPPED | OUTPATIENT
Start: 2023-11-15 | End: 2023-12-15

## 2023-11-15 RX ORDER — ROSUVASTATIN CALCIUM 20 MG/1
20 TABLET, COATED ORAL DAILY
Qty: 90 TABLET | Refills: 3 | Status: SHIPPED | OUTPATIENT
Start: 2023-11-15

## 2023-11-15 NOTE — PROGRESS NOTES
[address] [place] [address] PRACTICE    NAME: Kendrick Mcconnell  AGE: 66 y.o. SEX: male  : 1960     DATE: 11/15/2023     Assessment and Plan:     Problem List Items Addressed This Visit          Digestive    Chronic GERD     Stable continue with same medication regimen recommend stomach protection with pantoprazole if symptoms change or worsen. Cardiovascular and Mediastinum    Central retinal artery occlusion, right eye - Primary     Patient will continue with Plavix and low-dose aspirin follow-up with ophthalmology by protocol. Reviewed cardiology report with patient he appears medically stable at this time to resume his job. Patient unsafe to drive at this time and not medically clear. After his retinal artery occlusion with visual loss he is awaiting new eyeglasses. This will assist with computer work and from this point he can work from home but is unable to drive and commute 3 hours each way to work. He will not be driving indefinitely -until further work-up with ophthalmology. Relevant Medications    clopidogrel (PLAVIX) 75 mg tablet       Other    Hyperlipidemia     Mixed hyperlipidemia stable with Crestor 5 mg continue same dosage         Relevant Medications    rosuvastatin (CRESTOR) 20 MG tablet    Annual physical exam       Immunizations and preventive care screenings were discussed with patient today. Appropriate education was printed on patient's after visit summary. Discussed risks and benefits of prostate cancer screening. We discussed the controversial history of PSA screening for prostate cancer in the [facility] as well as the risk of over detection and over treatment of prostate cancer by way of PSA screening.   The patient understands that PSA blood testing is an imperfect way to screen for prostate cancer and that elevated PSA levels in the blood may also be caused by infection, inflammation, prostatic trauma or manipulation, urological procedures, or by benign prostatic enlargement. The role of the digital rectal examination in prostate cancer screening was also discussed and I discussed with him that there is large interobserver variability in the findings of digital rectal examination. Counseling:  Alcohol/drug use: discussed moderation in alcohol intake, the recommendations for healthy alcohol use, and avoidance of illicit drug use. Dental Health: discussed importance of regular tooth brushing, flossing, and dental visits. Injury prevention: discussed safety/seat belts, safety helmets, smoke detectors, carbon dioxide detectors, and smoking near bedding or upholstery. Sexual health: discussed sexually transmitted diseases, partner selection, use of condoms, avoidance of unintended pregnancy, and contraceptive alternatives. Exercise: the importance of regular exercise/physical activity was discussed. Recommend exercise 3-5 times per week for at least 30 minutes. Return in about 6 months (around 5/15/2024). Chief Complaint:     Chief Complaint   Patient presents with    Follow-up     1 month    Physical Exam      History of Present Illness:     Adult Annual Physical   Patient here for a comprehensive physical exam. The patient reports no problems. Diet and Physical Activity  Diet/Nutrition: well balanced diet. Exercise: no formal exercise. Depression Screening  PHQ-2/9 Depression Screening           General Health  Sleep: sleeps well. Hearing: normal - bilateral.  Vision: vision problems: Visual loss right eye . Dental: regular dental visits.  Health  Symptoms include: none    Advanced Care Planning  Do you have an advanced directive? yes  Do you have a durable medical power of ? yes     Review of Systems:     Review of Systems   Constitutional:  Negative for chills, fatigue and fever.    HENT:  Negative for congestion, nosebleeds, rhinorrhea, sinus pressure and sore throat. Eyes:  Negative for discharge and redness. Respiratory:  Negative for cough and shortness of breath. Cardiovascular:  Negative for chest pain, palpitations and leg swelling. Gastrointestinal:  Negative for abdominal pain, blood in stool and nausea. Endocrine: Negative for cold intolerance, heat intolerance and polyuria. Genitourinary:  Negative for dysuria and frequency. Musculoskeletal:  Negative for arthralgias, back pain and myalgias. Skin:  Negative for rash. Neurological:  Negative for dizziness, weakness and headaches. Hematological:  Negative for adenopathy. Psychiatric/Behavioral:  Negative for behavioral problems and sleep disturbance. The patient is not nervous/anxious. Past Medical History:     Past Medical History:   Diagnosis Date    Chronic low back pain     GERD (gastroesophageal reflux disease)     Hyperlipidemia     Sleep apnea     Verruca 2 yrs      Past Surgical History:     Past Surgical History:   Procedure Laterality Date    COLONOSCOPY      ESOPHAGOGASTRODUODENOSCOPY      ME ESOPHAGOGASTRODUODENOSCOPY TRANSORAL DIAGNOSTIC N/A 5/24/2018    Procedure: ESOPHAGOGASTRODUODENOSCOPY (EGD); Surgeon: Tirso Garcia MD;  Location: [facility]; Service: Gastroenterology      Family History:     Family History   Problem Relation Age of Onset    Diabetes Mother     Lung cancer Father     Cancer Father     Cancer Brother         Bladder Cancer and a stroke    No Known Problems Daughter     No Known Problems Son       Social History:     Social History     Socioeconomic History    Marital status: /Civil Union     Spouse name: None    Number of children: None    Years of education: None    Highest education level: None   Occupational History    None   Tobacco Use    Smoking status: Never    Smokeless tobacco: Never   Vaping Use    Vaping Use: Never used   Substance and Sexual Activity    Alcohol use:  Yes     Alcohol/week: 4.0 standard drinks of alcohol     Types: 2 Glasses of wine, 2 Cans of beer per week     Comment: social    Drug use: No    Sexual activity: Yes     Partners: Female   Other Topics Concern    None   Social History Narrative    None     Social Determinants of Health     Financial Resource Strain: Not on file   Food Insecurity: No Food Insecurity (9/13/2023)    Hunger Vital Sign     Worried About Running Out of Food in the Last Year: Never true     Ran Out of Food in the Last Year: Never true   Transportation Needs: No Transportation Needs (9/13/2023)    PRAPARE - Transportation     Lack of Transportation (Medical): No     Lack of Transportation (Non-Medical): No   Physical Activity: Not on file   Stress: Not on file   Social Connections: Not on file   Intimate Partner Violence: Not on file   Housing Stability: Low Risk  (9/13/2023)    Housing Stability Vital Sign     Unable to Pay for Housing in the Last Year: No     Number of Places Lived in the Last Year: 1     Unstable Housing in the Last Year: No      Current Medications:     Current Outpatient Medications   Medication Sig Dispense Refill    aspirin (ECOTRIN LOW STRENGTH) 81 mg EC tablet Take 81 mg by mouth daily      clopidogrel (PLAVIX) 75 mg tablet Take 1 tablet (75 mg total) by mouth daily 90 tablet 3    rosuvastatin (CRESTOR) 20 MG tablet Take 1 tablet (20 mg total) by mouth daily 90 tablet 3    clonazePAM (KlonoPIN) 0.5 mg tablet Take 1 tablet (0.5 mg total) by mouth 2 (two) times a day (Patient not taking: Reported on 11/15/2023) 30 tablet 0    pantoprazole (PROTONIX) 40 mg tablet  (Patient not taking: Reported on 11/15/2023)       No current facility-administered medications for this visit.       Allergies:     No Known Allergies   Physical Exam:     /60 (BP Location: Left arm, Patient Position: Sitting, Cuff Size: Standard)   Pulse 74   Temp (!) 96.6 °F (35.9 °C) (Tympanic)   Resp 18   Ht 5' 4" (1.626 m)   Wt 72.4 kg (159 lb 9.6 oz)   SpO2 98%   BMI 27.40 kg/m² Physical Exam  Vitals and nursing note reviewed. Constitutional:       General: He is not in acute distress. Appearance: Normal appearance. He is well-developed and normal weight. HENT:      Head: Normocephalic and atraumatic. Right Ear: Tympanic membrane, ear canal and external ear normal.      Left Ear: Tympanic membrane, ear canal and external ear normal.      Nose: Nose normal.      Mouth/Throat:      Mouth: Mucous membranes are moist.      Pharynx: Oropharynx is clear. Eyes:      Extraocular Movements: Extraocular movements intact. Conjunctiva/sclera: Conjunctivae normal.      Pupils: Pupils are equal, round, and reactive to light. Cardiovascular:      Rate and Rhythm: Normal rate and regular rhythm. Pulses: Normal pulses. Heart sounds: Normal heart sounds. No murmur heard. Pulmonary:      Effort: Pulmonary effort is normal. No respiratory distress. Breath sounds: Normal breath sounds. Abdominal:      General: Bowel sounds are normal.      Palpations: Abdomen is soft. Tenderness: There is no abdominal tenderness. Musculoskeletal:         General: No swelling. Normal range of motion. Cervical back: Normal range of motion and neck supple. Skin:     General: Skin is warm and dry. Capillary Refill: Capillary refill takes less than 2 seconds. Neurological:      General: No focal deficit present. Mental Status: He is alert and oriented to person, place, and time. Mental status is at baseline. Psychiatric:         Mood and Affect: Mood normal.         Behavior: Behavior normal.         Thought Content: Thought content normal.         Judgment: Judgment normal.      BMI Counseling: Body mass index is 27.4 kg/m².  The BMI is above normal. Nutrition recommendations include reducing portion sizes, decreasing overall calorie intake, reducing fast food intake, consuming healthier snacks, decreasing soda and/or juice intake, moderation in carbohydrate intake, and reducing intake of saturated fat and trans fat. Exercise recommendations include exercising 3-5 times per week.     Camden Weiner DO  [address] [facility]

## 2023-11-15 NOTE — ASSESSMENT & PLAN NOTE
Stable continue with same medication regimen recommend stomach protection with pantoprazole if symptoms change or worsen.

## 2023-11-15 NOTE — ASSESSMENT & PLAN NOTE
Patient will continue with Plavix and low-dose aspirin follow-up with ophthalmology by protocol. Reviewed cardiology report with patient he appears medically stable at this time to resume his job. Patient unsafe to drive at this time and not medically clear. After his retinal artery occlusion with visual loss he is awaiting new eyeglasses. This will assist with computer work and from this point he can work from home but is unable to drive and commute 3 hours each way to work. He will not be driving indefinitely -until further work-up with ophthalmology.

## 2023-11-16 ENCOUNTER — TELEPHONE (OUTPATIENT)
Dept: SLEEP CENTER | Facility: CLINIC | Age: 63
End: 2023-11-16

## 2023-11-16 NOTE — TELEPHONE ENCOUNTER
Called patient and advised sleep study resulted and shows mild sleep apnea with hypoxemia. Per study order, Dr. Humera Lopez, PCP, requests follow up with Dr. Camelia Carlos. Provided patient with phone number for Dr. Dorcas Gonzalez office to call to schedule sleep consult.

## 2023-12-11 ENCOUNTER — TELEPHONE (OUTPATIENT)
Dept: NEUROLOGY | Facility: CLINIC | Age: 63
End: 2023-12-11

## 2023-12-11 NOTE — TELEPHONE ENCOUNTER
Called patient and spoke with him regarding HFU. Patient declined to schedule. I did advise him that he can schedule an HFU up to 1 yr from his d/c date, anything after that would be a new patient appt. He verbalized understanding. Not scheduling HFU at this time      Thank you,     [facility]        HFU/ [address] [place] [address]- 9/15/2023     Fred Webster will need follow up in in 6 weeks with neurovascular attending or advance practitioner. He will not require outpatient neurological testing.

## 2023-12-26 ENCOUNTER — APPOINTMENT (EMERGENCY)
Dept: RADIOLOGY | Facility: HOSPITAL | Age: 63
End: 2023-12-26
Payer: COMMERCIAL

## 2023-12-26 ENCOUNTER — HOSPITAL ENCOUNTER (EMERGENCY)
Facility: HOSPITAL | Age: 63
Discharge: HOME | End: 2023-12-26
Attending: EMERGENCY MEDICINE
Payer: COMMERCIAL

## 2023-12-26 ENCOUNTER — APPOINTMENT (EMERGENCY)
Dept: VASCULAR ULTRASOUND | Facility: HOSPITAL | Age: 63
End: 2023-12-26
Payer: COMMERCIAL

## 2023-12-26 VITALS
HEART RATE: 80 BPM | TEMPERATURE: 97.2 F | RESPIRATION RATE: 20 BRPM | DIASTOLIC BLOOD PRESSURE: 59 MMHG | OXYGEN SATURATION: 100 % | SYSTOLIC BLOOD PRESSURE: 121 MMHG

## 2023-12-26 DIAGNOSIS — M54.31 SCIATICA OF RIGHT SIDE: Primary | ICD-10-CM

## 2023-12-26 LAB
ANION GAP SERPL CALCULATED.3IONS-SCNC: 9 MMOL/L
APTT PPP: 23 SECONDS (ref 23–37)
BASOPHILS # BLD AUTO: 0.03 THOUSANDS/ÂΜL (ref 0–0.1)
BASOPHILS NFR BLD AUTO: 0 % (ref 0–1)
BUN SERPL-MCNC: 19 MG/DL (ref 5–25)
CALCIUM SERPL-MCNC: 9.6 MG/DL (ref 8.4–10.2)
CHLORIDE SERPL-SCNC: 104 MMOL/L (ref 96–108)
CO2 SERPL-SCNC: 26 MMOL/L (ref 21–32)
CREAT SERPL-MCNC: 1.02 MG/DL (ref 0.6–1.3)
EOSINOPHIL # BLD AUTO: 0.04 THOUSAND/ÂΜL (ref 0–0.61)
EOSINOPHIL NFR BLD AUTO: 1 % (ref 0–6)
ERYTHROCYTE [DISTWIDTH] IN BLOOD BY AUTOMATED COUNT: 11.9 % (ref 11.6–15.1)
GFR SERPL CREATININE-BSD FRML MDRD: 78 ML/MIN/1.73SQ M
GLUCOSE SERPL-MCNC: 125 MG/DL (ref 65–140)
HCT VFR BLD AUTO: 41.3 % (ref 36.5–49.3)
HGB BLD-MCNC: 14 G/DL (ref 12–17)
IMM GRANULOCYTES # BLD AUTO: 0.04 THOUSAND/UL (ref 0–0.2)
IMM GRANULOCYTES NFR BLD AUTO: 1 % (ref 0–2)
INR PPP: 1.04 (ref 0.84–1.19)
LYMPHOCYTES # BLD AUTO: 1.37 THOUSANDS/ÂΜL (ref 0.6–4.47)
LYMPHOCYTES NFR BLD AUTO: 16 % (ref 14–44)
MCH RBC QN AUTO: 32.3 PG (ref 26.8–34.3)
MCHC RBC AUTO-ENTMCNC: 33.9 G/DL (ref 31.4–37.4)
MCV RBC AUTO: 95 FL (ref 82–98)
MONOCYTES # BLD AUTO: 0.67 THOUSAND/ÂΜL (ref 0.17–1.22)
MONOCYTES NFR BLD AUTO: 8 % (ref 4–12)
NEUTROPHILS # BLD AUTO: 6.69 THOUSANDS/ÂΜL (ref 1.85–7.62)
NEUTS SEG NFR BLD AUTO: 74 % (ref 43–75)
NRBC BLD AUTO-RTO: 0 /100 WBCS
PLATELET # BLD AUTO: 131 THOUSANDS/UL (ref 149–390)
PMV BLD AUTO: 11.8 FL (ref 8.9–12.7)
POTASSIUM SERPL-SCNC: 3.8 MMOL/L (ref 3.5–5.3)
PROTHROMBIN TIME: 14.2 SECONDS (ref 11.6–14.5)
RBC # BLD AUTO: 4.34 MILLION/UL (ref 3.88–5.62)
SODIUM SERPL-SCNC: 139 MMOL/L (ref 135–147)
WBC # BLD AUTO: 8.84 THOUSAND/UL (ref 4.31–10.16)

## 2023-12-26 PROCEDURE — 85025 COMPLETE CBC W/AUTO DIFF WBC: CPT | Performed by: NURSE PRACTITIONER

## 2023-12-26 PROCEDURE — 99284 EMERGENCY DEPT VISIT MOD MDM: CPT

## 2023-12-26 PROCEDURE — 72170 X-RAY EXAM OF PELVIS: CPT

## 2023-12-26 PROCEDURE — 85610 PROTHROMBIN TIME: CPT | Performed by: NURSE PRACTITIONER

## 2023-12-26 PROCEDURE — 93971 EXTREMITY STUDY: CPT | Performed by: INTERNAL MEDICINE

## 2023-12-26 PROCEDURE — 36415 COLL VENOUS BLD VENIPUNCTURE: CPT | Performed by: NURSE PRACTITIONER

## 2023-12-26 PROCEDURE — 93005 ELECTROCARDIOGRAM TRACING: CPT

## 2023-12-26 PROCEDURE — 85730 THROMBOPLASTIN TIME PARTIAL: CPT | Performed by: NURSE PRACTITIONER

## 2023-12-26 PROCEDURE — 96374 THER/PROPH/DIAG INJ IV PUSH: CPT

## 2023-12-26 PROCEDURE — 96375 TX/PRO/DX INJ NEW DRUG ADDON: CPT

## 2023-12-26 PROCEDURE — 93971 EXTREMITY STUDY: CPT

## 2023-12-26 PROCEDURE — 80048 BASIC METABOLIC PNL TOTAL CA: CPT | Performed by: NURSE PRACTITIONER

## 2023-12-26 PROCEDURE — 99285 EMERGENCY DEPT VISIT HI MDM: CPT | Performed by: NURSE PRACTITIONER

## 2023-12-26 PROCEDURE — 96376 TX/PRO/DX INJ SAME DRUG ADON: CPT

## 2023-12-26 RX ORDER — DEXAMETHASONE 2 MG/1
2 TABLET ORAL 2 TIMES DAILY WITH MEALS
Qty: 10 TABLET | Refills: 0 | Status: SHIPPED | OUTPATIENT
Start: 2023-12-26 | End: 2023-12-31

## 2023-12-26 RX ORDER — KETOROLAC TROMETHAMINE 30 MG/ML
15 INJECTION, SOLUTION INTRAMUSCULAR; INTRAVENOUS ONCE
Status: COMPLETED | OUTPATIENT
Start: 2023-12-26 | End: 2023-12-26

## 2023-12-26 RX ORDER — HYDROMORPHONE HCL/PF 1 MG/ML
1 SYRINGE (ML) INJECTION ONCE
Status: COMPLETED | OUTPATIENT
Start: 2023-12-26 | End: 2023-12-26

## 2023-12-26 RX ORDER — GABAPENTIN 100 MG/1
100 CAPSULE ORAL 3 TIMES DAILY
Qty: 30 CAPSULE | Refills: 0 | Status: SHIPPED | OUTPATIENT
Start: 2023-12-26 | End: 2024-01-05

## 2023-12-26 RX ORDER — ACETAMINOPHEN 325 MG/1
975 TABLET ORAL ONCE
Status: COMPLETED | OUTPATIENT
Start: 2023-12-26 | End: 2023-12-26

## 2023-12-26 RX ORDER — LORAZEPAM 2 MG/ML
0.5 INJECTION INTRAMUSCULAR ONCE
Status: COMPLETED | OUTPATIENT
Start: 2023-12-26 | End: 2023-12-26

## 2023-12-26 RX ORDER — MORPHINE SULFATE 15 MG/1
15 TABLET ORAL EVERY 6 HOURS PRN
Qty: 20 TABLET | Refills: 0 | Status: SHIPPED | OUTPATIENT
Start: 2023-12-26

## 2023-12-26 RX ADMIN — HYDROMORPHONE HYDROCHLORIDE 1 MG: 1 INJECTION, SOLUTION INTRAMUSCULAR; INTRAVENOUS; SUBCUTANEOUS at 14:14

## 2023-12-26 RX ADMIN — KETOROLAC TROMETHAMINE 15 MG: 30 INJECTION, SOLUTION INTRAMUSCULAR at 14:15

## 2023-12-26 RX ADMIN — LORAZEPAM 0.5 MG: 2 INJECTION INTRAMUSCULAR; INTRAVENOUS at 15:39

## 2023-12-26 RX ADMIN — HYDROMORPHONE HYDROCHLORIDE 1 MG: 1 INJECTION, SOLUTION INTRAMUSCULAR; INTRAVENOUS; SUBCUTANEOUS at 15:39

## 2023-12-26 RX ADMIN — ACETAMINOPHEN 975 MG: 325 TABLET, FILM COATED ORAL at 14:12

## 2023-12-26 NOTE — ED PROVIDER NOTES
History  Chief Complaint   Patient presents with    Leg Pain     Pt reports R severe leg pain, hx of blood clot, unable to put pressure on leg. Hx of stroke in the past, currently taking blood thinners     This is a 63-year-old male patient presenting here with his family with pretty exquisite right leg pain.  He gestures that the pain is behind the posterior right calf.  Pain started late yesterday but has blossomed significantly.  He does have a history of sciatica and lower back problems.  Family denies any acute trauma.  I do not really appreciate any right lower extremity swelling.  The pain is not producible with palpation of the calf muscle.  Pain is exacerbated by straight leg raise on the right suggestive of root nerve inflammation.      Leg Pain  Associated symptoms: no back pain and no fever        Prior to Admission Medications   Prescriptions Last Dose Informant Patient Reported? Taking?   aspirin (ECOTRIN LOW STRENGTH) 81 mg EC tablet  Self Yes No   Sig: Take 81 mg by mouth daily   clonazePAM (KlonoPIN) 0.5 mg tablet   No No   Sig: Take 1 tablet (0.5 mg total) by mouth 2 (two) times a day   Patient not taking: Reported on 11/15/2023   clopidogrel (PLAVIX) 75 mg tablet   No No   Sig: Take 1 tablet (75 mg total) by mouth daily   pantoprazole (PROTONIX) 40 mg tablet   Yes No   Patient not taking: Reported on 11/15/2023   rosuvastatin (CRESTOR) 20 MG tablet   No No   Sig: Take 1 tablet (20 mg total) by mouth daily      Facility-Administered Medications: None       Past Medical History:   Diagnosis Date    Chronic low back pain     GERD (gastroesophageal reflux disease)     Hyperlipidemia     Sleep apnea     Verruca 2 yrs       Past Surgical History:   Procedure Laterality Date    COLONOSCOPY      ESOPHAGOGASTRODUODENOSCOPY      GA ESOPHAGOGASTRODUODENOSCOPY TRANSORAL DIAGNOSTIC N/A 5/24/2018    Procedure: ESOPHAGOGASTRODUODENOSCOPY (EGD);  Surgeon: Francis Sanders MD;  Location: [facility];  Service:  Gastroenterology       Family History   Problem Relation Age of Onset    Diabetes Mother     Lung cancer Father     Cancer Father     Cancer Brother         Bladder Cancer and a stroke    No Known Problems Daughter     No Known Problems Son      I have reviewed and agree with the history as documented.    E-Cigarette/Vaping    E-Cigarette Use Never User      E-Cigarette/Vaping Substances    Nicotine No     THC No     CBD No     Flavoring No     Other No     Unknown No      Social History     Tobacco Use    Smoking status: Never    Smokeless tobacco: Never   Vaping Use    Vaping status: Never Used   Substance Use Topics    Alcohol use: Yes     Alcohol/week: 4.0 standard drinks of alcohol     Types: 2 Glasses of wine, 2 Cans of beer per week     Comment: social    Drug use: No       Review of Systems   Constitutional:  Negative for chills and fever.   HENT:  Negative for ear pain and sore throat.    Eyes:  Negative for pain and visual disturbance.   Respiratory:  Negative for cough and shortness of breath.    Cardiovascular:  Negative for chest pain and palpitations.   Gastrointestinal:  Negative for abdominal pain and vomiting.   Genitourinary:  Negative for dysuria and hematuria.   Musculoskeletal:  Positive for gait problem. Negative for arthralgias and back pain.   Skin:  Negative for color change and rash.   Neurological:  Negative for seizures and syncope.   All other systems reviewed and are negative.      Physical Exam  Physical Exam  Vitals and nursing note reviewed.   Constitutional:       General: He is not in acute distress.     Appearance: He is well-developed.   HENT:      Head: Normocephalic and atraumatic.   Eyes:      General:         Right eye: No discharge.         Left eye: No discharge.      Conjunctiva/sclera: Conjunctivae normal.   Cardiovascular:      Rate and Rhythm: Normal rate.   Pulmonary:      Effort: Pulmonary effort is normal. No respiratory distress.   Abdominal:      General: There is  no distension.      Tenderness: There is no guarding.   Musculoskeletal:         General: No deformity.      Cervical back: Normal range of motion and neck supple.      Lumbar back: Spasms and tenderness present. Positive right straight leg raise test.   Skin:     General: Skin is warm and dry.      Findings: No rash.   Neurological:      Mental Status: He is alert and oriented to person, place, and time.      Coordination: Coordination normal.         Vital Signs  ED Triage Vitals   Temperature Pulse Respirations Blood Pressure SpO2   12/26/23 1337 12/26/23 1337 12/26/23 1337 12/26/23 1337 12/26/23 1337   (!) 97.2 °F (36.2 °C) (!) 139 22 131/72 94 %      Temp Source Heart Rate Source Patient Position - Orthostatic VS BP Location FiO2 (%)   12/26/23 1337 12/26/23 1337 12/26/23 1337 12/26/23 1337 --   Oral Monitor Lying Left arm       Pain Score       12/26/23 1412       10 - Worst Possible Pain           Vitals:    12/26/23 1337 12/26/23 1345 12/26/23 1530   BP: 131/72 140/75 121/59   Pulse: (!) 139 76 80   Patient Position - Orthostatic VS: Lying Lying Lying         Visual Acuity      ED Medications  Medications   HYDROmorphone (DILAUDID) injection 1 mg (1 mg Intravenous Given 12/26/23 1414)   LORazepam (ATIVAN) injection 0.5 mg (0.5 mg Intravenous Given 12/26/23 1539)   ketorolac (TORADOL) injection 15 mg (15 mg Intravenous Given 12/26/23 1415)   acetaminophen (TYLENOL) tablet 975 mg (975 mg Oral Given 12/26/23 1412)   HYDROmorphone (DILAUDID) injection 1 mg (1 mg Intravenous Given 12/26/23 1539)       Diagnostic Studies  Results Reviewed       Procedure Component Value Units Date/Time    Basic metabolic panel [[id number]] Collected: 12/26/23 1416    Lab Status: Final result Specimen: Blood from Arm, Left Updated: 12/26/23 1453     Sodium 139 mmol/L      Potassium 3.8 mmol/L      Chloride 104 mmol/L      CO2 26 mmol/L      ANION GAP 9 mmol/L      BUN 19 mg/dL      Creatinine 1.02 mg/dL      Glucose 125 mg/dL       Calcium 9.6 mg/dL      eGFR 78 ml/min/1.73sq m     Narrative:      National Kid[employer] Disease Foundation guidelines for Chronic Kidney Disease (CKD):     Stage 1 with normal or high GFR (GFR > 90 mL/min/1.73 square meters)    Stage 2 Mild CKD (GFR = 60-89 mL/min/1.73 square meters)    Stage 3A Moderate CKD (GFR = 45-59 mL/min/1.73 square meters)    Stage 3B Moderate CKD (GFR = 30-44 mL/min/1.73 square meters)    Stage 4 Severe CKD (GFR = 15-29 mL/min/1.73 square meters)    Stage 5 End Stage CKD (GFR <15 mL/min/1.73 square meters)  Note: GFR calculation is accurate only with a steady state creatinine    Protime-INR [[id number]]  (Normal) Collected: 12/26/23 1416    Lab Status: Final result Specimen: Blood from Arm, Left Updated: 12/26/23 1440     Protime 14.2 seconds      INR 1.04    APTT [[id number]]  (Normal) Collected: 12/26/23 1416    Lab Status: Final result Specimen: Blood from Arm, Left Updated: 12/26/23 1440     PTT 23 seconds     CBC and differential [[id number]]  (Abnormal) Collected: 12/26/23 1416    Lab Status: Final result Specimen: Blood from Arm, Left Updated: 12/26/23 1427     WBC 8.84 Thousand/uL      RBC 4.34 Million/uL      Hemoglobin 14.0 g/dL      Hematocrit 41.3 %      MCV 95 fL      MCH 32.3 pg      MCHC 33.9 g/dL      RDW 11.9 %      MPV 11.8 fL      Platelets 131 Thousands/uL      nRBC 0 /100 WBCs      Neutrophils Relative 74 %      Immat GRANS % 1 %      Lymphocytes Relative 16 %      Monocytes Relative 8 %      Eosinophils Relative 1 %      Basophils Relative 0 %      Neutrophils Absolute 6.69 Thousands/µL      Immature Grans Absolute 0.04 Thousand/uL      Lymphocytes Absolute 1.37 Thousands/µL      Monocytes Absolute 0.67 Thousand/µL      Eosinophils Absolute 0.04 Thousand/µL      Basophils Absolute 0.03 Thousands/µL                    VAS lower limb venous duplex study, unilateral/limited   Final Result by Gayathri Langley MD (12/26 1659)      XR pelvis ap only 1 or 2 views   ED Interpretation  by BEBA Lopez (12/26 1439)   Unremarkable pelvic imaging      Final Result by Wagner Tay MD (12/26 1653)      No acute osseous abnormality.      Again seen are multiple bilateral renal calculi.      Workstation performed: [id number]                    Procedures  Procedures         ED Course                               SBIRT 20yo+      Flowsheet Row Most Recent Value   Initial Alcohol Screen: US AUDIT-C     1. How often do you have a drink containing alcohol? 0 Filed at: 12/26/2023 1548   2. How many drinks containing alcohol do you have on a typical day you are drinking?  0 Filed at: 12/26/2023 1548   3a. Male UNDER 65: How often do you have five or more drinks on one occasion? 0 Filed at: 12/26/2023 1548   Audit-C Score 0 Filed at: 12/26/2023 1548   CHARMAINE: How many times in the past year have you...    Used an illegal drug or used a prescription medication for non-medical reasons? Never Filed at: 12/26/2023 1548                      Medical Decision Making  No evidence of deep vein thrombosis.  No pelvic abnormalities on imaging.  Pain consistent with sciatica.  Recommend outpatient physical therapy and some pain control.    Amount and/or Complexity of Data Reviewed  Labs: ordered.  Radiology: ordered and independent interpretation performed.    Risk  OTC drugs.  Prescription drug management.             Disposition  Final diagnoses:   Sciatica of right side     Time reflects when diagnosis was documented in both MDM as applicable and the Disposition within this note       Time User Action Codes Description Comment    12/26/2023  3:07 PM Nj Dsouza Add [M54.31] Sciatica of right side           ED Disposition       ED Disposition   Discharge    Condition   Stable    Date/Time   Tue Dec 26, 2023 1507    Comment   Betito Landers discharge to home/self care.                   Follow-up Information       Follow up With Specialties Details Why Contact Info Additional Information    [facility] Physical Therapy Go to   [phone]            Discharge Medication List as of 12/26/2023  3:09 PM        START taking these medications    Details   dexamethasone (DECADRON) 2 mg tablet Take 1 tablet (2 mg total) by mouth 2 (two) times a day with meals for 5 days, Starting Tue 12/26/2023, Until Sun 12/31/2023, Normal      gabapentin (NEURONTIN) 100 mg capsule Take 1 capsule (100 mg total) by mouth 3 (three) times a day for 10 days, Starting Tue 12/26/2023, Until Fri 1/5/2024, Normal      morphine (MSIR) 15 mg tablet Take 1 tablet (15 mg total) by mouth every 6 (six) hours as needed for severe pain for up to 20 doses Max Daily Amount: 60 mg, Starting Tue 12/26/2023, Normal           CONTINUE these medications which have NOT CHANGED    Details   aspirin (ECOTRIN LOW STRENGTH) 81 mg EC tablet Take 81 mg by mouth daily, Historical Med      clonazePAM (KlonoPIN) 0.5 mg tablet Take 1 tablet (0.5 mg total) by mouth 2 (two) times a day, Starting Wed 9/20/2023, Normal      clopidogrel (PLAVIX) 75 mg tablet Take 1 tablet (75 mg total) by mouth daily, Starting Wed 11/15/2023, Until Fri 12/15/2023, Normal      pantoprazole (PROTONIX) 40 mg tablet Historical Med      rosuvastatin (CRESTOR) 20 MG tablet Take 1 tablet (20 mg total) by mouth daily, Starting Wed 11/15/2023, Normal                 PDMP Review       None            ED Provider  Electronically Signed by             BEBA Lopez  12/30/23 7986

## 2023-12-27 ENCOUNTER — NURSE TRIAGE (OUTPATIENT)
Dept: PHYSICAL THERAPY | Facility: OTHER | Age: 63
End: 2023-12-27

## 2023-12-27 DIAGNOSIS — M54.31 SCIATICA OF RIGHT SIDE: Primary | ICD-10-CM

## 2023-12-27 LAB
ATRIAL RATE: 77 BPM
P AXIS: 73 DEGREES
PR INTERVAL: 140 MS
QRS AXIS: -17 DEGREES
QRSD INTERVAL: 96 MS
QT INTERVAL: 360 MS
QTC INTERVAL: 407 MS
T WAVE AXIS: 6 DEGREES
VENTRICULAR RATE: 77 BPM

## 2023-12-27 NOTE — TELEPHONE ENCOUNTER
Additional Information   Negative: Is this related to a work injury?   Negative: Is this related to an MVA?   Negative: Are you currently recieving homecare services?    Background - Initial Assessment  Clinical complaint: Pain started in right calf on 12/22/23. On 12/26/23 pain then started to shoot up the right leg to the right buttocks. No numbness or tingling. Pt states he has had sciatica in the past, couple of years ago, but it goes away on its own never making treatment necessary. NKI Was seen in ED 12/26/23. Pain is better with sitting, and worse with standing or walking.   Date of onset: 12/26/23  Frequency of pain: constant unless sitting  Quality of pain: burning and sharp    Protocols used: [facility] Protocol

## 2023-12-27 NOTE — TELEPHONE ENCOUNTER
Additional Information   Negative: Has the patient had unexplained weight loss?   Negative: Does the patient have a fever?   Negative: Is the patient experiencing urine retention?   Negative: Is the patient experiencing acute drop foot or paralysis?   Negative: Has the patient experienced major trauma? (fall from height, high speed collision, direct blow to spine) and is also experiencing nausea, light-headedness, or loss of consciousness?   Negative: Is the patient experiencing blood in sputum?   Affirmative: Is this a chronic condition?    Protocols used: [facility] Protocol    This RN did review in detail the [facility] and what we can provide for their sciatica pain.  Patient is agreeable to being triaged by this RN and would like to proceed with Physical Therapy.    Referral was placed for Physical Therapy at the [facility]. Patients information was sent to the  to make evaluation appointment. Patient made aware that the PT office  will be calling to schedule the appointment.  Patient was provided with the phone number to the PT office.    No further questions and/or concerns were voiced by the patient at this time. Patient states understanding of the referral that was placed.    Referral Closed.

## 2023-12-28 LAB
ATRIAL RATE: 76 BPM
PR INTERVAL: 152 MS
QRS AXIS: -12 DEGREES
QRSD INTERVAL: 86 MS
QT INTERVAL: 366 MS
QTC INTERVAL: 411 MS
T WAVE AXIS: 83 DEGREES
VENTRICULAR RATE: 76 BPM

## 2024-01-02 ENCOUNTER — OFFICE VISIT (OUTPATIENT)
Dept: FAMILY MEDICINE CLINIC | Facility: CLINIC | Age: 64
End: 2024-01-02
Payer: COMMERCIAL

## 2024-01-02 VITALS
SYSTOLIC BLOOD PRESSURE: 128 MMHG | DIASTOLIC BLOOD PRESSURE: 70 MMHG | BODY MASS INDEX: 26.56 KG/M2 | HEART RATE: 84 BPM | TEMPERATURE: 98 F | OXYGEN SATURATION: 98 % | WEIGHT: 155.6 LBS | RESPIRATION RATE: 20 BRPM | HEIGHT: 64 IN

## 2024-01-02 DIAGNOSIS — F41.9 ANXIETY: ICD-10-CM

## 2024-01-02 DIAGNOSIS — H34.11 CENTRAL RETINAL ARTERY OCCLUSION, RIGHT EYE: ICD-10-CM

## 2024-01-02 DIAGNOSIS — R00.2 INTERMITTENT PALPITATIONS: ICD-10-CM

## 2024-01-02 DIAGNOSIS — M54.31 SCIATICA, RIGHT SIDE: Primary | ICD-10-CM

## 2024-01-02 DIAGNOSIS — F41.1 GENERALIZED ANXIETY DISORDER: ICD-10-CM

## 2024-01-02 PROCEDURE — 99214 OFFICE O/P EST MOD 30 MIN: CPT | Performed by: FAMILY MEDICINE

## 2024-01-02 NOTE — ASSESSMENT & PLAN NOTE
Patient presents today for generalized anxiety and stress over his recent physical debilitating condition with loss of vision right eye after central retinal artery occlusion.  I discussed the patient's case with him and counseled him on biofeedback and cognitive behavioral therapy.  Referral for psychotherapy and counseling can be done at this point as well.  As this is a life-changing event for him.  I discussed his ability to travel to work with others and he is still attempting to cope and adjust to this issue at this point.  I do not recommend medication yet but we will follow-up closely in 1 month and refer for therapy now

## 2024-01-02 NOTE — ASSESSMENT & PLAN NOTE
Patient denies change or improvement in symptoms and will follow-up with ophthalmology again in the next several months patient was there and followed up last week reviewed reports

## 2024-01-02 NOTE — PATIENT INSTRUCTIONS
Lower Back Exercises   WHAT YOU NEED TO KNOW:   Lower back exercises help heal and strengthen your back muscles to prevent another injury. Ask your healthcare provider if you need to see a physical therapist for more advanced exercises.   DISCHARGE INSTRUCTIONS:   Return to the emergency department if:   You have severe pain that prevents you from moving.       Call your doctor if:   Your pain becomes worse.    You have new pain.    You have questions or concerns about your condition or care.    Do lower back exercises safely:   Do the exercises on a mat or firm surface (not on a bed).  A firm surface will support your spine and prevent low back pain.    Move slowly and smoothly.  Avoid fast or jerky motions.    Breathe normally.  Do not hold your breath.    Stop if you feel pain.  It is normal to feel some discomfort at first, but you should not feel pain. Regular exercise will help decrease your discomfort over time.    Lower back exercises:  Your healthcare provider may recommend that you do back exercises 10 to 30 minutes each day. He or she may also recommend that you do exercises 1 to 3 times each day. Ask your provider which exercises are best for you and how often to do them.  Ankle pumps:  Lie on your back. Move your foot up (with your toes pointing toward your head). Then, move your foot down (with your toes pointing away from you). Repeat this exercise 10 times on each side.         Heel slides:  Lie on your back. Slowly bend one leg and then straighten it. Next, bend the other leg and then straighten it. Repeat 10 times on each side.         Pelvic tilt:  Lie on your back with your knees bent and feet flat on the floor. Place your arms in a relaxed position beside your body. Tighten the muscles of your abdomen and flatten your back against the floor. Hold for 5 seconds. Repeat 5 times.         Back stretch:  Lie on your back with your hands behind your head. Bend your knees and turn the lower half of  your body to one side. Hold this position for 10 seconds. Repeat 3 times on each side.         Straight leg raises:  Lie on your back with one leg straight. Bend the other knee. Tighten your abdomen and then slowly lift the straight leg up about 6 to 12 inches off the floor. Hold for 1 to 5 seconds. Lower your leg slowly. Repeat 10 times on each leg.         Knee-to-chest:  Lie on your back with your knees bent and feet flat on the floor. Pull one of your knees toward your chest and hold it there for 5 seconds. Return your leg to the starting position. Lift the other knee toward your chest and hold for 5 seconds. Do this 5 times on each side.         Cat and camel:  Place your hands and knees on the floor. Arch your back upward toward the ceiling and lower your head. Round out your spine as much as you can. Hold for 5 seconds. Lift your head upward and push your chest downward toward the floor. Hold for 5 seconds. Do 3 sets or as directed.         Wall squats:  Stand with your back against a wall. Tighten the muscles of your abdomen. Slowly lower your body until your knees are bent at a 45 degree angle. Hold this position for 5 seconds. Slowly move back up to a standing position. Repeat 10 times.         Curl up:  Lie on your back with your knees bent and feet flat on the floor. Place your hands, palms down, underneath the curve in your lower back. Next, with your elbows on the floor, lift your shoulders and chest 2 to 3 inches. Keep your head in line with your shoulders. Hold this position for 5 seconds. When you can do this exercise without pain for 10 to 15 seconds, you may add a rotation. While your shoulders and chest are lifted off the ground, turn slightly to the left and hold. Repeat on the other side.         Bird dog:  Place your hands and knees on the floor. Keep your wrists directly below your shoulders and your knees directly below your hips. Pull your belly button in toward your spine. Do not flatten  or arch your back. Tighten your abdominal muscles. Raise one arm straight out so that it is aligned with your head. Next, raise the leg opposite your arm. Hold this position for 15 seconds. Lower your arm and leg slowly and change sides. Do 5 sets.       Follow up with your doctor as directed:  Write down your questions so you remember to ask them during your visits.  © Copyright [employer] 2023 Information is for End User's use only and may not be sold, redistributed or otherwise used for commercial purposes.  The above information is an  only. It is not intended as medical advice for individual conditions or treatments. Talk to your doctor, nurse or pharmacist before following any medical regimen to see if it is safe and effective for you.

## 2024-01-02 NOTE — ASSESSMENT & PLAN NOTE
Intermittent palpitations secondary to stress now and I recommend avoidance of caffeine products.  Patient notes some changes with sleep patterns additionally but denies chest pain associated with this.  If patient develops chest pain shortness of breath will refer for cardiology further workup but patient has been evaluated by cardiology recently after his neurologic event and appears to be stable.  I believe this is stress related.

## 2024-01-02 NOTE — ASSESSMENT & PLAN NOTE
Recent flareup of sciatica this time on the right side causing difficulty with bending lifting moving and maintaining same position for more than 20 minutes.  I recommend a home stretching program and physical therapy evaluation for this he will continue with Tylenol anti-inflammatory medication as needed limiting usage and primarily working on an exercise plan.  Patient should avoid driving over the next 3 weeks while under treatment

## 2024-01-02 NOTE — ASSESSMENT & PLAN NOTE
Situational anxiety now exacerbating underlying general anxiety as a result of his recent neurological change with visual disturbance to the right eye and disabling condition affecting his overall lifestyle.  He is encouraged to continue working and would like to do this through home for now and I recommend counseling and discussion further in 1 month

## 2024-01-02 NOTE — PROGRESS NOTES
Assessment/Plan:       Problem List Items Addressed This Visit          Cardiovascular and Mediastinum    Central retinal artery occlusion, right eye     Patient denies change or improvement in symptoms and will follow-up with ophthalmology again in the next several months patient was there and followed up last week reviewed reports            Nervous and Auditory    Sciatica, right side - Primary     Recent flareup of sciatica this time on the right side causing difficulty with bending lifting moving and maintaining same position for more than 20 minutes.  I recommend a home stretching program and physical therapy evaluation for this he will continue with Tylenol anti-inflammatory medication as needed limiting usage and primarily working on an exercise plan.  Patient should avoid driving over the next 3 weeks while under treatment            Other    Anxiety     Situational anxiety now exacerbating underlying general anxiety as a result of his recent neurological change with visual disturbance to the right eye and disabling condition affecting his overall lifestyle.  He is encouraged to continue working and would like to do this through home for now and I recommend counseling and discussion further in 1 month         Intermittent palpitations     Intermittent palpitations secondary to stress now and I recommend avoidance of caffeine products.  Patient notes some changes with sleep patterns additionally but denies chest pain associated with this.  If patient develops chest pain shortness of breath will refer for cardiology further workup but patient has been evaluated by cardiology recently after his neurologic event and appears to be stable.  I believe this is stress related.         Generalized anxiety disorder     Patient presents today for generalized anxiety and stress over his recent physical debilitating condition with loss of vision right eye after central retinal artery occlusion.  I discussed the patient's  case with him and counseled him on biofeedback and cognitive behavioral therapy.  Referral for psychotherapy and counseling can be done at this point as well.  As this is a life-changing event for him.  I discussed his ability to travel to work with others and he is still attempting to cope and adjust to this issue at this point.  I do not recommend medication yet but we will follow-up closely in 1 month and refer for therapy now         Relevant Orders    Ambulatory referral to Psych Services         Subjective:      Patient ID: Betito Landers is a 63 y.o. male.    Patient presents today for evaluation and discussion regarding stress that he is dealing with generalized anxiety and now worsening right-sided sciatic back pain which I feel is flareup from stress and muscle tension.  He denies falling or lifting an object but twisted and awakened from bed with pain radiating from lower lumbar region into the sciatic notch and down into right lower extremity with negative straight leg raise testing I believe he has nerve root impingement and sciatic pain which should improve over time with physical therapy.        The following portions of the patient's history were reviewed and updated as appropriate: allergies, current medications, past family history, past medical history, past social history, past surgical history and problem list.    Review of Systems   Constitutional:  Negative for chills, fatigue and fever.   HENT:  Negative for congestion, nosebleeds, rhinorrhea, sinus pressure and sore throat.    Eyes:  Negative for discharge and redness.   Respiratory:  Negative for cough and shortness of breath.    Cardiovascular:  Negative for chest pain, palpitations and leg swelling.   Gastrointestinal:  Negative for abdominal pain, blood in stool and nausea.   Endocrine: Negative for cold intolerance, heat intolerance and polyuria.   Genitourinary:  Negative for dysuria and frequency.   Musculoskeletal:  Positive for back  "pain. Negative for arthralgias and myalgias.   Skin:  Negative for rash.   Neurological:  Negative for dizziness, weakness and headaches.   Hematological:  Negative for adenopathy.   Psychiatric/Behavioral:  Negative for behavioral problems and sleep disturbance. The patient is not nervous/anxious.          Objective:      /70 (BP Location: Left arm, Patient Position: Sitting, Cuff Size: Standard)   Pulse 84   Temp 98 °F (36.7 °C) (Tympanic)   Resp 20   Ht 5' 4\" (1.626 m)   Wt 70.6 kg (155 lb 9.6 oz)   SpO2 98%   BMI 26.71 kg/m²        Physical Exam  Vitals and nursing note reviewed.   Constitutional:       Appearance: Normal appearance. He is well-developed.   HENT:      Head: Normocephalic and atraumatic.      Right Ear: Tympanic membrane, ear canal and external ear normal.      Left Ear: Tympanic membrane, ear canal and external ear normal.      Nose: Nose normal.      Mouth/Throat:      Mouth: Mucous membranes are moist.   Eyes:      General: No scleral icterus.     Conjunctiva/sclera: Conjunctivae normal.      Pupils: Pupils are equal, round, and reactive to light.   Neck:      Thyroid: No thyromegaly.      Vascular: No JVD.   Cardiovascular:      Rate and Rhythm: Normal rate and regular rhythm.      Heart sounds: Normal heart sounds. No murmur heard.  Pulmonary:      Effort: Pulmonary effort is normal.      Breath sounds: Normal breath sounds. No wheezing or rales.   Chest:      Chest wall: No tenderness.   Abdominal:      General: Bowel sounds are normal. There is no distension.      Palpations: Abdomen is soft. There is no mass.      Tenderness: There is no abdominal tenderness. There is no guarding or rebound.   Musculoskeletal:         General: Tenderness present. No deformity. Normal range of motion.      Cervical back: Normal range of motion and neck supple.      Comments: Low back pain at L4-5 right-sided with paravertebral muscle spasms.  Mild sciatic notch tenderness negative straight leg " "raise testing   Lymphadenopathy:      Cervical: No cervical adenopathy.   Skin:     General: Skin is warm and dry.      Findings: No erythema or rash.   Neurological:      Mental Status: He is alert and oriented to person, place, and time.      Cranial Nerves: No cranial nerve deficit.      Deep Tendon Reflexes: Reflexes are normal and symmetric. Reflexes normal.   Psychiatric:         Behavior: Behavior normal.         Thought Content: Thought content normal.         Judgment: Judgment normal.          Data:    Laboratory Results: I have personally reviewed the pertinent laboratory results/reports   Radiology/Other Diagnostic Testing Results: I have personally reviewed pertinent reports.       Lab Results   Component Value Date    WBC 8.84 12/26/2023    HGB 14.0 12/26/2023    HCT 41.3 12/26/2023    MCV 95 12/26/2023     (L) 12/26/2023     Lab Results   Component Value Date    K 3.8 12/26/2023     12/26/2023    CO2 26 12/26/2023    BUN 19 12/26/2023    CREATININE 1.02 12/26/2023    GLUF 94 08/04/2023    CALCIUM 9.6 12/26/2023    AST 25 09/12/2023    ALT 30 09/12/2023    ALKPHOS 42 09/12/2023    EGFR 78 12/26/2023     Lab Results   Component Value Date    CHOLESTEROL 146 09/14/2023    CHOLESTEROL 138 08/04/2023    CHOLESTEROL 161 03/25/2023     Lab Results   Component Value Date    HDL 48 09/14/2023    HDL 56 08/04/2023    HDL 56 03/25/2023     Lab Results   Component Value Date    LDLCALC 86 09/14/2023    LDLCALC 77 08/04/2023    LDLCALC 93 03/25/2023     Lab Results   Component Value Date    TRIG 58 09/14/2023    TRIG 26 08/04/2023    TRIG 61 03/25/2023     No results found for: \"CHOLHDL\"  Lab Results   Component Value Date    VPA2OYPCTJYY 2.010 08/04/2023     Lab Results   Component Value Date    HGBA1C 5.9 (H) 09/14/2023     Lab Results   Component Value Date    PSA 0.7 03/25/2023       Mayur Pollock, DO      "

## 2024-01-04 ENCOUNTER — EVALUATION (OUTPATIENT)
Dept: PHYSICAL THERAPY | Facility: CLINIC | Age: 64
End: 2024-01-04
Payer: COMMERCIAL

## 2024-01-04 DIAGNOSIS — M54.31 SCIATICA OF RIGHT SIDE: Primary | ICD-10-CM

## 2024-01-04 PROCEDURE — 97110 THERAPEUTIC EXERCISES: CPT | Performed by: PHYSICAL THERAPIST

## 2024-01-04 PROCEDURE — 97162 PT EVAL MOD COMPLEX 30 MIN: CPT | Performed by: PHYSICAL THERAPIST

## 2024-01-04 NOTE — PROGRESS NOTES
PT Evaluation     Today's date: 2024  Patient name: Betito Landers  : 1960  MRN: [id number]  Referring provider: Smith Piedra PT  Dx:   Encounter Diagnosis     ICD-10-CM    1. Sciatica of right side  M54.31 Ambulatory referral to PT spine          Start Time: 1005  Stop Time: 1105  Total time in clinic (min): 60 minutes    Assessment  Assessment details: Betito Landers is a 63 y.o. male who presents with acute onset of right LE radicular pain from his buttock to lower leg. Patient denies experiencing tingling/numbness. Patient reports flexion bias with less pain in sitting, bending and laying on side with leg bent. Examination findings confirm flexion directional preference with lessening pain with repeated flexion. Patient has intact LE strength. Hypersensitivity noted to light touch. Limited, painful lumbar segmental mobility noted. Patient's clinical presentation is consistent with lumbar stenosis. Patient would benefit from skilled physical therapy to address their aforementioned impairments, improve their level of function and to improve their overall quality of life.    Impairments: abnormal gait, abnormal muscle firing, abnormal or restricted ROM, activity intolerance, lacks appropriate home exercise program, pain with function and poor posture   Understanding of Dx/Px/POC: excellent  Goals  Short Term Goals: to be achieved by 4 weeks  1) Patient to be independent with basic HEP.  2) Decrease pain to 4/10 at its worst.  3) Increase lumbar spine ROM by 25% in all deficient planes.   4) Increase LE strength by 1/2 MMT grade in all deficient planes.  5) Patient to report decreased sleep interruption secondary to pain.  6) Increase ambulatory tolerance by 10 min.    Long Term Goals: to be achieved by discharge  1) FOTO equal to or greater than 70.  2) Patient to be independent with comprehensive HEP.  3) Abolish pain for improved quality of life.  4) Lumbar spine ROM WNL all planes to improve  a/iadls.  5) Increase LE strength to 5/5 MMT grade in all planes to improve a/iadls.  6) Patient to report no sleep interruption secondary to pain.  7) Increase ambulatory tolerance to 60 min.    Plan  Patient would benefit from: skilled PT  Planned modality interventions: biofeedback, cryotherapy, TENS, thermotherapy: hydrocollator packs, unattended electrical stimulation and low level laser therapy  Planned therapy interventions: abdominal trunk stabilization, activity modification, ADL retraining, ADL training, behavior modification, body mechanics training, breathing training, functional ROM exercises, home exercise program, IADL retraining, joint mobilization, manual therapy, massage, motor coordination training, neuromuscular re-education, patient education, postural training, self care, strengthening, stretching, therapeutic activities, therapeutic exercise and transfer training  Frequency: 1-3x week.  Duration in weeks: 12  Plan of Care beginning date: 1/4/2024  Plan of Care expiration date: 3/28/2024  Treatment plan discussed with: patient      Subjective Evaluation    History of Present Illness  Mechanism of injury: Patient presents with insidious onset of right calf pain beginning approximately 2 weeks ago. Patient's standing/walking tolerance gradually lessened. Patient one day was unable to get out of his car/stand and went to the ED. Patient underwent a VAS which was negative. Patient went to the chiropractor for 2 visits, which helped to partially relieve his pain. Patient was prescribed exercises by his PCP, which has also helped partially. Patient bent forward and his back popped, which provided relief. Patient denies experiencing tingling/numbness. Patient's foot feels like his shoe is tied too tightly. Patient denies weakness or bowel/bladder dysfunction. Patient does limp when his leg becomes painful. Patient's quality of sleep remains disrupted, but improving.  Patient Goals  Patient goal:  return to premorbid norm  Pain  Current pain ratin  At best pain ratin  At worst pain rating: 10  Location: right buttocks, calf  Quality: cutting.  Alleviating factors: sitting, laying on left side with right leg bent.  Exacerbated by: prolonged standing, walking, laying on right side.    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     Right   No palpable tenderness to the erector spinae and lumbar paraspinals.   Muscle spasm in the piriformis.   Tenderness of the piriformis.     Tenderness     Lumbar Spine  Tenderness in the spinous process (L4-5, S1).     Neurological Testing     Sensation     Lumbar   Left   Intact: light touch    Right   Hypersensation: light touch    Comments   Right light touch: L4-5    Active Range of Motion     Lumbar   Flexion:  WFL  Extension:  WFL and with pain Restriction level: moderate    Joint Play     Hypomobile: L1, L2, L3, L4, L5 and S1     Pain: L4, L5 and S1   Mechanical Assessment    Cervical      Thoracic      Lumbar    Sitting flexion: repeated movements  Pain location: no change  Pain level: decreased  Standing extension: repeated movements  Pain location: no change  Pain intensity: worse  Pain level: increased    Strength/Myotome Testing     Lumbar   Left   Normal strength  Heel walk: normal  Toe walk: normal    Right   Normal strength  Heel walk: normal  Toe walk: normal    Tests     Lumbar     Left   Negative crossed SLR, passive SLR and slump test.     Right   Negative crossed SLR, passive SLR and slump test.     Left Hip   Negative VINITA and long sit.     Right Hip   Negative VINITA and long sit.     Ambulation     Ambulation: Level Surfaces   Ambulation without assistive device: independent    Observational Gait   Gait: antalgic      Functional Assessment      Squat    Left within functional limits and right within functional limits.                POC expires Unit limit Auth  expiration date PT/OT + Visit Limit?   3/28 N/A After 25 visits Auth after 25 visits                 Visit/Unit Tracking  AUTH Status:  Date 1/4              Auth after 25 visits Used 1               Remaining                    Precautions: partial blindness      Manuals 1/4            Lumbar gapping in left side lying                                                    Neuro Re-Ed             Webslide: L row             Standing multifidus press with TB                                                                              Ther Ex             Patient education: pathophysiology, HEP review GR            Nustep: UE/LE             Seated lumbar flexion Reviewed Multidirectional PB rollout           DKC Reviewed            Piriformis str. Reviewed            Bridging             Clamshells                          Ther Activity                                       Gait Training                                       Modalities                                        Access Code: [email or web address]  URL: [email or web address]  Date: 01/04/2024  Prepared by: Leonid John    Exercises  - Supine Double Knee to Chest  - 2-3 x daily - 7 x weekly - 1 sets - 10 reps - 10 seconds hold  - Seated Flexion Stretch  - 2-3 x daily - 7 x weekly - 1 sets - 10 reps - 10 seconds hold  - Supine Piriformis Stretch with Foot on Ground  - 2-3 x daily - 7 x weekly - 3 sets - 1 reps - 30 seconds hold

## 2024-01-05 ENCOUNTER — OFFICE VISIT (OUTPATIENT)
Dept: PODIATRY | Facility: CLINIC | Age: 64
End: 2024-01-05
Payer: COMMERCIAL

## 2024-01-05 VITALS — BODY MASS INDEX: 26.46 KG/M2 | WEIGHT: 155 LBS | HEIGHT: 64 IN

## 2024-01-05 DIAGNOSIS — S99.922A INJURY OF TOENAIL OF LEFT FOOT, INITIAL ENCOUNTER: Primary | ICD-10-CM

## 2024-01-05 DIAGNOSIS — M79.675 GREAT TOE PAIN, LEFT: ICD-10-CM

## 2024-01-05 PROCEDURE — 99213 OFFICE O/P EST LOW 20 MIN: CPT | Performed by: STUDENT IN AN ORGANIZED HEALTH CARE EDUCATION/TRAINING PROGRAM

## 2024-01-05 PROCEDURE — 11730 AVULSION NAIL PLATE SIMPLE 1: CPT | Performed by: STUDENT IN AN ORGANIZED HEALTH CARE EDUCATION/TRAINING PROGRAM

## 2024-01-05 RX ORDER — LIDOCAINE HYDROCHLORIDE 10 MG/ML
3 INJECTION, SOLUTION INFILTRATION; PERINEURAL ONCE
Status: COMPLETED | OUTPATIENT
Start: 2024-01-05 | End: 2024-01-05

## 2024-01-05 RX ADMIN — LIDOCAINE HYDROCHLORIDE 3 ML: 10 INJECTION, SOLUTION INFILTRATION; PERINEURAL at 14:50

## 2024-01-05 NOTE — PROGRESS NOTES
Assessment/Plan:    No problem-specific Assessment & Plan notes found for this encounter.       Diagnoses and all orders for this visit:    Injury of toenail of left foot, initial encounter  -     lidocaine (XYLOCAINE) 1 % injection 3 mL  -     Nail removal    Great toe pain, left  -     lidocaine (XYLOCAINE) 1 % injection 3 mL  -     Nail removal      Plan:     - Patient presents with partially detached left hallux toenail plate secondary to injury he sustained months ago.  Total toenail avulsion procedure was performed as detailed below     - The procedure, anesthesia, complications and alternative care were explained in great detail. No warranties or guarantees were given as to the outcome of the procedure. Verbal consent was obtained from the patient. 3cc of 1% lidocaine plain was injected in to the left hallux following sterile alcohol prep.  The toe was prepped in sterile fashion. Under sterile conditions the (total) nail plate was removed. A dry sterile dressing with antibiotic ointment was applied to the surgical site.  Home care instructions were given to the patient including decreased activity, ice and OTC pain medication as needed for 3 days. Patient will also perform daily dressing changes until the surgical site is fully healed. Patient tolerated the procedure well and with no complications.       - Monitor for infection: pus (thick yellow drainage), redness tracking up the foot, fever, nausea, vomiting, fever, chills. If any of these issues arise, call clinic immediately or go to urgent care or emergency room to see provider     - The patient will return in 10 days for follow-up.        Subjective:      Patient ID: Betito Landers is a 63 y.o. male.    Extremity-year-old male with past medical history as below presents for evaluation of left big toe pain secondary to a partially detached toenail plate.  Patient reports in September last year he had an injury to his big toe causing a black and blue of  toenail plate and now the plate is loosely attached.  She reports the pain is usually with pressure.  He has no other complaints at this visit.        The following portions of the patient's history were reviewed and updated as appropriate: He  has a past medical history of Chronic low back pain, GERD (gastroesophageal reflux disease), Hyperlipidemia, Sleep apnea, and Verruca (2 yrs).  He   Patient Active Problem List    Diagnosis Date Noted    Sciatica, right side 01/02/2024    Generalized anxiety disorder 01/02/2024    Intermittent palpitations 10/18/2023    Central retinal artery occlusion, right eye 10/11/2023    Vision problem 09/13/2023    Actinic keratosis 08/11/2023    Sprain of right rotator cuff capsule 04/10/2023    Need for diphtheria-tetanus-pertussis (Tdap) vaccine 04/10/2023    Skin inflammation 02/08/2023    Neck pain 12/05/2022    Right foot pain 12/05/2022    Pilonidal cyst 11/09/2022    Blisters of multiple sites 07/28/2022    Heat rash 07/19/2022    COVID-19 virus infection 05/12/2022    Sciatica, left side 02/25/2022    Right elbow pain 02/03/2021    Hyperglycemia 02/03/2021    Allergic contact dermatitis due to plants, except food 11/16/2020    Annual physical exam 07/07/2020    Acute URI 01/06/2020    Non-allergic rhinitis 03/26/2019    Right groin pain 03/26/2019    Nocturia more than twice per night 12/27/2018    Obstructive sleep apnea 12/27/2018    Chronic GERD 05/18/2018    Chest pain 06/26/2016    Hyperlipidemia 06/26/2016    Anxiety 06/26/2016     He  has a past surgical history that includes Colonoscopy; Esophagogastroduodenoscopy; and pr esophagogastroduodenoscopy transoral diagnostic (N/A, 5/24/2018).  Current Outpatient Medications   Medication Sig Dispense Refill    aspirin (ECOTRIN LOW STRENGTH) 81 mg EC tablet Take 81 mg by mouth daily      clopidogrel (PLAVIX) 75 mg tablet Take 1 tablet (75 mg total) by mouth daily 90 tablet 3    gabapentin (NEURONTIN) 100 mg capsule Take 1  "capsule (100 mg total) by mouth 3 (three) times a day for 10 days 30 capsule 0    rosuvastatin (CRESTOR) 20 MG tablet Take 1 tablet (20 mg total) by mouth daily 90 tablet 3    clonazePAM (KlonoPIN) 0.5 mg tablet Take 1 tablet (0.5 mg total) by mouth 2 (two) times a day (Patient not taking: Reported on 11/15/2023) 30 tablet 0    morphine (MSIR) 15 mg tablet Take 1 tablet (15 mg total) by mouth every 6 (six) hours as needed for severe pain for up to 20 doses Max Daily Amount: 60 mg (Patient not taking: Reported on 1/2/2024) 20 tablet 0    pantoprazole (PROTONIX) 40 mg tablet  (Patient not taking: Reported on 11/15/2023)       No current facility-administered medications for this visit.   .    Review of Systems   All other systems reviewed and are negative.        Objective:      Ht 5' 4\" (1.626 m)   Wt 70.3 kg (155 lb)   BMI 26.61 kg/m²          Physical Exam  Vitals reviewed.   Cardiovascular:      Pulses:           Dorsalis pedis pulses are 2+ on the left side.        Posterior tibial pulses are 2+ on the left side.   Feet:      Comments: Pain with palpation noted to the left hallux nail plate.  Nail plate is partially detached and only attached on the medial aspect.  No discoloration noted to the toe.  No pain with palpation to the hallux on the plantarly.      Nail removal    Date/Time: 1/5/2024 2:30 PM    Performed by: Krista Zapata DPM  Authorized by: Krista Zapata DPM    Patient location:  ClinicUniversal Protocol:  Consent: Verbal consent obtained.  Risks and benefits: risks, benefits and alternatives were discussed  Consent given by: patient  Time out: Immediately prior to procedure a \"time out\" was called to verify the correct patient, procedure, equipment, support staff and site/side marked as required.  Patient understanding: patient states understanding of the procedure being performed  Patient identity confirmed: verbally with patient    Location:     Foot:  L big toe  Pre-procedure details:     Skin " preparation:  Alcohol and Betadine  Anesthesia (see MAR for exact dosages):     Anesthesia method:  Local infiltration    Local anesthetic:  Lidocaine 1% w/o epi (3cc)  Nail Removal:     Nail removed:  Complete  Post-procedure details:     Dressing:  4x4 sterile gauze, antibiotic ointment and gauze roll    Patient tolerance of procedure:  Tolerated well, no immediate complications

## 2024-01-10 ENCOUNTER — OFFICE VISIT (OUTPATIENT)
Dept: PHYSICAL THERAPY | Facility: CLINIC | Age: 64
End: 2024-01-10
Payer: COMMERCIAL

## 2024-01-10 DIAGNOSIS — M54.31 SCIATICA OF RIGHT SIDE: Primary | ICD-10-CM

## 2024-01-10 PROCEDURE — 97110 THERAPEUTIC EXERCISES: CPT | Performed by: PHYSICAL THERAPIST

## 2024-01-10 PROCEDURE — 97112 NEUROMUSCULAR REEDUCATION: CPT | Performed by: PHYSICAL THERAPIST

## 2024-01-10 NOTE — PROGRESS NOTES
"Daily Note     Today's date: 1/10/2024  Patient name: Betito Landers  : 1960  MRN: [id number]  Referring provider: Smith Piedra, MARLENE  Dx:   Encounter Diagnosis     ICD-10-CM    1. Sciatica of right side  M54.31           Start Time: 1630  Stop Time: 1717  Total time in clinic (min): 47 minutes    Subjective: Patient reports that his lower back has returned to approximately 75% of his premorbid norm. Patient's HEP has helped with stretching his lower back and alleviating his leg pain.       Objective: See treatment diary below      Assessment: Tolerated treatment well. Patient demonstrated fatigue post treatment and would benefit from continued PT. Reviewed and updated HEP. Patient demonstrated and verbalized good understanding of current HEP.      Plan: Continue per plan of care.  Progress treatment as tolerated.       POC expires Unit limit Auth  expiration date PT/OT + Visit Limit?   3/28 N/A After 25 visits Auth after 25 visits                 Visit/Unit Tracking  AUTH Status:  Date 1/4 1/10             Auth after 25 visits Used 1 2              Remaining                    Precautions: partial blindness      Manuals 1/4 1/10           Lumbar gapping in left side lying                                                    Neuro Re-Ed             Webslide: L row  GTB 2x10 3\"           Standing multifidus press with TB  GTB 10x10\"                                                                            Ther Ex             Patient education: pathophysiology, HEP review GR            Nustep: UE/LE  L9 10 min           Seated lumbar flexion Reviewed Multidirectional PB rollout           DKC Reviewed Reviewed           Piriformis str. Reviewed Reviewed           Bridging  2x10 5\"           Clamshells  20x5\" b/l                        Ther Activity                                       Gait Training                                       Modalities                                            "

## 2024-01-17 ENCOUNTER — APPOINTMENT (OUTPATIENT)
Dept: PHYSICAL THERAPY | Facility: CLINIC | Age: 64
End: 2024-01-17
Payer: COMMERCIAL

## 2024-01-24 ENCOUNTER — OFFICE VISIT (OUTPATIENT)
Dept: PODIATRY | Facility: CLINIC | Age: 64
End: 2024-01-24
Payer: COMMERCIAL

## 2024-01-24 VITALS
DIASTOLIC BLOOD PRESSURE: 74 MMHG | WEIGHT: 155 LBS | SYSTOLIC BLOOD PRESSURE: 132 MMHG | HEIGHT: 64 IN | BODY MASS INDEX: 26.46 KG/M2 | HEART RATE: 91 BPM

## 2024-01-24 DIAGNOSIS — S99.922D INJURY OF TOENAIL OF LEFT FOOT, SUBSEQUENT ENCOUNTER: Primary | ICD-10-CM

## 2024-01-24 PROCEDURE — 99212 OFFICE O/P EST SF 10 MIN: CPT | Performed by: STUDENT IN AN ORGANIZED HEALTH CARE EDUCATION/TRAINING PROGRAM

## 2024-01-24 NOTE — PROGRESS NOTES
Assessment/Plan:    No problem-specific Assessment & Plan notes found for this encounter.       Diagnoses and all orders for this visit:    Injury of toenail of left foot, subsequent encounter      Plan:     - Patient was counseled and educated on the condition and the diagnosis.  The diagnosis, treatment options and prognosis were discussed in detail.   -Left hallux toenail avulsion site has healed.  Recommended patient to continue wearing regular shoes and sneakers.  -Addressed all questions and concerns  -Return as needed    Subjective:      Patient ID: Betito Landers is a 63 y.o. male.    63-year-old male with past medical history as below presents for an evaluation of left hallux total nail avulsion.  Patient reports overall he is doing well no discomfort with wearing shoes and socks.  He does report some sensitivity with touch to the big toe.  No other complaints.        The following portions of the patient's history were reviewed and updated as appropriate: He  has a past medical history of Chronic low back pain, GERD (gastroesophageal reflux disease), Hyperlipidemia, Sleep apnea, and Verruca (2 yrs).  He   Patient Active Problem List    Diagnosis Date Noted    Sciatica, right side 01/02/2024    Generalized anxiety disorder 01/02/2024    Intermittent palpitations 10/18/2023    Central retinal artery occlusion, right eye 10/11/2023    Vision problem 09/13/2023    Actinic keratosis 08/11/2023    Sprain of right rotator cuff capsule 04/10/2023    Need for diphtheria-tetanus-pertussis (Tdap) vaccine 04/10/2023    Skin inflammation 02/08/2023    Neck pain 12/05/2022    Right foot pain 12/05/2022    Pilonidal cyst 11/09/2022    Blisters of multiple sites 07/28/2022    Heat rash 07/19/2022    COVID-19 virus infection 05/12/2022    Sciatica, left side 02/25/2022    Right elbow pain 02/03/2021    Hyperglycemia 02/03/2021    Allergic contact dermatitis due to plants, except food 11/16/2020    Annual physical exam  "07/07/2020    Acute URI 01/06/2020    Non-allergic rhinitis 03/26/2019    Right groin pain 03/26/2019    Nocturia more than twice per night 12/27/2018    Obstructive sleep apnea 12/27/2018    Chronic GERD 05/18/2018    Chest pain 06/26/2016    Hyperlipidemia 06/26/2016    Anxiety 06/26/2016     He  has a past surgical history that includes Colonoscopy; Esophagogastroduodenoscopy; and pr esophagogastroduodenoscopy transoral diagnostic (N/A, 5/24/2018).  Current Outpatient Medications   Medication Sig Dispense Refill    aspirin (ECOTRIN LOW STRENGTH) 81 mg EC tablet Take 81 mg by mouth daily      rosuvastatin (CRESTOR) 20 MG tablet Take 1 tablet (20 mg total) by mouth daily 90 tablet 3    clonazePAM (KlonoPIN) 0.5 mg tablet Take 1 tablet (0.5 mg total) by mouth 2 (two) times a day (Patient not taking: Reported on 11/15/2023) 30 tablet 0    clopidogrel (PLAVIX) 75 mg tablet Take 1 tablet (75 mg total) by mouth daily 90 tablet 3    gabapentin (NEURONTIN) 100 mg capsule Take 1 capsule (100 mg total) by mouth 3 (three) times a day for 10 days 30 capsule 0    morphine (MSIR) 15 mg tablet Take 1 tablet (15 mg total) by mouth every 6 (six) hours as needed for severe pain for up to 20 doses Max Daily Amount: 60 mg (Patient not taking: Reported on 1/2/2024) 20 tablet 0    pantoprazole (PROTONIX) 40 mg tablet  (Patient not taking: Reported on 11/15/2023)       No current facility-administered medications for this visit.   .    Review of Systems   All other systems reviewed and are negative.        Objective:      /74   Pulse 91   Ht 5' 4\" (1.626 m)   Wt 70.3 kg (155 lb)   BMI 26.61 kg/m²          Physical Exam  Vitals reviewed.   Feet:      Comments: Left hallux total nail avulsion site has healed.          "

## 2024-02-21 PROBLEM — J06.9 ACUTE URI: Status: RESOLVED | Noted: 2020-01-06 | Resolved: 2024-02-21

## 2024-04-17 ENCOUNTER — OFFICE VISIT (OUTPATIENT)
Dept: FAMILY MEDICINE CLINIC | Facility: CLINIC | Age: 64
End: 2024-04-17
Payer: COMMERCIAL

## 2024-04-17 VITALS
RESPIRATION RATE: 18 BRPM | SYSTOLIC BLOOD PRESSURE: 110 MMHG | WEIGHT: 166 LBS | DIASTOLIC BLOOD PRESSURE: 62 MMHG | OXYGEN SATURATION: 98 % | HEART RATE: 66 BPM | HEIGHT: 64 IN | BODY MASS INDEX: 28.34 KG/M2 | TEMPERATURE: 98.8 F

## 2024-04-17 DIAGNOSIS — R35.1 NOCTURIA MORE THAN TWICE PER NIGHT: ICD-10-CM

## 2024-04-17 DIAGNOSIS — J31.0 NON-ALLERGIC RHINITIS: ICD-10-CM

## 2024-04-17 DIAGNOSIS — H34.11 CENTRAL RETINAL ARTERY OCCLUSION, RIGHT EYE: Primary | ICD-10-CM

## 2024-04-17 DIAGNOSIS — F41.1 GENERALIZED ANXIETY DISORDER: ICD-10-CM

## 2024-04-17 DIAGNOSIS — E78.2 MIXED HYPERLIPIDEMIA: ICD-10-CM

## 2024-04-17 DIAGNOSIS — G47.33 OBSTRUCTIVE SLEEP APNEA: ICD-10-CM

## 2024-04-17 DIAGNOSIS — R00.2 INTERMITTENT PALPITATIONS: ICD-10-CM

## 2024-04-17 PROCEDURE — 99396 PREV VISIT EST AGE 40-64: CPT | Performed by: FAMILY MEDICINE

## 2024-04-17 NOTE — ASSESSMENT & PLAN NOTE
Generalized anxiety has been stable patient is not using clonazepam for now and will use it only as needed basis

## 2024-04-17 NOTE — PROGRESS NOTES
ADULT ANNUAL PHYSICAL  [facility]    NAME: Betito Landers  AGE: 64 y.o. SEX: male  : 1960     DATE: 2024     Assessment and Plan:     Problem List Items Addressed This Visit          Cardiovascular and Mediastinum    Central retinal artery occlusion, right eye - Primary     No worsening visual change patient is following up with ophthalmology regularly he is continuing on low-dose aspirin along with Plavix blood pressure remains stable he has no new complaints he will continue with Crestor 20 mg and follow-up with all laboratory work as scheduled            Respiratory    Obstructive sleep apnea     Stable no worsening daytime somnolence continue with same treatment regimen         Non-allergic rhinitis     No worsening rhinitis continue with over-the-counter Zyrtec or Allegra.  Patient would like to try the Astepro over-the-counter spray which I provided a sample of he will hold off on the over-the-counter Zyrtec unless needed            Behavioral Health    Generalized anxiety disorder     Generalized anxiety has been stable patient is not using clonazepam for now and will use it only as needed basis            Other    Hyperlipidemia     Follow-up laboratory work every 6 months continue Crestor follow-up with cardiology yearly         Relevant Orders    CBC and differential    Comprehensive metabolic panel    Lipid panel    TSH, 3rd generation with Free T4 reflex    Nocturia more than twice per night     Nocturia unchanged stable patient will add Flomax only if needed or Cialis follow-up PSA annually         Relevant Orders    PSA, total and free    Intermittent palpitations     Stable currently no change in medications palpitations have improved less stress overall            Immunizations and preventive care screenings were discussed with patient today. Appropriate education was printed on patient's after visit summary.    Discussed  risks and benefits of prostate cancer screening. We discussed the controversial history of PSA screening for prostate cancer in the [place] as well as the risk of over detection and over treatment of prostate cancer by way of PSA screening.  The patient understands that PSA blood testing is an imperfect way to screen for prostate cancer and that elevated PSA levels in the blood may also be caused by infection, inflammation, prostatic trauma or manipulation, urological procedures, or by benign prostatic enlargement.    The role of the digital rectal examination in prostate cancer screening was also discussed and I discussed with him that there is large interobserver variability in the findings of digital rectal examination.    Counseling:  Alcohol/drug use: discussed moderation in alcohol intake, the recommendations for healthy alcohol use, and avoidance of illicit drug use.  Dental Health: discussed importance of regular tooth brushing, flossing, and dental visits.  Injury prevention: discussed safety/seat belts, safety helmets, smoke detectors, carbon dioxide detectors, and smoking near bedding or upholstery.  Sexual health: discussed sexually transmitted diseases, partner selection, use of condoms, avoidance of unintended pregnancy, and contraceptive alternatives.  Exercise: the importance of regular exercise/physical activity was discussed. Recommend exercise 3-5 times per week for at least 30 minutes.          Return in about 6 months (around 10/17/2024).     Chief Complaint:     Chief Complaint   Patient presents with    Follow-up      History of Present Illness:     Adult Annual Physical   Patient here for a comprehensive physical exam. The patient reports no problems.    Diet and Physical Activity  Diet/Nutrition: well balanced diet.   Exercise: no formal exercise.      Depression Screening  PHQ-2/9 Depression Screening    Little interest or pleasure in doing things: 0 - not at all  Feeling down,  depressed, or hopeless: 0 - not at all  PHQ-2 Score: 0  PHQ-2 Interpretation: Negative depression screen       General Health  Sleep: sleeps well.   Hearing: normal - bilateral.  Vision: no vision problems.  Recently after retinal artery occlusion unchanged  Dental: regular dental visits.        Health  Symptoms include: none    Advanced Care Planning  Do you have an advanced directive?   Do you have a durable medical power of ?  ACP document given to patient?      Review of Systems:     Review of Systems   Constitutional:  Negative for chills, fatigue and fever.   HENT:  Negative for congestion, nosebleeds, rhinorrhea, sinus pressure and sore throat.    Eyes:  Negative for discharge and redness.   Respiratory:  Negative for cough and shortness of breath.    Cardiovascular:  Negative for chest pain, palpitations and leg swelling.   Gastrointestinal:  Negative for abdominal pain, blood in stool and nausea.   Endocrine: Negative for cold intolerance, heat intolerance and polyuria.   Genitourinary:  Negative for dysuria and frequency.   Musculoskeletal:  Negative for arthralgias, back pain and myalgias.   Skin:  Negative for rash.   Neurological:  Negative for dizziness, weakness and headaches.   Hematological:  Negative for adenopathy.   Psychiatric/Behavioral:  Negative for behavioral problems and sleep disturbance. The patient is not nervous/anxious.       Past Medical History:     Past Medical History:   Diagnosis Date    Chronic low back pain     GERD (gastroesophageal reflux disease)     Hyperlipidemia     Sleep apnea     Verruca 2 yrs      Past Surgical History:     Past Surgical History:   Procedure Laterality Date    COLONOSCOPY      ESOPHAGOGASTRODUODENOSCOPY      WI ESOPHAGOGASTRODUODENOSCOPY TRANSORAL DIAGNOSTIC N/A 5/24/2018    Procedure: ESOPHAGOGASTRODUODENOSCOPY (EGD);  Surgeon: Francis Sanders MD;  Location: [facility];  Service: Gastroenterology      Family History:     Family History    Problem Relation Age of Onset    Diabetes Mother     Lung cancer Father     Cancer Father     Cancer Brother         Bladder Cancer and a stroke    No Known Problems Daughter     No Known Problems Son       Social History:     Social History     Socioeconomic History    Marital status: /Civil Union     Spouse name: None    Number of children: None    Years of education: None    Highest education level: None   Occupational History    None   Tobacco Use    Smoking status: Never    Smokeless tobacco: Never   Vaping Use    Vaping status: Never Used   Substance and Sexual Activity    Alcohol use: Yes     Alcohol/week: 4.0 standard drinks of alcohol     Types: 2 Glasses of wine, 2 Cans of beer per week     Comment: social    Drug use: No    Sexual activity: Yes     Partners: Female   Other Topics Concern    None   Social History Narrative    None     Social Determinants of Health     Financial Resource Strain: Not At Risk (10/7/2023)    Received from [facility]    Financial Resource Strain     In the last 12 months did you skip medications to save money?: No     In the last 12 months, was there a time when you needed to see a doctor but could not because of cost?: No   Food Insecurity: Not At Risk (10/7/2023)    Received from [facility]    Food Insecurity     In the last 12 months did you ever eat less than you felt you should because there wasn't enough money for food?: No   Transportation Needs: Not At Risk (10/7/2023)    Received from [facility]    Transporation     In the last 12 months, have you ever had to go without healthcare because you didn't have a way to get there?: No   Physical Activity: Not on file   Stress: Not on file   Social Connections: Not At Risk (10/7/2023)    Received from [facility]    Social Connections     Do you often feel lonely?: No   Intimate Partner Violence: Not on file  "  Housing Stability: Not At Risk (10/7/2023)    Received from [facility]    Housing Stability     Are you worried that in the next 2 months you may not have stable housing?: No      Current Medications:     Current Outpatient Medications   Medication Sig Dispense Refill    aspirin (ECOTRIN LOW STRENGTH) 81 mg EC tablet Take 81 mg by mouth daily      clopidogrel (PLAVIX) 75 mg tablet Take 1 tablet (75 mg total) by mouth daily 90 tablet 3    rosuvastatin (CRESTOR) 20 MG tablet Take 1 tablet (20 mg total) by mouth daily 90 tablet 3    clonazePAM (KlonoPIN) 0.5 mg tablet Take 1 tablet (0.5 mg total) by mouth 2 (two) times a day (Patient not taking: Reported on 11/15/2023) 30 tablet 0    pantoprazole (PROTONIX) 40 mg tablet  (Patient not taking: Reported on 11/15/2023)       No current facility-administered medications for this visit.      Allergies:     No Known Allergies   Physical Exam:     /62 (BP Location: Left arm, Patient Position: Sitting, Cuff Size: Standard)   Pulse 66   Temp 98.8 °F (37.1 °C) (Tympanic)   Resp 18   Ht 5' 4\" (1.626 m)   Wt 75.3 kg (166 lb)   SpO2 98%   BMI 28.49 kg/m²     Physical Exam  Vitals and nursing note reviewed.   Constitutional:       General: He is not in acute distress.     Appearance: Normal appearance. He is well-developed.   HENT:      Head: Normocephalic and atraumatic.      Right Ear: Tympanic membrane, ear canal and external ear normal.      Left Ear: Tympanic membrane, ear canal and external ear normal.      Nose: Nose normal.      Mouth/Throat:      Mouth: Mucous membranes are moist.      Pharynx: Oropharynx is clear.   Eyes:      Conjunctiva/sclera: Conjunctivae normal.      Pupils: Pupils are equal, round, and reactive to light.   Cardiovascular:      Rate and Rhythm: Normal rate and regular rhythm.      Pulses: Normal pulses.      Heart sounds: Normal heart sounds. No murmur heard.  Pulmonary:      Effort: Pulmonary effort is normal. " No respiratory distress.      Breath sounds: Normal breath sounds.   Abdominal:      General: Bowel sounds are normal.      Palpations: Abdomen is soft.      Tenderness: There is no abdominal tenderness.   Musculoskeletal:         General: No swelling. Normal range of motion.      Cervical back: Normal range of motion and neck supple.   Skin:     General: Skin is warm and dry.      Capillary Refill: Capillary refill takes less than 2 seconds.   Neurological:      General: No focal deficit present.      Mental Status: He is alert. Mental status is at baseline.   Psychiatric:         Mood and Affect: Mood normal.         Behavior: Behavior normal.         Thought Content: Thought content normal.         Judgment: Judgment normal.          Mayur Pollock DO  [facility]

## 2024-04-17 NOTE — ASSESSMENT & PLAN NOTE
No worsening rhinitis continue with over-the-counter Zyrtec or Allegra.  Patient would like to try the Astepro over-the-counter spray which I provided a sample of he will hold off on the over-the-counter Zyrtec unless needed

## 2024-04-17 NOTE — ASSESSMENT & PLAN NOTE
No worsening visual change patient is following up with ophthalmology regularly he is continuing on low-dose aspirin along with Plavix blood pressure remains stable he has no new complaints he will continue with Crestor 20 mg and follow-up with all laboratory work as scheduled

## 2024-05-01 ENCOUNTER — APPOINTMENT (EMERGENCY)
Dept: CT IMAGING | Facility: HOSPITAL | Age: 64
End: 2024-05-01
Payer: COMMERCIAL

## 2024-05-01 ENCOUNTER — HOSPITAL ENCOUNTER (EMERGENCY)
Facility: HOSPITAL | Age: 64
Discharge: HOME | End: 2024-05-01
Attending: EMERGENCY MEDICINE
Payer: COMMERCIAL

## 2024-05-01 VITALS
TEMPERATURE: 98.7 F | SYSTOLIC BLOOD PRESSURE: 133 MMHG | RESPIRATION RATE: 16 BRPM | HEART RATE: 77 BPM | DIASTOLIC BLOOD PRESSURE: 67 MMHG | OXYGEN SATURATION: 95 %

## 2024-05-01 DIAGNOSIS — N23 RENAL COLIC ON LEFT SIDE: Primary | ICD-10-CM

## 2024-05-01 DIAGNOSIS — N20.0 KIDNEY STONES: ICD-10-CM

## 2024-05-01 DIAGNOSIS — N13.30 HYDRONEPHROSIS: ICD-10-CM

## 2024-05-01 LAB
ANION GAP SERPL CALCULATED.3IONS-SCNC: 10 MMOL/L (ref 4–13)
BACTERIA UR QL AUTO: NORMAL /HPF
BASOPHILS # BLD AUTO: 0.02 THOUSANDS/ÂΜL (ref 0–0.1)
BASOPHILS NFR BLD AUTO: 0 % (ref 0–1)
BILIRUB UR QL STRIP: NEGATIVE
BUN SERPL-MCNC: 25 MG/DL (ref 5–25)
CALCIUM SERPL-MCNC: 8.6 MG/DL (ref 8.4–10.2)
CHLORIDE SERPL-SCNC: 107 MMOL/L (ref 96–108)
CLARITY UR: CLEAR
CO2 SERPL-SCNC: 21 MMOL/L (ref 21–32)
COLOR UR: ABNORMAL
CREAT SERPL-MCNC: 1.22 MG/DL (ref 0.6–1.3)
EOSINOPHIL # BLD AUTO: 0 THOUSAND/ÂΜL (ref 0–0.61)
EOSINOPHIL NFR BLD AUTO: 0 % (ref 0–6)
ERYTHROCYTE [DISTWIDTH] IN BLOOD BY AUTOMATED COUNT: 12.4 % (ref 11.6–15.1)
GFR SERPL CREATININE-BSD FRML MDRD: 63 ML/MIN/1.73SQ M
GLUCOSE SERPL-MCNC: 131 MG/DL (ref 65–140)
GLUCOSE UR STRIP-MCNC: NEGATIVE MG/DL
HCT VFR BLD AUTO: 42.8 % (ref 36.5–49.3)
HGB BLD-MCNC: 14.3 G/DL (ref 12–17)
HGB UR QL STRIP.AUTO: ABNORMAL
IMM GRANULOCYTES # BLD AUTO: 0.02 THOUSAND/UL (ref 0–0.2)
IMM GRANULOCYTES NFR BLD AUTO: 0 % (ref 0–2)
KETONES UR STRIP-MCNC: ABNORMAL MG/DL
LEUKOCYTE ESTERASE UR QL STRIP: NEGATIVE
LIPASE SERPL-CCNC: 30 U/L (ref 11–82)
LYMPHOCYTES # BLD AUTO: 0.58 THOUSANDS/ÂΜL (ref 0.6–4.47)
LYMPHOCYTES NFR BLD AUTO: 6 % (ref 14–44)
MCH RBC QN AUTO: 32.3 PG (ref 26.8–34.3)
MCHC RBC AUTO-ENTMCNC: 33.4 G/DL (ref 31.4–37.4)
MCV RBC AUTO: 97 FL (ref 82–98)
MONOCYTES # BLD AUTO: 0.68 THOUSAND/ÂΜL (ref 0.17–1.22)
MONOCYTES NFR BLD AUTO: 7 % (ref 4–12)
NEUTROPHILS # BLD AUTO: 8.55 THOUSANDS/ÂΜL (ref 1.85–7.62)
NEUTS SEG NFR BLD AUTO: 87 % (ref 43–75)
NITRITE UR QL STRIP: NEGATIVE
NON-SQ EPI CELLS URNS QL MICRO: NORMAL /HPF
NRBC BLD AUTO-RTO: 0 /100 WBCS
PH UR STRIP.AUTO: 5 [PH]
PLATELET # BLD AUTO: 128 THOUSANDS/UL (ref 149–390)
PMV BLD AUTO: 11.4 FL (ref 8.9–12.7)
POTASSIUM SERPL-SCNC: 4 MMOL/L (ref 3.5–5.3)
PROT UR STRIP-MCNC: NEGATIVE MG/DL
RBC # BLD AUTO: 4.43 MILLION/UL (ref 3.88–5.62)
RBC #/AREA URNS AUTO: NORMAL /HPF
SODIUM SERPL-SCNC: 138 MMOL/L (ref 135–147)
SP GR UR STRIP.AUTO: 1.05 (ref 1–1.03)
UROBILINOGEN UR STRIP-ACNC: <2 MG/DL
WBC # BLD AUTO: 9.85 THOUSAND/UL (ref 4.31–10.16)
WBC #/AREA URNS AUTO: NORMAL /HPF

## 2024-05-01 PROCEDURE — 96375 TX/PRO/DX INJ NEW DRUG ADDON: CPT

## 2024-05-01 PROCEDURE — 36415 COLL VENOUS BLD VENIPUNCTURE: CPT | Performed by: EMERGENCY MEDICINE

## 2024-05-01 PROCEDURE — 96361 HYDRATE IV INFUSION ADD-ON: CPT

## 2024-05-01 PROCEDURE — 83690 ASSAY OF LIPASE: CPT | Performed by: EMERGENCY MEDICINE

## 2024-05-01 PROCEDURE — 96365 THER/PROPH/DIAG IV INF INIT: CPT

## 2024-05-01 PROCEDURE — 99284 EMERGENCY DEPT VISIT MOD MDM: CPT

## 2024-05-01 PROCEDURE — 81001 URINALYSIS AUTO W/SCOPE: CPT | Performed by: EMERGENCY MEDICINE

## 2024-05-01 PROCEDURE — 80076 HEPATIC FUNCTION PANEL: CPT | Performed by: EMERGENCY MEDICINE

## 2024-05-01 PROCEDURE — 96376 TX/PRO/DX INJ SAME DRUG ADON: CPT

## 2024-05-01 PROCEDURE — 99285 EMERGENCY DEPT VISIT HI MDM: CPT | Performed by: EMERGENCY MEDICINE

## 2024-05-01 PROCEDURE — 74177 CT ABD & PELVIS W/CONTRAST: CPT

## 2024-05-01 PROCEDURE — 80048 BASIC METABOLIC PNL TOTAL CA: CPT | Performed by: EMERGENCY MEDICINE

## 2024-05-01 PROCEDURE — 85025 COMPLETE CBC W/AUTO DIFF WBC: CPT | Performed by: EMERGENCY MEDICINE

## 2024-05-01 RX ORDER — OXYCODONE HYDROCHLORIDE 5 MG/1
5 TABLET ORAL EVERY 6 HOURS PRN
Qty: 12 TABLET | Refills: 0 | Status: SHIPPED | OUTPATIENT
Start: 2024-05-01 | End: 2024-05-04

## 2024-05-01 RX ORDER — ONDANSETRON 4 MG/1
4 TABLET, ORALLY DISINTEGRATING ORAL EVERY 8 HOURS PRN
Qty: 12 TABLET | Refills: 0 | Status: SHIPPED | OUTPATIENT
Start: 2024-05-01 | End: 2024-05-05

## 2024-05-01 RX ORDER — ACETAMINOPHEN 10 MG/ML
1000 INJECTION, SOLUTION INTRAVENOUS ONCE
Status: COMPLETED | OUTPATIENT
Start: 2024-05-01 | End: 2024-05-01

## 2024-05-01 RX ORDER — TAMSULOSIN HYDROCHLORIDE 0.4 MG/1
0.4 CAPSULE ORAL ONCE
Status: COMPLETED | OUTPATIENT
Start: 2024-05-01 | End: 2024-05-01

## 2024-05-01 RX ORDER — SENNOSIDES 8.6 MG
650 CAPSULE ORAL EVERY 8 HOURS PRN
Qty: 15 TABLET | Refills: 0 | Status: SHIPPED | OUTPATIENT
Start: 2024-05-01 | End: 2024-05-06

## 2024-05-01 RX ORDER — ONDANSETRON 2 MG/ML
4 INJECTION INTRAMUSCULAR; INTRAVENOUS ONCE
Status: COMPLETED | OUTPATIENT
Start: 2024-05-01 | End: 2024-05-01

## 2024-05-01 RX ORDER — HYDROMORPHONE HCL/PF 1 MG/ML
0.5 SYRINGE (ML) INJECTION ONCE
Status: COMPLETED | OUTPATIENT
Start: 2024-05-01 | End: 2024-05-01

## 2024-05-01 RX ORDER — TAMSULOSIN HYDROCHLORIDE 0.4 MG/1
0.4 CAPSULE ORAL
Qty: 7 CAPSULE | Refills: 0 | Status: SHIPPED | OUTPATIENT
Start: 2024-05-02 | End: 2024-05-08 | Stop reason: SDUPTHER

## 2024-05-01 RX ADMIN — ACETAMINOPHEN 1000 MG: 10 INJECTION INTRAVENOUS at 13:58

## 2024-05-01 RX ADMIN — SODIUM CHLORIDE 1000 ML: 0.9 INJECTION, SOLUTION INTRAVENOUS at 10:32

## 2024-05-01 RX ADMIN — TAMSULOSIN HYDROCHLORIDE 0.4 MG: 0.4 CAPSULE ORAL at 13:59

## 2024-05-01 RX ADMIN — IOHEXOL 85 ML: 350 INJECTION, SOLUTION INTRAVENOUS at 11:31

## 2024-05-01 RX ADMIN — HYDROMORPHONE HYDROCHLORIDE 0.5 MG: 1 INJECTION, SOLUTION INTRAMUSCULAR; INTRAVENOUS; SUBCUTANEOUS at 10:27

## 2024-05-01 RX ADMIN — ONDANSETRON 4 MG: 2 INJECTION INTRAMUSCULAR; INTRAVENOUS at 10:29

## 2024-05-01 RX ADMIN — HYDROMORPHONE HYDROCHLORIDE 0.5 MG: 1 INJECTION, SOLUTION INTRAMUSCULAR; INTRAVENOUS; SUBCUTANEOUS at 13:55

## 2024-05-01 NOTE — ED PROCEDURE NOTE
Procedure  POC Renal US    Date/Time: 5/1/2024 12:06 PM    Performed by: Enriqueta Zamora DO  Authorized by: Enriqueta Zamora DO    Patient location:  ED  Other Assisting Provider: Yes (comment) (Cassidy Chang DO)    Procedure details:     Exam Type:  Educational    Indications: flank/back pain      Assessment for:  Suspected hydronephrosis and bladder volume    Views obtained: bladder (transverse and sagittal), left kidney and right kidney      Image quality: diagnostic      Image availability:  Images available in PACS and video obtained  Findings:     LEFT kidney findings: unremarkable      RIGHT kidney findings: unremarkable      Bladder:  Visualized  Interpretation:     Renal ultrasound impressions: normal exam                     Enriqueta Zamora DO  05/01/24 [id number]

## 2024-05-01 NOTE — ED PROVIDER NOTES
History  Chief Complaint   Patient presents with    Flank Pain     Pt reports left sided flank pain xcouple weeks on and off, back and abd pain, hx kidney stones, nausea     Patient pmhx of central retinal artery occlusion, GERD, kidney stone who presents with flank and abdominal pain. Patient reports that for the the past two weeks patient has had intermittent L flank and lower abdominal pain. Last Denies any aggravating or alleviating factors, not worse with PO intake. Yesterday night, patient's flank and abdominal pain has become constant and more intense.  Denies any diarrhea, constipation, hematochezia, hematuria, difficulty urinating, increased urinary frequency/urgency. Denies any fevers or chills.          Prior to Admission Medications   Prescriptions Last Dose Informant Patient Reported? Taking?   aspirin (ECOTRIN LOW STRENGTH) 81 mg EC tablet  Self Yes No   Sig: Take 81 mg by mouth daily   clonazePAM (KlonoPIN) 0.5 mg tablet   No No   Sig: Take 1 tablet (0.5 mg total) by mouth 2 (two) times a day   Patient not taking: Reported on 11/15/2023   clopidogrel (PLAVIX) 75 mg tablet   No No   Sig: Take 1 tablet (75 mg total) by mouth daily   pantoprazole (PROTONIX) 40 mg tablet   Yes No   Patient not taking: Reported on 11/15/2023   rosuvastatin (CRESTOR) 20 MG tablet   No No   Sig: Take 1 tablet (20 mg total) by mouth daily      Facility-Administered Medications: None       Past Medical History:   Diagnosis Date    Chronic low back pain     GERD (gastroesophageal reflux disease)     Hyperlipidemia     Sleep apnea     Verruca 2 yrs       Past Surgical History:   Procedure Laterality Date    COLONOSCOPY      ESOPHAGOGASTRODUODENOSCOPY      AL ESOPHAGOGASTRODUODENOSCOPY TRANSORAL DIAGNOSTIC N/A 5/24/2018    Procedure: ESOPHAGOGASTRODUODENOSCOPY (EGD);  Surgeon: Francis Sanders MD;  Location: [facility];  Service: Gastroenterology       Family History   Problem Relation Age of Onset    Diabetes Mother     Lung  cancer Father     Cancer Father     Cancer Brother         Bladder Cancer and a stroke    No Known Problems Daughter     No Known Problems Son      I have reviewed and agree with the history as documented.    E-Cigarette/Vaping    E-Cigarette Use Never User      E-Cigarette/Vaping Substances    Nicotine No     THC No     CBD No     Flavoring No     Other No     Unknown No      Social History     Tobacco Use    Smoking status: Never    Smokeless tobacco: Never   Vaping Use    Vaping status: Never Used   Substance Use Topics    Alcohol use: Yes     Alcohol/week: 4.0 standard drinks of alcohol     Types: 2 Glasses of wine, 2 Cans of beer per week     Comment: social    Drug use: No        Review of Systems   Constitutional:  Positive for fatigue. Negative for chills and fever.   HENT: Negative.     Respiratory:  Negative for chest tightness and shortness of breath.    Cardiovascular:  Negative for chest pain and palpitations.   Gastrointestinal:  Positive for abdominal pain and nausea. Negative for blood in stool, constipation, diarrhea and vomiting.   Endocrine: Negative for polyuria.   Genitourinary:  Positive for flank pain. Negative for dysuria, frequency, hematuria, scrotal swelling and urgency.   Neurological:  Negative for dizziness, weakness and light-headedness.       Physical Exam  ED Triage Vitals   Temperature Pulse Respirations Blood Pressure SpO2   05/01/24 0933 05/01/24 0934 05/01/24 0934 05/01/24 0934 05/01/24 0934   98.7 °F (37.1 °C) 81 18 164/77 99 %      Temp Source Heart Rate Source Patient Position - Orthostatic VS BP Location FiO2 (%)   05/01/24 0933 05/01/24 0934 05/01/24 1400 05/01/24 1400 --   Oral Monitor Sitting Right arm       Pain Score       05/01/24 0933       9             Orthostatic Vital Signs  Vitals:    05/01/24 0934 05/01/24 1400   BP: 164/77 133/67   Pulse: 81 77   Patient Position - Orthostatic VS:  Sitting       Physical Exam  Constitutional:       Appearance: Normal appearance.  He is normal weight.   HENT:      Head: Normocephalic.   Cardiovascular:      Rate and Rhythm: Normal rate and regular rhythm.      Pulses: Normal pulses.      Heart sounds: No murmur heard.     No friction rub. No gallop.   Pulmonary:      Effort: Pulmonary effort is normal. No respiratory distress.      Breath sounds: No stridor. No wheezing, rhonchi or rales.   Chest:      Chest wall: No tenderness.   Abdominal:      General: Abdomen is flat. Bowel sounds are normal.      Palpations: Abdomen is soft.      Tenderness: There is abdominal tenderness (LLQ). There is left CVA tenderness.   Musculoskeletal:         General: Normal range of motion.      Cervical back: Normal range of motion.   Skin:     General: Skin is warm.   Neurological:      Mental Status: He is oriented to person, place, and time. Mental status is at baseline.   Psychiatric:         Mood and Affect: Mood normal.         Behavior: Behavior normal.         Thought Content: Thought content normal.         Judgment: Judgment normal.         ED Medications  Medications   sodium chloride 0.9 % bolus 1,000 mL (0 mL Intravenous Stopped 5/1/24 1211)   ondansetron (ZOFRAN) injection 4 mg (4 mg Intravenous Given 5/1/24 1029)   HYDROmorphone (DILAUDID) injection 0.5 mg (0.5 mg Intravenous Given 5/1/24 1027)   iohexol (OMNIPAQUE) 350 MG/ML injection (MULTI-DOSE) 100 mL (85 mL Intravenous Given 5/1/24 1131)   tamsulosin (FLOMAX) capsule 0.4 mg (0.4 mg Oral Given 5/1/24 1359)   HYDROmorphone (DILAUDID) injection 0.5 mg (0.5 mg Intravenous Given 5/1/24 1355)   acetaminophen (Ofirmev) injection 1,000 mg (0 mg Intravenous Stopped 5/1/24 1449)       Diagnostic Studies  Results Reviewed       Procedure Component Value Units Date/Time    Urine Microscopic [[id number]]  (Normal) Collected: 05/01/24 1210    Lab Status: Final result Specimen: Urine, Clean Catch Updated: 05/01/24 1253     RBC, UA 1-2 /hpf      WBC, UA 1-2 /hpf      Epithelial Cells None Seen /hpf       Bacteria, UA None Seen /hpf     UA w Reflex to Microscopic w Reflex to Culture [[id number]]  (Abnormal) Collected: 05/01/24 1210    Lab Status: Final result Specimen: Urine, Clean Catch Updated: 05/01/24 1250     Color, UA Light Yellow     Clarity, UA Clear     Specific Gravity, UA 1.048     pH, UA 5.0     Leukocytes, UA Negative     Nitrite, UA Negative     Protein, UA Negative mg/dl      Glucose, UA Negative mg/dl      Ketones, UA 10 (1+) mg/dl      Urobilinogen, UA <2.0 mg/dl      Bilirubin, UA Negative     Occult Blood, UA Trace    Basic metabolic panel [[id number]] Collected: 05/01/24 1027    Lab Status: Final result Specimen: Blood from Arm, Right Updated: 05/01/24 1111     Sodium 138 mmol/L      Potassium 4.0 mmol/L      Chloride 107 mmol/L      CO2 21 mmol/L      ANION GAP 10 mmol/L      BUN 25 mg/dL      Creatinine 1.22 mg/dL      Glucose 131 mg/dL      Calcium 8.6 mg/dL      eGFR 63 ml/min/1.73sq m     Narrative:      National Kidney Disease Foundation guidelines for Chronic Kidney Disease (CKD):     Stage 1 with normal or high GFR (GFR > 90 mL/min/1.73 square meters)    Stage 2 Mild CKD (GFR = 60-89 mL/min/1.73 square meters)    Stage 3A Moderate CKD (GFR = 45-59 mL/min/1.73 square meters)    Stage 3B Moderate CKD (GFR = 30-44 mL/min/1.73 square meters)    Stage 4 Severe CKD (GFR = 15-29 mL/min/1.73 square meters)    Stage 5 End Stage CKD (GFR <15 mL/min/1.73 square meters)  Note: GFR calculation is accurate only with a steady state creatinine    Lipase [[id number]]  (Normal) Collected: 05/01/24 1027    Lab Status: Final result Specimen: Blood from Arm, Right Updated: 05/01/24 1111     Lipase 30 u/L     Hepatic function panel [[id number]] Collected: 05/01/24 1027    Lab Status: Final result Specimen: Blood from Arm, Right Updated: 05/01/24 1111     Total Bilirubin 0.68 mg/dL      Bilirubin, Direct 0.08 mg/dL      Alkaline Phosphatase 47 U/L      AST 26 U/L      ALT 26 U/L      Total Protein 7.0 g/dL       Albumin 4.2 g/dL     CBC and differential [[id number]]  (Abnormal) Collected: 05/01/24 1027    Lab Status: Final result Specimen: Blood from Arm, Right Updated: 05/01/24 1107     WBC 9.85 Thousand/uL      RBC 4.43 Million/uL      Hemoglobin 14.3 g/dL      Hematocrit 42.8 %      MCV 97 fL      MCH 32.3 pg      MCHC 33.4 g/dL      RDW 12.4 %      MPV 11.4 fL      Platelets 128 Thousands/uL      nRBC 0 /100 WBCs      Segmented % 87 %      Immature Grans % 0 %      Lymphocytes % 6 %      Monocytes % 7 %      Eosinophils Relative 0 %      Basophils Relative 0 %      Absolute Neutrophils 8.55 Thousands/µL      Absolute Immature Grans 0.02 Thousand/uL      Absolute Lymphocytes 0.58 Thousands/µL      Absolute Monocytes 0.68 Thousand/µL      Eosinophils Absolute 0.00 Thousand/µL      Basophils Absolute 0.02 Thousands/µL                    CT abdomen pelvis with contrast   Final Result by Francisco Okeefe DO (05/01 1335)      1. Obstructing calculus measuring 6 mm within the proximal left ureter with mild upstream hydronephrosis, perinephric stranding, and hypoenhancement of the renal parenchyma.      2. Additional bilateral nonobstructive renal calculi. The largest measuring 8 mm on the left.      The study was marked in EPIC for immediate notification.               Workstation performed: [id number]               Procedures  Procedures      ED Course                                       Medical Decision Making  Patient with left-sided abdominal pain and left sided CVA tenderness since yesterday. Patient reported prior to yesterday intermittent tenderness for several days. Past history of kidney stones. Differential: Kidney stone, diverticulitis, pancreatitis, UTI, mesenteric ischemia, MSK    Ordered BMP with hepatic function panel, CBC, CT abdomen pelvis w contrast, UA, CT abdomen/Pelvis. Given 1L bolus of NS, Dilaudid, and zofran     BMP: creatinine 1.22, elevated from baseline of 1.1-0.9, otherwise within normal limits.  LFT unremarkable  Lipase within normal limits  CBC unremarkable  Microscopic UA grossly normal, no evidence of UTI  CT abdomen pelvis: Obstructive 6 mm stone within the proximal left ureter with mild upstream hydronephrosis, perinephric stranding, and hypoenhancement of the renal parenchyma. Patient with bilateral nonobstructive renal calculi largest being 8 mm on left.      Based upon imaging, patient's source of pain is L sided ureteral calculus. No evidence of infection on UA or CBC. Patient did not meet SIRS criteria. Patient's nausea was well controlled with Zofran. Required additional dose of dilaudid and was also given IV Tylenol due to pain after which his pain was well controlled controlled. Patient is stable for discharge with follow-up with his outpatient urologist. Zofran PRN, Tylenol PRN, Oxycodone PRN and a 7 day course of flowmax was ordered upon discharge. Patient is to return to the ED if worsening symptoms or signs of infection.         Amount and/or Complexity of Data Reviewed  Labs: ordered.  Radiology: ordered.    Risk  OTC drugs.  Prescription drug management.          Disposition  Final diagnoses:   Renal colic on left side   Kidney stones   Hydronephrosis     Time reflects when diagnosis was documented in both MDM as applicable and the Disposition within this note       Time User Action Codes Description Comment    5/1/2024  1:40 PM Tadevosyan, Tonya Add [N23] Renal colic on left side     5/1/2024  1:41 PM Tadevosyan, Tonya Add [N20.0] Kidney stones     5/1/2024  1:41 PM Tadevosyan, Tonya Add [N13.30] Hydronephrosis           ED Disposition       ED Disposition   Discharge    Condition   Stable    Date/Time   Wed May 1, 2024  1:41 PM    Comment   Betito Landers discharge to home/self care.                   Follow-up Information       Follow up With Specialties Details Why Contact Info Additional Information    [facility] Emergency Medicine  If symptoms  worsen, develop fever, and [address] [facility] [address]  [phone] [facility], [address] [facility], [place], [place], [address]    Leonid Floyd PA-C Urology, Physician Assistant Call today  [address]  [address]  [facility] [id number] [phone]               Discharge Medication List as of 5/1/2024  2:44 PM        START taking these medications    Details   acetaminophen (TYLENOL) 650 mg CR tablet Take 1 tablet (650 mg total) by mouth every 8 (eight) hours as needed for mild pain for up to 5 days, Starting Wed 5/1/2024, Until Mon 5/6/2024 at 2359, Normal      ondansetron (ZOFRAN-ODT) 4 mg disintegrating tablet Take 1 tablet (4 mg total) by mouth every 8 (eight) hours as needed for nausea or vomiting for up to 4 days, Starting Wed 5/1/2024, Until Sun 5/5/2024 at 2359, Normal      oxyCODONE (Roxicodone) 5 immediate release tablet Take 1 tablet (5 mg total) by mouth every 6 (six) hours as needed for moderate pain or severe pain for up to 3 days Max Daily Amount: 20 mg, Starting Wed 5/1/2024, Until Sat 5/4/2024 at 2359, Normal      tamsulosin (FLOMAX) 0.4 mg Take 1 capsule (0.4 mg total) by mouth daily with dinner Do not start before May 2, 2024., Starting Thu 5/2/2024, Normal           CONTINUE these medications which have NOT CHANGED    Details   aspirin (ECOTRIN LOW STRENGTH) 81 mg EC tablet Take 81 mg by mouth daily, Historical Med      clonazePAM (KlonoPIN) 0.5 mg tablet Take 1 tablet (0.5 mg total) by mouth 2 (two) times a day, Starting Wed 9/20/2023, Normal      clopidogrel (PLAVIX) 75 mg tablet Take 1 tablet (75 mg total) by mouth daily, Starting Wed 11/15/2023, Until Wed 4/17/2024, Normal      pantoprazole (PROTONIX) 40 mg tablet Historical Med      rosuvastatin (CRESTOR) 20 MG tablet Take 1 tablet (20 mg total) by mouth daily, Starting Wed 11/15/2023, Normal               PDMP Review       None             ED  Provider  Attending physically available and evaluated Betito Landers. I managed the patient along with the ED Attending.    Electronically Signed by           Lizandro Su MD  05/01/24 9974

## 2024-05-01 NOTE — DISCHARGE INSTRUCTIONS
Please take one pill of Flomax every day for the next week  Please take one tablet of  Zofran every 8 hours as needed for nausea   Please take one tablet Tylenol every 8 hours as needed for mild pain  Please take one tablet of oxycodone every 8 hours as needed for severe pain  Please follow up with your Urologist, see above for phone number  Return to the ED if there is any fever, worsening of symptoms

## 2024-05-02 ENCOUNTER — TELEPHONE (OUTPATIENT)
Dept: UROLOGY | Facility: AMBULATORY SURGERY CENTER | Age: 64
End: 2024-05-02

## 2024-05-02 ENCOUNTER — OFFICE VISIT (OUTPATIENT)
Dept: UROLOGY | Facility: CLINIC | Age: 64
End: 2024-05-02
Payer: COMMERCIAL

## 2024-05-02 VITALS
HEIGHT: 64 IN | HEART RATE: 82 BPM | OXYGEN SATURATION: 98 % | RESPIRATION RATE: 19 BRPM | DIASTOLIC BLOOD PRESSURE: 72 MMHG | WEIGHT: 166 LBS | SYSTOLIC BLOOD PRESSURE: 142 MMHG | BODY MASS INDEX: 28.34 KG/M2

## 2024-05-02 DIAGNOSIS — N20.0 KIDNEY STONES: Primary | ICD-10-CM

## 2024-05-02 DIAGNOSIS — N20.1 LEFT URETERAL STONE: ICD-10-CM

## 2024-05-02 DIAGNOSIS — N23 RENAL COLIC ON LEFT SIDE: ICD-10-CM

## 2024-05-02 LAB
SL AMB  POCT GLUCOSE, UA: NORMAL
SL AMB LEUKOCYTE ESTERASE,UA: NORMAL
SL AMB POCT BILIRUBIN,UA: NORMAL
SL AMB POCT BLOOD,UA: NORMAL
SL AMB POCT CLARITY,UA: CLEAR
SL AMB POCT COLOR,UA: YELLOW
SL AMB POCT KETONES,UA: NORMAL
SL AMB POCT NITRITE,UA: NORMAL
SL AMB POCT PH,UA: 6
SL AMB POCT SPECIFIC GRAVITY,UA: 1.01
SL AMB POCT URINE PROTEIN: NORMAL
SL AMB POCT UROBILINOGEN: 0.2

## 2024-05-02 PROCEDURE — 87086 URINE CULTURE/COLONY COUNT: CPT | Performed by: PHYSICIAN ASSISTANT

## 2024-05-02 PROCEDURE — 99213 OFFICE O/P EST LOW 20 MIN: CPT | Performed by: PHYSICIAN ASSISTANT

## 2024-05-02 PROCEDURE — 81002 URINALYSIS NONAUTO W/O SCOPE: CPT | Performed by: PHYSICIAN ASSISTANT

## 2024-05-02 RX ORDER — SODIUM CHLORIDE, SODIUM LACTATE, POTASSIUM CHLORIDE, CALCIUM CHLORIDE 600; 310; 30; 20 MG/100ML; MG/100ML; MG/100ML; MG/100ML
125 INJECTION, SOLUTION INTRAVENOUS CONTINUOUS
Status: CANCELLED | OUTPATIENT
Start: 2024-05-02

## 2024-05-02 NOTE — PROGRESS NOTES
UROLOGY PROGRESS NOTE   Patient Identifiers: Betito Landers (MRN [id number])  Date of Service: 5/2/2024    Subjective:   64-year-old man with history of kidney stones.  He was in the emergency room yesterday and had a CAT scan showing a 6 mm stone in the proximal ureter on the left side.  He has additional bilateral nonobstructing stones up to 8 mm.  Creatinine 1.22.  WBC 9.85.  Quite uncomfortable with pain and nausea.  No fever no hematuria.    Reason for visit: Kidney stone follow-up    Objective:     VITALS:    There were no vitals filed for this visit.  AUA SYMPTOM SCORE      Flowsheet Row Most Recent Value   AUA SYMPTOM SCORE    How often have you had a sensation of not emptying your bladder completely after you finished urinating? 0 (P)    How often have you had to urinate again less than two hours after you finished urinating? 0 (P)    How often have you found you stopped and started again several times when you urinate? 0 (P)    How often have you found it difficult to postpone urination? 0 (P)    How often have you had a weak urinary stream? 0 (P)    How often have you had to push or strain to begin urination? 0 (P)    How many times did you most typically get up to urinate from the time you went to bed at night until the time you got up in the morning? 1 (P)    Quality of Life: If you were to spend the rest of your life with your urinary condition just the way it is now, how would you feel about that? 5 (P)    AUA SYMPTOM SCORE 1 (P)               LABS:  Lab Results   Component Value Date    HGB 14.3 05/01/2024    HCT 42.8 05/01/2024    WBC 9.85 05/01/2024     (L) 05/01/2024   ]    Lab Results   Component Value Date    K 4.0 05/01/2024     05/01/2024    CO2 21 05/01/2024    BUN 25 05/01/2024    CREATININE 1.22 05/01/2024    CALCIUM 8.6 05/01/2024   ]        INPATIENT MEDS:    Current Outpatient Medications:     acetaminophen (TYLENOL) 650 mg CR tablet, Take 1 tablet (650 mg total) by mouth  every 8 (eight) hours as needed for mild pain for up to 5 days, Disp: 15 tablet, Rfl: 0    aspirin (ECOTRIN LOW STRENGTH) 81 mg EC tablet, Take 81 mg by mouth daily, Disp: , Rfl:     clonazePAM (KlonoPIN) 0.5 mg tablet, Take 1 tablet (0.5 mg total) by mouth 2 (two) times a day (Patient not taking: Reported on 11/15/2023), Disp: 30 tablet, Rfl: 0    clopidogrel (PLAVIX) 75 mg tablet, Take 1 tablet (75 mg total) by mouth daily, Disp: 90 tablet, Rfl: 3    ondansetron (ZOFRAN-ODT) 4 mg disintegrating tablet, Take 1 tablet (4 mg total) by mouth every 8 (eight) hours as needed for nausea or vomiting for up to 4 days, Disp: 12 tablet, Rfl: 0    oxyCODONE (Roxicodone) 5 immediate release tablet, Take 1 tablet (5 mg total) by mouth every 6 (six) hours as needed for moderate pain or severe pain for up to 3 days Max Daily Amount: 20 mg, Disp: 12 tablet, Rfl: 0    pantoprazole (PROTONIX) 40 mg tablet, , Disp: , Rfl:     rosuvastatin (CRESTOR) 20 MG tablet, Take 1 tablet (20 mg total) by mouth daily, Disp: 90 tablet, Rfl: 3    tamsulosin (FLOMAX) 0.4 mg, Take 1 capsule (0.4 mg total) by mouth daily with dinner Do not start before May 2, 2024., Disp: 7 capsule, Rfl: 0  No current facility-administered medications for this visit.      Physical Exam:   There were no vitals taken for this visit.  GEN: no acute distress    RESP: breathing comfortably with no accessory muscle use    ABD: soft, non-tender, non-distended left CVA tenderness  INCISION:    EXT: no significant peripheral edema       RADIOLOGY:    IMPRESSION:     1. Obstructing calculus measuring 6 mm within the proximal left ureter with mild upstream hydronephrosis, perinephric stranding, and hypoenhancement of the renal parenchyma.     2. Additional bilateral nonobstructive renal calculi. The largest measuring 8 mm on the left.    Assessment:   #1.  Acute left proximal ureter calculi with hydronephrosis    Plan:   -Discussed options of management with the patient  -Will  add him on for urgent add on potentially next week  -Urine sent for culture in case requeste surgery scheduler notified  -Reviewed the small possibility of stent only and requiring a second procedure understands

## 2024-05-02 NOTE — ED ATTENDING ATTESTATION
5/1/2024  I, Tonya Tse MD, saw and evaluated the patient. I have discussed the patient with the resident/non-physician practitioner and agree with the resident's/non-physician practitioner's findings, Plan of Care, and MDM as documented in the resident's/non-physician practitioner's note, except where noted. All available labs and Radiology studies were reviewed.  I was present for key portions of any procedure(s) performed by the resident/non-physician practitioner and I was immediately available to provide assistance.       At this point I agree with the current assessment done in the Emergency Department.  I have conducted an independent evaluation of this patient a history and physical is as follows:    64-year-old male presenting to emergency department with left-sided flank abdominal and back pain.  Associate with nausea.  History of kidney stones.  Feels somewhat similar.  No fevers.  No vomiting.  No pain or burning with urination.  Normal bowel movements.  No chest pain or trouble breathing.  On exam his with left CVA tenderness, left lower quadrant abdominal tenderness.    Agree with workup, abdominal ups, urine, CT, pain control    Patient with kidney stone.  No sign of infection.  Pain and nausea control.  Has urologist for outpatient follow-up.      ED Course         Critical Care Time  Procedures

## 2024-05-02 NOTE — H&P (VIEW-ONLY)
UROLOGY PROGRESS NOTE   Patient Identifiers: Betito Landers (MRN [id number])  Date of Service: 5/2/2024    Subjective:   64-year-old man with history of kidney stones.  He was in the emergency room yesterday and had a CAT scan showing a 6 mm stone in the proximal ureter on the left side.  He has additional bilateral nonobstructing stones up to 8 mm.  Creatinine 1.22.  WBC 9.85.  Quite uncomfortable with pain and nausea.  No fever no hematuria.    Reason for visit: Kidney stone follow-up    Objective:     VITALS:    There were no vitals filed for this visit.  AUA SYMPTOM SCORE      Flowsheet Row Most Recent Value   AUA SYMPTOM SCORE    How often have you had a sensation of not emptying your bladder completely after you finished urinating? 0 (P)    How often have you had to urinate again less than two hours after you finished urinating? 0 (P)    How often have you found you stopped and started again several times when you urinate? 0 (P)    How often have you found it difficult to postpone urination? 0 (P)    How often have you had a weak urinary stream? 0 (P)    How often have you had to push or strain to begin urination? 0 (P)    How many times did you most typically get up to urinate from the time you went to bed at night until the time you got up in the morning? 1 (P)    Quality of Life: If you were to spend the rest of your life with your urinary condition just the way it is now, how would you feel about that? 5 (P)    AUA SYMPTOM SCORE 1 (P)               LABS:  Lab Results   Component Value Date    HGB 14.3 05/01/2024    HCT 42.8 05/01/2024    WBC 9.85 05/01/2024     (L) 05/01/2024   ]    Lab Results   Component Value Date    K 4.0 05/01/2024     05/01/2024    CO2 21 05/01/2024    BUN 25 05/01/2024    CREATININE 1.22 05/01/2024    CALCIUM 8.6 05/01/2024   ]        INPATIENT MEDS:    Current Outpatient Medications:     acetaminophen (TYLENOL) 650 mg CR tablet, Take 1 tablet (650 mg total) by mouth  every 8 (eight) hours as needed for mild pain for up to 5 days, Disp: 15 tablet, Rfl: 0    aspirin (ECOTRIN LOW STRENGTH) 81 mg EC tablet, Take 81 mg by mouth daily, Disp: , Rfl:     clonazePAM (KlonoPIN) 0.5 mg tablet, Take 1 tablet (0.5 mg total) by mouth 2 (two) times a day (Patient not taking: Reported on 11/15/2023), Disp: 30 tablet, Rfl: 0    clopidogrel (PLAVIX) 75 mg tablet, Take 1 tablet (75 mg total) by mouth daily, Disp: 90 tablet, Rfl: 3    ondansetron (ZOFRAN-ODT) 4 mg disintegrating tablet, Take 1 tablet (4 mg total) by mouth every 8 (eight) hours as needed for nausea or vomiting for up to 4 days, Disp: 12 tablet, Rfl: 0    oxyCODONE (Roxicodone) 5 immediate release tablet, Take 1 tablet (5 mg total) by mouth every 6 (six) hours as needed for moderate pain or severe pain for up to 3 days Max Daily Amount: 20 mg, Disp: 12 tablet, Rfl: 0    pantoprazole (PROTONIX) 40 mg tablet, , Disp: , Rfl:     rosuvastatin (CRESTOR) 20 MG tablet, Take 1 tablet (20 mg total) by mouth daily, Disp: 90 tablet, Rfl: 3    tamsulosin (FLOMAX) 0.4 mg, Take 1 capsule (0.4 mg total) by mouth daily with dinner Do not start before May 2, 2024., Disp: 7 capsule, Rfl: 0  No current facility-administered medications for this visit.      Physical Exam:   There were no vitals taken for this visit.  GEN: no acute distress    RESP: breathing comfortably with no accessory muscle use    ABD: soft, non-tender, non-distended left CVA tenderness  INCISION:    EXT: no significant peripheral edema       RADIOLOGY:    IMPRESSION:     1. Obstructing calculus measuring 6 mm within the proximal left ureter with mild upstream hydronephrosis, perinephric stranding, and hypoenhancement of the renal parenchyma.     2. Additional bilateral nonobstructive renal calculi. The largest measuring 8 mm on the left.    Assessment:   #1.  Acute left proximal ureter calculi with hydronephrosis    Plan:   -Discussed options of management with the patient  -Will  add him on for urgent add on potentially next week  -Urine sent for culture in case requeste surgery scheduler notified  -Reviewed the small possibility of stent only and requiring a second procedure understands

## 2024-05-02 NOTE — TELEPHONE ENCOUNTER
I spoke with pt this afternoon and confirmed scheduling him for surgery with Dr. Finch at the [facility] on 5/6/24. Pt confirmed that he will have a  on the day of surgery and will be NPO after midnight the night before surgery. Pt was instructed to call our office with any additional questions or concerns regarding this procedure.

## 2024-05-03 ENCOUNTER — NURSE TRIAGE (OUTPATIENT)
Age: 64
End: 2024-05-03

## 2024-05-03 LAB — BACTERIA UR CULT: NORMAL

## 2024-05-03 NOTE — TELEPHONE ENCOUNTER
Pain is related to his underlying kidney stone in which she was seen in the office for yesterday.  He has scheduled surgery on 5/6 with Dr. Finch.  He should continue to increase his fluid intake as well as take the medications that were prescribed to him for pain.  It appears that he was given a prescription for oxycodone that he can utilize for severe pain he can also use Tylenol and Motrin.  He also was prescribed Zofran for any underlying nausea.  Should also continue to take Flomax.  If he starts to develop a fever greater than 100.5, intractable nausea and vomiting, and severe pain he can report to the emergency room.  Otherwise continue to monitor symptoms at home and plan for surgery in 3 days.

## 2024-05-03 NOTE — TELEPHONE ENCOUNTER
Called patient back and notified him that he can take the pain medication prescribed, and if symptoms get worse to go to ER - He understands.  He states that he hasn't gotten a call about surgery - explained that the OR will call him probably around 5 tonight - He understands

## 2024-05-03 NOTE — TELEPHONE ENCOUNTER
Patient scheduled for 5/6/24 procedure w/Dr. Finch CYSTOSCOPY URETEROSCOPY WITH LITHOTRIPSY HOLMIUM LASER, RETROGRADE PYELOGRAM AND INSERTION STENT URETERAL (Left: Bladder)     Patient called today to report he is still having ongoing  left mid back and flank area pain that radiates.     Denies fever, but does have low grade 99.6 at this time. Denies urinary symptoms, vomiting.       Reviewed ED precautions. Patient will monitor symptoms at this time.     Additional Information   Negative: The patient has severe uncontrolled pain   Negative: The patient has a fever of 101 or higher   Negative: The patient has persistent vomiting    Answer Questions - Initial Assessment   When did this pain start: 3 days    Where is your pain: mid back- generates through back     Is your pain on the left, right, or both sides: left    Does your pain radiate anywhere: pain radiates- feels like knife in back     Can you qualify the pain: constant     Do you have nausea or vomiting: nausea- but not eating.     Do you have a fever 101 or higher: No- low grade 99.6    Do you have any urinary symptoms: no    Do you have a history of kidney stones or UTI's: yes    Have you had recent urologic procedure or surgery: no    Protocols used: FLANK PAIN / KIDNEY STONES / HYDRONEPHROSIS

## 2024-05-06 ENCOUNTER — ANESTHESIA EVENT (OUTPATIENT)
Dept: PERIOP | Facility: HOSPITAL | Age: 64
End: 2024-05-06
Payer: COMMERCIAL

## 2024-05-06 ENCOUNTER — NURSE TRIAGE (OUTPATIENT)
Age: 64
End: 2024-05-06

## 2024-05-06 ENCOUNTER — TELEPHONE (OUTPATIENT)
Dept: UROLOGY | Facility: CLINIC | Age: 64
End: 2024-05-06

## 2024-05-06 ENCOUNTER — HOSPITAL ENCOUNTER (OUTPATIENT)
Facility: HOSPITAL | Age: 64
Setting detail: OUTPATIENT SURGERY
Discharge: HOME | End: 2024-05-06
Attending: UROLOGY | Admitting: UROLOGY
Payer: COMMERCIAL

## 2024-05-06 ENCOUNTER — ANESTHESIA (OUTPATIENT)
Dept: PERIOP | Facility: HOSPITAL | Age: 64
End: 2024-05-06
Payer: COMMERCIAL

## 2024-05-06 ENCOUNTER — APPOINTMENT (OUTPATIENT)
Dept: RADIOLOGY | Facility: HOSPITAL | Age: 64
End: 2024-05-06
Payer: COMMERCIAL

## 2024-05-06 VITALS
HEART RATE: 102 BPM | TEMPERATURE: 98 F | WEIGHT: 158 LBS | BODY MASS INDEX: 26.98 KG/M2 | DIASTOLIC BLOOD PRESSURE: 82 MMHG | OXYGEN SATURATION: 98 % | HEIGHT: 64 IN | SYSTOLIC BLOOD PRESSURE: 155 MMHG | RESPIRATION RATE: 20 BRPM

## 2024-05-06 DIAGNOSIS — N20.1 LEFT URETERAL STONE: Primary | ICD-10-CM

## 2024-05-06 PROCEDURE — C2617 STENT, NON-COR, TEM W/O DEL: HCPCS | Performed by: UROLOGY

## 2024-05-06 PROCEDURE — 52356 CYSTO/URETERO W/LITHOTRIPSY: CPT | Performed by: UROLOGY

## 2024-05-06 PROCEDURE — C1747 URETEROSCOPE DIGITAL FLEX SNGL USE STD DEFLECTION APTRA: HCPCS | Performed by: UROLOGY

## 2024-05-06 PROCEDURE — C1769 GUIDE WIRE: HCPCS | Performed by: UROLOGY

## 2024-05-06 PROCEDURE — 82360 CALCULUS ASSAY QUANT: CPT | Performed by: UROLOGY

## 2024-05-06 PROCEDURE — 74420 UROGRAPHY RTRGR +-KUB: CPT

## 2024-05-06 PROCEDURE — C1758 CATHETER, URETERAL: HCPCS | Performed by: UROLOGY

## 2024-05-06 PROCEDURE — C1894 INTRO/SHEATH, NON-LASER: HCPCS | Performed by: UROLOGY

## 2024-05-06 DEVICE — INLAY OPTIMA URETERAL STENT W/O GUIDEWIRE
Type: IMPLANTABLE DEVICE | Site: URETER | Status: FUNCTIONAL
Brand: BARD® INLAY OPTIMA® URETERAL STENT

## 2024-05-06 RX ORDER — CEFDINIR 300 MG/1
300 CAPSULE ORAL EVERY 12 HOURS SCHEDULED
Qty: 6 CAPSULE | Refills: 0 | Status: SHIPPED | OUTPATIENT
Start: 2024-05-06 | End: 2024-05-09

## 2024-05-06 RX ORDER — PHENAZOPYRIDINE HYDROCHLORIDE 200 MG/1
200 TABLET, FILM COATED ORAL
Qty: 6 TABLET | Refills: 0 | Status: SHIPPED | OUTPATIENT
Start: 2024-05-06 | End: 2024-05-08

## 2024-05-06 RX ORDER — PROPOFOL 10 MG/ML
INJECTION, EMULSION INTRAVENOUS AS NEEDED
Status: DISCONTINUED | OUTPATIENT
Start: 2024-05-06 | End: 2024-05-06

## 2024-05-06 RX ORDER — ONDANSETRON 2 MG/ML
INJECTION INTRAMUSCULAR; INTRAVENOUS AS NEEDED
Status: DISCONTINUED | OUTPATIENT
Start: 2024-05-06 | End: 2024-05-06

## 2024-05-06 RX ORDER — FENTANYL CITRATE/PF 50 MCG/ML
50 SYRINGE (ML) INJECTION
Status: DISCONTINUED | OUTPATIENT
Start: 2024-05-06 | End: 2024-05-06 | Stop reason: HOSPADM

## 2024-05-06 RX ORDER — DEXAMETHASONE SODIUM PHOSPHATE 10 MG/ML
INJECTION, SOLUTION INTRAMUSCULAR; INTRAVENOUS AS NEEDED
Status: DISCONTINUED | OUTPATIENT
Start: 2024-05-06 | End: 2024-05-06

## 2024-05-06 RX ORDER — SODIUM CHLORIDE, SODIUM LACTATE, POTASSIUM CHLORIDE, CALCIUM CHLORIDE 600; 310; 30; 20 MG/100ML; MG/100ML; MG/100ML; MG/100ML
125 INJECTION, SOLUTION INTRAVENOUS CONTINUOUS
Status: DISCONTINUED | OUTPATIENT
Start: 2024-05-06 | End: 2024-05-06

## 2024-05-06 RX ORDER — METOCLOPRAMIDE HYDROCHLORIDE 5 MG/ML
10 INJECTION INTRAMUSCULAR; INTRAVENOUS ONCE AS NEEDED
Status: DISCONTINUED | OUTPATIENT
Start: 2024-05-06 | End: 2024-05-06 | Stop reason: HOSPADM

## 2024-05-06 RX ORDER — FENTANYL CITRATE 50 UG/ML
INJECTION, SOLUTION INTRAMUSCULAR; INTRAVENOUS AS NEEDED
Status: DISCONTINUED | OUTPATIENT
Start: 2024-05-06 | End: 2024-05-06

## 2024-05-06 RX ORDER — SODIUM CHLORIDE, SODIUM LACTATE, POTASSIUM CHLORIDE, CALCIUM CHLORIDE 600; 310; 30; 20 MG/100ML; MG/100ML; MG/100ML; MG/100ML
125 INJECTION, SOLUTION INTRAVENOUS CONTINUOUS
Status: DISCONTINUED | OUTPATIENT
Start: 2024-05-06 | End: 2024-05-06 | Stop reason: HOSPADM

## 2024-05-06 RX ORDER — OXYCODONE HYDROCHLORIDE 5 MG/1
5 TABLET ORAL EVERY 4 HOURS PRN
Status: DISCONTINUED | OUTPATIENT
Start: 2024-05-06 | End: 2024-05-06 | Stop reason: HOSPADM

## 2024-05-06 RX ORDER — MAGNESIUM HYDROXIDE 1200 MG/15ML
LIQUID ORAL AS NEEDED
Status: DISCONTINUED | OUTPATIENT
Start: 2024-05-06 | End: 2024-05-06 | Stop reason: HOSPADM

## 2024-05-06 RX ORDER — HYDROMORPHONE HCL/PF 1 MG/ML
0.5 SYRINGE (ML) INJECTION
Status: DISCONTINUED | OUTPATIENT
Start: 2024-05-06 | End: 2024-05-06 | Stop reason: HOSPADM

## 2024-05-06 RX ORDER — LIDOCAINE HYDROCHLORIDE 10 MG/ML
INJECTION, SOLUTION EPIDURAL; INFILTRATION; INTRACAUDAL; PERINEURAL AS NEEDED
Status: DISCONTINUED | OUTPATIENT
Start: 2024-05-06 | End: 2024-05-06

## 2024-05-06 RX ORDER — OXYBUTYNIN CHLORIDE 5 MG/1
5 TABLET ORAL 3 TIMES DAILY PRN
Qty: 30 TABLET | Refills: 0 | Status: SHIPPED | OUTPATIENT
Start: 2024-05-06 | End: 2024-05-16

## 2024-05-06 RX ORDER — SODIUM CHLORIDE, SODIUM LACTATE, POTASSIUM CHLORIDE, CALCIUM CHLORIDE 600; 310; 30; 20 MG/100ML; MG/100ML; MG/100ML; MG/100ML
INJECTION, SOLUTION INTRAVENOUS CONTINUOUS PRN
Status: DISCONTINUED | OUTPATIENT
Start: 2024-05-06 | End: 2024-05-06

## 2024-05-06 RX ORDER — CEFAZOLIN SODIUM 1 G/50ML
1000 SOLUTION INTRAVENOUS
Status: COMPLETED | OUTPATIENT
Start: 2024-05-07 | End: 2024-05-06

## 2024-05-06 RX ADMIN — ONDANSETRON 4 MG: 2 INJECTION INTRAMUSCULAR; INTRAVENOUS at 16:00

## 2024-05-06 RX ADMIN — PROPOFOL 200 MG: 10 INJECTION, EMULSION INTRAVENOUS at 15:58

## 2024-05-06 RX ADMIN — DEXAMETHASONE SODIUM PHOSPHATE 10 MG: 10 INJECTION INTRAMUSCULAR; INTRAVENOUS at 16:00

## 2024-05-06 RX ADMIN — FENTANYL CITRATE 25 MCG: 50 INJECTION INTRAMUSCULAR; INTRAVENOUS at 16:24

## 2024-05-06 RX ADMIN — LIDOCAINE HYDROCHLORIDE 50 MG: 10 INJECTION, SOLUTION EPIDURAL; INFILTRATION; INTRACAUDAL; PERINEURAL at 15:57

## 2024-05-06 RX ADMIN — FENTANYL CITRATE 50 MCG: 50 INJECTION INTRAMUSCULAR; INTRAVENOUS at 15:54

## 2024-05-06 RX ADMIN — SODIUM CHLORIDE, SODIUM LACTATE, POTASSIUM CHLORIDE, AND CALCIUM CHLORIDE: .6; .31; .03; .02 INJECTION, SOLUTION INTRAVENOUS at 14:30

## 2024-05-06 RX ADMIN — FENTANYL CITRATE 25 MCG: 50 INJECTION INTRAMUSCULAR; INTRAVENOUS at 16:09

## 2024-05-06 RX ADMIN — CEFAZOLIN SODIUM 1000 MG: 1 SOLUTION INTRAVENOUS at 15:54

## 2024-05-06 NOTE — ANESTHESIA PREPROCEDURE EVALUATION
Procedure:  CYSTOSCOPY URETEROSCOPY WITH LITHOTRIPSY HOLMIUM LASER, RETROGRADE PYELOGRAM AND INSERTION STENT URETERAL (Left: Bladder)    Relevant Problems   CARDIO   (+) Chest pain   (+) Hyperlipidemia      GI/HEPATIC   (+) Chronic GERD      NEURO/PSYCH   (+) Anxiety   (+) Generalized anxiety disorder      PULMONARY   (+) Obstructive sleep apnea        Physical Exam    Airway    Mallampati score: II  TM Distance: >3 FB  Neck ROM: full     Dental   No notable dental hx     Cardiovascular      Pulmonary      Other Findings    Anesthesia Plan  ASA Score- 3     Anesthesia Type- general with ASA Monitors.         Additional Monitors:     Airway Plan: LMA.           Plan Factors-Exercise tolerance (METS): >4 METS.    Chart reviewed. EKG reviewed.  Existing labs reviewed. Patient summary reviewed.    Patient is not a current smoker.      There is medical exclusion for perioperative obstructive sleep apnea risk education.        Induction- intravenous.    Postoperative Plan- Plan for postoperative opioid use.     Informed Consent- Anesthetic plan and risks discussed with patient.  I personally reviewed this patient with the CRNA. Discussed and agreed on the Anesthesia Plan with the CRNA..

## 2024-05-06 NOTE — INTERVAL H&P NOTE
H&P reviewed. After examining the patient I find no changes in the patients condition since the H&P had been written.    Vitals:    05/06/24 1414   BP: 134/79   Pulse: 87   Resp: 20   Temp: 97.5 °F (36.4 °C)   SpO2: 97%     Patient reports severe pain yesterday treated with narcotic he is doing better today.  Urine culture was negative  Exam  No acute distress  Rate and rhythm  Good auscultation bilaterally  Abdomen soft nontender nondistended  No CVA tenderness palpation bilaterally    We we discussed the plan for left uteroscopy.  He does have moderate sized left renal stones as well.  Will attempt to treat these if left ureteral stone goes smoothly.

## 2024-05-06 NOTE — OP NOTE
OPERATIVE REPORT  PATIENT NAME: Betito Landers    :  1960  MRN: [id number]  Pt Location: AN OR ROOM 03    SURGERY DATE: 2024    Surgeons and Role:     * Mauro Finch MD - Primary    Preop Diagnosis:  Left ureteral stone [N20.1]  Left renal stones    Post-Op Diagnosis Codes:     * Left ureteral stone [N20.1]  Left renal stones    Procedure(s):  Left - CYSTOSCOPY URETEROSCOPY WITH LITHOTRIPSY HOLMIUM LASER of ureteral stone  Retrograde intrarenal surgery with laser lithotripsy of renal stone   STONE BASKET EXTRACTION. RETROGRADE PYELOGRAM AND INSERTION STENT URETERAL    Specimen(s):  ID Type Source Tests Collected by Time Destination   A : left ureteral and renal stone Calculus Ureter, Left STONE ANALYSIS Mauro Finch MD 2024 1636        Estimated Blood Loss:   Minimal    Drains:  Ureteral Internal Stent Left ureter 6 Fr. (Active)   Number of days: 0       Anesthesia Type:   General    Operative Indications:  Patient with an 8 mm proximal ureteral stone as well as 2 renal stones the larger measuring 1 cm in size associated with significant pain issues so fast track for surgery    Operative Findings:  Left proximal ureteral stone retropulsed into the kidney where it was treated along with a intra pole or stone.  The lower pole stone could be seen uroscopy but not accessed and based on retrograde appeared to be associated with a stenosed infundibulum  Distal ureter was tight and somewhat traumatized by access sheath  One-time use Aptra ureteroscope was chosen because of the patient's multiple large stones including lower pole location which would be difficult to access with the regular scope.    Complications:   None    Procedure and Technique:    After informed consent including the risks of bleeding, infection, ureteral injury, and need for secondary procedures, patient was placed supine in the operating room theater.  Gen. anesthesia was administered.      They were then placed in the dorsal  lithotomy position and sterilely prepped and draped in usual fashion. Antibiotic prophylaxis and DVT prophylaxis were administered Cystoscopy was performed the 21 Yi cystoscope 30° lens.  This revealed a normal urethra.  Inspection of the bladder revealed no abnormalities.  Fluoroscopy did show evidence of a stone in the proximal left ureter as well as stones in the kidney.    Attention was turned to the left ureteral orifice. A 5fr open ended catheter was attempted to be passed into the ureteral orifice. A retrograde ureteropyelogram was performed showing normal-appearing ureter and collecting system with filling defect in the proximal ureter consistent with stone. Then a 0.38 solo guidewire was passed through the open ended catheter and up the ureter into the renal pelvis.     A semirigid ureteroscope was passed into the bladder and attempted to pass in the ureter but the ureteral orifice was somewhat tight so a second wire was passed through the scope into the ureteral orifice and with this the ureteroscope to gently enter the ureter but could not advance as the ureter orifice was too tight to accommodate.    The scope was removed and over the second wire a 1012 x 35 cm access sheath was then sequentially placed (inner sheath and then inner + outer sheath)  under fluoroscopic guidance with some resistance in distal ureter otherwise no resistance after this.      A Aptra one-time use flexible ureteroscope was then passed through the access sheath.  The scope was chosen because of the patient's multiple large stones including lower pole location which would be difficult to access with the regular scope.    The stone was encountered in the proximal ureter but quickly retropulsed into an upper pole of the collecting system where it was followed      This was fragmented with the use of a 270 micron holmium laser fiber at settings of 0.2J and 50Hz and 0.8J and 10Hz.  Another stone located in the interpolar calyx  was also fragmented in a similar fashion.  Both stones were brown and hard consistent with calcium oxalate monohydrate.      The lower pole was entered and explored but the stone seen arthroscopy could not be found.  It appeared to be associated with a stenosis of the pedal based on pyelogram.    Sequential passes were then made with a 1.9fr zero tip wire basket in order to retrieve stone debris from the upper and interpolar calyces.   The collecting system was examined again and no significant residual stone fragments were appreciated.  Hemostasis was appropriate.       The ureteroscope was backed down the ureter under vision and there were no residual fragments and the ureter was noted to be intact with no injury other than the distal ureter which showed some traumatic changes from access sheath passage along the medial aspect of intravesical ureter.    Cystoscope was reassembled over the guidewire and a 6 x 24 double-J stent inserted without difficulty with good coil seen in left collecting system on fluoroscopy and visually in the bladder.  The string was not left on. The bladder was drained.   The patient was placed back supine, awakened from general anesthesia and brought to recovery room in stable condition.     A qualified resident physician was not available.    Patient Disposition:  PACU     Plan: Stent will be removed in 2 to 3 weeks.  KUB ultrasound to follow 4 to 6 weeks later.        SIGNATURE: Mauro Finch MD  DATE: May 6, 2024  TIME: 4:53 PM

## 2024-05-06 NOTE — DISCHARGE INSTR - AVS FIRST PAGE
Betito Landers:    Your surgery went well.  Your ureter stone and one of your kidney stones were fragmented to small pieces and subsequent fragments extracted via basket such that a residual stone should be small like grains of sand that can pass on their own.  There was a another kidney stone that could not be accessed because it was in a location the scope cannot reach the lower pole    We will keep your stent in place for a few weeks because the end of your kidney tube was tight and this will help it heal.  Will plan to remove your stent in clinic via cystoscopy.  This is a quick outpatient procedure for which you are awake and has mild discomfort.    Please plan to take an antibiotic around the stent removal starting the day before, the day of and finishing the day after stent removal.    It is important to ensure you have healed well from surgery and therefore we will plan for an abdominal X-ray and kidney ultrasound approximately 4-6 weeks after the stent is removed.    Please take your medications as prescribed with caution for comfort.  Most importantly please drink 6-8 glasses of water per day    Please call with any questions or concerns.    Mauro Finch MD  [facility]  [phone]            WHAT IS A STENT?  At the end of the procedure, your doctor may place a stent into your ureter. A stent is a thin, flexible piece of plastic that will hold open your ureter while the remaining small pieces of stone pass. This allows your kidney to drain easily and prevents you from having to “pass” these small stone pieces on your own, which could be painful. The stent is about 12 inches long and looks and feels like a thin piece of spaghetti.    AFTER THE PROCEDURE  After the procedure you may experience the following symptoms. All of these are normal and should resolve within 1 or 2 days after your stent is removed.  Urinary frequency (urinating more often than usual)  Urinary urgency (the  sensation that you need to urinate right away)  Painful urination (this can be pain in your bladder or in your back when  you urinate)  Blood in your urine ( a stent can irritate the lining of your bladder causing it to bleed)  Back/Flank pain, especially with urination    You may receive a prescription for narcotic pain medication after the procedure. You will also receive a prescription for tamsulosin which you will take once a day for 2 weeks to help relax your ureter and decrease stent discomfort. You will also need to purchase a stool softener (i.e. Colace) or mild laxative (i.e. Miralax) as the narcotic pain medication can make you constipated. This is important as constipation can exacerbate stent related symptoms.     STENT REMOVAL  In some cases, your doctor will leave strings attached to your stent. The strings will be taped to your skin after the procedure. The strings will allow you to remove the thin flexible stent while you are at home. Normally, the stent can be removed 3-5 days after your procedure; your physician will tell you the specific date after your procedure.     On the day you are supposed to remove your stent, do the following:  When you wake up in the morning, take 1-2 pain pills with food.  Start your antibiotic pill the morning of schedule stent removal if prescribed  One hour later sit on the toilet or in the bath tub.  Take a deep breath in and while exhaling, pull the string.   Dispose of the stent in the garbage.    Alternatively, you will come back for an office procedure to remove the stent by placing a small camera into your bladder to remove the stent.    During the next 4-8 hours after removing your stent, you may experience additional blood in your urine, pain with urination or back/side pain. You should take the pain medication you were prescribed to help you with the pain, as well as continue the Flomax. If the pain is severe, you are vomiting, and/or have a fever > 101.4  please call the clinic.

## 2024-05-06 NOTE — TELEPHONE ENCOUNTER
Left ureteroscopy for treatment of a ureteral stone and interpolar renal stone.  There was a lower pole renal stone which could not be accessed via the ureteroscope.  Distal ureter was tight and therefore plan for stent x 2-3 weeks to help this area heal from access sheath trauma.

## 2024-05-06 NOTE — ANESTHESIA POSTPROCEDURE EVALUATION
Post-Op Assessment Note    CV Status:  Stable  Pain Score: 0    Pain management: adequate       Mental Status:  Alert and awake   Hydration Status:  Euvolemic   PONV Controlled:  Controlled   Airway Patency:  Patent     Post Op Vitals Reviewed: Yes    No anethesia notable event occurred.    Staff: Anesthesiologist, CRNA               BP   129/67   Temp   98.8   Pulse  90   Resp   12   SpO2   97

## 2024-05-06 NOTE — TELEPHONE ENCOUNTER
"Answer Assessment - Initial Assessment Questions  1. REASON FOR CALL or QUESTION: \"What is your reason for calling today?\" or \"How can I best help you?\" or \"What question do you have that I can help answer?\"           Patients spouse called to confirm location of surgery for today.     Advised [facility]    Protocols used: Information Only Call - No Triage-ADULT-[place]    "

## 2024-05-07 NOTE — TELEPHONE ENCOUNTER
LM to contact office if he calls please ask how he is feeling. Please advise. Normal post op expectations are as follows: blood in urine, painful urination, urgency and frequency with urination and back pain. Please increase your water intake and take medications as prescribed. You may use a heating pad for back discomfort. Please look out for any signs and symptoms of infection. Ex: chills and fever. If that does occur, please give the office a call.     Please confirm follow up appts and make sure pt get US and KUB PTV in 6 weeks.

## 2024-05-07 NOTE — TELEPHONE ENCOUNTER
"Patient and spouse calling in with c/o of left side discomfort. Patient describes it as \"pulsing, muscle cramp\" he states when it happens his urinary flow slows down.     Patient states he took a Pyridium for burning but he has not taken any Oxybutynin yet, advised to take that now for bladder spasm. He is drinking 50-60 ounces of water daily, advised to continue with that. Encouraged him to use the medications prescribed and utilize a heating pad for the back pain as well.    Patient states he is having BM's but concerned for constipation. Discussed colace and senokot. He will start with colace.     Reviewed all sxs to be expected, reviewed s/s of infection and reviewed ED precautions.     Confirmed upcoming appts with patient. He states he prefers [facility] for stent removal if possible. Did inform patient due to availability most likely not possible.     Patient is taking the Flomax and has 2 doses left, he is wondering if he should continue. If so send to [facility] on file.        "

## 2024-05-08 DIAGNOSIS — N23 RENAL COLIC ON LEFT SIDE: ICD-10-CM

## 2024-05-08 LAB
ALBUMIN SERPL BCP-MCNC: 4.2 G/DL (ref 3.5–5)
ALP SERPL-CCNC: 47 U/L (ref 34–104)
ALT SERPL W P-5'-P-CCNC: 26 U/L (ref 7–52)
AST SERPL W P-5'-P-CCNC: 26 U/L (ref 13–39)
BILIRUB DIRECT SERPL-MCNC: 0.08 MG/DL (ref 0–0.2)
BILIRUB SERPL-MCNC: 0.68 MG/DL (ref 0.2–1)
PROT SERPL-MCNC: 7 G/DL (ref 6.4–8.4)

## 2024-05-08 RX ORDER — TAMSULOSIN HYDROCHLORIDE 0.4 MG/1
0.4 CAPSULE ORAL
Qty: 30 CAPSULE | Refills: 0 | Status: SHIPPED | OUTPATIENT
Start: 2024-05-08

## 2024-05-08 NOTE — TELEPHONE ENCOUNTER
Patient called and message provided about medication sent to pharmacy. Patient wants to know if its ok for him to eat red sauce with pasta.  He wants to make sure it will not irritate anything in regards to acidity with the tomato sauce.  He is going out to dinner tonight. Are there any restrictions on what he is able to eat.     Patient can be reached at [phone]

## 2024-05-08 NOTE — TELEPHONE ENCOUNTER
LM on voicemail for patient to call office back. Number provided.    If patient calls back, please let him know that a refill of Flomax was sent to [facility] [place].    Per Jackie YODER:    Flomax will help relax his ureters and bladder to make it easier to urinate.

## 2024-05-08 NOTE — TELEPHONE ENCOUNTER
Spoke with patient and aware that acidic foods, spicy, caffeine, soda are considered bladder irritants. Explained there is not guarantee that it won't cause him some discomfort so avoiding these foods would be best for his current situation. Encouraged adequate hydration with water and states he only drinks water.    Patient verbalized understanding and appreciative of phone call.    No further questions or concerns.

## 2024-05-15 LAB
COLOR STONE: NORMAL
COM MFR STONE: 100 %
COMMENT-STONE3: NORMAL
COMPOSITION: NORMAL
LABORATORY COMMENT REPORT: NORMAL
PHOTO: NORMAL
SIZE STONE: NORMAL MM
SPEC SOURCE SUBJ: NORMAL
STONE ANALYSIS-IMP: NORMAL
STONE ANALYSIS-IMP: NORMAL
WT STONE: 83 MG

## 2024-05-21 NOTE — PROGRESS NOTES
Cystoscopy     Date/Time  5/22/2024 11:40 AM     Performed by  Renu Galvez PA-C   Authorized by  Renu Galvez PA-C     Universal Protocol:  Consent: Verbal consent obtained. Written consent obtained.  Risks and benefits: risks, benefits and alternatives were discussed  Consent given by: patient  Timeout called at: 5/22/2024 11:57 AM.  Patient understanding: patient states understanding of the procedure being performed  Patient consent: the patient's understanding of the procedure matches consent given  Procedure consent: procedure consent matches procedure scheduled  Patient identity confirmed: verbally with patient and provided demographic data      Procedure Details:  Procedure type: simple removal of a foreign body, stone, or stent    Patient tolerance: Patient tolerated the procedure well with no immediate complications    Additional Procedure Details: Patient cleaned and draped in sterile fashion. Urojet injected into urethra. Cystoscope introduced in urethra and advanced into bladder with visualization and then subsequent removal of ureteral stent using stent grasper device.          - Left ureteral stent removed  - Complete Cefdinir as prescribed  - F/u in 4-6 weeks with KUB and US

## 2024-05-22 ENCOUNTER — PROCEDURE VISIT (OUTPATIENT)
Dept: UROLOGY | Facility: CLINIC | Age: 64
End: 2024-05-22
Payer: COMMERCIAL

## 2024-05-22 VITALS
BODY MASS INDEX: 27.49 KG/M2 | HEART RATE: 86 BPM | HEIGHT: 64 IN | WEIGHT: 161 LBS | SYSTOLIC BLOOD PRESSURE: 128 MMHG | OXYGEN SATURATION: 98 % | DIASTOLIC BLOOD PRESSURE: 76 MMHG | TEMPERATURE: 98.3 F

## 2024-05-22 DIAGNOSIS — N20.0 KIDNEY STONES: Primary | ICD-10-CM

## 2024-05-22 LAB
SL AMB  POCT GLUCOSE, UA: NORMAL
SL AMB LEUKOCYTE ESTERASE,UA: NORMAL
SL AMB POCT BILIRUBIN,UA: NORMAL
SL AMB POCT BLOOD,UA: NORMAL
SL AMB POCT CLARITY,UA: CLEAR
SL AMB POCT COLOR,UA: YELLOW
SL AMB POCT KETONES,UA: NORMAL
SL AMB POCT NITRITE,UA: NORMAL
SL AMB POCT PH,UA: NORMAL
SL AMB POCT SPECIFIC GRAVITY,UA: 1
SL AMB POCT URINE PROTEIN: NORMAL
SL AMB POCT UROBILINOGEN: 0.2

## 2024-05-22 PROCEDURE — 81002 URINALYSIS NONAUTO W/O SCOPE: CPT | Performed by: PHYSICIAN ASSISTANT

## 2024-05-22 PROCEDURE — 52310 CYSTOSCOPY AND TREATMENT: CPT | Performed by: PHYSICIAN ASSISTANT

## 2024-06-05 NOTE — PROGRESS NOTES
"  UROLOGY PROGRESS NOTE   Patient Identifiers: Betito Landers (MRN [id number])  Date of Service: 10/6/2022  Subjective:   62-year-old man history of kidney stones.  CT showing mild left hydronephrosis with 3 mm stone at the UV junction.    Reason for visit: Kidney stone follow-up    Objective:     VITALS:    Vitals:    10/06/22 1024   BP: 128/78   Pulse: 69   SpO2: 100%           LABS:  Lab Results   Component Value Date    HGB 14.3 05/01/2024    HCT 42.8 05/01/2024    WBC 9.85 05/01/2024     (L) 05/01/2024   ]    Lab Results   Component Value Date    K 4.0 05/01/2024     05/01/2024    CO2 21 05/01/2024    BUN 25 05/01/2024    CREATININE 1.22 05/01/2024    CALCIUM 8.6 05/01/2024   ]        INPATIENT MEDS:    Current Outpatient Medications:     aspirin (ECOTRIN LOW STRENGTH) 81 mg EC tablet, Take 81 mg by mouth daily, Disp: , Rfl:     clopidogrel (PLAVIX) 75 mg tablet, Take 1 tablet (75 mg total) by mouth daily, Disp: 90 tablet, Rfl: 3    ondansetron (ZOFRAN-ODT) 4 mg disintegrating tablet, Take 1 tablet (4 mg total) by mouth every 8 (eight) hours as needed for nausea or vomiting for up to 4 days, Disp: 12 tablet, Rfl: 0    oxybutynin (DITROPAN) 5 mg tablet, Take 1 tablet (5 mg total) by mouth 3 (three) times a day as needed (bladder spasms) for up to 10 days, Disp: 30 tablet, Rfl: 0    rosuvastatin (CRESTOR) 20 MG tablet, Take 1 tablet (20 mg total) by mouth daily, Disp: 90 tablet, Rfl: 3    tamsulosin (FLOMAX) 0.4 mg, Take 1 capsule (0.4 mg total) by mouth daily with dinner, Disp: 30 capsule, Rfl: 0      Physical Exam:   /78 (BP Location: Right arm, Patient Position: Sitting, Cuff Size: Adult)   Pulse 69   Ht 5' 4\" (1.626 m)   Wt 74.9 kg (165 lb 3.2 oz)   SpO2 100%   BMI 28.36 kg/m²   GEN: no acute distress    RESP: breathing comfortably with no accessory muscle use    ABD: soft, non-tender, non-distended   INCISION:    EXT: no significant peripheral edema       RADIOLOGY:   IMPRESSION:   "   Mild left hydroureteronephrosis secondary to a 3 mm calculus just proximal to the ureterovesical junction. Additional bilateral nonobstructive calculi noted.    Assessment:   #1.  Nephrolithiasis    Plan:   -Follow-up in 3 months with KUB prior to visit  -  -  -

## 2024-06-20 ENCOUNTER — APPOINTMENT (OUTPATIENT)
Dept: RADIOLOGY | Facility: MEDICAL CENTER | Age: 64
End: 2024-06-20
Payer: COMMERCIAL

## 2024-06-20 ENCOUNTER — HOSPITAL ENCOUNTER (OUTPATIENT)
Dept: RADIOLOGY | Facility: MEDICAL CENTER | Age: 64
Discharge: HOME | End: 2024-06-20
Payer: COMMERCIAL

## 2024-06-20 DIAGNOSIS — N20.1 LEFT URETERAL STONE: ICD-10-CM

## 2024-06-20 PROCEDURE — 76775 US EXAM ABDO BACK WALL LIM: CPT

## 2024-06-20 PROCEDURE — 74018 RADEX ABDOMEN 1 VIEW: CPT

## 2024-06-25 ENCOUNTER — OFFICE VISIT (OUTPATIENT)
Dept: UROLOGY | Facility: CLINIC | Age: 64
End: 2024-06-25
Payer: COMMERCIAL

## 2024-06-25 VITALS
BODY MASS INDEX: 27.49 KG/M2 | RESPIRATION RATE: 14 BRPM | OXYGEN SATURATION: 95 % | WEIGHT: 161 LBS | SYSTOLIC BLOOD PRESSURE: 132 MMHG | HEIGHT: 64 IN | DIASTOLIC BLOOD PRESSURE: 79 MMHG | TEMPERATURE: 98.8 F | HEART RATE: 113 BPM

## 2024-06-25 DIAGNOSIS — N20.1 LEFT URETERAL STONE: ICD-10-CM

## 2024-06-25 DIAGNOSIS — N20.0 KIDNEY STONES: Primary | ICD-10-CM

## 2024-06-25 PROCEDURE — 99213 OFFICE O/P EST LOW 20 MIN: CPT | Performed by: PHYSICIAN ASSISTANT

## 2024-06-25 NOTE — PROGRESS NOTES
6/25/2024      Chief Complaint   Patient presents with    Follow-up     Assessment and Plan    Nephrolithiasis   - S/p left ureteroscopy with laser lithotripsy  and basket stone extraction on 5/6/24  - Stone analysis CaOx  - KUB from 6/20/24 - Stable bilateral renal calculi.   - Renal bladder US from 6/20/24 - Left hydronephrosis has resolved. Left ureteral jet is visualized. No perinephric collection. Bilateral renal nonobstructing calculi.  - Asymptomatic.   - Recommend dietary modifications and proper hydration for stone prevention   - Nephrology referral for metabolic evaluation   - F/u in 1 year with KUB and US to monitor stone burden. Continue yearly prostate cancer screening with PCP    History of Present Illness  Betito Landers is a 64 y.o. male here for follow up evaluation of nephrolithiasis. Recently s/p left URS last month. Presents today for imaging review. He is doing well and has no flank pain or urinary symptoms.       Review of Systems   Constitutional:  Negative for chills and fever.   Respiratory:  Negative for shortness of breath.    Cardiovascular:  Negative for chest pain.   Gastrointestinal:  Negative for abdominal pain.   Genitourinary:  Negative for difficulty urinating, dysuria, flank pain, frequency, hematuria and urgency.   Neurological:  Negative for dizziness.           AUA SYMPTOM SCORE      Flowsheet Row Most Recent Value   AUA SYMPTOM SCORE    How often have you had a sensation of not emptying your bladder completely after you finished urinating? 0 (P)     How often have you had to urinate again less than two hours after you finished urinating? 0 (P)     How often have you found you stopped and started again several times when you urinate? 0 (P)     How often have you found it difficult to postpone urination? 0 (P)     How often have you had a weak urinary stream? 0 (P)     How often have you had to push or strain to begin urination? 0 (P)     How many times did you most typically  get up to urinate from the time you went to bed at night until the time you got up in the morning? 1 (P)     Quality of Life: If you were to spend the rest of your life with your urinary condition just the way it is now, how would you feel about that? 0 (P)     AUA SYMPTOM SCORE 1 (P)                 Past Medical History  Past Medical History:   Diagnosis Date    Chronic low back pain     GERD (gastroesophageal reflux disease)     Hyperlipidemia     Sleep apnea     Verruca 2 yrs       Past Social History  Past Surgical History:   Procedure Laterality Date    COLONOSCOPY      ESOPHAGOGASTRODUODENOSCOPY      FL RETROGRADE PYELOGRAM  5/6/2024    AK CYSTO/URETERO W/LITHOTRIPSY &INDWELL STENT INSRT Left 5/6/2024    Procedure: CYSTOSCOPY URETEROSCOPY WITH LITHOTRIPSY HOLMIUM LASER, STONE BASKET EXTRACTION, RETROGRADE PYELOGRAM AND INSERTION STENT URETERAL;  Surgeon: Mauro Finch MD;  [facility];  Service: Urology    AK ESOPHAGOGASTRODUODENOSCOPY TRANSORAL DIAGNOSTIC N/A 5/24/2018    Procedure: ESOPHAGOGASTRODUODENOSCOPY (EGD);  Surgeon: Francis Sanders MD;  Location: [facility];  Service: Gastroenterology     Social History     Tobacco Use   Smoking Status Never    Passive exposure: Past   Smokeless Tobacco Never       Past Family History  Family History   Problem Relation Age of Onset    Diabetes Mother     Lung cancer Father     Cancer Father     Cancer Brother         Bladder Cancer and a stroke    No Known Problems Daughter     No Known Problems Son        Past Social history  Social History     Socioeconomic History    Marital status: /Civil Union     Spouse name: Not on file    Number of children: Not on file    Years of education: Not on file    Highest education level: Not on file   Occupational History    Not on file   Tobacco Use    Smoking status: Never     Passive exposure: Past    Smokeless tobacco: Never   Vaping Use    Vaping status: Never Used   Substance and Sexual Activity    Alcohol  use: Yes     Alcohol/week: 4.0 standard drinks of alcohol     Types: 2 Glasses of wine, 2 Cans of beer per week     Comment: social    Drug use: No    Sexual activity: Yes     Partners: Female   Other Topics Concern    Not on file   Social History Narrative    Not on file     Social Determinants of Health     Financial Resource Strain: Not At Risk (10/7/2023)    Received from [facility]    Financial Resource Strain     In the last 12 months did you skip medications to save money?: No     In the last 12 months, was there a time when you needed to see a doctor but could not because of cost?: No   Food Insecurity: Not At Risk (10/7/2023)    Received from [facility]    Food Insecurity     In the last 12 months did you ever eat less than you felt you should because there wasn't enough money for food?: No   Transportation Needs: Not At Risk (10/7/2023)    Received from [facility]    Transporation     In the last 12 months, have you ever had to go without healthcare because you didn't have a way to get there?: No   Physical Activity: Not on file   Stress: Not on file   Social Connections: Not At Risk (10/7/2023)    Received from [facility]    Social Connections     Do you often feel lonely?: No   Intimate Partner Violence: Not on file   Housing Stability: Not At Risk (10/7/2023)    Received from [facility]    Housing Stability     Are you worried that in the next 2 months you may not have stable housing?: No       Current Medications  Current Outpatient Medications   Medication Sig Dispense Refill    aspirin (ECOTRIN LOW STRENGTH) 81 mg EC tablet Take 81 mg by mouth daily      rosuvastatin (CRESTOR) 20 MG  "tablet Take 1 tablet (20 mg total) by mouth daily 90 tablet 3    clopidogrel (PLAVIX) 75 mg tablet Take 1 tablet (75 mg total) by mouth daily 90 tablet 3     No current facility-administered medications for this visit.       Allergies  No Known Allergies      The following portions of the patient's history were reviewed and updated as appropriate: allergies, current medications, past medical history, past social history, past surgical history and problem list.      Vitals  Vitals:    06/25/24 1403   BP: 132/79   BP Location: Left arm   Patient Position: Sitting   Cuff Size: Standard   Pulse: (!) 113   Resp: 14   Temp: 98.8 °F (37.1 °C)   TempSrc: Temporal   SpO2: 95%   Weight: 73 kg (161 lb)   Height: 5' 4\" (1.626 m)           Physical Exam  Physical Exam  Constitutional:       Appearance: Normal appearance.   HENT:      Head: Normocephalic and atraumatic.      Right Ear: External ear normal.      Left Ear: External ear normal.      Nose: Nose normal.   Eyes:      General: No scleral icterus.     Conjunctiva/sclera: Conjunctivae normal.   Cardiovascular:      Pulses: Normal pulses.   Pulmonary:      Effort: Pulmonary effort is normal.   Musculoskeletal:         General: Normal range of motion.      Cervical back: Normal range of motion.   Neurological:      General: No focal deficit present.      Mental Status: He is alert and oriented to person, place, and time.   Psychiatric:         Mood and Affect: Mood normal.         Behavior: Behavior normal.         Thought Content: Thought content normal.         Judgment: Judgment normal.           Results  No results found for this or any previous visit (from the past 1 hour(s)).]  Lab Results   Component Value Date    PSA 0.7 03/25/2023    PSA 0.5 03/19/2022    PSA 0.6 06/10/2021     Lab Results   Component Value Date    CALCIUM 8.6 05/01/2024    K 4.0 05/01/2024    CO2 21 05/01/2024     05/01/2024    BUN 25 05/01/2024    CREATININE 1.22 05/01/2024     Lab Results "   Component Value Date    WBC 9.85 05/01/2024    HGB 14.3 05/01/2024    HCT 42.8 05/01/2024    MCV 97 05/01/2024     (L) 05/01/2024           Orders  No orders of the defined types were placed in this encounter.    Renu Galvez

## 2024-08-10 ENCOUNTER — HOSPITAL ENCOUNTER (EMERGENCY)
Facility: HOSPITAL | Age: 64
Discharge: HOME | End: 2024-08-10
Attending: EMERGENCY MEDICINE
Payer: COMMERCIAL

## 2024-08-10 ENCOUNTER — APPOINTMENT (EMERGENCY)
Dept: CT IMAGING | Facility: HOSPITAL | Age: 64
End: 2024-08-10
Payer: COMMERCIAL

## 2024-08-10 VITALS
TEMPERATURE: 97.6 F | BODY MASS INDEX: 28.34 KG/M2 | OXYGEN SATURATION: 97 % | DIASTOLIC BLOOD PRESSURE: 74 MMHG | RESPIRATION RATE: 18 BRPM | SYSTOLIC BLOOD PRESSURE: 127 MMHG | WEIGHT: 165.12 LBS | HEART RATE: 93 BPM

## 2024-08-10 DIAGNOSIS — M54.10 RADICULAR PAIN OF LEFT LOWER EXTREMITY: Primary | ICD-10-CM

## 2024-08-10 DIAGNOSIS — M54.32 SCIATICA, LEFT SIDE: ICD-10-CM

## 2024-08-10 DIAGNOSIS — L05.91 PILONIDAL CYST: ICD-10-CM

## 2024-08-10 DIAGNOSIS — N20.0 NEPHROLITHIASIS: ICD-10-CM

## 2024-08-10 LAB
ALBUMIN SERPL BCG-MCNC: 4.5 G/DL (ref 3.5–5)
ALP SERPL-CCNC: 53 U/L (ref 34–104)
ALT SERPL W P-5'-P-CCNC: 25 U/L (ref 7–52)
ANION GAP SERPL CALCULATED.3IONS-SCNC: 8 MMOL/L (ref 4–13)
AST SERPL W P-5'-P-CCNC: 23 U/L (ref 13–39)
BASOPHILS # BLD AUTO: 0.04 THOUSANDS/ÂΜL (ref 0–0.1)
BASOPHILS NFR BLD AUTO: 1 % (ref 0–1)
BILIRUB SERPL-MCNC: 0.52 MG/DL (ref 0.2–1)
BUN SERPL-MCNC: 18 MG/DL (ref 5–25)
CALCIUM SERPL-MCNC: 9.3 MG/DL (ref 8.4–10.2)
CHLORIDE SERPL-SCNC: 105 MMOL/L (ref 96–108)
CO2 SERPL-SCNC: 27 MMOL/L (ref 21–32)
CREAT SERPL-MCNC: 0.97 MG/DL (ref 0.6–1.3)
EOSINOPHIL # BLD AUTO: 0.03 THOUSAND/ÂΜL (ref 0–0.61)
EOSINOPHIL NFR BLD AUTO: 0 % (ref 0–6)
ERYTHROCYTE [DISTWIDTH] IN BLOOD BY AUTOMATED COUNT: 12.2 % (ref 11.6–15.1)
GFR SERPL CREATININE-BSD FRML MDRD: 83 ML/MIN/1.73SQ M
GLUCOSE SERPL-MCNC: 120 MG/DL (ref 65–140)
HCT VFR BLD AUTO: 44.2 % (ref 36.5–49.3)
HGB BLD-MCNC: 15 G/DL (ref 12–17)
IMM GRANULOCYTES # BLD AUTO: 0.02 THOUSAND/UL (ref 0–0.2)
IMM GRANULOCYTES NFR BLD AUTO: 0 % (ref 0–2)
LACTATE SERPL-SCNC: 1.3 MMOL/L (ref 0.5–2)
LIPASE SERPL-CCNC: 45 U/L (ref 11–82)
LYMPHOCYTES # BLD AUTO: 1.17 THOUSANDS/ÂΜL (ref 0.6–4.47)
LYMPHOCYTES NFR BLD AUTO: 15 % (ref 14–44)
MCH RBC QN AUTO: 32.3 PG (ref 26.8–34.3)
MCHC RBC AUTO-ENTMCNC: 33.9 G/DL (ref 31.4–37.4)
MCV RBC AUTO: 95 FL (ref 82–98)
MONOCYTES # BLD AUTO: 0.54 THOUSAND/ÂΜL (ref 0.17–1.22)
MONOCYTES NFR BLD AUTO: 7 % (ref 4–12)
NEUTROPHILS # BLD AUTO: 6.27 THOUSANDS/ÂΜL (ref 1.85–7.62)
NEUTS SEG NFR BLD AUTO: 77 % (ref 43–75)
NRBC BLD AUTO-RTO: 0 /100 WBCS
PLATELET # BLD AUTO: 139 THOUSANDS/UL (ref 149–390)
PMV BLD AUTO: 11.5 FL (ref 8.9–12.7)
POTASSIUM SERPL-SCNC: 4 MMOL/L (ref 3.5–5.3)
PROT SERPL-MCNC: 7.3 G/DL (ref 6.4–8.4)
RBC # BLD AUTO: 4.65 MILLION/UL (ref 3.88–5.62)
SODIUM SERPL-SCNC: 140 MMOL/L (ref 135–147)
WBC # BLD AUTO: 8.07 THOUSAND/UL (ref 4.31–10.16)

## 2024-08-10 PROCEDURE — 80053 COMPREHEN METABOLIC PANEL: CPT

## 2024-08-10 PROCEDURE — 99285 EMERGENCY DEPT VISIT HI MDM: CPT | Performed by: EMERGENCY MEDICINE

## 2024-08-10 PROCEDURE — 36415 COLL VENOUS BLD VENIPUNCTURE: CPT

## 2024-08-10 PROCEDURE — 74177 CT ABD & PELVIS W/CONTRAST: CPT

## 2024-08-10 PROCEDURE — 85025 COMPLETE CBC W/AUTO DIFF WBC: CPT

## 2024-08-10 PROCEDURE — 96374 THER/PROPH/DIAG INJ IV PUSH: CPT

## 2024-08-10 PROCEDURE — 99284 EMERGENCY DEPT VISIT MOD MDM: CPT

## 2024-08-10 PROCEDURE — 83690 ASSAY OF LIPASE: CPT

## 2024-08-10 PROCEDURE — 83605 ASSAY OF LACTIC ACID: CPT

## 2024-08-10 PROCEDURE — 96375 TX/PRO/DX INJ NEW DRUG ADDON: CPT

## 2024-08-10 PROCEDURE — 96361 HYDRATE IV INFUSION ADD-ON: CPT

## 2024-08-10 RX ORDER — BACLOFEN 10 MG/1
10 TABLET ORAL 2 TIMES DAILY
Qty: 14 TABLET | Refills: 0 | Status: SHIPPED | OUTPATIENT
Start: 2024-08-10 | End: 2024-08-12

## 2024-08-10 RX ORDER — HYDROMORPHONE HCL/PF 1 MG/ML
0.5 SYRINGE (ML) INJECTION ONCE
Status: COMPLETED | OUTPATIENT
Start: 2024-08-10 | End: 2024-08-10

## 2024-08-10 RX ORDER — KETOROLAC TROMETHAMINE 30 MG/ML
15 INJECTION, SOLUTION INTRAMUSCULAR; INTRAVENOUS ONCE
Status: COMPLETED | OUTPATIENT
Start: 2024-08-10 | End: 2024-08-10

## 2024-08-10 RX ORDER — METHYLPREDNISOLONE 4 MG
TABLET, DOSE PACK ORAL
Qty: 21 TABLET | Refills: 0 | Status: SHIPPED | OUTPATIENT
Start: 2024-08-10

## 2024-08-10 RX ORDER — BACLOFEN 10 MG/1
10 TABLET ORAL 2 TIMES DAILY
Qty: 14 TABLET | Refills: 0 | Status: SHIPPED | OUTPATIENT
Start: 2024-08-10 | End: 2024-08-10 | Stop reason: CLARIF

## 2024-08-10 RX ADMIN — SODIUM CHLORIDE 500 ML: 0.9 INJECTION, SOLUTION INTRAVENOUS at 10:27

## 2024-08-10 RX ADMIN — IOHEXOL 80 ML: 350 INJECTION, SOLUTION INTRAVENOUS at 10:19

## 2024-08-10 RX ADMIN — KETOROLAC TROMETHAMINE 15 MG: 30 INJECTION, SOLUTION INTRAMUSCULAR; INTRAVENOUS at 09:41

## 2024-08-10 RX ADMIN — HYDROMORPHONE HYDROCHLORIDE 0.5 MG: 1 INJECTION, SOLUTION INTRAMUSCULAR; INTRAVENOUS; SUBCUTANEOUS at 10:26

## 2024-08-10 NOTE — DISCHARGE INSTRUCTIONS
Please return to the ED if you develop the inability to walk, move extremities, numbness/tingling of extremities or groin, incontinence, or any other concerning symptoms develop    Take Baclofen of extreme pain that tylenol does not cover    Follow with comprehensive spine program for best treatment of radicular pain    Take Methylprednisolone as prescribed

## 2024-08-10 NOTE — ED PROVIDER NOTES
History  Chief Complaint   Patient presents with    Flank Pain     Left sided     64-year-old male presenting with left-sided lower back pain, left-sided lower abdominal pain, and left groin pain.  Patient states that approximately 2 weeks ago he had some sciatica-like symptoms that included gluteal pain that shot down the back of his left leg.  That pain has since mostly resolved but he is still having some left gluteal pain as well as left lower back pain.  Patient states that starting Thursday the left gluteal pain migrated anteriorly and became more left lower abdominal and left groin pain.  Pain ranges anywhere from 8-10 out of 10 intensity and was previously alleviated by sitting forward however it is now not positionally dependent.  Patient has noted the passage of some small kidney stones as approximately 2 months ago he had nephrolithiasis with stent placement and the stent was removed approximately 1 month ago.  Patient states he has been well-hydrated and that this episode feels similar but is not exactly like his previous episode of kidney stones.  Patient denies feeling any bulges, history of hernias, chest pain, shortness of breath, peripheral edema, changes in bowel movements, blood or painful urination.      Flank Pain  Associated symptoms: no chest pain, no chills, no constipation, no cough, no diarrhea, no dysuria, no fever, no hematuria, no nausea, no shortness of breath, no sore throat and no vomiting        Prior to Admission Medications   Prescriptions Last Dose Informant Patient Reported? Taking?   aspirin (ECOTRIN LOW STRENGTH) 81 mg EC tablet  Self Yes No   Sig: Take 81 mg by mouth daily   clopidogrel (PLAVIX) 75 mg tablet  Self No No   Sig: Take 1 tablet (75 mg total) by mouth daily   rosuvastatin (CRESTOR) 20 MG tablet  Self No No   Sig: Take 1 tablet (20 mg total) by mouth daily      Facility-Administered Medications: None       Past Medical History:   Diagnosis Date    Chronic low back  pain     GERD (gastroesophageal reflux disease)     Hyperlipidemia     Sleep apnea     Verruca 2 yrs       Past Surgical History:   Procedure Laterality Date    COLONOSCOPY      ESOPHAGOGASTRODUODENOSCOPY      FL RETROGRADE PYELOGRAM  5/6/2024    [place] CYSTO/URETERO W/LITHOTRIPSY &INDWELL STENT INSRT Left 5/6/2024    Procedure: CYSTOSCOPY URETEROSCOPY WITH LITHOTRIPSY HOLMIUM LASER, STONE BASKET EXTRACTION, RETROGRADE PYELOGRAM AND INSERTION STENT URETERAL;  Surgeon: Mauro Finch MD;  [facility];  Service: Urology    SC ESOPHAGOGASTRODUODENOSCOPY TRANSORAL DIAGNOSTIC N/A 5/24/2018    Procedure: ESOPHAGOGASTRODUODENOSCOPY (EGD);  Surgeon: Francis Sanders MD;  Location[facility];  Service: Gastroenterology       Family History   Problem Relation Age of Onset    Diabetes Mother     Lung cancer Father     Cancer Father     Cancer Brother         Bladder Cancer and a stroke    No Known Problems Daughter     No Known Problems Son      I have reviewed and agree with the history as documented.    E-Cigarette/Vaping    E-Cigarette Use Never User      E-Cigarette/Vaping Substances    Nicotine No     THC No     CBD No     Flavoring No     Other No     Unknown No      Social History     Tobacco Use    Smoking status: Never     Passive exposure: Past    Smokeless tobacco: Never   Vaping Use    Vaping status: Never Used   Substance Use Topics    Alcohol use: Yes     Alcohol/week: 4.0 standard drinks of alcohol     Types: 2 Glasses of wine, 2 Cans of beer per week     Comment: social    Drug use: No        Review of Systems   Constitutional:  Negative for chills and fever.   HENT:  Negative for ear pain and sore throat.    Eyes:  Negative for pain and visual disturbance.   Respiratory:  Negative for cough and shortness of breath.    Cardiovascular:  Negative for chest pain and palpitations.   Gastrointestinal:  Negative for abdominal pain, constipation, diarrhea, nausea and vomiting.   Genitourinary:  Positive for  flank pain. Negative for difficulty urinating, dysuria, hematuria and scrotal swelling.   Musculoskeletal:  Negative for arthralgias and back pain.   Skin:  Negative for color change and rash.   Neurological:  Negative for seizures and syncope.   All other systems reviewed and are negative.      Physical Exam  ED Triage Vitals [08/10/24 0919]   Temperature Pulse Respirations Blood Pressure SpO2   97.6 °F (36.4 °C) 84 18 148/72 97 %      Temp Source Heart Rate Source Patient Position - Orthostatic VS BP Location FiO2 (%)   Oral Monitor Lying Right arm --      Pain Score       10 - Worst Possible Pain             Orthostatic Vital Signs  Vitals:    08/10/24 0919 08/10/24 1123   BP: 148/72 127/74   Pulse: 84 93   Patient Position - Orthostatic VS: Lying Lying       Physical Exam  Vitals and nursing note reviewed.   Constitutional:       General: He is not in acute distress.     Appearance: He is well-developed.   HENT:      Head: Normocephalic and atraumatic.   Eyes:      Conjunctiva/sclera: Conjunctivae normal.   Cardiovascular:      Rate and Rhythm: Normal rate and regular rhythm.      Heart sounds: No murmur heard.  Pulmonary:      Effort: Pulmonary effort is normal. No respiratory distress.      Breath sounds: Normal breath sounds.   Abdominal:      General: There is no distension.      Palpations: Abdomen is soft.      Tenderness: There is abdominal tenderness. There is no right CVA tenderness, left CVA tenderness or rebound.   Musculoskeletal:         General: No swelling.      Cervical back: Normal range of motion and neck supple. No tenderness.   Skin:     General: Skin is warm and dry.      Capillary Refill: Capillary refill takes less than 2 seconds.   Neurological:      Mental Status: He is alert.   Psychiatric:         Mood and Affect: Mood normal.         ED Medications  Medications   ketorolac (TORADOL) injection 15 mg (15 mg Intravenous Given 8/10/24 0941)   HYDROmorphone (DILAUDID) injection 0.5 mg (0.5  mg Intravenous Given 8/10/24 1026)   sodium chloride 0.9 % bolus 500 mL (0 mL Intravenous Stopped 8/10/24 1127)   iohexol (OMNIPAQUE) 350 MG/ML injection (MULTI-DOSE) 80 mL (80 mL Intravenous Given 8/10/24 1019)       Diagnostic Studies  Results Reviewed       Procedure Component Value Units Date/Time    Lactic acid, plasma (w/reflex if result > 2.0) [[id number]]  (Normal) Collected: 08/10/24 1026    Lab Status: Final result Specimen: Blood from Arm, Left Updated: 08/10/24 1054     LACTIC ACID 1.3 mmol/L     Narrative:      Result may be elevated if tourniquet was used during collection.    CMP [[id number]] Collected: 08/10/24 0941    Lab Status: Final result Specimen: Blood from Arm, Right Updated: 08/10/24 1005     Sodium 140 mmol/L      Potassium 4.0 mmol/L      Chloride 105 mmol/L      CO2 27 mmol/L      ANION GAP 8 mmol/L      BUN 18 mg/dL      Creatinine 0.97 mg/dL      Glucose 120 mg/dL      Calcium 9.3 mg/dL      AST 23 U/L      ALT 25 U/L      Alkaline Phosphatase 53 U/L      Total Protein 7.3 g/dL      Albumin 4.5 g/dL      Total Bilirubin 0.52 mg/dL      eGFR 83 ml/min/1.73sq m     Narrative:      National Kidney Disease Foundation guidelines for Chronic Kidney Disease (CKD):     Stage 1 with normal or high GFR (GFR > 90 mL/min/1.73 square meters)    Stage 2 Mild CKD (GFR = 60-89 mL/min/1.73 square meters)    Stage 3A Moderate CKD (GFR = 45-59 mL/min/1.73 square meters)    Stage 3B Moderate CKD (GFR = 30-44 mL/min/1.73 square meters)    Stage 4 Severe CKD (GFR = 15-29 mL/min/1.73 square meters)    Stage 5 End Stage CKD (GFR <15 mL/min/1.73 square meters)  Note: GFR calculation is accurate only with a steady state creatinine    Lipase [[id number]]  (Normal) Collected: 08/10/24 0941    Lab Status: Final result Specimen: Blood from Arm, Right Updated: 08/10/24 1005     Lipase 45 u/L     CBC and differential [[id number]]  (Abnormal) Collected: 08/10/24 0941    Lab Status: Final result Specimen: Blood from Arm,  Right Updated: 08/10/24 0957     WBC 8.07 Thousand/uL      RBC 4.65 Million/uL      Hemoglobin 15.0 g/dL      Hematocrit 44.2 %      MCV 95 fL      MCH 32.3 pg      MCHC 33.9 g/dL      RDW 12.2 %      MPV 11.5 fL      Platelets 139 Thousands/uL      nRBC 0 /100 WBCs      Segmented % 77 %      Immature Grans % 0 %      Lymphocytes % 15 %      Monocytes % 7 %      Eosinophils Relative 0 %      Basophils Relative 1 %      Absolute Neutrophils 6.27 Thousands/µL      Absolute Immature Grans 0.02 Thousand/uL      Absolute Lymphocytes 1.17 Thousands/µL      Absolute Monocytes 0.54 Thousand/µL      Eosinophils Absolute 0.03 Thousand/µL      Basophils Absolute 0.04 Thousands/µL                    CT abdomen pelvis with contrast   Final Result by Mayur Kingston MD (08/10 1105)      No acute findings in the abdomen or pelvis.   Bilateral nephrolithiasis without hydronephrosis.         Workstation performed: [id number]               Procedures  Procedures      ED Course  ED Course as of 08/12/24 2144   Sat Aug 10, 2024   1135 Pt re-evaluated. Pain improved. Pt able to ambulate without pain. CT shows no acute intraabdominal process.                                        Medical Decision Making  64-year-old male presenting with flank pain  Patient at risk for nephrolithiasis, hydronephrosis, sciatica, cauda equina, conus medullaris, paraspinal abscess, entrapped hernia, pancreatitis, etc.  CBC shows no leukocytosis-unlikely infectious in etiology including paraspinal abscess  CMP shows no electrolyte abnormality or acute endorgan damage  CMP also demonstrates good kidney function-unlikely hydronephrosis  Lipase normal-rules out of pancreatitis  CT abdomen ordered-no acute findings of the abdomen or pelvis  Patient has bilateral nephrolithiasis without hydronephrosis-unlikely causing his symptoms  No cauda equina or conus medullaris  No entrapped hernia or hernia found  Likely radicular pain or sciatica  Patient given  ketorolac and fluids along with small dose of Dilaudid for pain  Patient discharged with follow up comprehensive spine recommended    Amount and/or Complexity of Data Reviewed  External Data Reviewed: labs, radiology and ECG.  Labs: ordered. Decision-making details documented in ED Course.  Radiology: ordered. Decision-making details documented in ED Course.    Risk  Prescription drug management.          Disposition  Final diagnoses:   Radicular pain of left lower extremity   Nephrolithiasis   Sciatica, left side   Pilonidal cyst     Time reflects when diagnosis was documented in both MDM as applicable and the Disposition within this note       Time User Action Codes Description Comment    8/10/2024 11:24 AM Trever Garg Add [M54.10] Radicular pain of left lower extremity     8/10/2024 11:27 AM Trever Garg Add [N20.0] Nephrolithiasis     8/10/2024 11:34 AM Trever Garg Add [M54.32] Sciatica, left side     8/10/2024 11:34 AM Trever Garg [L05.91] Pilonidal cyst           ED Disposition       ED Disposition   Discharge    Condition   Stable    Date/Time   Sat Aug 10, 2024 11:33 AM    Comment   Betito Landers discharge to home/self care.                   Follow-up Information       Follow up With Specialties Details Why Contact Info Additional Information    [facility] [place] [facility] Schedule an appointment as soon as possible for a visit  If symptoms worsen [address] [address] [phone] [facility] [place], [facility] [address] [place], [address] [phone]            Discharge Medication List as of 8/10/2024 11:37 AM        START taking these medications    Details   methylPREDNISolone 4 MG tablet therapy pack Use as directed on package, Normal      baclofen 10 mg tablet Take 1 tablet (10 mg total) by mouth 2 (two) times a day for 7 days For hiccups, Starting Sat 8/10/2024, Until Sat  8/17/2024, Normal           CONTINUE these medications which have NOT CHANGED    Details   aspirin (ECOTRIN LOW STRENGTH) 81 mg EC tablet Take 81 mg by mouth daily, Historical Med      clopidogrel (PLAVIX) 75 mg tablet Take 1 tablet (75 mg total) by mouth daily, Starting Wed 11/15/2023, Until Wed 5/22/2024, Normal      rosuvastatin (CRESTOR) 20 MG tablet Take 1 tablet (20 mg total) by mouth daily, Starting Wed 11/15/2023, Normal           No discharge procedures on file.    PDMP Review       None             ED Provider  Attending physically available and evaluated Betito Landers. I managed the patient along with the ED Attending.    Electronically Signed by           Trever Garg DO  08/12/24 9145

## 2024-08-12 ENCOUNTER — APPOINTMENT (OUTPATIENT)
Dept: RADIOLOGY | Facility: CLINIC | Age: 64
End: 2024-08-12
Payer: COMMERCIAL

## 2024-08-12 ENCOUNTER — OFFICE VISIT (OUTPATIENT)
Dept: FAMILY MEDICINE CLINIC | Facility: CLINIC | Age: 64
End: 2024-08-12
Payer: COMMERCIAL

## 2024-08-12 VITALS
HEIGHT: 64 IN | WEIGHT: 160.4 LBS | BODY MASS INDEX: 27.39 KG/M2 | SYSTOLIC BLOOD PRESSURE: 160 MMHG | DIASTOLIC BLOOD PRESSURE: 90 MMHG | TEMPERATURE: 98.7 F | HEART RATE: 79 BPM | OXYGEN SATURATION: 98 % | RESPIRATION RATE: 18 BRPM

## 2024-08-12 DIAGNOSIS — M54.42 ACUTE LEFT-SIDED LOW BACK PAIN WITH LEFT-SIDED SCIATICA: ICD-10-CM

## 2024-08-12 DIAGNOSIS — M54.42 ACUTE LEFT-SIDED LOW BACK PAIN WITH LEFT-SIDED SCIATICA: Primary | ICD-10-CM

## 2024-08-12 DIAGNOSIS — R03.0 ELEVATED BLOOD PRESSURE READING: ICD-10-CM

## 2024-08-12 PROCEDURE — 72110 X-RAY EXAM L-2 SPINE 4/>VWS: CPT

## 2024-08-12 PROCEDURE — 99214 OFFICE O/P EST MOD 30 MIN: CPT | Performed by: NURSE PRACTITIONER

## 2024-08-12 RX ORDER — METHOCARBAMOL 500 MG/1
500 TABLET, FILM COATED ORAL 4 TIMES DAILY
Qty: 60 TABLET | Refills: 0 | Status: SHIPPED | OUTPATIENT
Start: 2024-08-12

## 2024-08-12 NOTE — ASSESSMENT & PLAN NOTE
Will change muscle relaxer to Robaxin, can take 4 times daily.  Discussed benefits of physical therapy.  Obtain x-ray.  Did also review insurance protocol typically not allowing MRI without previous workup.  Patient/family to call insurance company.  Continue prednisone

## 2024-08-12 NOTE — PROGRESS NOTES
OFFICE VISIT  Betito Landers 64 y.o. male MRN: [id number]          Assessment / Plan:  Problem List Items Addressed This Visit          Nervous and Auditory    Acute left-sided low back pain with left-sided sciatica - Primary     Will change muscle relaxer to Robaxin, can take 4 times daily.  Discussed benefits of physical therapy.  Obtain x-ray.  Did also review insurance protocol typically not allowing MRI without previous workup.  Patient/family to call insurance company.  Continue prednisone         Relevant Medications    methocarbamol (ROBAXIN) 500 mg tablet    Other Relevant Orders    XR spine lumbar minimum 4 views non injury    Ambulatory Referral to Physical Therapy    MRI lumbar spine wo contrast       Other    Elevated blood pressure reading     Blood pressure today mildly elevated, most likely contributing to acute pain.  Will need reevaluation next office visit              Reason For Visit / Chief Complaint  Chief Complaint   Patient presents with    Back Pain     Needs a referral for MRI         HPI:  Betito Landers is a 64 y.o. male who presents today for chronic low back pain, he was prev treated with sciatica. He reports pain to left groin and left leg, He does reports lower back pain on left side. He reports taking baclofen and medrol dose pack. He has been on medication with no relief.  Pain is sitting, standing, bending. Does have minimal relief with lying flat  His pain is 10/10, shaking episodes.  Unable to take other pain medication except Tylenol due to current Plavix treatment.  No loss of bowel or bladder.  No numbness or tingling reported.    Historical Information   Past Medical History:   Diagnosis Date    Chronic low back pain     GERD (gastroesophageal reflux disease)     Hyperlipidemia     Sleep apnea     Verruca 2 yrs     Past Surgical History:   Procedure Laterality Date    COLONOSCOPY      ESOPHAGOGASTRODUODENOSCOPY      FL RETROGRADE PYELOGRAM  5/6/2024    UT CYSTO/URETERO  W/LITHOTRIPSY &INDWELL STENT INSRT Left 5/6/2024    Procedure: CYSTOSCOPY URETEROSCOPY WITH LITHOTRIPSY HOLMIUM LASER, STONE BASKET EXTRACTION, RETROGRADE PYELOGRAM AND INSERTION STENT URETERAL;  Surgeon: Mauro Finch MD;  Location: [facility];  Service: Urology    IA ESOPHAGOGASTRODUODENOSCOPY TRANSORAL DIAGNOSTIC N/A 5/24/2018    Procedure: ESOPHAGOGASTRODUODENOSCOPY (EGD);  Surgeon: Francis Sanders MD;  Location: [facility];  Service: Gastroenterology     Social History   Social History     Substance and Sexual Activity   Alcohol Use Yes    Alcohol/week: 4.0 standard drinks of alcohol    Types: 2 Glasses of wine, 2 Cans of beer per week    Comment: social     Social History     Substance and Sexual Activity   Drug Use No     Social History     Tobacco Use   Smoking Status Never    Passive exposure: Past   Smokeless Tobacco Never     Family History   Problem Relation Age of Onset    Diabetes Mother     Lung cancer Father     Cancer Father     Cancer Brother         Bladder Cancer and a stroke    No Known Problems Daughter     No Known Problems Son        Meds/Allergies   No Known Allergies    Meds:    Current Outpatient Medications:     aspirin (ECOTRIN LOW STRENGTH) 81 mg EC tablet, Take 81 mg by mouth daily, Disp: , Rfl:     clopidogrel (PLAVIX) 75 mg tablet, Take 1 tablet (75 mg total) by mouth daily, Disp: 90 tablet, Rfl: 3    methocarbamol (ROBAXIN) 500 mg tablet, Take 1 tablet (500 mg total) by mouth 4 (four) times a day, Disp: 60 tablet, Rfl: 0    methylPREDNISolone 4 MG tablet therapy pack, Use as directed on package, Disp: 21 tablet, Rfl: 0    rosuvastatin (CRESTOR) 20 MG tablet, Take 1 tablet (20 mg total) by mouth daily, Disp: 90 tablet, Rfl: 3      REVIEW OF SYSTEMS  Review of Systems   Constitutional:  Positive for activity change. Negative for chills, fatigue and fever.   HENT:  Negative for congestion, ear discharge, ear pain, sinus pressure, sinus pain, sore throat, tinnitus and trouble  "swallowing.    Eyes:  Negative for photophobia, pain, discharge, itching and visual disturbance.   Respiratory:  Negative for cough, chest tightness, shortness of breath and wheezing.    Cardiovascular:  Negative for chest pain and leg swelling.   Gastrointestinal:  Negative for abdominal distention, abdominal pain, constipation, diarrhea, nausea and vomiting.   Endocrine: Negative for polydipsia, polyphagia and polyuria.   Genitourinary:  Negative for dysuria and frequency.   Musculoskeletal:  Positive for arthralgias, back pain and gait problem. Negative for myalgias, neck pain and neck stiffness.   Skin:  Negative for color change.   Neurological:  Negative for dizziness, syncope, weakness, numbness and headaches.   Hematological:  Does not bruise/bleed easily.   Psychiatric/Behavioral:  Negative for behavioral problems, confusion, self-injury, sleep disturbance and suicidal ideas. The patient is not nervous/anxious.            Current Vitals:   Blood Pressure: 160/90 (08/12/24 1348)  Pulse: 79 (08/12/24 1348)  Temperature: 98.7 °F (37.1 °C) (08/12/24 1348)  Temp Source: Tympanic (08/12/24 1348)  Respirations: 18 (08/12/24 1348)  Height: 5' 4\" (162.6 cm) (08/12/24 1348)  Weight - Scale: 72.8 kg (160 lb 6.4 oz) (08/12/24 1348)  SpO2: 98 % (08/12/24 1348)  [unfilled]    PHYSICAL EXAMS:  Physical Exam  Vitals and nursing note reviewed.   Constitutional:       Comments: Appears uncomfortable   HENT:      Head: Normocephalic and atraumatic.   Cardiovascular:      Rate and Rhythm: Normal rate and regular rhythm.      Pulses: Normal pulses.      Heart sounds: Normal heart sounds.   Pulmonary:      Effort: Pulmonary effort is normal.      Breath sounds: Normal breath sounds.   Musculoskeletal:         General: Tenderness present.      Cervical back: Normal range of motion and neck supple.      Comments: Left lower lumbar region   Skin:     General: Skin is warm.   Neurological:      General: No focal deficit present. "      Mental Status: He is alert and oriented to person, place, and time.   Psychiatric:         Mood and Affect: Mood normal.         Behavior: Behavior normal.         Thought Content: Thought content normal.         Judgment: Judgment normal.             Lab, imaging and other studies: I have personally reviewed pertinent reports.  .

## 2024-08-12 NOTE — ASSESSMENT & PLAN NOTE
Blood pressure today mildly elevated, most likely contributing to acute pain.  Will need reevaluation next office visit

## 2024-08-12 NOTE — ED ATTENDING ATTESTATION
8/10/2024  I, Keisha Underwood MD, saw and evaluated the patient. I have discussed the patient with the resident/non-physician practitioner and agree with the resident's/non-physician practitioner's findings, Plan of Care, and MDM as documented in the resident's/non-physician practitioner's note, except where noted. All available labs and Radiology studies were reviewed.  I was present for key portions of any procedure(s) performed by the resident/non-physician practitioner and I was immediately available to provide assistance.       At this point I agree with the current assessment done in the Emergency Department.  I have conducted an independent evaluation of this patient a history and physical is as follows:    Patient is a 64-year-old male who presents to the emergency department for evaluation with left lower quadrant, left flank and buttock discomfort.  Onset 3 days ago without identified provocation.  He notes worsening with position change and has not had relief with acetaminophen.  No trouble with bowel movements.  Urination notable for having passed a couple of tiny stones.  No hematuria or dysuria.  He has appreciated nausea and dry heaves over the last couple of hours.  Discomfort does not radiate to the legs.  He has not appreciated any weakness, numbness or paresthesias.  No fever.  He did undergo procedural therapy approximately 2 months ago for left ureterolithiasis and subsequently had stent removed a few weeks later.  He has been dealing with low back/buttock discomfort which he has attributed to sciatica.    On exam he appears uncomfortable-especially with movements.  He is afebrile.  Heart sounds regular.  Lungs clear to auscultation bilaterally.  No lower thoracic or lumbar midline tenderness.  No CVA tenderness.  Mild left low lumbar tenderness present.  Palpation does not fully reproduce symptoms.  No right-sided low back discomfort.  Abdomen is soft with normal active bowel  sounds.  Nontender.  No inguinal tenderness, discoloration, swelling or tenderness of left testicle/scrotum.  ED Course  ED Course as of 08/12/24 0023   Sat Aug 10, 2024   1014 Endorses positional component to his discomfort.  Strongly suspect radiculopathy.  No visualized rash.  Differential diagnosis otherwise includes but is not limited to ureterolithiasis, UTI/pyelonephritis, diverticulitis, colitis, hernia.  Unlikely zoster or testicular problem.         Critical Care Time  Procedures

## 2024-08-13 NOTE — RESULT ENCOUNTER NOTE
Please let him know his xray resulted, no acute findings. Will most likely need to start PT prior the MRI

## 2024-08-19 ENCOUNTER — TELEPHONE (OUTPATIENT)
Dept: PAIN MEDICINE | Facility: CLINIC | Age: 64
End: 2024-08-19

## 2024-08-20 ENCOUNTER — TELEPHONE (OUTPATIENT)
Age: 64
End: 2024-08-20

## 2024-08-20 DIAGNOSIS — F40.240 CLAUSTROPHOBIA: Primary | ICD-10-CM

## 2024-08-20 RX ORDER — LORAZEPAM 1 MG/1
TABLET ORAL
Qty: 2 TABLET | Refills: 0 | Status: SHIPPED | OUTPATIENT
Start: 2024-08-20

## 2024-08-20 NOTE — TELEPHONE ENCOUNTER
Patient is going for a MRI on Thursday he's claustrophobic would like medication to relax .   Send to [facility]

## 2024-08-26 ENCOUNTER — HOSPITAL ENCOUNTER (OUTPATIENT)
Dept: MRI IMAGING | Facility: CLINIC | Age: 64
Discharge: HOME | End: 2024-08-26
Payer: COMMERCIAL

## 2024-08-26 DIAGNOSIS — M54.42 ACUTE LEFT-SIDED LOW BACK PAIN WITH LEFT-SIDED SCIATICA: ICD-10-CM

## 2024-08-26 PROCEDURE — 72148 MRI LUMBAR SPINE W/O DYE: CPT

## 2024-08-28 ENCOUNTER — TELEPHONE (OUTPATIENT)
Dept: FAMILY MEDICINE CLINIC | Facility: CLINIC | Age: 64
End: 2024-08-28

## 2024-08-28 NOTE — TELEPHONE ENCOUNTER
----- Message from BEBA Stone sent at 8/28/2024  7:24 AM EDT -----  MRI has resulted.  I would like to place a referral for spine/pain team.  Ask if agreeable.

## 2024-08-29 NOTE — PROGRESS NOTES
Assessment:  1. Lumbar spondylosis    2. Acute exacerbation of chronic low back pain    3. Lumbar radiculopathy    4. Hyperreflexia    5. Sacroiliac joint dysfunction of left side    6. Sacroiliitis (HCC)        Plan:  Orders Placed This Encounter   Procedures    FL spine and pain procedure     Standing Status:   Future     Standing Expiration Date:   8/30/2028     Scheduling Instructions:      Will call to schedule.     Order Specific Question:   Reason for Exam:     Answer:   left SI joint injection     Order Specific Question:   Anticoagulant hold needed?     Answer:   no       No orders of the defined types were placed in this encounter.      My impressions and treatment recommendations were discussed in detail with the patient, who verbalized understanding and had no further questions.    64-year-old male presents her office for chief complaint of left buttock pain and left groin pain.  His symptoms were considerably worse earlier this month.  Denies pain radiating past the knee.  He is neurologically intact on exam except for diffuse hyperreflexia.  Lower back exam is notable for significant tenderness to palpation over the left SI joint as well as exacerbation of symptoms with provocative maneuvers.  There is likely component of left SI joint dysfunction.      I also discussed his MRI findings with him which shows multilevel spondylosis as well as bulging disks and advanced foraminal narrowing at multiple levels.  His pain only radiates into the left groin and although differential diagnosis includes upper lumbar radiculopathy, his symptoms seem to be more aligned with SI joint dysfunction.     He continues home PT exercises.  If his symptoms worsen, I will schedule him for left SI joint injection under fluoroscopic guidance to help with his symptoms.  He will call if he would like to schedule the procedure.    [place] Prescription Drug Monitoring Program report was reviewed and was appropriate      Complete risks and benefits including bleeding, infection, tissue reaction, nerve injury and allergic reaction were discussed. The approach was demonstrated using models and literature was provided. Verbal and written consent was obtained.     Discharge instructions were provided. I personally saw and examined the patient and I agree with the above discussed plan of care.    History of Present Illness:    Betito Landers is a 64 y.o. male who presents to [facility] for initial evaluation of the above stated pain complaints. The patient has a past medical and chronic pain history as outlined in the assessment section. He was referred by Referral Self  No address on file .    Patient is here with chief complaint of left-sided back buttock and groin pain.  Earlier this month, patient also had some sciatica-like symptoms today including gluteal pain that travels into the left groin. He had to go to the ED for this on 8/10. Reports this is a chronic issue but has been worsening over the last 3 months. Initially, he was having issues on the right which have resolved.     Since ED visit symptoms have improved considerably, but when it was at its worst, the pain was quite notable with left hip flexion and pivoting on the left leg . He reports groin pain has resolved quite a bit but most of it is lateral hip and left buttock.     Began of an undetermined cause and is moderate to severe over the past month.  5 out of 10.  Intermittent.  Burning, shooting, sharp, dull/aching, cutting in nature.    Symptoms are increased with standing, bending, sitting, walking, exercise.  Decreased with lying down or relaxation.    She has moderate relief with PT, exercise, osteopathic manipulation, chiropractor, heat/ice therapy.    No tobacco or marijuana use.  Not allergic to latex or contrast dye.    Currently using acetaminophen, baclofen.  In the past has used oral steroids.  Also reports some relief with  Flexeril.      Review of Systems:    Review of Systems   Musculoskeletal:  Positive for back pain and myalgias.           Past Medical History:   Diagnosis Date    Chronic low back pain     GERD (gastroesophageal reflux disease)     Hyperlipidemia     Sleep apnea     Verruca 2 yrs       Past Surgical History:   Procedure Laterality Date    COLONOSCOPY      ESOPHAGOGASTRODUODENOSCOPY      FL RETROGRADE PYELOGRAM  5/6/2024    ME CYSTO/URETERO W/LITHOTRIPSY &INDWELL STENT INSRT Left 5/6/2024    Procedure: CYSTOSCOPY URETEROSCOPY WITH LITHOTRIPSY HOLMIUM LASER, STONE BASKET EXTRACTION, RETROGRADE PYELOGRAM AND INSERTION STENT URETERAL;  Surgeon: Mauro Finch MD;  [facility];  Service: Urology    ME ESOPHAGOGASTRODUODENOSCOPY TRANSORAL DIAGNOSTIC N/A 5/24/2018    Procedure: ESOPHAGOGASTRODUODENOSCOPY (EGD);  Surgeon: Francis Sanders MD;  Location: [facility];  Service: Gastroenterology       Family History   Problem Relation Age of Onset    Diabetes Mother     Lung cancer Father     Cancer Father     Cancer Brother         Bladder Cancer and a stroke    No Known Problems Daughter     No Known Problems Son        Social History     Occupational History    Not on file   Tobacco Use    Smoking status: Never     Passive exposure: Past    Smokeless tobacco: Never   Vaping Use    Vaping status: Never Used   Substance and Sexual Activity    Alcohol use: Yes     Alcohol/week: 4.0 standard drinks of alcohol     Types: 2 Glasses of wine, 2 Cans of beer per week     Comment: social    Drug use: No    Sexual activity: Yes     Partners: Female         Current Outpatient Medications:     aspirin (ECOTRIN LOW STRENGTH) 81 mg EC tablet, Take 81 mg by mouth daily, Disp: , Rfl:     methocarbamol (ROBAXIN) 500 mg tablet, Take 1 tablet (500 mg total) by mouth 4 (four) times a day, Disp: 60 tablet, Rfl: 0    rosuvastatin (CRESTOR) 20 MG tablet, Take 1 tablet (20 mg total) by mouth daily, Disp: 90 tablet, Rfl: 3    clopidogrel  "(PLAVIX) 75 mg tablet, Take 1 tablet (75 mg total) by mouth daily, Disp: 90 tablet, Rfl: 3    LORazepam (ATIVAN) 1 mg tablet, Take 1 tablet 2 hours prior to MRI (Patient not taking: Reported on 8/30/2024), Disp: 2 tablet, Rfl: 0    methylPREDNISolone 4 MG tablet therapy pack, Use as directed on package (Patient not taking: Reported on 8/30/2024), Disp: 21 tablet, Rfl: 0    No Known Allergies    Physical Exam:    /81   Pulse 68   Ht 5' 4\" (1.626 m)   Wt 73 kg (161 lb)   BMI 27.64 kg/m²     Constitutional: normal, well developed, well nourished, alert, in no distress and non-toxic and no overt pain behavior.  Eyes: anicteric  HEENT: grossly intact  Neck: supple, symmetric, trachea midline and no masses   Pulmonary:even and unlabored  Cardiovascular:No edema or pitting edema present  Skin:Normal without rashes or lesions and well hydrated  Psychiatric:Mood and affect appropriate  Neurologic:Cranial Nerves II-XII grossly intact  Musculoskeletal: log rolling and stinchfield maneuver on the left  negative for left groin pain. Slight left groin pain with VINITA on the left.     Lumbar Spine Exam    Appearance:  Normal lordosis  Palpation/Tenderness:  no tenderness or spasm  Sensory:  no sensory deficits noted  Motor Strength:  Left hip flexion:  5/5  Left hip extension:  5/5  Right hip flexion:  5/5  Right hip extension:  5/5  Left knee flexion:  5/5  Left knee extension:  5/5  Right knee flexion:  5/5  Right knee extension:  5/5  Left foot dorsiflexion:  5/5  Left foot plantar flexion:  5/5  Right foot dorsiflexion:  5/5  Right foot plantar flexion:  5/5  Reflexes:  Left Biceps:  3+   Right Biceps:  3+   Left Brachioradialis:  3+   Right Brachioradialis:  3+   Left Triceps:  3+   Right Triceps:  3+   Left Patellar:  3+   Right Patellar:  3+   Left Achilles:  3+   Right Achilles:  3+   Special Tests:  Left Orlando's Maneuver:  positive  Left Gaenslen's Test:  positive  Left Pelvic Distraction Test:  " positive  Hernandez test positive right, negative left    Imaging    MRI LUMBAR SPINE WITHOUT CONTRAST        8/26/24     INDICATION: M54.42: Lumbago with sciatica, left side.     COMPARISON: Lumbar spine radiograph from 8/12/2024, CT of the abdomen pelvis from 8/10/2024     TECHNIQUE:  Multiplanar, multisequence imaging of the lumbar spine was performed. .        IMAGE QUALITY:  Diagnostic     FINDINGS:     VERTEBRAL BODIES:  There are 5 lumbar type vertebral bodies. There is levocurvature of the lumbar spine, centered at L2 and L3. There is mild retrolisthesis of L2 on L3 and L3 on L4. There are no compression fractures. Bone marrow signal intensity   appears grossly normal involving the lumbar spine.     SACRUM:  Normal signal within the sacrum. No evidence of insufficiency or stress fracture.     DISTAL CORD AND CONUS:  Normal size and signal within the distal cord and conus. The conus medullaris terminates at the L1 level.     PARASPINAL SOFT TISSUES:  Paraspinal soft tissues are unremarkable.     LOWER THORACIC DISC SPACES:  Normal disc height and signal.  No disc herniation, canal stenosis or foraminal narrowing.     LUMBAR DISC SPACES:     L1-L2: Desiccation, disc height loss, diffuse disc bulge. Bilateral facet arthropathy. No canal stenosis. Mild bilateral lateral recess stenosis. Mild bilateral neural foraminal stenosis.     L2-L3: Desiccation, diffuse disc bulge asymmetric to the left, with superimposed left central and subarticular and left foraminal disc extrusion extending slightly inferiorly. Bilateral facet arthropathy and ligamentum flavum hypertrophy. Mild canal   stenosis. Left greater than right lateral recess stenosis. Moderate left and mild to moderate right neural foraminal stenosis.     L3-L4: Disc desiccation, diffuse disc bulge asymmetric to the left, with superimposed left foraminal disc protrusion and annular fissure. Bilateral facet arthropathy. No canal stenosis. Mild bilateral lateral  recess stenosis. Moderate to severe bilateral   neural foraminal stenosis.     L4-L5: Desiccation, diffuse disc bulge with superimposed right greater than left foraminal disc protrusion with annular fissures. Bilateral facet arthropathy and ligamentum flavum hypertrophy. Mild canal stenosis. Mild bilateral lateral recess stenosis.   Severe bilateral neural foraminal stenosis.     L5-S1: Mild disc bulge provide facet arthropathy. No canal stenosis noted. No neuroforaminal stenosis identified.     OTHER FINDINGS:  None.     IMPRESSION:     Multilevel lumbar spondylosis, as described above, contributing to at most mild canal stenosis at L2-L3 and L4-L5, and multilevel neural foraminal stenosis, worst bilaterally at L3-L5.        FL spine and pain procedure    (Results Pending)       Orders Placed This Encounter   Procedures    FL spine and pain procedure

## 2024-08-30 ENCOUNTER — CONSULT (OUTPATIENT)
Dept: PAIN MEDICINE | Facility: CLINIC | Age: 64
End: 2024-08-30
Payer: COMMERCIAL

## 2024-08-30 VITALS
DIASTOLIC BLOOD PRESSURE: 81 MMHG | HEART RATE: 68 BPM | HEIGHT: 64 IN | SYSTOLIC BLOOD PRESSURE: 123 MMHG | BODY MASS INDEX: 27.49 KG/M2 | WEIGHT: 161 LBS

## 2024-08-30 DIAGNOSIS — R29.2 HYPERREFLEXIA: ICD-10-CM

## 2024-08-30 DIAGNOSIS — M46.1 SACROILIITIS (HCC): ICD-10-CM

## 2024-08-30 DIAGNOSIS — M53.3 SACROILIAC JOINT DYSFUNCTION OF LEFT SIDE: ICD-10-CM

## 2024-08-30 DIAGNOSIS — M47.816 LUMBAR SPONDYLOSIS: Primary | ICD-10-CM

## 2024-08-30 DIAGNOSIS — M54.16 LUMBAR RADICULOPATHY: ICD-10-CM

## 2024-08-30 DIAGNOSIS — M54.50 ACUTE EXACERBATION OF CHRONIC LOW BACK PAIN: ICD-10-CM

## 2024-08-30 DIAGNOSIS — G89.29 ACUTE EXACERBATION OF CHRONIC LOW BACK PAIN: ICD-10-CM

## 2024-08-30 PROCEDURE — 99244 OFF/OP CNSLTJ NEW/EST MOD 40: CPT | Performed by: STUDENT IN AN ORGANIZED HEALTH CARE EDUCATION/TRAINING PROGRAM

## 2024-08-30 NOTE — PATIENT INSTRUCTIONS
Patient Education     Sacroiliac Joint Pain   About this topic   The spine ends in a set of 5 fused bones. They are called the sacrum. They join the pelvis at the sacroiliac joint. This is also called the SI joint. Strong bands of tissue called ligaments hold this joint together. Normally, there is very little movement at this joint. Its job is to absorb shock and take stress off of the spine. You can have SI joint pain if this joint is irritated.  What are the causes?   Sometimes, the exact cause of SI pain is unknown. It is not always known if the pain is in the joint or in the ligaments around the joint. The pain may be caused by wear and tear on the joint from arthritis. Pregnancy causes this joint to become looser. Sometimes, the pain may be caused by swelling from problems like gout or an injury. Infection or a fracture can also cause SI pain.   What can make this more likely to happen?   You are more likely to have this problem if you have had an injury to your spine, pelvis, or buttocks. Someone with a history of being pregnant a few times is more likely to have problems with her SI joint. Legs that are not the same length can cause pain as well. Some infections may cause problems with the SI joint.  What are the main signs?   Pain in the lower back or buttocks. It may also be felt in the hips or pelvis. Some people feel the pain in the groin or back of the legs. This pain is often worse with activity like climbing stairs or standing for a long time.  Numbness or tingling in the upper leg  Feeling of weakness in the leg  Stiffness  Feeling unstable when moving  How does the doctor diagnose this health problem?   The doctor will do an exam and feel around your lower back. Your doctor will have you bend and twist at the waist to see what makes the pain worse. Your doctor may have you move and push and pull on your legs to test your motion and strength. Your doctor will check if the muscles in the back or  legs are tight. Your doctor may check the feeling and reflexes in your legs.  The doctor may order:  X-ray  CT or MRI scan  Bone scan  Lab tests  Diagnostic injection ? injecting a drug into the joint to see if relief is given  How does the doctor treat this health problem?   Rest from activities that make the problem worse  Ice  Heat may be used later but not right away. Heat can make swelling worse.  Special belt worn low on the hips called an SI belt. It helps to hold the joint tightly together.  Electrical stimulation  Exercises  Chiropractic manipulation  Radiofrequency ablation ? A treatment where small nerves around the joint are burned so they are numb. This does not always work. It may only last for a few years.  Surgery may be needed if other treatment plans do not work.  Injections (cortisone)  Are there other health problems to treat?   If the problem is caused by an infection, inflammatory arthritis, or ankylosing spondylosis, these problems will need to be treated.  What drugs may be needed?   The doctor may order drugs to:  Help with pain and swelling  Fight an infection  The doctor may give you a shot to help with pain and swelling. Talk with your doctor about possible risks with this shot.   What problems could happen?   Long-term back pain  Weight gain, less muscle strength and flexibility, weaker bones  Arthritis  Need for surgery  Infection  What can be done to prevent this health problem?   Stay active and work out to keep your muscles strong and flexible. Warm up slowly and stretch before you exercise.  Use good posture.  Use proper ways to lift and bend:  Spread your feet apart so you have a good base of support. Then, bend with your knees when you  something from the ground.  When lifting and moving an object, keep your back straight. Keep the object as close to your body as possible. Do not twist. Instead, move your feet to the direction you are going.  Follow your doctor's orders  "about weight lifting.  Last Reviewed Date   2020-10-12  Consumer Information Use and Disclaimer   This generalized information is a limited summary of diagnosis, treatment, and/or medication information. It is not meant to be comprehensive and should be used as a tool to help the user understand and/or assess potential diagnostic and treatment options. It does NOT include all information about conditions, treatments, medications, side effects, or risks that may apply to a specific patient. It is not intended to be medical advice or a substitute for the medical advice, diagnosis, or treatment of a health care provider based on the health care provider's examination and assessment of a patient’s specific and unique circumstances. Patients must speak with a health care provider for complete information about their health, medical questions, and treatment options, including any risks or benefits regarding use of medications. This information does not endorse any treatments or medications as safe, effective, or approved for treating a specific patient. Up[employer] Inc[employer] and its affiliates disclaim any warranty or liability relating to this information or the use thereof. The use of this information is governed by the Terms of Use, available at https://www.[employer]/en/know/clinical-effectiveness-terms   Copyright   Copyright © 2024 [employer], Inc. and its affiliates and/or licensors. All rights reserved.    Patient Education     Epidural injection   The Basics   Written by the doctors and editors at [email or web address]   What is an epidural injection? -- An epidural injection can be used to treat a condition called \"radiculopathy.\" This is the medical term for the pain, weakness, numbness, or tingling that happens when nerves coming from the spinal cord get pinched or damaged.  The doctor injects medicines into the space outside the covering of the spinal cord (figure 1). This is similar to an \"epidural\" that is used for pain " "relief during labor and childbirth.  Epidural injections can be given into different parts of your back:   Cervical epidural injection - Used to help with pain in the head or arms.   Thoracic epidural injection - Used to help with pain in the upper or middle back.   Lumbar epidural injection - Used to help with pain in the lower back or legs.  How do I prepare for an epidural injection? -- The doctor or nurse will tell you if you need to do anything special to prepare. Before your procedure, your doctor will do an exam. They might send you to get tests, such as:   X-ray, ultrasound, or other imaging tests - Imaging tests create pictures of the inside of the body.  Your doctor will also ask you about your \"health history.\" This involves asking you questions about any health problems you have or had in the past, past surgeries, and any medicines you take. Tell them about:   Any medicines you are taking - This includes any prescription or \"over-the-counter\" medicines you use, plus any herbal supplements you take. It helps to write down and bring a list of any medicines you take, or bring a bag with all of your medicines with you.   Any allergies you have   Any bleeding problems you have - Certain medicines, including some herbs and supplements, can increase the risk of bleeding. Some health conditions also increase this risk.  You will also get information about:   Eating and drinking before your procedure - In some cases, you might need to \"fast\" before surgery. This means not eating or drinking anything for a period of time. In other cases, you might be allowed to have liquids until a short time before the procedure. Whether you need to fast, and for how long, depends on the procedure you are having.   What help you will need when you go home - For example, you might need to have someone else bring you home or stay with you for some time while you recover.  Ask the doctor or nurse if you have questions or if there is " "anything you do not understand.  What happens during an epidural injection? -- When it is time for the procedure:   You might get an \"IV,\" which is a thin tube that goes into a vein. This can be used to give you fluids and medicines.   You will get anesthesia medicines to numb the area where the doctor will give the injection. This is to make sure that you do not feel pain during the procedure. You might also get medicines to make you relax and feel sleepy, called \"sedatives.\"   The doctors and nurses will monitor your breathing, blood pressure, and heart rate during the procedure.   The doctor might use a continuous X-ray called \"fluoroscopy.\" This is to help make sure that the medicines are injected into the right place. The doctor might also inject a dye to see where to give the medicine.   The doctor will place a needle through your skin and inject the medicine into a space near your spine. Then, they will remove the needle and cover the area with a clean bandage.   The procedure takes 15 to 30 minutes.  What happens after an epidural injection? -- After your procedure, the staff will watch you closely for a short time. It might take a few days before you feel the effects of the epidural injection.  Before you go home, make sure that you know what problems to look out for and when to call the doctor. Make sure that you understand your doctor's or nurse's instructions. Ask questions about anything you do not understand.  For the rest of the day after your procedure:   Try to rest. Limit activities like exercise or driving.   The doctor might recommend an over-the-counter pain medicine. These include acetaminophen (sample brand name: Tylenol), ibuprofen (sample brand names: Advil, Motrin), and naproxen (sample brand name: Aleve).   Ice can help with pain and swelling. Put a cold gel pack, bag of ice, or bag of frozen vegetables on the injection site area every 1 to 2 hours, for 15 minutes each time. Put a thin " "towel between the ice (or other cold object) and the skin.  What are the risks of an epidural injection? -- Your doctor will talk to you about all of the possible risks, and answer your questions. Possible risks include:   Bleeding   Infection   Headache   Nerve injury  When should I call the doctor? -- Call for emergency help right away (in the [place] and [place], call 9-1-1) if:   You can't move your arms or legs.  Call for advice if:   You have a fever of 100.4°F (38°C) or higher, or chills.   You have redness or swelling around the injection site.   You have a headache.   Your arms or legs are numb, weak, or tingly.  All topics are updated as new evidence becomes available and our peer review process is complete.  This topic retrieved from [employer] on: May 15, 2024.  Topic 050969 Version 1.0  Release: 32.4.3 - C32.134  © 2024 UpToDate, Inc. and/or its affiliates. All rights reserved.  figure 1: Epidural injection     Duringan epidural injection, the doctor inserts a needle between 2 of the bones thatmake up the spine. Then, they inject medicines into the area around the spinalcord. This is an illustration of a \"lumbar\" epidural injection, which is given into the low back. The doctor can place the needle in other areas to treat other types of pain.  Graphic 168936 Version 1.0  Consumer Information Use and Disclaimer   Disclaimer: This generalized information is a limited summary of diagnosis, treatment, and/or medication information. It is not meant to be comprehensive and should be used as a tool to help the user understand and/or assess potential diagnostic and treatment options. It does NOT include all information about conditions, treatments, medications, side effects, or risks that may apply to a specific patient. It is not intended to be medical advice or a substitute for the medical advice, diagnosis, or treatment of a health care provider based on the health care provider's examination and assessment of a " patient's specific and unique circumstances. Patients must speak with a health care provider for complete information about their health, medical questions, and treatment options, including any risks or benefits regarding use of medications. This information does not endorse any treatments or medications as safe, effective, or approved for treating a specific patient. UpToDate, Inc. and its affiliates disclaim any warranty or liability relating to this information or the use thereof.The use of this information is governed by the Terms of Use, available at https://www.[employer].com/en/know/clinical-effectiveness-terms. 2024© UpToDate, Inc. and its affiliates and/or licensors. All rights reserved.  Copyright   © 2024 UpToDate, Inc. and/or its affiliates. All rights reserved.

## 2024-09-23 ENCOUNTER — OFFICE VISIT (OUTPATIENT)
Dept: FAMILY MEDICINE CLINIC | Facility: CLINIC | Age: 64
End: 2024-09-23
Payer: COMMERCIAL

## 2024-09-23 VITALS
TEMPERATURE: 97.8 F | RESPIRATION RATE: 18 BRPM | DIASTOLIC BLOOD PRESSURE: 78 MMHG | BODY MASS INDEX: 28.34 KG/M2 | OXYGEN SATURATION: 98 % | SYSTOLIC BLOOD PRESSURE: 118 MMHG | HEIGHT: 64 IN | HEART RATE: 75 BPM | WEIGHT: 166 LBS

## 2024-09-23 DIAGNOSIS — F41.1 GENERALIZED ANXIETY DISORDER: Primary | ICD-10-CM

## 2024-09-23 DIAGNOSIS — R23.8 BLISTERS OF MULTIPLE SITES: ICD-10-CM

## 2024-09-23 PROCEDURE — 10140 I&D HMTMA SEROMA/FLUID COLLJ: CPT | Performed by: NURSE PRACTITIONER

## 2024-09-23 PROCEDURE — 99214 OFFICE O/P EST MOD 30 MIN: CPT | Performed by: NURSE PRACTITIONER

## 2024-09-23 RX ORDER — ESCITALOPRAM OXALATE 5 MG/1
5 TABLET ORAL DAILY
Qty: 30 TABLET | Refills: 1 | Status: SHIPPED | OUTPATIENT
Start: 2024-09-23

## 2024-09-23 RX ORDER — CEPHALEXIN 500 MG/1
500 CAPSULE ORAL 4 TIMES DAILY
Qty: 28 CAPSULE | Refills: 0 | Status: SHIPPED | OUTPATIENT
Start: 2024-09-23 | End: 2024-09-30

## 2024-09-23 NOTE — PROGRESS NOTES
Incision and Drainage    Date/Time: 2024 3:00 PM    Performed by: BEBA Lowery  Authorized by: BEBA Lowery  Universal Protocol:  procedure performed by consultantConsent: Verbal consent obtained.  Consent given by: patient  Patient identity confirmed: verbally with patient    Patient location:  Bedside  Location:     Type:  Fluid collection    Location:  Lower extremity    Lower extremity location:  R foot  Pre-procedure details:     Skin preparation:  Alchohol wipe  Anesthesia (see MAR for exact dosages):     Anesthesia method:  None  Procedure details:     Complexity:  Simple    Needle aspiration: no      Incision types:  Stab incision    Scalpel size: blunt needle.    Drainage:  Serous    Drainage amount:  Scant    Packing materials:  None  Post-procedure details:     Patient tolerance of procedure:  Tolerated well, no immediate complications    Ambulatory Visit  Name: Betito Landers      : 1960      MRN: [id number]  Encounter Provider: BEBA Lowery  Encounter Date: 2024   Encounter department: [facility]    Assessment & Plan  Generalized anxiety disorder  Start lexapro, Reassurance given. Daily physical activity recommended. Improve sleep quality. Consider hobbies.  Set goals. Utilize coping mechanisms. Reviewed relaxation techniques. Follow up in 4-6 weeks.   Orders:    escitalopram (LEXAPRO) 5 mg tablet; Take 1 tablet (5 mg total) by mouth daily    Blisters of multiple sites    Orders:    cephalexin (KEFLEX) 500 mg capsule; Take 1 capsule (500 mg total) by mouth 4 (four) times a day for 7 days       History of Present Illness     Reports hive to ankles, does reports the hives have been filling with fluid  He reports increase stress at work           Review of Systems   Constitutional:  Negative for activity change, chills, fatigue and fever.   HENT:  Negative for congestion, ear discharge, ear pain, sinus pressure, sinus pain, sore throat,  "tinnitus and trouble swallowing.    Eyes:  Negative for photophobia, pain, discharge, itching and visual disturbance.   Respiratory:  Negative for cough, chest tightness, shortness of breath and wheezing.    Cardiovascular:  Negative for chest pain and leg swelling.   Gastrointestinal:  Negative for abdominal distention, abdominal pain, constipation, diarrhea, nausea and vomiting.   Endocrine: Negative for polydipsia, polyphagia and polyuria.   Genitourinary:  Negative for dysuria and frequency.   Musculoskeletal:  Negative for arthralgias, myalgias, neck pain and neck stiffness.   Skin:  Positive for wound. Negative for color change.   Neurological:  Negative for dizziness, syncope, weakness, numbness and headaches.   Hematological:  Does not bruise/bleed easily.   Psychiatric/Behavioral:  Negative for behavioral problems, confusion, self-injury, sleep disturbance and suicidal ideas. The patient is not nervous/anxious.            Objective     /78 (BP Location: Left arm, Patient Position: Sitting, Cuff Size: Standard)   Pulse 75   Temp 97.8 °F (36.6 °C) (Tympanic)   Resp 18   Ht 5' 4\" (1.626 m)   Wt 75.3 kg (166 lb)   SpO2 98%   BMI 28.49 kg/m²     Physical Exam  Vitals and nursing note reviewed.   Constitutional:       Appearance: Normal appearance.   HENT:      Head: Normocephalic and atraumatic.   Cardiovascular:      Rate and Rhythm: Normal rate and regular rhythm.      Pulses: Normal pulses.      Heart sounds: Normal heart sounds.   Musculoskeletal:      Cervical back: Normal range of motion and neck supple.   Skin:     Findings: Lesion present.   Neurological:      General: No focal deficit present.      Mental Status: He is alert and oriented to person, place, and time.   Psychiatric:         Mood and Affect: Mood normal.         Behavior: Behavior normal.         "

## 2024-09-23 NOTE — ASSESSMENT & PLAN NOTE
Start lexapro, Reassurance given. Daily physical activity recommended. Improve sleep quality. Consider hobbies.  Set goals. Utilize coping mechanisms. Reviewed relaxation techniques. Follow up in 4-6 weeks.   Orders:    escitalopram (LEXAPRO) 5 mg tablet; Take 1 tablet (5 mg total) by mouth daily

## 2024-09-23 NOTE — ASSESSMENT & PLAN NOTE
Orders:    cephalexin (KEFLEX) 500 mg capsule; Take 1 capsule (500 mg total) by mouth 4 (four) times a day for 7 days

## 2024-10-04 ENCOUNTER — TELEPHONE (OUTPATIENT)
Dept: NEPHROLOGY | Facility: CLINIC | Age: 64
End: 2024-10-04

## 2024-10-04 DIAGNOSIS — N20.0 NEPHROLITHIASIS: Primary | ICD-10-CM

## 2024-10-04 NOTE — TELEPHONE ENCOUNTER
Called left voice message to remind patient to get non-fasting labs done 1 week prior to 10/17/24 scheduled consult appointment with Dr.Jwalant Zapata. also advised as a reminder If labs / testing are not done in the appropriate time for scheduled appointment, appointment may need to be rescheduled.

## 2024-10-06 LAB
ALBUMIN SERPL-MCNC: 4.4 G/DL (ref 3.6–5.1)
ALBUMIN/GLOB SERPL: 1.8 (CALC) (ref 1–2.5)
ALP SERPL-CCNC: 43 U/L (ref 35–144)
ALT SERPL-CCNC: 25 U/L (ref 9–46)
AST SERPL-CCNC: 23 U/L (ref 10–35)
BASOPHILS # BLD AUTO: 29 CELLS/UL (ref 0–200)
BASOPHILS NFR BLD AUTO: 0.6 %
BILIRUB SERPL-MCNC: 0.8 MG/DL (ref 0.2–1.2)
BLASTS # BLD: ABNORMAL CELLS/UL
BLASTS NFR BLD MANUAL: ABNORMAL %
BUN SERPL-MCNC: 16 MG/DL (ref 7–25)
BUN/CREAT SERPL: NORMAL (CALC) (ref 6–22)
CALCIUM SERPL-MCNC: 9.5 MG/DL (ref 8.6–10.3)
CHLORIDE SERPL-SCNC: 104 MMOL/L (ref 98–110)
CHOLEST SERPL-MCNC: 142 MG/DL
CHOLEST/HDLC SERPL: 2.6 (CALC)
CO2 SERPL-SCNC: 30 MMOL/L (ref 20–32)
CREAT SERPL-MCNC: 1.08 MG/DL (ref 0.7–1.35)
EOSINOPHIL # BLD AUTO: 38 CELLS/UL (ref 15–500)
EOSINOPHIL NFR BLD AUTO: 0.8 %
ERYTHROCYTE [DISTWIDTH] IN BLOOD BY AUTOMATED COUNT: 12.1 % (ref 11–15)
GFR/BSA.PRED SERPLBLD CYS-BASED-ARV: 78 ML/MIN/1.73M2
GLOBULIN SER CALC-MCNC: 2.4 G/DL (CALC) (ref 1.9–3.7)
GLUCOSE SERPL-MCNC: 98 MG/DL (ref 65–99)
HCT VFR BLD AUTO: 44.9 % (ref 38.5–50)
HDLC SERPL-MCNC: 54 MG/DL
HGB BLD-MCNC: 14.9 G/DL (ref 13.2–17.1)
LDLC SERPL CALC-MCNC: 76 MG/DL (CALC)
LYMPHOCYTES # BLD AUTO: 1286 CELLS/UL (ref 850–3900)
LYMPHOCYTES NFR BLD AUTO: 26.8 %
MCH RBC QN AUTO: 32.3 PG (ref 27–33)
MCHC RBC AUTO-ENTMCNC: 33.2 G/DL (ref 32–36)
MCV RBC AUTO: 97.2 FL (ref 80–100)
METAMYELOCYTES # BLD: ABNORMAL CELLS/UL
METAMYELOCYTES NFR BLD MANUAL: ABNORMAL %
MONOCYTES # BLD AUTO: 403 CELLS/UL (ref 200–950)
MONOCYTES NFR BLD AUTO: 8.4 %
MYELOCYTES # BLD: ABNORMAL CELLS/UL
MYELOCYTES NFR BLD MANUAL: ABNORMAL %
NEUTROPHILS # BLD AUTO: 3043 CELLS/UL (ref 1500–7800)
NEUTROPHILS NFR BLD AUTO: 63.4 %
NEUTS BAND # BLD: ABNORMAL CELLS/UL (ref 0–750)
NEUTS BAND NFR BLD MANUAL: ABNORMAL %
NONHDLC SERPL-MCNC: 88 MG/DL (CALC)
NRBC # BLD: ABNORMAL CELLS/UL
NRBC BLD-RTO: ABNORMAL /100 WBC
PLATELET # BLD AUTO: 132 THOUSAND/UL (ref 140–400)
PMV BLD REES-ECKER: 11.8 FL (ref 7.5–12.5)
POTASSIUM SERPL-SCNC: 4.3 MMOL/L (ref 3.5–5.3)
PROMYELOCYTES # BLD: ABNORMAL CELLS/UL
PROMYELOCYTES NFR BLD MANUAL: ABNORMAL %
PROT SERPL-MCNC: 6.8 G/DL (ref 6.1–8.1)
PSA FREE MFR SERPL: 29 % (CALC)
PSA FREE SERPL-MCNC: 0.2 NG/ML
PSA SERPL-MCNC: 0.7 NG/ML
RBC # BLD AUTO: 4.62 MILLION/UL (ref 4.2–5.8)
SERVICE CMNT-IMP: ABNORMAL
SODIUM SERPL-SCNC: 142 MMOL/L (ref 135–146)
TRIGL SERPL-MCNC: 42 MG/DL
TSH SERPL-ACNC: 2.18 MIU/L (ref 0.4–4.5)
VARIANT LYMPHS NFR BLD: ABNORMAL % (ref 0–10)
WBC # BLD AUTO: 4.8 THOUSAND/UL (ref 3.8–10.8)

## 2024-10-09 ENCOUNTER — OFFICE VISIT (OUTPATIENT)
Dept: FAMILY MEDICINE CLINIC | Facility: CLINIC | Age: 64
End: 2024-10-09
Payer: COMMERCIAL

## 2024-10-09 VITALS
OXYGEN SATURATION: 98 % | TEMPERATURE: 98.5 F | HEIGHT: 64 IN | BODY MASS INDEX: 27.69 KG/M2 | DIASTOLIC BLOOD PRESSURE: 88 MMHG | WEIGHT: 162.2 LBS | RESPIRATION RATE: 18 BRPM | HEART RATE: 78 BPM | SYSTOLIC BLOOD PRESSURE: 126 MMHG

## 2024-10-09 DIAGNOSIS — F41.9 ANXIETY: Primary | ICD-10-CM

## 2024-10-09 DIAGNOSIS — K21.9 CHRONIC GERD: ICD-10-CM

## 2024-10-09 DIAGNOSIS — R23.8 BLISTERS OF MULTIPLE SITES: ICD-10-CM

## 2024-10-09 DIAGNOSIS — H34.11 CENTRAL RETINAL ARTERY OCCLUSION, RIGHT EYE: ICD-10-CM

## 2024-10-09 DIAGNOSIS — F41.1 GENERALIZED ANXIETY DISORDER: ICD-10-CM

## 2024-10-09 PROCEDURE — 99214 OFFICE O/P EST MOD 30 MIN: CPT | Performed by: FAMILY MEDICINE

## 2024-10-09 RX ORDER — PROPRANOLOL HCL 60 MG
60 CAPSULE, EXTENDED RELEASE 24HR ORAL DAILY
Qty: 30 CAPSULE | Refills: 3 | Status: SHIPPED | OUTPATIENT
Start: 2024-10-09 | End: 2024-10-17 | Stop reason: ALTCHOICE

## 2024-10-09 RX ORDER — PREDNISONE 10 MG/1
TABLET ORAL
Qty: 24 TABLET | Refills: 0 | Status: SHIPPED | OUTPATIENT
Start: 2024-10-09

## 2024-10-09 NOTE — ASSESSMENT & PLAN NOTE
Rash develops and worsens after stress and has had this once before 2 years ago and upper inner thighs now on both lower legs near ankles and feet.  Will start prednisone for course of 10 days    Orders:    propranolol (INDERAL LA) 60 mg 24 hr capsule; Take 1 capsule (60 mg total) by mouth daily    predniSONE 10 mg tablet; 3 daily x 4 days then 2 daily x 4 days then 1 daily x 4 days

## 2024-10-09 NOTE — PROGRESS NOTES
Ambulatory Visit  Name: Betito Landers      : 1960      MRN: [id number]  Encounter Provider: Mayur Pollock DO  Encounter Date: 10/9/2024   Encounter department: [facility]    Assessment & Plan  Anxiety  Generalized anxiety related to work stress under time constraint now and pressure daily and now causing rash on legs. Try Propranolol    Orders:    propranolol (INDERAL LA) 60 mg 24 hr capsule; Take 1 capsule (60 mg total) by mouth daily    Generalized anxiety disorder  Patient failed on Lexapro and cannot take lorazepam daily.  Will try propranolol long-acting    Orders:    propranolol (INDERAL LA) 60 mg 24 hr capsule; Take 1 capsule (60 mg total) by mouth daily    Central retinal artery occlusion, right eye  Patient working from home now unable to drive long distances and is continuing on Plavix per ophthalmology along with low-dose aspirin and Crestor    Orders:    propranolol (INDERAL LA) 60 mg 24 hr capsule; Take 1 capsule (60 mg total) by mouth daily    Chronic GERD  Stable no worsening symptoms can start with H2 blocker or proton pump inhibitor symptoms change or worsen    Orders:    propranolol (INDERAL LA) 60 mg 24 hr capsule; Take 1 capsule (60 mg total) by mouth daily    Blisters of multiple sites  Rash develops and worsens after stress and has had this once before 2 years ago and upper inner thighs now on both lower legs near ankles and feet.  Will start prednisone for course of 10 days    Orders:    propranolol (INDERAL LA) 60 mg 24 hr capsule; Take 1 capsule (60 mg total) by mouth daily    predniSONE 10 mg tablet; 3 daily x 4 days then 2 daily x 4 days then 1 daily x 4 days       History of Present Illness     Rash and work stress          Review of Systems   Constitutional:  Negative for chills, fatigue and fever.   HENT:  Negative for congestion, nosebleeds, rhinorrhea, sinus pressure and sore throat.    Eyes:  Negative for discharge and redness.   Respiratory:  " Negative for cough and shortness of breath.    Cardiovascular:  Negative for chest pain, palpitations and leg swelling.   Gastrointestinal:  Negative for abdominal pain, blood in stool and nausea.   Endocrine: Negative for cold intolerance, heat intolerance and polyuria.   Genitourinary:  Negative for dysuria and frequency.   Musculoskeletal:  Negative for arthralgias, back pain and myalgias.   Skin:  Negative for rash.   Neurological:  Negative for dizziness, weakness and headaches.   Hematological:  Negative for adenopathy.   Psychiatric/Behavioral:  Negative for behavioral problems and sleep disturbance. The patient is not nervous/anxious.            Objective     /88 (BP Location: Left arm, Patient Position: Sitting, Cuff Size: Standard)   Pulse 78   Temp 98.5 °F (36.9 °C) (Tympanic)   Resp 18   Ht 5' 4\" (1.626 m)   Wt 73.6 kg (162 lb 3.2 oz)   SpO2 98%   BMI 27.84 kg/m²     Physical Exam  Vitals and nursing note reviewed.   Constitutional:       Appearance: Normal appearance. He is well-developed.   HENT:      Head: Normocephalic and atraumatic.      Right Ear: Tympanic membrane and external ear normal.      Left Ear: Tympanic membrane and external ear normal.      Nose: Nose normal.      Mouth/Throat:      Mouth: Mucous membranes are moist.   Eyes:      General: No scleral icterus.     Conjunctiva/sclera: Conjunctivae normal.      Pupils: Pupils are equal, round, and reactive to light.   Neck:      Thyroid: No thyromegaly.      Vascular: No JVD.   Cardiovascular:      Rate and Rhythm: Normal rate and regular rhythm.      Heart sounds: Normal heart sounds. No murmur heard.  Pulmonary:      Effort: Pulmonary effort is normal.      Breath sounds: Normal breath sounds. No wheezing or rales.   Chest:      Chest wall: No tenderness.   Abdominal:      General: Bowel sounds are normal. There is no distension.      Palpations: Abdomen is soft. There is no mass.      Tenderness: There is no abdominal " tenderness. There is no guarding or rebound.   Musculoskeletal:         General: No tenderness or deformity. Normal range of motion.      Cervical back: Normal range of motion and neck supple.   Lymphadenopathy:      Cervical: No cervical adenopathy.   Skin:     General: Skin is warm and dry.      Findings: Rash present. No erythema.   Neurological:      Mental Status: He is alert and oriented to person, place, and time.      Cranial Nerves: No cranial nerve deficit.      Deep Tendon Reflexes: Reflexes are normal and symmetric. Reflexes normal.   Psychiatric:         Behavior: Behavior normal.         Thought Content: Thought content normal.         Judgment: Judgment normal.

## 2024-10-09 NOTE — ASSESSMENT & PLAN NOTE
Generalized anxiety related to work stress under time constraint now and pressure daily and now causing rash on legs. Try Propranolol    Orders:    propranolol (INDERAL LA) 60 mg 24 hr capsule; Take 1 capsule (60 mg total) by mouth daily

## 2024-10-09 NOTE — ASSESSMENT & PLAN NOTE
Patient working from home now unable to drive long distances and is continuing on Plavix per ophthalmology along with low-dose aspirin and Crestor    Orders:    propranolol (INDERAL LA) 60 mg 24 hr capsule; Take 1 capsule (60 mg total) by mouth daily

## 2024-10-09 NOTE — ASSESSMENT & PLAN NOTE
Stable no worsening symptoms can start with H2 blocker or proton pump inhibitor symptoms change or worsen    Orders:    propranolol (INDERAL LA) 60 mg 24 hr capsule; Take 1 capsule (60 mg total) by mouth daily

## 2024-10-09 NOTE — ASSESSMENT & PLAN NOTE
Patient failed on Lexapro and cannot take lorazepam daily.  Will try propranolol long-acting    Orders:    propranolol (INDERAL LA) 60 mg 24 hr capsule; Take 1 capsule (60 mg total) by mouth daily

## 2024-10-17 ENCOUNTER — CONSULT (OUTPATIENT)
Dept: NEPHROLOGY | Facility: CLINIC | Age: 64
End: 2024-10-17
Payer: COMMERCIAL

## 2024-10-17 VITALS
TEMPERATURE: 97.9 F | HEART RATE: 79 BPM | BODY MASS INDEX: 28.17 KG/M2 | HEIGHT: 64 IN | DIASTOLIC BLOOD PRESSURE: 80 MMHG | WEIGHT: 165 LBS | RESPIRATION RATE: 16 BRPM | OXYGEN SATURATION: 98 % | SYSTOLIC BLOOD PRESSURE: 130 MMHG

## 2024-10-17 DIAGNOSIS — N20.0 NEPHROLITHIASIS: Primary | ICD-10-CM

## 2024-10-17 PROCEDURE — 99243 OFF/OP CNSLTJ NEW/EST LOW 30: CPT | Performed by: INTERNAL MEDICINE

## 2024-10-17 NOTE — PROGRESS NOTES
NEPHROLOGY OFFICE CONSULT  Betito Landers 64 y.o.male YOB: 1960 MRN: [id number]    Encounter: [id number] DATE: 10/17/2024    REASON FOR VISIT: Betito Landers is a 64 y.o.male who was referred by  for metabolic stone workup for further management of recurrent nephrolithiasis.    HPI:    This is 64-year-old male with past medical history significant for hyperlipidemia, anxiety disorder, GERD, right retinal artery occlusion, referred to the nephrology for metabolic stone workup for further management of recurrent nephrolithiasis.    Patient having issue with kidney stone and been passing kidney stone for more than 10 years.  Patient underwent left lithotripsy on 5/6/2024 and stone analysis done during that time showed 100% calcium oxalate monohydrate crystals.  Patient had underwent CT scan on 8/10/2024 showed bilateral nonobstructive renal stone.    Patient's wife was also present at the time of consultation and history was also obtained from her and all the questions were answered.    Patient has underlying hyperlipidemia and currently taking statin.    Patient also have underlying right retinal artery occlusion and currently also taking Plavix with aspirin.        REVIEW OF SYSTEMS:    Review of Systems   Constitutional:  Negative for chills and fever.   HENT:  Negative for nosebleeds.    Eyes:  Negative for photophobia and pain.   Respiratory:  Negative for choking.    Cardiovascular:  Negative for palpitations.   Gastrointestinal:  Negative for blood in stool.   Genitourinary:  Negative for hematuria.   Musculoskeletal:  Negative for neck stiffness.   Neurological:  Negative for seizures.   Psychiatric/Behavioral:  Negative for confusion and suicidal ideas.          PAST MEDICAL HISTORY:  Past Medical History:   Diagnosis Date    Chronic low back pain     GERD (gastroesophageal reflux disease)     Hyperlipidemia     Sleep apnea     Verruca 2 yrs       PAST SURGICAL HISTORY:  Past Surgical  "History:   Procedure Laterality Date    COLONOSCOPY      ESOPHAGOGASTRODUODENOSCOPY      FL RETROGRADE PYELOGRAM  5/6/2024    NH CYSTO/URETERO W/LITHOTRIPSY &INDWELL STENT INSRT Left 5/6/2024    Procedure: CYSTOSCOPY URETEROSCOPY WITH LITHOTRIPSY HOLMIUM LASER, STONE BASKET EXTRACTION, RETROGRADE PYELOGRAM AND INSERTION STENT URETERAL;  Surgeon: Mauro Finch MD;  Location: [facility];  Service: Urology    NH ESOPHAGOGASTRODUODENOSCOPY TRANSORAL DIAGNOSTIC N/A 5/24/2018    Procedure: ESOPHAGOGASTRODUODENOSCOPY (EGD);  Surgeon: Francis Sanders MD;  Location: [facility];  Service: Gastroenterology       SOCIAL HISTORY:  Social History     Substance and Sexual Activity   Alcohol Use Not Currently    Alcohol/week: 4.0 standard drinks of alcohol    Types: 2 Glasses of wine, 2 Cans of beer per week    Comment: social     Social History     Substance and Sexual Activity   Drug Use No     Social History     Tobacco Use   Smoking Status Never    Passive exposure: Past   Smokeless Tobacco Never       FAMILY HISTORY:  Family History   Problem Relation Age of Onset    Diabetes Mother     Lung cancer Father     Cancer Father     Cancer Brother         Bladder Cancer and a stroke    No Known Problems Daughter     No Known Problems Son        ALLERGY:  No Known Allergies    MEDICATIONS:    Current Outpatient Medications:     aspirin (ECOTRIN LOW STRENGTH) 81 mg EC tablet, Take 81 mg by mouth daily, Disp: , Rfl:     clopidogrel (PLAVIX) 75 mg tablet, Take 1 tablet (75 mg total) by mouth daily, Disp: 90 tablet, Rfl: 3    predniSONE 10 mg tablet, 3 daily x 4 days then 2 daily x 4 days then 1 daily x 4 days, Disp: 24 tablet, Rfl: 0    rosuvastatin (CRESTOR) 20 MG tablet, Take 1 tablet (20 mg total) by mouth daily, Disp: 90 tablet, Rfl: 3    PHYSICAL EXAM:  Vitals:    10/17/24 1453   BP: 130/80   Pulse: 79   Resp: 16   Temp: 97.9 °F (36.6 °C)   TempSrc: Temporal   SpO2: 98%   Weight: 74.8 kg (165 lb)   Height: 5' 4\" (1.626 m) "     Body mass index is 28.32 kg/m².    Physical Exam  Constitutional:       General: He is not in acute distress.  HENT:      Right Ear: External ear normal.   Eyes:      General: No scleral icterus.     Conjunctiva/sclera:      Right eye: No hemorrhage.  Neck:      Thyroid: No thyromegaly.      Vascular: No JVD.   Cardiovascular:      Rate and Rhythm: Normal rate.   Pulmonary:      Effort: Pulmonary effort is normal. No accessory muscle usage or respiratory distress.      Breath sounds: No wheezing.   Abdominal:      General: There is no distension.   Musculoskeletal:      Right ankle: No swelling.      Left ankle: No swelling.   Skin:     General: Skin is warm.      Coloration: Skin is not jaundiced.   Neurological:      Mental Status: He is alert. He is not disoriented.   Psychiatric:         Speech: He is communicative.         Behavior: Behavior is not combative.         LAB RESULTS:  Results for orders placed or performed in visit on 10/05/24   Cholesterol, total    Collection Time: 10/05/24  9:42 AM   Result Value Ref Range    Total Cholesterol 142 <200 mg/dL   HDL cholesterol    Collection Time: 10/05/24  9:42 AM   Result Value Ref Range    HDL 54 > OR = 40 mg/dL   Triglycerides    Collection Time: 10/05/24  9:42 AM   Result Value Ref Range    Triglycerides 42 <150 mg/dL   LDL cholesterol, direct    Collection Time: 10/05/24  9:42 AM   Result Value Ref Range    LDL Calculated 76 mg/dL (calc)   Chol/HDL Ratio    Collection Time: 10/05/24  9:42 AM   Result Value Ref Range    Chol HDLC Ratio 2.6 <5.0 (calc)   Non HDL Cholesterol    Collection Time: 10/05/24  9:42 AM   Result Value Ref Range    Non-HDL Cholesterol 88 <130 mg/dL (calc)   Comprehensive metabolic panel    Collection Time: 10/05/24  9:42 AM   Result Value Ref Range    Glucose, Random 98 65 - 99 mg/dL    BUN 16 7 - 25 mg/dL    Creatinine 1.08 0.70 - 1.35 mg/dL    eGFR 78 > OR = 60 mL/min/1.73m2    SL AMB BUN/CREATININE RATIO SEE NOTE: 6 - 22 (calc)     Sodium 142 135 - 146 mmol/L    Potassium 4.3 3.5 - 5.3 mmol/L    Chloride 104 98 - 110 mmol/L    CO2 30 20 - 32 mmol/L    Calcium 9.5 8.6 - 10.3 mg/dL    Protein, Total 6.8 6.1 - 8.1 g/dL    Albumin 4.4 3.6 - 5.1 g/dL    Globulin 2.4 1.9 - 3.7 g/dL (calc)    Albumin/Globulin Ratio 1.8 1.0 - 2.5 (calc)    TOTAL BILIRUBIN 0.8 0.2 - 1.2 mg/dL    Alkaline Phosphatase 43 35 - 144 U/L    AST 23 10 - 35 U/L    ALT 25 9 - 46 U/L   TSH, 3rd generation with Free T4 reflex    Collection Time: 10/05/24  9:42 AM   Result Value Ref Range    TSH W/RFX TO FREE T4 2.18 0.40 - 4.50 mIU/L   CBC and differential    Collection Time: 10/05/24  9:42 AM   Result Value Ref Range    White Blood Cell Count 4.8 3.8 - 10.8 Thousand/uL    Red Blood Cell Count 4.62 4.20 - 5.80 Million/uL    Hemoglobin 14.9 13.2 - 17.1 g/dL    HCT 44.9 38.5 - 50.0 %    MCV 97.2 80.0 - 100.0 fL    MCH 32.3 27.0 - 33.0 pg    MCHC 33.2 32.0 - 36.0 g/dL    RDW 12.1 11.0 - 15.0 %    Platelet Count 132 (L) 140 - 400 Thousand/uL    SL AMB MPV 11.8 7.5 - 12.5 fL    Neutrophils (Absolute) 3,043 1,500 - 7,800 cells/uL    Absolute Bands CANCELED 0 - 750 cells/uL    Absolute Metamyelocytes CANCELED 0 cells/uL    SL AMB ABSOLUTE MYELOCYTES CANCELED 0 cells/uL    Absolute Promyelocytes CANCELED 0 cells/uL    Lymphocytes (Absolute) 1,286 850 - 3,900 cells/uL    Monocytes (Absolute) 403 200 - 950 cells/uL    Eosinophils (Absolute) 38 15 - 500 cells/uL    Basophils ABS 29 0 - 200 cells/uL    Absolute Blasts CANCELED 0 cells/uL    Absolute NRBC's CANCELED 0 cells/uL    Neutrophils 63.4 %    Bands PCT CANCELED %    Metamyelocytes CANCELED %    SL AMB MYELOCYTES CANCELED %    Promyelocytes CANCELED %    Lymphocytes 26.8 %    Reactive Lymphocytes CANCELED 0 - 10 %    Monocytes 8.4 %    Eosinophils 0.8 %    Basophils PCT 0.6 %    Blasts CANCELED %    NRBC's CANCELED 0 /100 WBC    Comment(s) CANCELED    PSA, total and free    Collection Time: 10/05/24  9:42 AM   Result Value Ref  "Range    Prostate Specific Antigen Total 0.7 < OR = 4.0 ng/mL    PSA, Free 0.2 ng/mL    PSA, Free Pct 29 >25 % (calc)       ASSESSMENT and PLAN:  Betito was seen today for consult and nephrolithiasis.    Diagnoses and all orders for this visit:    Nephrolithiasis  -     Ambulatory Referral to Nephrology  -     Basic metabolic panel; Future  -     Phosphorus; Future  -     Uric acid; Future  -     PTH, intact; Future        Assessment & Plan  Nephrolithiasis      1.  Nephrolithiasis.  Recurrent, exact etiology is currently unknown.    According to patient he has been passing kidney stone for the last 10 years but had underwent lithotripsy on 5/6/2024.  Stone analysis done on 5/6/2024 showed 100% calcium oxalate monohydrate crystals.  Patient had underwent CT scan of abdomen on 8/10/2024 showed bilateral nonobstructive renal calculi.    Patient is currently not taking calcium/vitamin D/vitamin C supplementation at this point.    Advised patient to drink around 3 L of fluid on daily basis to maintain daily urine output more than 2 L.  Also encourage patient to consider adding lemon to the water to help boost citrate level.    Also encourage patient to follow low-salt diet which would be beneficial in the light of underlying calcium oxalate stone.    Discussed with patient today that would like to do 24-hour urine collection for metabolic stone workup and will go over dietary modification versus medication depending on the results.    Returns to the nephrology office in 3 months with Reema.  Plan to check BMP, phosphorus, uric acid, PTH + 4-hour urine collection before next visit.        Portions of the record may have been created with voice recognition software. Occasional wrong word or \"sound a like\" substitutions may have occurred due to the inherent limitations of voice recognition software. Read the chart carefully and recognize, using context, where substitutions have occurred.If you have any questions, please " contact the dictating provider.

## 2024-10-23 ENCOUNTER — OFFICE VISIT (OUTPATIENT)
Dept: FAMILY MEDICINE CLINIC | Facility: CLINIC | Age: 64
End: 2024-10-23
Payer: COMMERCIAL

## 2024-10-23 VITALS
OXYGEN SATURATION: 94 % | BODY MASS INDEX: 28.38 KG/M2 | WEIGHT: 166.2 LBS | HEART RATE: 81 BPM | SYSTOLIC BLOOD PRESSURE: 104 MMHG | HEIGHT: 64 IN | DIASTOLIC BLOOD PRESSURE: 56 MMHG | TEMPERATURE: 98 F | RESPIRATION RATE: 18 BRPM

## 2024-10-23 DIAGNOSIS — E78.2 MIXED HYPERLIPIDEMIA: ICD-10-CM

## 2024-10-23 DIAGNOSIS — R73.9 HYPERGLYCEMIA: ICD-10-CM

## 2024-10-23 DIAGNOSIS — H34.11 CENTRAL RETINAL ARTERY OCCLUSION, RIGHT EYE: Primary | ICD-10-CM

## 2024-10-23 DIAGNOSIS — R35.1 NOCTURIA MORE THAN TWICE PER NIGHT: ICD-10-CM

## 2024-10-23 DIAGNOSIS — G47.33 OBSTRUCTIVE SLEEP APNEA: ICD-10-CM

## 2024-10-23 PROCEDURE — 99214 OFFICE O/P EST MOD 30 MIN: CPT | Performed by: FAMILY MEDICINE

## 2024-10-23 NOTE — PROGRESS NOTES
"Ambulatory Visit  Name: Betito Landers      : 1960      MRN: [id number]  Encounter Provider: Mayur Pollock DO  Encounter Date: 10/23/2024   Encounter department: [facility]    Assessment & Plan  Central retinal artery occlusion, right eye  Stable on Plavix and aspirin continue same dosing as scheduled follow-up with ophthalmology         Obstructive sleep apnea  Stable no change in treatment plan at this time reevaluate in 6 months         Mixed hyperlipidemia  Reviewed lab work follow heart healthy diet continue on Crestor 20 mg daily         Nocturia more than twice per night  Monitor PSA annually         Hyperglycemia    Orders:    Comprehensive metabolic panel; Future    Hemoglobin A1C; Future       History of Present Illness     Follow-up evaluation 6-month checkup          Review of Systems   Constitutional:  Negative for chills, fatigue and fever.   HENT:  Negative for congestion, nosebleeds, rhinorrhea, sinus pressure and sore throat.    Eyes:  Negative for discharge and redness.   Respiratory:  Negative for cough and shortness of breath.    Cardiovascular:  Negative for chest pain, palpitations and leg swelling.   Gastrointestinal:  Negative for abdominal pain, blood in stool and nausea.   Endocrine: Negative for cold intolerance, heat intolerance and polyuria.   Genitourinary:  Negative for dysuria and frequency.   Musculoskeletal:  Positive for arthralgias and back pain. Negative for myalgias.   Skin:  Negative for rash.   Neurological:  Negative for dizziness, weakness and headaches.   Hematological:  Negative for adenopathy.   Psychiatric/Behavioral:  Negative for behavioral problems and sleep disturbance. The patient is not nervous/anxious.            Objective     /56 (BP Location: Left arm, Patient Position: Sitting, Cuff Size: Large)   Pulse 81   Temp 98 °F (36.7 °C) (Tympanic)   Resp 18   Ht 5' 4\" (1.626 m)   Wt 75.4 kg (166 lb 3.2 oz)   SpO2 94% "   BMI 28.53 kg/m²     Physical Exam  Vitals and nursing note reviewed.   Constitutional:       Appearance: Normal appearance. He is well-developed.   HENT:      Head: Normocephalic and atraumatic.      Right Ear: Tympanic membrane and external ear normal.      Left Ear: Tympanic membrane and external ear normal.      Nose: Nose normal.      Mouth/Throat:      Mouth: Mucous membranes are moist.      Pharynx: Oropharynx is clear.   Eyes:      General: No scleral icterus.     Conjunctiva/sclera: Conjunctivae normal.      Pupils: Pupils are equal, round, and reactive to light.   Neck:      Thyroid: No thyromegaly.      Vascular: No JVD.   Cardiovascular:      Rate and Rhythm: Normal rate and regular rhythm.      Heart sounds: Normal heart sounds. No murmur heard.  Pulmonary:      Effort: Pulmonary effort is normal.      Breath sounds: Normal breath sounds. No wheezing or rales.   Chest:      Chest wall: No tenderness.   Abdominal:      General: Bowel sounds are normal. There is no distension.      Palpations: Abdomen is soft. There is no mass.      Tenderness: There is no abdominal tenderness. There is no guarding or rebound.   Musculoskeletal:         General: No tenderness or deformity. Normal range of motion.      Cervical back: Normal range of motion and neck supple.   Lymphadenopathy:      Cervical: No cervical adenopathy.   Skin:     General: Skin is warm and dry.      Findings: No erythema or rash.   Neurological:      Mental Status: He is alert and oriented to person, place, and time.      Cranial Nerves: No cranial nerve deficit.      Deep Tendon Reflexes: Reflexes are normal and symmetric. Reflexes normal.   Psychiatric:         Behavior: Behavior normal.         Thought Content: Thought content normal.         Judgment: Judgment normal.

## 2024-10-23 NOTE — ASSESSMENT & PLAN NOTE
Stable on Plavix and aspirin continue same dosing as scheduled follow-up with ophthalmology

## 2024-10-28 DIAGNOSIS — E78.2 MIXED HYPERLIPIDEMIA: ICD-10-CM

## 2024-10-28 DIAGNOSIS — H34.11 CENTRAL RETINAL ARTERY OCCLUSION, RIGHT EYE: ICD-10-CM

## 2024-10-29 RX ORDER — ROSUVASTATIN CALCIUM 20 MG/1
20 TABLET, COATED ORAL DAILY
Qty: 90 TABLET | Refills: 1 | Status: SHIPPED | OUTPATIENT
Start: 2024-10-29

## 2024-10-29 RX ORDER — CLOPIDOGREL BISULFATE 75 MG/1
75 TABLET ORAL DAILY
Qty: 90 TABLET | Refills: 1 | Status: SHIPPED | OUTPATIENT
Start: 2024-10-29

## 2024-11-26 ENCOUNTER — OFFICE VISIT (OUTPATIENT)
Dept: FAMILY MEDICINE CLINIC | Facility: CLINIC | Age: 64
End: 2024-11-26
Payer: COMMERCIAL

## 2024-11-26 VITALS
OXYGEN SATURATION: 98 % | SYSTOLIC BLOOD PRESSURE: 100 MMHG | TEMPERATURE: 98 F | HEIGHT: 64 IN | RESPIRATION RATE: 18 BRPM | DIASTOLIC BLOOD PRESSURE: 68 MMHG | BODY MASS INDEX: 29.19 KG/M2 | HEART RATE: 73 BPM | WEIGHT: 171 LBS

## 2024-11-26 DIAGNOSIS — S05.02XA ABRASION OF LEFT CORNEA, INITIAL ENCOUNTER: Primary | ICD-10-CM

## 2024-11-26 PROCEDURE — 99213 OFFICE O/P EST LOW 20 MIN: CPT | Performed by: FAMILY MEDICINE

## 2024-11-26 NOTE — PROGRESS NOTES
Name: Betito Landers      : 1960      MRN: [id number]  Encounter Provider: Mayur Pollock DO  Encounter Date: 2024   Encounter department: [facility]  :  Assessment & Plan  Abrasion of left cornea, initial encounter  Small corneal abrasion on the lower left lateral aspect of his left eye.  He has moderate inflammation in the conjunctiva and no foreign body seen.  Will try TobraDex eye ointment and if this does not improve after 24 hours he will follow-up with ophthalmology    Orders:    tobramycin-dexamethasone (TOBRADEX) ophthalmic ointment; Administer 0.5 inches into the left eye 3 (three) times a day           History of Present Illness     Patient is here for an irritation to his left eye lower lateral aspect denies trauma    Eye Problem   Pertinent negatives include no eye discharge, eye redness, fever, nausea or weakness.   Eye Pain   Pertinent negatives include no eye discharge, eye redness, fever, nausea or weakness.     Review of Systems   Constitutional:  Negative for chills, fatigue and fever.   HENT:  Negative for congestion, nosebleeds, rhinorrhea, sinus pressure and sore throat.    Eyes:  Positive for pain. Negative for discharge and redness.   Respiratory:  Negative for cough and shortness of breath.    Cardiovascular:  Negative for chest pain, palpitations and leg swelling.   Gastrointestinal:  Negative for abdominal pain, blood in stool and nausea.   Endocrine: Negative for cold intolerance, heat intolerance and polyuria.   Genitourinary:  Negative for dysuria and frequency.   Musculoskeletal:  Negative for arthralgias, back pain and myalgias.   Skin:  Negative for rash.   Neurological:  Negative for dizziness, weakness and headaches.   Hematological:  Negative for adenopathy.   Psychiatric/Behavioral:  Negative for behavioral problems and sleep disturbance. The patient is not nervous/anxious.           Objective   /68 (BP Location: Left arm,  "Patient Position: Sitting, Cuff Size: Large)   Pulse 73   Temp 98 °F (36.7 °C) (Tympanic)   Resp 18   Ht 5' 4\" (1.626 m)   Wt 77.6 kg (171 lb)   SpO2 98%   BMI 29.35 kg/m²      Physical Exam  Vitals and nursing note reviewed.   Constitutional:       Appearance: He is well-developed.   HENT:      Head: Normocephalic and atraumatic.      Right Ear: External ear normal.      Left Ear: External ear normal.      Nose: Nose normal.   Eyes:      General: No scleral icterus.     Conjunctiva/sclera: Conjunctivae normal.      Pupils: Pupils are equal, round, and reactive to light.      Comments: Left eye irritation lower left lateral aspect   Neck:      Thyroid: No thyromegaly.      Vascular: No JVD.   Cardiovascular:      Rate and Rhythm: Normal rate and regular rhythm.      Heart sounds: Normal heart sounds. No murmur heard.  Pulmonary:      Effort: Pulmonary effort is normal.      Breath sounds: Normal breath sounds. No wheezing or rales.   Chest:      Chest wall: No tenderness.   Abdominal:      General: Bowel sounds are normal. There is no distension.      Palpations: Abdomen is soft. There is no mass.      Tenderness: There is no abdominal tenderness. There is no guarding or rebound.   Musculoskeletal:         General: No tenderness or deformity. Normal range of motion.      Cervical back: Normal range of motion and neck supple.   Lymphadenopathy:      Cervical: No cervical adenopathy.   Skin:     General: Skin is warm and dry.      Findings: No erythema or rash.   Neurological:      Mental Status: He is alert and oriented to person, place, and time.      Cranial Nerves: No cranial nerve deficit.      Deep Tendon Reflexes: Reflexes are normal and symmetric. Reflexes normal.   Psychiatric:         Behavior: Behavior normal.         Thought Content: Thought content normal.         Judgment: Judgment normal.         "

## 2024-11-26 NOTE — ASSESSMENT & PLAN NOTE
Small corneal abrasion on the lower left lateral aspect of his left eye.  He has moderate inflammation in the conjunctiva and no foreign body seen.  Will try TobraDex eye ointment and if this does not improve after 24 hours he will follow-up with ophthalmology    Orders:    tobramycin-dexamethasone (TOBRADEX) ophthalmic ointment; Administer 0.5 inches into the left eye 3 (three) times a day

## 2024-11-27 LAB
AMMONIA UR-SCNC: 50 MMOL/24 HR (ref 15–60)
CA H2 PHOS DIHYD 24H SATFR UR: 0.45 (ref 0.5–2)
CALCIUM 24H UR-MRATE: 211 MG/24 HR
CALCIUM/CREAT UR: 110 MG/G CREAT (ref 34–196)
CALCIUM/KG BODY WEIGHT: 2.8 MG/24 HR/KG
CAOX INDEX 24H UR-RTO: 1.69 (ref 6–10)
CHLORIDE 24H UR-SRATE: 313 MMOL/24 HR (ref 70–250)
CITRATE 24H UR-MCNC: 710 MG/24 HR
COMMENT: ABNORMAL
CREAT UR-MCNC: 1912 MG/24 HR
CREAT/BW 24H UR-RELMRAT: 25.5 MG/24 HR/KG (ref 11.9–24.4)
CYSTINE 24H UR-MCNC: ABNORMAL MG/DL
MAGNESIUM 24H UR-MRATE: 84 MG/24 HR (ref 30–120)
OXALATE UR-MCNC: 29 MG/24 HR (ref 20–40)
PH 24H UR: 6.26 [PH] (ref 5.8–6.2)
PHOSPHATE 24H UR-MRATE: 1240 MG/24 HR (ref 600–1200)
POTASSIUM 24H UR-SCNC: 70 MMOL/24 HR (ref 20–100)
PROTEIN CATABOLIC RATE 24H UR-MRATE: 1.6 G/KG/24 HR (ref 0.8–1.4)
SODIUM 24H UR-SRATE: 318 MMOL/24 HR (ref 50–150)
SPECIMEN VOL 24H UR: 4410 ML/24 HR (ref 500–4000)
SULFATE 24H UR-SRATE: 59 MEQ/24 HR (ref 20–80)
URATE 24H SATFR UR: 0.33
URATE 24H UR-MRATE: 1192 MG/24 HR
UUN 24H UR-MRATE: 17.08 G/24 HR (ref 6–14)

## 2024-12-15 LAB
BUN SERPL-MCNC: 21 MG/DL (ref 7–25)
BUN/CREAT SERPL: NORMAL (CALC) (ref 6–22)
CALCIUM SERPL-MCNC: 8.9 MG/DL (ref 8.6–10.3)
CHLORIDE SERPL-SCNC: 106 MMOL/L (ref 98–110)
CO2 SERPL-SCNC: 27 MMOL/L (ref 20–32)
CREAT SERPL-MCNC: 0.92 MG/DL (ref 0.7–1.35)
GFR/BSA.PRED SERPLBLD CYS-BASED-ARV: 94 ML/MIN/1.73M2
GLUCOSE SERPL-MCNC: 97 MG/DL (ref 65–99)
PHOSPHATE SERPL-MCNC: 3.8 MG/DL (ref 2.5–4.5)
POTASSIUM SERPL-SCNC: 4.2 MMOL/L (ref 3.5–5.3)
PTH-INTACT SERPL-MCNC: 62 PG/ML (ref 16–77)
SODIUM SERPL-SCNC: 140 MMOL/L (ref 135–146)
URATE SERPL-MCNC: 4.5 MG/DL (ref 4–8)

## 2025-01-23 ENCOUNTER — OFFICE VISIT (OUTPATIENT)
Dept: NEPHROLOGY | Facility: CLINIC | Age: 65
End: 2025-01-23
Payer: COMMERCIAL

## 2025-01-23 VITALS
RESPIRATION RATE: 18 BRPM | SYSTOLIC BLOOD PRESSURE: 119 MMHG | HEART RATE: 86 BPM | OXYGEN SATURATION: 98 % | HEIGHT: 64 IN | WEIGHT: 174.4 LBS | BODY MASS INDEX: 29.78 KG/M2 | DIASTOLIC BLOOD PRESSURE: 74 MMHG | TEMPERATURE: 97.4 F

## 2025-01-23 DIAGNOSIS — N20.0 NEPHROLITHIASIS: Primary | ICD-10-CM

## 2025-01-23 DIAGNOSIS — R82.993 HYPERURICOSURIA: ICD-10-CM

## 2025-01-23 PROCEDURE — 99213 OFFICE O/P EST LOW 20 MIN: CPT

## 2025-01-23 NOTE — LETTER
January 23, 2025     Mayur Pollock DO  [address] [place] [id number]    Patient: Betito Landers   YOB: 1960   Date of Visit: 1/23/2025       Dear Dr. Pollock:    Thank you for referring Betito Landers to me for evaluation. Below are my notes for this consultation.    If you have questions, please do not hesitate to call me. I look forward to following your patient along with you.         Sincerely,        BEBA Haywood        CC: No Recipients    BEBA Haywood  1/23/2025  4:47 PM  Sign when Signing Visit  NEPHROLOGY OFFICE NOTE    Patient: Betito Landers               Sex: male           YOB: 1960        Age:  64 y.o.       1/23/2025    ASSESSMENT/PLAN     Assessment & Plan  Nephrolithiasis    Orders:  •  Basic metabolic panel; Future  •  Urinalysis with microscopic; Future  •  Phosphorus; Future  •  Uric acid; Future    Underlying history of recurrent nephrolithiasis, according to patient he has been having issues with kidney stones for greater than 10 years.  Prior stone analysis performed on 5/6/2024 noted 100% calcium oxalate monohydrate crystals.    Updated BMP noted serum calcium of 8.9.  Creatinine 0.92 with a EGFR of 94 and preserved.  Phosphorus is normal limits at 3.8 and serum uric acid level within normal limits at 4.5.    He underwent 24-hour urine Litholink for metabolic stone workup on 11/21/2024.  He was noted to have a preserved urine volume of 4410, calcium oxalate saturation 1.69, normal urine calcium of 211, urine oxalate 29, urine citrate 710, urine pH 6.258.    Identified risk factors include elevated urine sodium level as well as hyperuricosuria.  Advised following a lower salt diet, continuing to add lemon or lime juice to daily intake of water, continue lower oxalate diet, avoid vitamin C supplements.  Management of hyperuricosuria as below.    Follow-up on repeat Litholink at 6 months and will also recheck updated  BMP, phosphorus, UA, and uric acid level prior to appointment.  Hyperuricosuria  He was noted to have uric acid saturation 0.33 but elevated urine uric acid level 1192.  Fortunately patient has elevated urine pH of 6.258 which will help protect against uric acid stone formation.    Patient was also noted to have an elevated creatinine per kilogram of body weight, protein catabolic rate, and urine urea nitrogen which may be indicative of high protein intake.  Advised patient to follow a more moderate protein diet, printout on low purine diet was also provided to patient.    Would advise against use of tart cherry juice to reduce uric acid levels in the setting of use of aspirin and Plavix use, as this can increase risk of bleeding.    BACKGROUND     I had the pleasure of seeing Betito Landers in the nephrology office today for follow-up.  He has a past medical history significant for hyperlipidemia, GERD, anxiety, right retinal artery occlusion, and recurrent nephrolithiasis.    He is currently following with our office and Dr. ROGER Zapata for management of underlying recurrent nephrolithiasis.  Patient reports he has been having issues with kidney stones for greater than 10 years.  He had undergone left lithotripsy on 5/6/2024 and stone analysis at that time showed 100% calcium oxalate monohydrate crystals.  On prior CT scan 8/10/2024 he was noted to have bilateral nonobstructive renal stones.    His underlying history of hyperlipidemia and actively maintained on statin therapy.    He has a history of a right retinal artery occlusion and is taking Plavix along with aspirin.    The last blood work was done on 12/14/2024, which we have reviewed together.    SUBJECTIVE     He currently has no major complaints at this time and is feeling well.    REVIEW OF SYSTEMS     Review of Systems   Constitutional:  Negative for activity change and fever.   HENT:  Negative for trouble swallowing.    Respiratory:  Negative for shortness  of breath.    Cardiovascular:  Negative for leg swelling.   Gastrointestinal:  Negative for nausea and vomiting.   Genitourinary:  Negative for difficulty urinating, dysuria, frequency and hematuria.   Musculoskeletal:  Negative for back pain.   Skin:  Negative for pallor.   Neurological:  Negative for dizziness, syncope, weakness and light-headedness.   Psychiatric/Behavioral:  Negative for sleep disturbance. The patient is not nervous/anxious.        OBJECTIVE     Current Weight: Weight - Scale: 79.1 kg (174 lb 6.4 oz)  Vitals:    01/23/25 1532   BP: 119/74   Pulse: 86   Resp: 18   Temp: (!) 97.4 °F (36.3 °C)   SpO2: 98%     Body mass index is 29.94 kg/m².    CURRENT MEDICATIONS       Current Outpatient Medications:   •  aspirin (ECOTRIN LOW STRENGTH) 81 mg EC tablet, Take 81 mg by mouth daily, Disp: , Rfl:   •  clopidogrel (PLAVIX) 75 mg tablet, TAKE 1 TABLET DAILY, Disp: 90 tablet, Rfl: 1  •  rosuvastatin (CRESTOR) 20 MG tablet, TAKE 1 TABLET DAILY, Disp: 90 tablet, Rfl: 1      PAST MEDICAL HISTORY     Past Medical History:   Diagnosis Date   • Chronic low back pain    • GERD (gastroesophageal reflux disease)    • Hyperlipidemia    • Kidney stone    • Sleep apnea    • Verruca 2 yrs       PAST SURGICAL HISTORY     Past Surgical History:   Procedure Laterality Date   • COLONOSCOPY     • ESOPHAGOGASTRODUODENOSCOPY     • FL RETROGRADE PYELOGRAM  5/6/2024   • [place] CYSTO/URETERO W/LITHOTRIPSY &INDWELL STENT INSRT Left 5/6/2024    Procedure: CYSTOSCOPY URETEROSCOPY WITH LITHOTRIPSY HOLMIUM LASER, STONE BASKET EXTRACTION, RETROGRADE PYELOGRAM AND INSERTION STENT URETERAL;  Surgeon: Mauro Finch MD;  Location: [facility];  Service: Urology   • [place] ESOPHAGOGASTRODUODENOSCOPY TRANSORAL DIAGNOSTIC N/A 5/24/2018    Procedure: ESOPHAGOGASTRODUODENOSCOPY (EGD);  Surgeon: Francis Sanders MD;  Location: [facility];  Service: Gastroenterology       ALLERGIES     No Known Allergies    SOCIAL HISTORY     Social History      Substance and Sexual Activity   Alcohol Use Yes   • Alcohol/week: 4.0 standard drinks of alcohol   • Types: 2 Glasses of wine, 2 Cans of beer per week    Comment: social     Social History     Substance and Sexual Activity   Drug Use No     Social History     Tobacco Use   Smoking Status Never   • Passive exposure: Past   Smokeless Tobacco Never       FAMILY HISTORY     Family History   Problem Relation Age of Onset   • Diabetes Mother    • Lung cancer Father    • Cancer Father    • Cancer Brother         Bladder Cancer and a stroke   • No Known Problems Daughter    • No Known Problems Son        PHYSICAL EXAMINATION     Physical Exam  Vitals reviewed.   Constitutional:       General: He is not in acute distress.  HENT:      Head: Normocephalic.      Mouth/Throat:      Lips: Pink.      Mouth: Mucous membranes are moist.   Eyes:      General: Lids are normal. No scleral icterus.  Cardiovascular:      Rate and Rhythm: Normal rate and regular rhythm.      Heart sounds: S1 normal and S2 normal.   Pulmonary:      Effort: Pulmonary effort is normal. No accessory muscle usage or respiratory distress.      Breath sounds: Normal breath sounds.   Abdominal:      General: There is no distension.      Tenderness: There is no abdominal tenderness.   Musculoskeletal:      Cervical back: Normal range of motion and neck supple. No tenderness.      Right lower leg: No edema.      Left lower leg: No edema.   Skin:     General: Skin is warm.      Coloration: Skin is not cyanotic or jaundiced.   Neurological:      General: No focal deficit present.      Mental Status: He is alert and oriented to person, place, and time.   Psychiatric:         Attention and Perception: Attention normal.         Speech: Speech normal.         Behavior: Behavior is cooperative.           LAB RESULTS     [facility] 12/14/2024:  Sodium 140  Potassium 4.2  Chloride 106  CO2 27  BUN 21  Creatinine 0.92  Glucose 97  Calcium 8.9  eGFR 94    Litholink 24-hour urine  panel 11/21/2024:  Urine volume 4410, calcium oxalate saturation 1.69, urine calcium to 11, urine oxalate 29, urine citrate 710, calcium phosphate saturation 0.45, urine pH 6.258, uric acid saturation 0.33, urine uric acid 1192, urine sodium 318.    RADIOLOGY RESULTS      No results found for this or any previous visit.    Results for orders placed during the hospital encounter of 06/25/16    X-ray chest 2 views    Narrative  CHEST    INDICATION:  Chest pain.    COMPARISON:  5/29/2013.    VIEWS:  Frontal and lateral projections; 2 images    FINDINGS:    Cardiomediastinal silhouette appears unremarkable.    The lungs are clear.  No pneumothorax or pleural effusion.    Visualized osseous structures appear within normal limits for the patient's age.    Impression  No active pulmonary disease.      Findings concur with the preliminary report by the referring clinician already in PACS and/or our electronic record EPIC.      Workstation performed: [id number]    CT abdomen and pelvis 8/10/2024:  FINDINGS:     ABDOMEN     LOWER CHEST: No clinically significant abnormality in the visualized lower chest.     LIVER/BILIARY TREE: Unremarkable.     GALLBLADDER: No calcified gallstones. No pericholecystic inflammatory change.     SPLEEN: Unremarkable.     PANCREAS: Unremarkable.     ADRENAL GLANDS: Stable 2 cm left adrenal nodule.     KIDNEYS/URETERS: Bilateral nephrolithiasis without hydronephrosis. Subcentimeter left renal hypodensity likely benign.     STOMACH AND BOWEL: Unremarkable.     APPENDIX: No findings to suggest appendicitis.     ABDOMINOPELVIC CAVITY: No ascites. No pneumoperitoneum. No lymphadenopathy.     VESSELS: Unremarkable for patient's age.     PELVIS     REPRODUCTIVE ORGANS: Unremarkable for patient's age.     URINARY BLADDER: Unremarkable.     ABDOMINAL WALL/INGUINAL REGIONS: Unremarkable.     BONES: No acute fracture or suspicious osseous lesion. Spinal degenerative changes.     IMPRESSION:     No acute  "findings in the abdomen or pelvis.  Bilateral nephrolithiasis without hydronephrosis.    Recommend Nephrology follow-up in 7 months.    BEBA Haywood  Nephrology  1/23/2025      Portions of the record may have been created with voice recognition software. Occasional wrong word or \"sound a like\" substitutions may have occurred due to the inherent limitations of voice recognition software. Read the chart carefully and recognize, using context, where substitutions have occurred.   "

## 2025-01-23 NOTE — PROGRESS NOTES
NEPHROLOGY OFFICE NOTE    Patient: Betito Landers               Sex: male           YOB: 1960        Age:  64 y.o.       1/23/2025    ASSESSMENT/PLAN     Assessment & Plan  Nephrolithiasis    Orders:    Basic metabolic panel; Future    Urinalysis with microscopic; Future    Phosphorus; Future    Uric acid; Future    Underlying history of recurrent nephrolithiasis, according to patient he has been having issues with kidney stones for greater than 10 years.  Prior stone analysis performed on 5/6/2024 noted 100% calcium oxalate monohydrate crystals.    Updated BMP noted serum calcium of 8.9.  Creatinine 0.92 with a EGFR of 94 and preserved.  Phosphorus is normal limits at 3.8 and serum uric acid level within normal limits at 4.5.    He underwent 24-hour urine Litholink for metabolic stone workup on 11/21/2024.  He was noted to have a preserved urine volume of 4410, calcium oxalate saturation 1.69, normal urine calcium of 211, urine oxalate 29, urine citrate 710, urine pH 6.258.    Identified risk factors include elevated urine sodium level as well as hyperuricosuria.  Advised following a lower salt diet, continuing to add lemon or lime juice to daily intake of water, continue lower oxalate diet, avoid vitamin C supplements.  Management of hyperuricosuria as below.    Follow-up on repeat Litholink at 6 months and will also recheck updated BMP, phosphorus, UA, and uric acid level prior to appointment.  Hyperuricosuria  He was noted to have uric acid saturation 0.33 but elevated urine uric acid level 1192.  Fortunately patient has elevated urine pH of 6.258 which will help protect against uric acid stone formation.    Patient was also noted to have an elevated creatinine per kilogram of body weight, protein catabolic rate, and urine urea nitrogen which may be indicative of high protein intake.  Advised patient to follow a more moderate protein diet, printout on low purine diet was also provided to  patient.    Would advise against use of tart cherry juice to reduce uric acid levels in the setting of use of aspirin and Plavix use, as this can increase risk of bleeding.    BACKGROUND     I had the pleasure of seeing Betito Landers in the nephrology office today for follow-up.  He has a past medical history significant for hyperlipidemia, GERD, anxiety, right retinal artery occlusion, and recurrent nephrolithiasis.    He is currently following with our office and Dr. ROGER Zapata for management of underlying recurrent nephrolithiasis.  Patient reports he has been having issues with kidney stones for greater than 10 years.  He had undergone left lithotripsy on 5/6/2024 and stone analysis at that time showed 100% calcium oxalate monohydrate crystals.  On prior CT scan 8/10/2024 he was noted to have bilateral nonobstructive renal stones.    His underlying history of hyperlipidemia and actively maintained on statin therapy.    He has a history of a right retinal artery occlusion and is taking Plavix along with aspirin.    The last blood work was done on 12/14/2024, which we have reviewed together.    SUBJECTIVE     He currently has no major complaints at this time and is feeling well.    REVIEW OF SYSTEMS     Review of Systems   Constitutional:  Negative for activity change and fever.   HENT:  Negative for trouble swallowing.    Respiratory:  Negative for shortness of breath.    Cardiovascular:  Negative for leg swelling.   Gastrointestinal:  Negative for nausea and vomiting.   Genitourinary:  Negative for difficulty urinating, dysuria, frequency and hematuria.   Musculoskeletal:  Negative for back pain.   Skin:  Negative for pallor.   Neurological:  Negative for dizziness, syncope, weakness and light-headedness.   Psychiatric/Behavioral:  Negative for sleep disturbance. The patient is not nervous/anxious.        OBJECTIVE     Current Weight: Weight - Scale: 79.1 kg (174 lb 6.4 oz)  Vitals:    01/23/25 1532   BP: 119/74    Pulse: 86   Resp: 18   Temp: (!) 97.4 °F (36.3 °C)   SpO2: 98%     Body mass index is 29.94 kg/m².    CURRENT MEDICATIONS       Current Outpatient Medications:     aspirin (ECOTRIN LOW STRENGTH) 81 mg EC tablet, Take 81 mg by mouth daily, Disp: , Rfl:     clopidogrel (PLAVIX) 75 mg tablet, TAKE 1 TABLET DAILY, Disp: 90 tablet, Rfl: 1    rosuvastatin (CRESTOR) 20 MG tablet, TAKE 1 TABLET DAILY, Disp: 90 tablet, Rfl: 1      PAST MEDICAL HISTORY     Past Medical History:   Diagnosis Date    Chronic low back pain     GERD (gastroesophageal reflux disease)     Hyperlipidemia     Kidney stone     Sleep apnea     Verruca 2 yrs       PAST SURGICAL HISTORY     Past Surgical History:   Procedure Laterality Date    COLONOSCOPY      ESOPHAGOGASTRODUODENOSCOPY      FL RETROGRADE PYELOGRAM  5/6/2024    [place] CYSTO/URETERO W/LITHOTRIPSY &INDWELL STENT INSRT Left 5/6/2024    Procedure: CYSTOSCOPY URETEROSCOPY WITH LITHOTRIPSY HOLMIUM LASER, STONE BASKET EXTRACTION, RETROGRADE PYELOGRAM AND INSERTION STENT URETERAL;  Surgeon: Mauro Finch MD;  [facility];  Service: Urology    [place] ESOPHAGOGASTRODUODENOSCOPY TRANSORAL DIAGNOSTIC N/A 5/24/2018    Procedure: ESOPHAGOGASTRODUODENOSCOPY (EGD);  Surgeon: Francis Sanders MD;  Location: [facility];  Service: Gastroenterology       ALLERGIES     No Known Allergies    SOCIAL HISTORY     Social History     Substance and Sexual Activity   Alcohol Use Yes    Alcohol/week: 4.0 standard drinks of alcohol    Types: 2 Glasses of wine, 2 Cans of beer per week    Comment: social     Social History     Substance and Sexual Activity   Drug Use No     Social History     Tobacco Use   Smoking Status Never    Passive exposure: Past   Smokeless Tobacco Never       FAMILY HISTORY     Family History   Problem Relation Age of Onset    Diabetes Mother     Lung cancer Father     Cancer Father     Cancer Brother         Bladder Cancer and a stroke    No Known Problems Daughter     No Known Problems Son         PHYSICAL EXAMINATION     Physical Exam  Vitals reviewed.   Constitutional:       General: He is not in acute distress.  HENT:      Head: Normocephalic.      Mouth/Throat:      Lips: Pink.      Mouth: Mucous membranes are moist.   Eyes:      General: Lids are normal. No scleral icterus.  Cardiovascular:      Rate and Rhythm: Normal rate and regular rhythm.      Heart sounds: S1 normal and S2 normal.   Pulmonary:      Effort: Pulmonary effort is normal. No accessory muscle usage or respiratory distress.      Breath sounds: Normal breath sounds.   Abdominal:      General: There is no distension.      Tenderness: There is no abdominal tenderness.   Musculoskeletal:      Cervical back: Normal range of motion and neck supple. No tenderness.      Right lower leg: No edema.      Left lower leg: No edema.   Skin:     General: Skin is warm.      Coloration: Skin is not cyanotic or jaundiced.   Neurological:      General: No focal deficit present.      Mental Status: He is alert and oriented to person, place, and time.   Psychiatric:         Attention and Perception: Attention normal.         Speech: Speech normal.         Behavior: Behavior is cooperative.           LAB RESULTS     BMP 12/14/2024:  Sodium 140  Potassium 4.2  Chloride 106  CO2 27  BUN 21  Creatinine 0.92  Glucose 97  Calcium 8.9  eGFR 94    Litholink 24-hour urine panel 11/21/2024:  Urine volume 4410, calcium oxalate saturation 1.69, urine calcium to 11, urine oxalate 29, urine citrate 710, calcium phosphate saturation 0.45, urine pH 6.258, uric acid saturation 0.33, urine uric acid 1192, urine sodium 318.    RADIOLOGY RESULTS      No results found for this or any previous visit.    Results for orders placed during the hospital encounter of 06/25/16    X-ray chest 2 views    Narrative  CHEST    INDICATION:  Chest pain.    COMPARISON:  5/29/2013.    VIEWS:  Frontal and lateral projections; 2 images    FINDINGS:    Cardiomediastinal silhouette appears  "unremarkable.    The lungs are clear.  No pneumothorax or pleural effusion.    Visualized osseous structures appear within normal limits for the patient's age.    Impression  No active pulmonary disease.      Findings concur with the preliminary report by the referring clinician already in PACS and/or our electronic record EPIC.      Workstation performed: [id number]    CT abdomen and pelvis 8/10/2024:  FINDINGS:     ABDOMEN     LOWER CHEST: No clinically significant abnormality in the visualized lower chest.     LIVER/BILIARY TREE: Unremarkable.     GALLBLADDER: No calcified gallstones. No pericholecystic inflammatory change.     SPLEEN: Unremarkable.     PANCREAS: Unremarkable.     ADRENAL GLANDS: Stable 2 cm left adrenal nodule.     KIDNEYS/URETERS: Bilateral nephrolithiasis without hydronephrosis. Subcentimeter left renal hypodensity likely benign.     STOMACH AND BOWEL: Unremarkable.     APPENDIX: No findings to suggest appendicitis.     ABDOMINOPELVIC CAVITY: No ascites. No pneumoperitoneum. No lymphadenopathy.     VESSELS: Unremarkable for patient's age.     PELVIS     REPRODUCTIVE ORGANS: Unremarkable for patient's age.     URINARY BLADDER: Unremarkable.     ABDOMINAL WALL/INGUINAL REGIONS: Unremarkable.     BONES: No acute fracture or suspicious osseous lesion. Spinal degenerative changes.     IMPRESSION:     No acute findings in the abdomen or pelvis.  Bilateral nephrolithiasis without hydronephrosis.    Recommend Nephrology follow-up in 7 months.    BEBA Haywood  Nephrology  1/23/2025      Portions of the record may have been created with voice recognition software. Occasional wrong word or \"sound a like\" substitutions may have occurred due to the inherent limitations of voice recognition software. Read the chart carefully and recognize, using context, where substitutions have occurred.   "

## 2025-01-23 NOTE — ASSESSMENT & PLAN NOTE
Orders:    Basic metabolic panel; Future    Urinalysis with microscopic; Future    Phosphorus; Future    Uric acid; Future    Underlying history of recurrent nephrolithiasis, according to patient he has been having issues with kidney stones for greater than 10 years.  Prior stone analysis performed on 5/6/2024 noted 100% calcium oxalate monohydrate crystals.    Updated BMP noted serum calcium of 8.9.  Creatinine 0.92 with a EGFR of 94 and preserved.  Phosphorus is normal limits at 3.8 and serum uric acid level within normal limits at 4.5.    He underwent 24-hour urine Litholink for metabolic stone workup on 11/21/2024.  He was noted to have a preserved urine volume of 4410, calcium oxalate saturation 1.69, normal urine calcium of 211, urine oxalate 29, urine citrate 710, urine pH 6.258.    Identified risk factors include elevated urine sodium level as well as hyperuricosuria.  Advised following a lower salt diet, continuing to add lemon or lime juice to daily intake of water, continue lower oxalate diet, avoid vitamin C supplements.  Management of hyperuricosuria as below.    Follow-up on repeat Litholink at 6 months and will also recheck updated BMP, phosphorus, UA, and uric acid level prior to appointment.

## 2025-01-23 NOTE — ASSESSMENT & PLAN NOTE
He was noted to have uric acid saturation 0.33 but elevated urine uric acid level 1192.  Fortunately patient has elevated urine pH of 6.258 which will help protect against uric acid stone formation.    Patient was also noted to have an elevated creatinine per kilogram of body weight, protein catabolic rate, and urine urea nitrogen which may be indicative of high protein intake.  Advised patient to follow a more moderate protein diet, printout on low purine diet was also provided to patient.    Would advise against use of tart cherry juice to reduce uric acid levels in the setting of use of aspirin and Plavix use, as this can increase risk of bleeding.

## 2025-01-23 NOTE — PATIENT INSTRUCTIONS
"Continue to drink plenty of water for goal urine output of greater than 2.5 L.  Follow a lower salt diet and limit intake of alcohol.  Add lemon or lime juice (up to 4 ounces to total intake of water) to your water daily to help maintain urinary citrate levels and keep urine pH above 6.0.  Maximum 8 ounce portions of protein and shellfish.  Limit intake of alcohol and soda.  Follow a lower purine diet, printout as below.    Patient Education     Low-purine diet   The Basics   Written by the doctors and editors at [facility]   Why do I need a low-purine diet? -- Purines are natural substances found in some foods. When the body digests purines, it makes a waste product called \"uric acid.\"  When too much uric acid builds up in the body, it can lead to health problems. Doctors might recommend following a low-purine diet as part of your treatment if you have:   Uric acid stones - These are a type of kidney stone.   Gout - Gout is a form of arthritis. It can cause attacks of pain and swelling in the joints.  Limiting foods high in purines is only 1 part of a treatment plan. These conditions are also treated with medicines. For people with gout, diet changes alone will probably not help much with symptoms. But doctors do usually recommend avoiding alcohol and any other foods that cause attacks.  What can I eat and drink on a low-purine diet?    Grains - Popcorn, whole-grain breads, pastas, cereals, rice.   Fruits - All fruits.   Vegetables - All vegetables, except those that are moderate and high in purines. Vegetables to avoid or limit are listed below.   Dairy - All types of dairy are low in purines. But low-fat or fat-free milk, yogurt, or cheeses are the best for you.   Meats, poultry, seafood, and proteins - Eggs, nuts, peanut butter.   Other foods and drinks - Water, tea, coffee, cocoa, condiments like salt, herbs, olives, pickles, relishes, vinegar, olive oil, sugar, sweets, gelatin.  What foods and drinks should I " avoid or limit on a low-purine diet?    Grains to avoid or limit - Oatmeal, wheat bran, wheat germ.   Vegetables to avoid or limit - Asparagus, spinach, cauliflower, green peas, mushrooms.   Meats, poultry, seafood, and proteins to avoid or limit - Red meat (beef, veal, lamb, pork), poultry, stiener, organ meats (sweetbreads, liver, kidneys, heart, brains), fish (mackerel, sardines, anchovies, herring, trout, dennis, cod, tuna), seafood (scallops, mussels, shrimp, lobster, crab, oysters), tripe, wild game (goose, duck, venison), mincemeat, lunch meats, turkey, dried beans, peas, lentils.   Other foods and drinks to avoid or limit - Gravies, meat sauces, alcohol, yeast and yeast extracts (taken as supplements). Meat- or fish-based soups, broths, and bouillons.  What else should I know? -- If you have excess body weight and gout, losing weight can help with symptoms. Drinking plenty of water can also help with problems caused by too much uric acid in your body.  All topics are updated as new evidence becomes available and our peer review process is complete.  This topic retrieved from [employer] on: Apr 04, 2024.  Topic 976221 Version 1.0  Release: 32.2.4 - C32.93  © 2024 UpToDate, Inc. and/or its affiliates. All rights reserved.  Consumer Information Use and Disclaimer   Disclaimer: This generalized information is a limited summary of diagnosis, treatment, and/or medication information. It is not meant to be comprehensive and should be used as a tool to help the user understand and/or assess potential diagnostic and treatment options. It does NOT include all information about conditions, treatments, medications, side effects, or risks that may apply to a specific patient. It is not intended to be medical advice or a substitute for the medical advice, diagnosis, or treatment of a health care provider based on the health care provider's examination and assessment of a patient's specific and unique circumstances. Patients  must speak with a health care provider for complete information about their health, medical questions, and treatment options, including any risks or benefits regarding use of medications. This information does not endorse any treatments or medications as safe, effective, or approved for treating a specific patient. UpToDate, Inc. and its affiliates disclaim any warranty or liability relating to this information or the use thereof.The use of this information is governed by the Terms of Use, available at https://www.woltersCardioLogsuwer.com/en/know/clinical-effectiveness-terms. 2024© UpToDate, Inc. and its affiliates and/or licensors. All rights reserved.  Copyright   © 2024 UpToDate, Inc. and/or its affiliates. All rights reserved.

## 2025-01-31 ENCOUNTER — TELEPHONE (OUTPATIENT)
Age: 65
End: 2025-01-31

## 2025-01-31 NOTE — TELEPHONE ENCOUNTER
Spoke to the pt. Advised to wait with the litholink test till July. Pt understood and was ok with that.

## 2025-02-28 ENCOUNTER — TELEPHONE (OUTPATIENT)
Age: 65
End: 2025-02-28

## 2025-02-28 DIAGNOSIS — J10.1 INFLUENZA A: Primary | ICD-10-CM

## 2025-02-28 RX ORDER — OSELTAMIVIR PHOSPHATE 75 MG/1
75 CAPSULE ORAL EVERY 12 HOURS SCHEDULED
Qty: 10 CAPSULE | Refills: 0 | Status: SHIPPED | OUTPATIENT
Start: 2025-02-28 | End: 2025-03-05

## 2025-02-28 NOTE — TELEPHONE ENCOUNTER
Patient called in stating everyone in his family has the flu and was told to call in this morning they would call in Tamiflu for him. He wants this sent to [facility] in [facility].    Please advise and call patient back when sent in

## 2025-03-10 ENCOUNTER — OFFICE VISIT (OUTPATIENT)
Dept: FAMILY MEDICINE CLINIC | Facility: CLINIC | Age: 65
End: 2025-03-10
Payer: COMMERCIAL

## 2025-03-10 VITALS
TEMPERATURE: 96.8 F | OXYGEN SATURATION: 99 % | DIASTOLIC BLOOD PRESSURE: 79 MMHG | BODY MASS INDEX: 29.35 KG/M2 | RESPIRATION RATE: 18 BRPM | HEART RATE: 67 BPM | SYSTOLIC BLOOD PRESSURE: 120 MMHG | WEIGHT: 171 LBS

## 2025-03-10 DIAGNOSIS — S46.912A LEFT SHOULDER STRAIN, INITIAL ENCOUNTER: Primary | ICD-10-CM

## 2025-03-10 PROCEDURE — 99213 OFFICE O/P EST LOW 20 MIN: CPT | Performed by: FAMILY MEDICINE

## 2025-03-10 NOTE — ASSESSMENT & PLAN NOTE
Tendinopathy and deltoid and anterior rotator cuff region after patient was lifting heavy ice off of furniture outdoors at his home.  He has inability to raise his left arm now as a result of pain and inflammation.  He will apply ice for 30 minutes twice daily and do pendulum exercises.  Referral for physical therapy can be initiated to help with range of motion and if symptoms do not improve we will follow-up with further workup with imaging if needed and orthopedic sports medicine    Orders:    Ambulatory Referral to Physical Therapy; Future

## 2025-03-10 NOTE — PROGRESS NOTES
Name: Betito Landers      : 1960      MRN: [id number]  Encounter Provider: Mayur Pollock DO  Encounter Date: 3/10/2025   Encounter department: [facility]  :  Assessment & Plan  Left shoulder strain, initial encounter  Tendinopathy and deltoid and anterior rotator cuff region after patient was lifting heavy ice off of furniture outdoors at his home.  He has inability to raise his left arm now as a result of pain and inflammation.  He will apply ice for 30 minutes twice daily and do pendulum exercises.  Referral for physical therapy can be initiated to help with range of motion and if symptoms do not improve we will follow-up with further workup with imaging if needed and orthopedic sports medicine    Orders:    Ambulatory Referral to Physical Therapy; Future           History of Present Illness   Patient here for left shoulder strain injury occurred 2 days ago worsening stiffness and pain      Review of Systems   Constitutional:  Negative for chills, fatigue and fever.   HENT:  Negative for congestion, nosebleeds, rhinorrhea, sinus pressure and sore throat.    Eyes:  Negative for discharge and redness.   Respiratory:  Negative for cough and shortness of breath.    Cardiovascular:  Negative for chest pain, palpitations and leg swelling.   Gastrointestinal:  Negative for abdominal pain, blood in stool and nausea.   Endocrine: Negative for cold intolerance, heat intolerance and polyuria.   Genitourinary:  Negative for dysuria and frequency.   Musculoskeletal:  Negative for arthralgias, back pain and myalgias.   Skin:  Negative for rash.   Neurological:  Negative for dizziness, weakness and headaches.   Hematological:  Negative for adenopathy.   Psychiatric/Behavioral:  Negative for behavioral problems and sleep disturbance. The patient is not nervous/anxious.        Objective   /79 (BP Location: Left arm, Patient Position: Sitting, Cuff Size: Standard)   Pulse 67   Temp  (!) 96.8 °F (36 °C) (Tympanic)   Resp 18   Wt 77.6 kg (171 lb)   SpO2 99%   BMI 29.35 kg/m²      Physical Exam  Vitals and nursing note reviewed.   Constitutional:       Appearance: Normal appearance. He is well-developed and normal weight.   HENT:      Head: Normocephalic and atraumatic.      Right Ear: Tympanic membrane and external ear normal.      Left Ear: Tympanic membrane and external ear normal.      Nose: Nose normal.   Eyes:      General: No scleral icterus.     Conjunctiva/sclera: Conjunctivae normal.      Pupils: Pupils are equal, round, and reactive to light.   Neck:      Thyroid: No thyromegaly.      Vascular: No JVD.   Cardiovascular:      Rate and Rhythm: Normal rate and regular rhythm.      Heart sounds: Normal heart sounds. No murmur heard.  Pulmonary:      Effort: Pulmonary effort is normal.      Breath sounds: Normal breath sounds. No wheezing or rales.   Chest:      Chest wall: No tenderness.   Abdominal:      General: Bowel sounds are normal. There is no distension.      Palpations: Abdomen is soft. There is no mass.      Tenderness: There is no abdominal tenderness. There is no guarding or rebound.   Musculoskeletal:         General: No tenderness or deformity. Normal range of motion.      Cervical back: Normal range of motion and neck supple.   Lymphadenopathy:      Cervical: No cervical adenopathy.   Skin:     General: Skin is warm and dry.      Findings: No erythema or rash.   Neurological:      Mental Status: He is alert and oriented to person, place, and time.      Cranial Nerves: No cranial nerve deficit.      Deep Tendon Reflexes: Reflexes are normal and symmetric. Reflexes normal.   Psychiatric:         Behavior: Behavior normal.         Thought Content: Thought content normal.         Judgment: Judgment normal.

## 2025-03-14 ENCOUNTER — TELEPHONE (OUTPATIENT)
Age: 65
End: 2025-03-14

## 2025-04-06 LAB
ALBUMIN SERPL-MCNC: 4.3 G/DL (ref 3.6–5.1)
ALBUMIN/GLOB SERPL: 1.7 (CALC) (ref 1–2.5)
ALP SERPL-CCNC: 49 U/L (ref 35–144)
ALT SERPL-CCNC: 21 U/L (ref 9–46)
AST SERPL-CCNC: 20 U/L (ref 10–35)
BILIRUB SERPL-MCNC: 0.5 MG/DL (ref 0.2–1.2)
BUN SERPL-MCNC: 15 MG/DL (ref 7–25)
BUN/CREAT SERPL: NORMAL (CALC) (ref 6–22)
CALCIUM SERPL-MCNC: 9.2 MG/DL (ref 8.6–10.3)
CHLORIDE SERPL-SCNC: 104 MMOL/L (ref 98–110)
CO2 SERPL-SCNC: 31 MMOL/L (ref 20–32)
CREAT SERPL-MCNC: 0.9 MG/DL (ref 0.7–1.35)
EST. AVERAGE GLUCOSE BLD GHB EST-MCNC: 120 MG/DL
EST. AVERAGE GLUCOSE BLD GHB EST-SCNC: 6.6 MMOL/L
GFR/BSA.PRED SERPLBLD CYS-BASED-ARV: 96 ML/MIN/1.73M2
GLOBULIN SER CALC-MCNC: 2.5 G/DL (CALC) (ref 1.9–3.7)
GLUCOSE SERPL-MCNC: 92 MG/DL (ref 65–99)
HBA1C MFR BLD: 5.8 % OF TOTAL HGB
POTASSIUM SERPL-SCNC: 4.1 MMOL/L (ref 3.5–5.3)
PROT SERPL-MCNC: 6.8 G/DL (ref 6.1–8.1)
SODIUM SERPL-SCNC: 141 MMOL/L (ref 135–146)

## 2025-04-30 ENCOUNTER — OFFICE VISIT (OUTPATIENT)
Dept: FAMILY MEDICINE CLINIC | Facility: CLINIC | Age: 65
End: 2025-04-30
Payer: COMMERCIAL

## 2025-04-30 VITALS
WEIGHT: 170 LBS | OXYGEN SATURATION: 99 % | TEMPERATURE: 98.1 F | HEIGHT: 64 IN | RESPIRATION RATE: 20 BRPM | BODY MASS INDEX: 29.02 KG/M2 | HEART RATE: 74 BPM | SYSTOLIC BLOOD PRESSURE: 120 MMHG | DIASTOLIC BLOOD PRESSURE: 76 MMHG

## 2025-04-30 DIAGNOSIS — Z00.00 ANNUAL PHYSICAL EXAM: Primary | ICD-10-CM

## 2025-04-30 DIAGNOSIS — M54.32 SCIATICA, LEFT SIDE: ICD-10-CM

## 2025-04-30 PROCEDURE — 99396 PREV VISIT EST AGE 40-64: CPT | Performed by: FAMILY MEDICINE

## 2025-04-30 RX ORDER — METHOCARBAMOL 500 MG/1
500 TABLET, FILM COATED ORAL 4 TIMES DAILY
Qty: 60 TABLET | Refills: 1 | Status: SHIPPED | OUTPATIENT
Start: 2025-04-30

## 2025-04-30 NOTE — ASSESSMENT & PLAN NOTE
Orders:    methocarbamol (ROBAXIN) 500 mg tablet; Take 1 tablet (500 mg total) by mouth 4 (four) times a day

## 2025-04-30 NOTE — PROGRESS NOTES
Adult Annual Physical  Name: Betito Landers      : 1960      MRN: [id number]  Encounter Provider: Mayur Pollock DO  Encounter Date: 2025   Encounter department: [facility]    :  Assessment & Plan  Annual physical exam    Orders:    Comprehensive metabolic panel; Future    CBC and differential; Future    Lipid panel; Future    Hemoglobin A1C; Future    PSA, total and free; Future    Sciatica, left side    Orders:    methocarbamol (ROBAXIN) 500 mg tablet; Take 1 tablet (500 mg total) by mouth 4 (four) times a day        Preventive Screenings:    - Prostate cancer screening: screening up-to-date     Immunizations:  - Immunizations due: Influenza and Zoster (Shingrix)         History of Present Illness     Adult Annual Physical:  Patient presents for annual physical.     Diet and Physical Activity:  - Diet/Nutrition: heart healthy (low sodium) diet and limited junk food.  - Exercise: walking.    Depression Screening:  - PHQ-2 Score: 0    General Health:  - Sleep: 4-6 hours of sleep on average and witnessed apnea.  - Hearing: decreased hearing bilateral ears and tinnitus.  - Vision: wears glasses.  - Dental: regular dental visits.     Health:  - History of STDs: no.   - Urinary symptoms: nocturia.     Advanced Care Planning:  - Has an advanced directive?: no    - Has a durable medical POA?: no      Review of Systems   Constitutional:  Negative for chills, fatigue and fever.   HENT:  Negative for congestion, nosebleeds, rhinorrhea, sinus pressure and sore throat.    Eyes:  Negative for discharge and redness.   Respiratory:  Negative for cough and shortness of breath.    Cardiovascular:  Negative for chest pain, palpitations and leg swelling.   Gastrointestinal:  Negative for abdominal pain, blood in stool and nausea.   Endocrine: Negative for cold intolerance, heat intolerance and polyuria.   Genitourinary:  Negative for dysuria and frequency.   Musculoskeletal:  Negative  "for arthralgias, back pain and myalgias.   Skin:  Negative for rash.   Neurological:  Negative for dizziness, weakness and headaches.   Hematological:  Negative for adenopathy.   Psychiatric/Behavioral:  Negative for behavioral problems and sleep disturbance. The patient is not nervous/anxious.          Objective   /76 (BP Location: Left arm, Patient Position: Sitting, Cuff Size: Standard)   Pulse 74   Temp 98.1 °F (36.7 °C) (Tympanic)   Resp 20   Ht 5' 4\" (1.626 m)   Wt 77.1 kg (170 lb)   SpO2 99%   BMI 29.18 kg/m²     Physical Exam  Vitals and nursing note reviewed.   Constitutional:       Appearance: Normal appearance. He is well-developed.   HENT:      Head: Normocephalic and atraumatic.      Right Ear: Tympanic membrane and external ear normal.      Left Ear: Tympanic membrane and external ear normal.      Nose: Nose normal.      Mouth/Throat:      Mouth: Mucous membranes are moist.   Eyes:      General: No scleral icterus.     Conjunctiva/sclera: Conjunctivae normal.      Pupils: Pupils are equal, round, and reactive to light.   Neck:      Thyroid: No thyromegaly.      Vascular: No JVD.   Cardiovascular:      Rate and Rhythm: Normal rate and regular rhythm.      Pulses: Normal pulses.      Heart sounds: Normal heart sounds. No murmur heard.  Pulmonary:      Effort: Pulmonary effort is normal.      Breath sounds: Normal breath sounds. No wheezing or rales.   Chest:      Chest wall: No tenderness.   Abdominal:      General: Bowel sounds are normal. There is no distension.      Palpations: Abdomen is soft. There is no mass.      Tenderness: There is no abdominal tenderness. There is no guarding or rebound.   Musculoskeletal:         General: No tenderness or deformity. Normal range of motion.      Cervical back: Normal range of motion and neck supple.   Lymphadenopathy:      Cervical: No cervical adenopathy.   Skin:     General: Skin is warm and dry.      Capillary Refill: Capillary refill takes less " than 2 seconds.      Findings: No erythema or rash.   Neurological:      General: No focal deficit present.      Mental Status: He is alert and oriented to person, place, and time.      Cranial Nerves: No cranial nerve deficit.      Deep Tendon Reflexes: Reflexes are normal and symmetric. Reflexes normal.   Psychiatric:         Mood and Affect: Mood normal.         Behavior: Behavior normal.         Thought Content: Thought content normal.         Judgment: Judgment normal.

## 2025-04-30 NOTE — ASSESSMENT & PLAN NOTE
Orders:    Comprehensive metabolic panel; Future    CBC and differential; Future    Lipid panel; Future    Hemoglobin A1C; Future    PSA, total and free; Future

## 2025-05-03 DIAGNOSIS — E78.2 MIXED HYPERLIPIDEMIA: ICD-10-CM

## 2025-05-03 DIAGNOSIS — H34.11 CENTRAL RETINAL ARTERY OCCLUSION, RIGHT EYE: ICD-10-CM

## 2025-05-05 RX ORDER — ROSUVASTATIN CALCIUM 20 MG/1
20 TABLET, COATED ORAL DAILY
Qty: 90 TABLET | Refills: 1 | Status: SHIPPED | OUTPATIENT
Start: 2025-05-05

## 2025-05-05 RX ORDER — CLOPIDOGREL BISULFATE 75 MG/1
75 TABLET ORAL DAILY
Qty: 90 TABLET | Refills: 1 | Status: SHIPPED | OUTPATIENT
Start: 2025-05-05

## 2025-05-08 ENCOUNTER — OFFICE VISIT (OUTPATIENT)
Dept: FAMILY MEDICINE CLINIC | Facility: CLINIC | Age: 65
End: 2025-05-08
Payer: COMMERCIAL

## 2025-05-08 VITALS
WEIGHT: 171 LBS | SYSTOLIC BLOOD PRESSURE: 118 MMHG | BODY MASS INDEX: 29.19 KG/M2 | RESPIRATION RATE: 20 BRPM | DIASTOLIC BLOOD PRESSURE: 80 MMHG | HEIGHT: 64 IN | TEMPERATURE: 98.8 F | OXYGEN SATURATION: 99 % | HEART RATE: 72 BPM

## 2025-05-08 DIAGNOSIS — J06.9 ACUTE URI: Primary | ICD-10-CM

## 2025-05-08 PROCEDURE — 99213 OFFICE O/P EST LOW 20 MIN: CPT | Performed by: NURSE PRACTITIONER

## 2025-05-08 RX ORDER — AMOXICILLIN 875 MG/1
875 TABLET, COATED ORAL 2 TIMES DAILY
Qty: 14 TABLET | Refills: 0 | Status: SHIPPED | OUTPATIENT
Start: 2025-05-08 | End: 2025-05-15

## 2025-05-08 NOTE — PROGRESS NOTES
Name: Betito Landers      : 1960      MRN: [id number]  Encounter Provider: Shweta Alonso DNP  Encounter Date: 2025   Encounter department: [facility]  :  Assessment & Plan  Acute URI  Symptoms appear to be viral in nature  Will use otc nasal spray for nasal burning, saline rinse  If not improving or worsening, start antbx   Orders:    amoxicillin (AMOXIL) 875 mg tablet; Take 1 tablet (875 mg total) by mouth 2 (two) times a day for 7 days           History of Present Illness   Sinus Problem  This is a new problem. The current episode started in the past 7 days. The problem is unchanged. There has been no fever. Associated symptoms include congestion and sinus pressure. Pertinent negatives include no chills, coughing, ear pain, headaches, neck pain, shortness of breath or sore throat. Past treatments include nothing. The treatment provided no relief.     Review of Systems   Constitutional:  Negative for activity change, chills, fatigue and fever.   HENT:  Positive for congestion, rhinorrhea, sinus pressure and sinus pain. Negative for ear discharge, ear pain, sore throat, tinnitus and trouble swallowing.         Nasal burning    Eyes:  Negative for photophobia, pain, discharge, itching and visual disturbance.   Respiratory:  Negative for cough, chest tightness, shortness of breath and wheezing.    Cardiovascular:  Negative for chest pain and leg swelling.   Gastrointestinal:  Negative for abdominal distention, abdominal pain, constipation, diarrhea, nausea and vomiting.   Endocrine: Negative for polydipsia, polyphagia and polyuria.   Genitourinary:  Negative for dysuria and frequency.   Musculoskeletal:  Negative for arthralgias, myalgias, neck pain and neck stiffness.   Skin:  Negative for color change.   Neurological:  Negative for dizziness, syncope, weakness, numbness and headaches.   Hematological:  Does not bruise/bleed easily.   Psychiatric/Behavioral:  Negative for  "behavioral problems, confusion, self-injury, sleep disturbance and suicidal ideas. The patient is not nervous/anxious.        Objective   /80 (BP Location: Right arm, Patient Position: Sitting, Cuff Size: Standard)   Pulse 72   Temp 98.8 °F (37.1 °C) (Tympanic)   Resp 20   Ht 5' 4\" (1.626 m)   Wt 77.6 kg (171 lb)   SpO2 99%   BMI 29.35 kg/m²      Physical Exam  Vitals reviewed.   Constitutional:       Appearance: Normal appearance.   HENT:      Head: Normocephalic and atraumatic.   Cardiovascular:      Rate and Rhythm: Normal rate and regular rhythm.      Pulses: Normal pulses.      Heart sounds: Normal heart sounds.   Pulmonary:      Effort: Pulmonary effort is normal.      Breath sounds: Normal breath sounds.   Skin:     General: Skin is warm.   Neurological:      General: No focal deficit present.      Mental Status: He is alert and oriented to person, place, and time.         "

## 2025-05-19 NOTE — ASSESSMENT & PLAN NOTE
Chronic GERD symptoms stable with pantoprazole continue current medication no change Medication:    Disp Refills Start End    losartan (COZAAR) 25 MG tablet 30 tablet 1 3/31/2025 --    Sig - Route: Take 1 tablet by mouth daily. - Oral      Last office visit date: 3/28/25  Medication Refill Protocol Failed.  Failed criteria: see below. Sent to clinician to review. Veronique Weinberg (proxy for Darrell Gaona) to [facility] (supporting Yuan Workman MD)        5/17/25 12:43 PM  Darrell [place], but here are the next set of BP readings:     DatetimeBP  5/11/2025              126/68  5/12/202510:77569/93  5/12/20253:10 /67  5/12/202510:45 /87  5/16/2025              159/83     He has enough Losartin thru this week.  Please advise.     Veronique

## 2025-06-12 ENCOUNTER — APPOINTMENT (OUTPATIENT)
Dept: RADIOLOGY | Facility: MEDICAL CENTER | Age: 65
End: 2025-06-12
Attending: PHYSICIAN ASSISTANT
Payer: COMMERCIAL

## 2025-06-12 ENCOUNTER — HOSPITAL ENCOUNTER (OUTPATIENT)
Dept: RADIOLOGY | Facility: MEDICAL CENTER | Age: 65
Discharge: HOME | End: 2025-06-12
Payer: COMMERCIAL

## 2025-06-12 DIAGNOSIS — N20.0 KIDNEY STONES: ICD-10-CM

## 2025-06-12 PROCEDURE — 74018 RADEX ABDOMEN 1 VIEW: CPT

## 2025-06-12 PROCEDURE — 76775 US EXAM ABDO BACK WALL LIM: CPT

## 2025-06-13 ENCOUNTER — TELEPHONE (OUTPATIENT)
Age: 65
End: 2025-06-13

## 2025-06-15 LAB
APPEARANCE UR: CLEAR
BACTERIA UR QL AUTO: ABNORMAL /HPF
BILIRUB UR QL STRIP: NEGATIVE
BUN SERPL-MCNC: 21 MG/DL (ref 7–25)
BUN/CREAT SERPL: NORMAL (CALC) (ref 6–22)
CALCIUM SERPL-MCNC: 9.3 MG/DL (ref 8.6–10.3)
CHLORIDE SERPL-SCNC: 105 MMOL/L (ref 98–110)
CO2 SERPL-SCNC: 30 MMOL/L (ref 20–32)
COLOR UR: YELLOW
CREAT SERPL-MCNC: 0.93 MG/DL (ref 0.7–1.35)
GFR/BSA.PRED SERPLBLD CYS-BASED-ARV: 92 ML/MIN/1.73M2
GLUCOSE SERPL-MCNC: 100 MG/DL (ref 65–139)
GLUCOSE UR QL STRIP: NEGATIVE
HGB UR QL STRIP: ABNORMAL
HYALINE CASTS #/AREA URNS LPF: ABNORMAL /LPF
KETONES UR QL STRIP: NEGATIVE
LEUKOCYTE ESTERASE UR QL STRIP: NEGATIVE
NITRITE UR QL STRIP: NEGATIVE
PH UR STRIP: 5.5 [PH] (ref 5–8)
PHOSPHATE SERPL-MCNC: 3.5 MG/DL (ref 2.5–4.5)
POTASSIUM SERPL-SCNC: 4.4 MMOL/L (ref 3.5–5.3)
PROT UR QL STRIP: NEGATIVE
RBC #/AREA URNS HPF: ABNORMAL /HPF
SODIUM SERPL-SCNC: 141 MMOL/L (ref 135–146)
SP GR UR STRIP: 1.02 (ref 1–1.03)
SQUAMOUS #/AREA URNS HPF: ABNORMAL /HPF
URATE SERPL-MCNC: 5 MG/DL (ref 4–8)
WBC #/AREA URNS HPF: ABNORMAL /HPF

## 2025-06-25 ENCOUNTER — OFFICE VISIT (OUTPATIENT)
Dept: UROLOGY | Facility: CLINIC | Age: 65
End: 2025-06-25
Payer: COMMERCIAL

## 2025-06-25 VITALS
HEART RATE: 86 BPM | TEMPERATURE: 97.6 F | OXYGEN SATURATION: 97 % | BODY MASS INDEX: 29.37 KG/M2 | SYSTOLIC BLOOD PRESSURE: 118 MMHG | DIASTOLIC BLOOD PRESSURE: 76 MMHG | HEIGHT: 64 IN | WEIGHT: 172 LBS

## 2025-06-25 DIAGNOSIS — N20.0 NEPHROLITHIASIS: Primary | ICD-10-CM

## 2025-06-25 PROCEDURE — 99213 OFFICE O/P EST LOW 20 MIN: CPT | Performed by: PHYSICIAN ASSISTANT

## 2025-06-25 NOTE — PROGRESS NOTES
Name: Betito Landers      : 1960      MRN: [id number]  Encounter Provider: Sarah Schnitzler, PA-C  Encounter Date: 2025   Encounter department: [facility] [place]  :  Assessment & Plan  Nephrolithiasis  - US kidney bladder from 25 - Bilateral renal nonobstructing calculi measuring 9 mm and smaller on left and 5 mm and smaller on right. Bilateral ureteral jets detected. No hydronephrosis.   - KUB from 25 - Bilateral renal calculi, measuring up to 6 mm on the right and 5 mm on the left.   - Asymptomatic.   - Recommend dietary modifications and proper hydration for stone prevention   - Continue nephrology follow up   - Follow up in 1 year with KUB and US  Orders:    XR abdomen 1 view kub; Future    US kidney and bladder; Future        History of Present Illness   Betito Landers is a 65 y.o. male who presents for follow up of kidney stones. History of left ureteroscopy laser lithotripsy and basket stone extraction last year. He denies any episodes of kidney stones since last visit. No flank pain or urinary complaints. Continues yearly prostate cancer screening through PCP, PSA from 10/5.24 was 0.7.       AUA SYMPTOM SCORE      Flowsheet Row Most Recent Value   AUA SYMPTOM SCORE    How often have you had a sensation of not emptying your bladder completely after you finished urinating? 0 (P)     How often have you had to urinate again less than two hours after you finished urinating? 0 (P)     How often have you found you stopped and started again several times when you urinate? 0 (P)     How often have you found it difficult to postpone urination? 0 (P)     How often have you had a weak urinary stream? 0 (P)     How often have you had to push or strain to begin urination? 0 (P)     How many times did you most typically get up to urinate from the time you went to bed at night until the time you got up in the morning? 1 (P)     Quality of Life: If you were to spend the rest of  your life with your urinary condition just the way it is now, how would you feel about that? 0 (P)     AUA SYMPTOM SCORE 1 (P)            Review of Systems   Constitutional:  Negative for chills and fever.   Respiratory:  Negative for shortness of breath.    Cardiovascular:  Negative for chest pain.   Gastrointestinal:  Negative for abdominal pain.   Genitourinary:  Negative for difficulty urinating, dysuria, flank pain, frequency, hematuria and urgency.   Neurological:  Negative for dizziness.        Objective   There were no vitals taken for this visit.    Physical Exam  Constitutional:       Appearance: Normal appearance.   HENT:      Head: Normocephalic and atraumatic.      Right Ear: External ear normal.      Left Ear: External ear normal.      Nose: Nose normal.     Eyes:      General: No scleral icterus.     Conjunctiva/sclera: Conjunctivae normal.       Cardiovascular:      Pulses: Normal pulses.   Pulmonary:      Effort: Pulmonary effort is normal.     Musculoskeletal:         General: Normal range of motion.      Cervical back: Normal range of motion.     Neurological:      General: No focal deficit present.      Mental Status: He is alert and oriented to person, place, and time.     Psychiatric:         Mood and Affect: Mood normal.         Behavior: Behavior normal.         Thought Content: Thought content normal.         Judgment: Judgment normal.          Results   Lab Results   Component Value Date    PSA 0.7 10/05/2024    PSA 0.7 03/25/2023    PSA 0.5 03/19/2022     Lab Results   Component Value Date    CALCIUM 9.3 06/14/2025    K 4.4 06/14/2025    CO2 30 06/14/2025     06/14/2025    BUN 21 06/14/2025    CREATININE 0.93 06/14/2025     Lab Results   Component Value Date    WBC 4.8 10/05/2024    HGB 14.9 10/05/2024    HCT 44.9 10/05/2024    MCV 97.2 10/05/2024     (L) 10/05/2024       Office Urine Dip  No results found for this or any previous visit (from the past hour).

## 2025-06-25 NOTE — ASSESSMENT & PLAN NOTE
- US kidney bladder from 6/12/25 - Bilateral renal nonobstructing calculi measuring 9 mm and smaller on left and 5 mm and smaller on right. Bilateral ureteral jets detected. No hydronephrosis.   - KUB from 6/12/25 - Bilateral renal calculi, measuring up to 6 mm on the right and 5 mm on the left.   - Asymptomatic.   - Recommend dietary modifications and proper hydration for stone prevention   - Continue nephrology follow up   - Follow up in 1 year with KUB and US  Orders:    XR abdomen 1 view kub; Future    US kidney and bladder; Future

## 2025-07-02 ENCOUNTER — HOSPITAL ENCOUNTER (EMERGENCY)
Facility: HOSPITAL | Age: 65
Discharge: HOME | End: 2025-07-02
Payer: COMMERCIAL

## 2025-07-02 ENCOUNTER — APPOINTMENT (EMERGENCY)
Dept: CT IMAGING | Facility: HOSPITAL | Age: 65
End: 2025-07-02
Payer: COMMERCIAL

## 2025-07-02 VITALS
BODY MASS INDEX: 30.07 KG/M2 | SYSTOLIC BLOOD PRESSURE: 138 MMHG | WEIGHT: 176.15 LBS | DIASTOLIC BLOOD PRESSURE: 75 MMHG | HEIGHT: 64 IN | RESPIRATION RATE: 22 BRPM | HEART RATE: 80 BPM | OXYGEN SATURATION: 95 % | TEMPERATURE: 97.6 F

## 2025-07-02 DIAGNOSIS — N20.0 KIDNEY STONE: Primary | ICD-10-CM

## 2025-07-02 LAB
ALBUMIN SERPL BCG-MCNC: 4.6 G/DL (ref 3.5–5)
ALP SERPL-CCNC: 46 U/L (ref 34–104)
ALT SERPL W P-5'-P-CCNC: 26 U/L (ref 7–52)
ANION GAP SERPL CALCULATED.3IONS-SCNC: 9 MMOL/L (ref 4–13)
AST SERPL W P-5'-P-CCNC: 24 U/L (ref 13–39)
BACTERIA UR QL AUTO: ABNORMAL /HPF
BASOPHILS # BLD AUTO: 0.03 THOUSANDS/ÂΜL (ref 0–0.1)
BASOPHILS NFR BLD AUTO: 0 % (ref 0–1)
BILIRUB SERPL-MCNC: 0.64 MG/DL (ref 0.2–1)
BILIRUB UR QL STRIP: NEGATIVE
BUN SERPL-MCNC: 15 MG/DL (ref 5–25)
CALCIUM SERPL-MCNC: 9.3 MG/DL (ref 8.4–10.2)
CHLORIDE SERPL-SCNC: 104 MMOL/L (ref 96–108)
CLARITY UR: CLEAR
CO2 SERPL-SCNC: 26 MMOL/L (ref 21–32)
COLOR UR: ABNORMAL
CREAT SERPL-MCNC: 1.11 MG/DL (ref 0.6–1.3)
EOSINOPHIL # BLD AUTO: 0.01 THOUSAND/ÂΜL (ref 0–0.61)
EOSINOPHIL NFR BLD AUTO: 0 % (ref 0–6)
ERYTHROCYTE [DISTWIDTH] IN BLOOD BY AUTOMATED COUNT: 11.9 % (ref 11.6–15.1)
GFR SERPL CREATININE-BSD FRML MDRD: 70 ML/MIN/1.73SQ M
GLUCOSE SERPL-MCNC: 129 MG/DL (ref 65–140)
GLUCOSE UR STRIP-MCNC: NEGATIVE MG/DL
HCT VFR BLD AUTO: 42.8 % (ref 36.5–49.3)
HGB BLD-MCNC: 14.7 G/DL (ref 12–17)
HGB UR QL STRIP.AUTO: ABNORMAL
IMM GRANULOCYTES # BLD AUTO: 0.06 THOUSAND/UL (ref 0–0.2)
IMM GRANULOCYTES NFR BLD AUTO: 1 % (ref 0–2)
KETONES UR STRIP-MCNC: NEGATIVE MG/DL
LEUKOCYTE ESTERASE UR QL STRIP: NEGATIVE
LIPASE SERPL-CCNC: 66 U/L (ref 11–82)
LYMPHOCYTES # BLD AUTO: 0.98 THOUSANDS/ÂΜL (ref 0.6–4.47)
LYMPHOCYTES NFR BLD AUTO: 8 % (ref 14–44)
MCH RBC QN AUTO: 32.5 PG (ref 26.8–34.3)
MCHC RBC AUTO-ENTMCNC: 34.3 G/DL (ref 31.4–37.4)
MCV RBC AUTO: 95 FL (ref 82–98)
MONOCYTES # BLD AUTO: 0.62 THOUSAND/ÂΜL (ref 0.17–1.22)
MONOCYTES NFR BLD AUTO: 5 % (ref 4–12)
MUCOUS THREADS UR QL AUTO: ABNORMAL
NEUTROPHILS # BLD AUTO: 10.18 THOUSANDS/ÂΜL (ref 1.85–7.62)
NEUTS SEG NFR BLD AUTO: 86 % (ref 43–75)
NITRITE UR QL STRIP: NEGATIVE
NON-SQ EPI CELLS URNS QL MICRO: ABNORMAL /HPF
NRBC BLD AUTO-RTO: 0 /100 WBCS
PH UR STRIP.AUTO: 7.5 [PH]
PLATELET # BLD AUTO: 136 THOUSANDS/UL (ref 149–390)
PMV BLD AUTO: 11.3 FL (ref 8.9–12.7)
POTASSIUM SERPL-SCNC: 4.3 MMOL/L (ref 3.5–5.3)
PROT SERPL-MCNC: 7.1 G/DL (ref 6.4–8.4)
PROT UR STRIP-MCNC: NEGATIVE MG/DL
RBC # BLD AUTO: 4.52 MILLION/UL (ref 3.88–5.62)
RBC #/AREA URNS AUTO: ABNORMAL /HPF
SODIUM SERPL-SCNC: 139 MMOL/L (ref 135–147)
SP GR UR STRIP.AUTO: 1.01 (ref 1–1.03)
UROBILINOGEN UR STRIP-ACNC: <2 MG/DL
WBC # BLD AUTO: 11.88 THOUSAND/UL (ref 4.31–10.16)
WBC #/AREA URNS AUTO: ABNORMAL /HPF

## 2025-07-02 PROCEDURE — 96361 HYDRATE IV INFUSION ADD-ON: CPT

## 2025-07-02 PROCEDURE — 99285 EMERGENCY DEPT VISIT HI MDM: CPT

## 2025-07-02 PROCEDURE — 99284 EMERGENCY DEPT VISIT MOD MDM: CPT

## 2025-07-02 PROCEDURE — 96376 TX/PRO/DX INJ SAME DRUG ADON: CPT

## 2025-07-02 PROCEDURE — 74176 CT ABD & PELVIS W/O CONTRAST: CPT

## 2025-07-02 PROCEDURE — 36415 COLL VENOUS BLD VENIPUNCTURE: CPT

## 2025-07-02 PROCEDURE — 96365 THER/PROPH/DIAG IV INF INIT: CPT

## 2025-07-02 PROCEDURE — 80053 COMPREHEN METABOLIC PANEL: CPT

## 2025-07-02 PROCEDURE — 96375 TX/PRO/DX INJ NEW DRUG ADDON: CPT

## 2025-07-02 PROCEDURE — 85025 COMPLETE CBC W/AUTO DIFF WBC: CPT

## 2025-07-02 PROCEDURE — 81001 URINALYSIS AUTO W/SCOPE: CPT

## 2025-07-02 PROCEDURE — 83690 ASSAY OF LIPASE: CPT

## 2025-07-02 RX ORDER — MORPHINE SULFATE 10 MG/ML
8 INJECTION, SOLUTION INTRAMUSCULAR; INTRAVENOUS ONCE
Status: COMPLETED | OUTPATIENT
Start: 2025-07-02 | End: 2025-07-02

## 2025-07-02 RX ORDER — ONDANSETRON 4 MG/1
4 TABLET, ORALLY DISINTEGRATING ORAL ONCE
Status: COMPLETED | OUTPATIENT
Start: 2025-07-02 | End: 2025-07-02

## 2025-07-02 RX ORDER — OXYCODONE HYDROCHLORIDE 5 MG/1
5 TABLET ORAL EVERY 6 HOURS PRN
Qty: 16 TABLET | Refills: 0 | Status: SHIPPED | OUTPATIENT
Start: 2025-07-02 | End: 2025-07-12

## 2025-07-02 RX ORDER — ACETAMINOPHEN 10 MG/ML
1000 INJECTION, SOLUTION INTRAVENOUS ONCE
Status: COMPLETED | OUTPATIENT
Start: 2025-07-02 | End: 2025-07-02

## 2025-07-02 RX ORDER — ONDANSETRON 4 MG/1
4 TABLET, ORALLY DISINTEGRATING ORAL EVERY 6 HOURS PRN
Qty: 30 TABLET | Refills: 0 | Status: ON HOLD | OUTPATIENT
Start: 2025-07-02 | End: 2025-07-12

## 2025-07-02 RX ORDER — ACETAMINOPHEN 325 MG/1
975 TABLET ORAL ONCE
Status: DISCONTINUED | OUTPATIENT
Start: 2025-07-02 | End: 2025-07-02 | Stop reason: HOSPADM

## 2025-07-02 RX ORDER — MORPHINE SULFATE 4 MG/ML
4 INJECTION, SOLUTION INTRAMUSCULAR; INTRAVENOUS ONCE
Status: COMPLETED | OUTPATIENT
Start: 2025-07-02 | End: 2025-07-02

## 2025-07-02 RX ORDER — ONDANSETRON 2 MG/ML
4 INJECTION INTRAMUSCULAR; INTRAVENOUS ONCE
Status: COMPLETED | OUTPATIENT
Start: 2025-07-02 | End: 2025-07-02

## 2025-07-02 RX ADMIN — SODIUM CHLORIDE 500 ML: 0.9 INJECTION, SOLUTION INTRAVENOUS at 17:37

## 2025-07-02 RX ADMIN — ONDANSETRON 4 MG: 2 INJECTION INTRAMUSCULAR; INTRAVENOUS at 15:30

## 2025-07-02 RX ADMIN — ONDANSETRON 4 MG: 4 TABLET, ORALLY DISINTEGRATING ORAL at 14:21

## 2025-07-02 RX ADMIN — SODIUM CHLORIDE 1000 ML: 0.9 INJECTION, SOLUTION INTRAVENOUS at 15:31

## 2025-07-02 RX ADMIN — MORPHINE SULFATE 4 MG: 4 INJECTION INTRAVENOUS at 19:00

## 2025-07-02 RX ADMIN — MORPHINE SULFATE 8 MG: 10 INJECTION INTRAVENOUS at 15:30

## 2025-07-02 RX ADMIN — ACETAMINOPHEN 1000 MG: 10 INJECTION INTRAVENOUS at 16:49

## 2025-07-02 NOTE — INCIDENTAL FINDINGS
The following findings require follow up:  Radiographic finding   Finding: Right hydronephrosis and hydroureter with a 7 x 6 mm obstructing calculus in the proximal right ureter.  2. Bilateral nonobstructing renal calculi.  3. 2 cm left adrenal adenoma stable since at least 2018. Biochemical evaluation should be considered to assess for a functioning adenoma, if not already performed.      Follow up required: PCP   Follow up should be done within 1 month(s)    Please notify the following clinician to assist with the follow up:   Dr. Pollock    Incidental finding results were discussed with the Patient by Julio Tejada MD on 07/02/25.   They expressed understanding and all questions answered.

## 2025-07-02 NOTE — ED NOTES
Patient urinated. Per provider Bladder scan is no longer needed.      Cyndi Espinal, RN  07/02/25 [id number]

## 2025-07-02 NOTE — DISCHARGE INSTRUCTIONS
Daughter called Re: Has questions about current office visit follow up;    -Issues with legs/possibility of another procedure  -Medication>thinks is causing headaches     Please take 2 extra strength Tylenol's every 6 hours for pain.  Please use prescribed oxycodone as needed for breakthrough pain.  Please use Zofran as prescribed for nausea and vomiting.  Please schedule a follow appointment to be seen by urology within 1 to 2 weeks.    Please follow-up with your family doctor within 1 month to be reevaluated for your abnormal CT finding.    Please return if you develop any new or concerning symptoms including fevers, severe vomiting, or severe uncontrolled pain.

## 2025-07-02 NOTE — ED NOTES
Patient medicated. Patient advised to please wait 10 minutes prior to getting up. Patient instructed on how to slowly get up from the bed. Patient provided call bell and advised to ring for assistance if he feels unable to walk out on his own.      Cyndi Espinal RN  07/02/25 1491

## 2025-07-02 NOTE — ED PROVIDER NOTES
Time reflects when diagnosis was documented in both MDM as applicable and the Disposition within this note       Time User Action Codes Description Comment    7/2/2025  5:57 PM Julio Tejada Add [N20.0] Kidney stone           ED Disposition       ED Disposition   Discharge    Condition   Stable    Date/Time   Wed Jul 2, 2025  5:57 PM    Comment   Betito Landers discharge to home/self care.                   Assessment & Plan       Medical Decision Making  65-year-old male with history of prior kidney stones and CVA presenting today for evaluation of sudden onset right flank pain since 10 AM this morning after eating food with associated nausea and 2 episodes of nonbilious nonbloody vomiting.  Also endorses some pink-tinged urine this morning prior to the onset of this episode.    Differential: Nephrolithiasis, UTI, small bowel obstruction, constipation    Plan: Will plan obtain CBC, CMP, lipase, UA, CT renal stone study.  Will plan to treat with fluids, antiemetics, pain medications.    Patient's labs showing a mild leukocytosis but otherwise unremarkable.  Urinalysis showing hematuria but no evidence of infection.  CT renal stone study showing right hydronephrosis and hydroureter with a 7 x 6 mm obstructing calculus in the proximal right ureter.  Patient's pain improved after IV medications and antiemetics.  I did offer patient admission for pain control but states that he would like to try to control his pain at home as he wants to try to avoid urologic procedure and try to pass the stone on his own.  Prescribed oxycodone for pain and given referral for urology.  Prescribed Zofran for nausea and vomiting.  Strict return precautions given, all questions.    Amount and/or Complexity of Data Reviewed  Labs:  Decision-making details documented in ED Course.  Radiology: ordered.    Risk  OTC drugs.  Prescription drug management.        ED Course as of 07/02/25 1816   Wed Jul 02, 2025   1439 WBC(!): 11.88        Medications   acetaminophen (TYLENOL) tablet 975 mg (0 mg Oral Hold 7/2/25 1656)   ondansetron (ZOFRAN-ODT) dispersible tablet 4 mg (4 mg Oral Given 7/2/25 1421)   morphine injection 8 mg (8 mg Intravenous Given 7/2/25 1530)   sodium chloride 0.9 % bolus 1,000 mL (0 mL Intravenous Stopped 7/2/25 1654)   ondansetron (ZOFRAN) injection 4 mg (4 mg Intravenous Given 7/2/25 1530)   acetaminophen (Ofirmev) injection 1,000 mg (0 mg Intravenous Stopped 7/2/25 1729)   sodium chloride 0.9 % bolus 500 mL (0 mL Intravenous Stopped 7/2/25 1753)       ED Risk Strat Scores                    No data recorded        SBIRT 20yo+      Flowsheet Row Most Recent Value   Initial Alcohol Screen: US AUDIT-C     1. How often do you have a drink containing alcohol? 1 Filed at: 07/02/2025 1537   2. How many drinks containing alcohol do you have on a typical day you are drinking?  0 Filed at: 07/02/2025 1537   3a. Male UNDER 65: How often do you have five or more drinks on one occasion? 0 Filed at: 07/02/2025 1537   Audit-C Score 1 Filed at: 07/02/2025 1537   CHARMAINE: How many times in the past year have you...    Used an illegal drug or used a prescription medication for non-medical reasons? Never Filed at: 07/02/2025 1537                            History of Present Illness       Chief Complaint   Patient presents with    Flank Pain     Right flank pain since 1000 this am, hx of stones. +N/V       Past Medical History[1]   Past Surgical History[2]   Family History[3]   Social History[4]   E-Cigarette/Vaping    E-Cigarette Use Never User       E-Cigarette/Vaping Substances    Nicotine No     THC No     CBD No     Flavoring No     Other No     Unknown No       I have reviewed and agree with the history as documented.     Patient is a 65-year-old male with history of prior kidney stones and CVA presenting today for evaluation of flank pain.  Patient states that at 10 AM this morning he had sudden onset of severe right sided flank pain that  he says feels like prior kidney stones.  He states that the pain is radiating into his stomach as well.  He endorses associated nausea and nonbilious vomiting vomiting twice.  He also says that this morning prior to onset of symptoms he had an episode of pink-tinged urine.  He states that his last kidney stone was on the left side and was so large that he required stenting.        Review of Systems   Constitutional:  Positive for chills. Negative for fever.   HENT:  Negative for congestion, rhinorrhea and sore throat.    Eyes:  Negative for pain and redness.   Respiratory:  Negative for cough and shortness of breath.    Cardiovascular:  Negative for chest pain and palpitations.   Gastrointestinal:  Positive for abdominal pain, nausea and vomiting. Negative for constipation and diarrhea.   Genitourinary:  Positive for flank pain and hematuria. Negative for dysuria and frequency.   Musculoskeletal:  Negative for back pain and neck pain.   Skin:  Negative for pallor and rash.   Neurological:  Negative for weakness and numbness.   All other systems reviewed and are negative.          Objective       ED Triage Vitals   Temperature Pulse Blood Pressure Respirations SpO2 Patient Position - Orthostatic VS   07/02/25 1418 07/02/25 1418 07/02/25 1418 07/02/25 1418 07/02/25 1418 07/02/25 1418   97.6 °F (36.4 °C) 69 137/73 22 99 % Sitting      Temp Source Heart Rate Source BP Location FiO2 (%) Pain Score    07/02/25 1418 07/02/25 1418 07/02/25 1418 -- 07/02/25 1530    Temporal Monitor Left arm  10 - Worst Possible Pain      Vitals      Date and Time Temp Pulse SpO2 Resp BP Pain Score FACES Pain Rating User   07/02/25 1530 -- -- -- -- -- 10 - Worst Possible Pain -- KM   07/02/25 1418 97.6 °F (36.4 °C) 69 99 % 22 137/73 -- -- TO            Physical Exam  Vitals and nursing note reviewed.   Constitutional:       General: He is not in acute distress.     Appearance: Normal appearance. He is well-developed. He is obese. He is not  ill-appearing, toxic-appearing or diaphoretic.      Comments: Uncomfortable appearing   HENT:      Head: Normocephalic and atraumatic.      Right Ear: External ear normal.      Left Ear: External ear normal.      Nose: Nose normal. No congestion or rhinorrhea.      Mouth/Throat:      Mouth: Mucous membranes are moist.     Eyes:      General: No scleral icterus.        Right eye: No discharge.         Left eye: No discharge.      Conjunctiva/sclera: Conjunctivae normal.       Cardiovascular:      Rate and Rhythm: Normal rate and regular rhythm.      Pulses: Normal pulses.      Heart sounds: Normal heart sounds. No murmur heard.     No friction rub. No gallop.   Pulmonary:      Effort: Pulmonary effort is normal. No respiratory distress.      Breath sounds: Normal breath sounds. No stridor. No wheezing, rhonchi or rales.   Abdominal:      General: Abdomen is flat. There is no distension.      Palpations: Abdomen is soft.      Tenderness: There is abdominal tenderness (Mild right upper and lower quadrant tenderness palpation). There is no right CVA tenderness, left CVA tenderness, guarding or rebound.     Musculoskeletal:         General: Normal range of motion.      Cervical back: Normal range of motion and neck supple. No rigidity or tenderness.     Skin:     General: Skin is warm and dry.      Capillary Refill: Capillary refill takes less than 2 seconds.      Coloration: Skin is not jaundiced or pale.     Neurological:      General: No focal deficit present.      Mental Status: He is alert and oriented to person, place, and time.     Psychiatric:         Mood and Affect: Mood normal.         Behavior: Behavior normal.         Results Reviewed       Procedure Component Value Units Date/Time    Urine Microscopic [[id number]]  (Abnormal) Collected: 07/02/25 1734    Lab Status: Final result Specimen: Urine, Other Updated: 07/02/25 1745     RBC, UA 4-10 /hpf      WBC, UA 1-2 /hpf      Epithelial Cells None Seen /hpf       Bacteria, UA None Seen /hpf      MUCUS THREADS Occasional    UA w Reflex to Microscopic w Reflex to Culture [[id number]]  (Abnormal) Collected: 07/02/25 1734    Lab Status: Final result Specimen: Urine, Other Updated: 07/02/25 1741     Color, UA Light Yellow     Clarity, UA Clear     Specific Gravity, UA 1.015     pH, UA 7.5     Leukocytes, UA Negative     Nitrite, UA Negative     Protein, UA Negative mg/dl      Glucose, UA Negative mg/dl      Ketones, UA Negative mg/dl      Urobilinogen, UA <2.0 mg/dl      Bilirubin, UA Negative     Occult Blood, UA Moderate    Lipase [[id number]]  (Normal) Collected: 07/02/25 1422    Lab Status: Final result Specimen: Blood from Arm, Right Updated: 07/02/25 1455     Lipase 66 u/L     Comprehensive metabolic panel [[id number]] Collected: 07/02/25 1422    Lab Status: Final result Specimen: Blood from Arm, Right Updated: 07/02/25 1454     Sodium 139 mmol/L      Potassium 4.3 mmol/L      Chloride 104 mmol/L      CO2 26 mmol/L      ANION GAP 9 mmol/L      BUN 15 mg/dL      Creatinine 1.11 mg/dL      Glucose 129 mg/dL      Calcium 9.3 mg/dL      AST 24 U/L      ALT 26 U/L      Alkaline Phosphatase 46 U/L      Total Protein 7.1 g/dL      Albumin 4.6 g/dL      Total Bilirubin 0.64 mg/dL      eGFR 70 ml/min/1.73sq m     Narrative:      National Kidney Disease Foundation guidelines for Chronic Kidney Disease (CKD):     Stage 1 with normal or high GFR (GFR > 90 mL/min/1.73 square meters)    Stage 2 Mild CKD (GFR = 60-89 mL/min/1.73 square meters)    Stage 3A Moderate CKD (GFR = 45-59 mL/min/1.73 square meters)    Stage 3B Moderate CKD (GFR = 30-44 mL/min/1.73 square meters)    Stage 4 Severe CKD (GFR = 15-29 mL/min/1.73 square meters)    Stage 5 End Stage CKD (GFR <15 mL/min/1.73 square meters)  Note: GFR calculation is accurate only with a steady state creatinine    CBC and differential [[id number]]  (Abnormal) Collected: 07/02/25 1422    Lab Status: Final result Specimen: Blood from Arm, Right  Updated: 07/02/25 1434     WBC 11.88 Thousand/uL      RBC 4.52 Million/uL      Hemoglobin 14.7 g/dL      Hematocrit 42.8 %      MCV 95 fL      MCH 32.5 pg      MCHC 34.3 g/dL      RDW 11.9 %      MPV 11.3 fL      Platelets 136 Thousands/uL      nRBC 0 /100 WBCs      Segmented % 86 %      Immature Grans % 1 %      Lymphocytes % 8 %      Monocytes % 5 %      Eosinophils Relative 0 %      Basophils Relative 0 %      Absolute Neutrophils 10.18 Thousands/µL      Absolute Immature Grans 0.06 Thousand/uL      Absolute Lymphocytes 0.98 Thousands/µL      Absolute Monocytes 0.62 Thousand/µL      Eosinophils Absolute 0.01 Thousand/µL      Basophils Absolute 0.03 Thousands/µL             CT renal stone study abdomen pelvis wo contrast   Final Interpretation by Deepali Patton MD (07/02 1614)         1. Right hydronephrosis and hydroureter with a 7 x 6 mm obstructing calculus in the proximal right ureter.   2. Bilateral nonobstructing renal calculi.   3. 2 cm left adrenal adenoma stable since at least 2018. Biochemical evaluation should be considered to assess for a functioning adenoma, if not already performed.         Workstation performed: [id number]             Procedures    ED Medication and Procedure Management   Prior to Admission Medications   Prescriptions Last Dose Informant Patient Reported? Taking?   aspirin (ECOTRIN LOW STRENGTH) 81 mg EC tablet  Self Yes No   Sig: Take 81 mg by mouth in the morning.   clopidogrel (PLAVIX) 75 mg tablet  Self No No   Sig: TAKE 1 TABLET DAILY   methocarbamol (ROBAXIN) 500 mg tablet  Self No No   Sig: Take 1 tablet (500 mg total) by mouth 4 (four) times a day   rosuvastatin (CRESTOR) 20 MG tablet  Self No No   Sig: TAKE 1 TABLET DAILY      Facility-Administered Medications: None     Patient's Medications   Discharge Prescriptions    ONDANSETRON (ZOFRAN-ODT) 4 MG DISINTEGRATING TABLET    Take 1 tablet (4 mg total) by mouth every 6 (six) hours as needed for nausea or vomiting        Start Date: 7/2/2025  End Date: --       Order Dose: 4 mg       Quantity: 30 tablet    Refills: 0    OXYCODONE (ROXICODONE) 5 IMMEDIATE RELEASE TABLET    Take 1 tablet (5 mg total) by mouth every 6 (six) hours as needed for severe pain for up to 10 days Max Daily Amount: 20 mg       Start Date: 7/2/2025  End Date: 7/12/2025       Order Dose: 5 mg       Quantity: 16 tablet    Refills: 0       ED SEPSIS DOCUMENTATION   Time reflects when diagnosis was documented in both MDM as applicable and the Disposition within this note       Time User Action Codes Description Comment    7/2/2025  5:57 PM Julio Tejada Add [N20.0] Kidney stone                      [1]   Past Medical History:  Diagnosis Date    Chronic low back pain     GERD (gastroesophageal reflux disease)     Hyperlipidemia     Kidney stone     Sleep apnea     Verruca 2 yrs   [2]   Past Surgical History:  Procedure Laterality Date    COLONOSCOPY      ESOPHAGOGASTRODUODENOSCOPY      FL RETROGRADE PYELOGRAM  5/6/2024    [place] CYSTO/URETERO W/LITHOTRIPSY &INDWELL STENT INSRT Left 5/6/2024    Procedure: CYSTOSCOPY URETEROSCOPY WITH LITHOTRIPSY HOLMIUM LASER, STONE BASKET EXTRACTION, RETROGRADE PYELOGRAM AND INSERTION STENT URETERAL;  Surgeon: Mauro Finch MD;  Location: [facility];  Service: Urology    WA ESOPHAGOGASTRODUODENOSCOPY TRANSORAL DIAGNOSTIC N/A 5/24/2018    Procedure: ESOPHAGOGASTRODUODENOSCOPY (EGD);  Surgeon: Francis Sanders MD;  Location: [facility];  Service: Gastroenterology   [3]   Family History  Problem Relation Name Age of Onset    Diabetes Mother Mary     Lung cancer Father Hung     Cancer Father Hung     Cancer Brother Simeon Valdes         Bladder Cancer and a stroke    No Known Problems Daughter      No Known Problems Son      Breast cancer Sister Pebbles Victor    [4]   Social History  Tobacco Use    Smoking status: Never     Passive exposure: Past    Smokeless tobacco: Never   Vaping Use    Vaping status: Never Used    Substance Use Topics    Alcohol use: Yes     Alcohol/week: 4.0 standard drinks of alcohol     Types: 2 Glasses of wine, 2 Cans of beer per week     Comment: social    Drug use: No        Julio Tejada MD  07/02/25 [id number]

## 2025-07-02 NOTE — ED NOTES
This RN came to d/c the patient. Patient notified this RN that his pain is returning and requested a small dose of pain medications prior to d/c. Provider notified. Awaiting orders.        Cyndi Espinal RN  07/02/25 [id number]

## 2025-07-06 ENCOUNTER — APPOINTMENT (EMERGENCY)
Dept: CT IMAGING | Facility: HOSPITAL | Age: 65
DRG: 660 | End: 2025-07-06
Payer: COMMERCIAL

## 2025-07-06 ENCOUNTER — HOSPITAL ENCOUNTER (INPATIENT)
Facility: HOSPITAL | Age: 65
LOS: 6 days | Discharge: HOME | DRG: 660 | End: 2025-07-12
Attending: EMERGENCY MEDICINE | Admitting: INTERNAL MEDICINE
Payer: COMMERCIAL

## 2025-07-06 DIAGNOSIS — N20.0 KIDNEY STONE: ICD-10-CM

## 2025-07-06 DIAGNOSIS — N20.1 URETERAL CALCULUS, RIGHT: Primary | ICD-10-CM

## 2025-07-06 DIAGNOSIS — K56.7 ILEUS (HCC): ICD-10-CM

## 2025-07-06 DIAGNOSIS — N17.9 AKI (ACUTE KIDNEY INJURY) (HCC): ICD-10-CM

## 2025-07-06 PROBLEM — K59.00 CONSTIPATION: Status: ACTIVE | Noted: 2025-07-06

## 2025-07-06 LAB
ALBUMIN SERPL BCG-MCNC: 4.2 G/DL (ref 3.5–5)
ALP SERPL-CCNC: 40 U/L (ref 34–104)
ALT SERPL W P-5'-P-CCNC: 16 U/L (ref 7–52)
ANION GAP SERPL CALCULATED.3IONS-SCNC: 11 MMOL/L (ref 4–13)
AST SERPL W P-5'-P-CCNC: 17 U/L (ref 13–39)
BACTERIA UR QL AUTO: ABNORMAL /HPF
BASOPHILS # BLD AUTO: 0.03 THOUSANDS/ÂΜL (ref 0–0.1)
BASOPHILS NFR BLD AUTO: 0 % (ref 0–1)
BILIRUB SERPL-MCNC: 0.94 MG/DL (ref 0.2–1)
BILIRUB UR QL STRIP: NEGATIVE
BUN SERPL-MCNC: 19 MG/DL (ref 5–25)
CALCIUM SERPL-MCNC: 9.3 MG/DL (ref 8.4–10.2)
CHLORIDE SERPL-SCNC: 99 MMOL/L (ref 96–108)
CLARITY UR: CLEAR
CO2 SERPL-SCNC: 26 MMOL/L (ref 21–32)
COLOR UR: YELLOW
CREAT SERPL-MCNC: 1.5 MG/DL (ref 0.6–1.3)
EOSINOPHIL # BLD AUTO: 0.01 THOUSAND/ÂΜL (ref 0–0.61)
EOSINOPHIL NFR BLD AUTO: 0 % (ref 0–6)
ERYTHROCYTE [DISTWIDTH] IN BLOOD BY AUTOMATED COUNT: 11.9 % (ref 11.6–15.1)
GFR SERPL CREATININE-BSD FRML MDRD: 48 ML/MIN/1.73SQ M
GLUCOSE SERPL-MCNC: 101 MG/DL (ref 65–140)
GLUCOSE UR STRIP-MCNC: NEGATIVE MG/DL
HCT VFR BLD AUTO: 43.5 % (ref 36.5–49.3)
HGB BLD-MCNC: 15.2 G/DL (ref 12–17)
HGB UR QL STRIP.AUTO: ABNORMAL
IMM GRANULOCYTES # BLD AUTO: 0.03 THOUSAND/UL (ref 0–0.2)
IMM GRANULOCYTES NFR BLD AUTO: 0 % (ref 0–2)
KETONES UR STRIP-MCNC: ABNORMAL MG/DL
LACTATE SERPL-SCNC: 0.7 MMOL/L (ref 0.5–2)
LEUKOCYTE ESTERASE UR QL STRIP: NEGATIVE
LG PLATELETS BLD QL SMEAR: PRESENT
LIPASE SERPL-CCNC: 34 U/L (ref 11–82)
LYMPHOCYTES # BLD AUTO: 0.87 THOUSANDS/ÂΜL (ref 0.6–4.47)
LYMPHOCYTES NFR BLD AUTO: 8 % (ref 14–44)
MCH RBC QN AUTO: 33 PG (ref 26.8–34.3)
MCHC RBC AUTO-ENTMCNC: 34.9 G/DL (ref 31.4–37.4)
MCV RBC AUTO: 95 FL (ref 82–98)
MONOCYTES # BLD AUTO: 1.24 THOUSAND/ÂΜL (ref 0.17–1.22)
MONOCYTES NFR BLD AUTO: 12 % (ref 4–12)
MUCOUS THREADS UR QL AUTO: ABNORMAL
NEUTROPHILS # BLD AUTO: 8.43 THOUSANDS/ÂΜL (ref 1.85–7.62)
NEUTS SEG NFR BLD AUTO: 80 % (ref 43–75)
NITRITE UR QL STRIP: NEGATIVE
NON-SQ EPI CELLS URNS QL MICRO: ABNORMAL /HPF
NRBC BLD AUTO-RTO: 0 /100 WBCS
PH UR STRIP.AUTO: 6 [PH]
PLATELET # BLD AUTO: 118 THOUSANDS/UL (ref 149–390)
PLATELET # BLD AUTO: 123 THOUSANDS/UL (ref 149–390)
PLATELET BLD QL SMEAR: ABNORMAL
PMV BLD AUTO: 10.9 FL (ref 8.9–12.7)
PMV BLD AUTO: 11.4 FL (ref 8.9–12.7)
POTASSIUM SERPL-SCNC: 3.8 MMOL/L (ref 3.5–5.3)
PROT SERPL-MCNC: 7.5 G/DL (ref 6.4–8.4)
PROT UR STRIP-MCNC: ABNORMAL MG/DL
RBC # BLD AUTO: 4.6 MILLION/UL (ref 3.88–5.62)
RBC #/AREA URNS AUTO: ABNORMAL /HPF
RBC MORPH BLD: NORMAL
SODIUM SERPL-SCNC: 136 MMOL/L (ref 135–147)
SP GR UR STRIP.AUTO: 1.03 (ref 1–1.03)
UROBILINOGEN UR STRIP-ACNC: <2 MG/DL
WBC # BLD AUTO: 10.61 THOUSAND/UL (ref 4.31–10.16)
WBC #/AREA URNS AUTO: ABNORMAL /HPF

## 2025-07-06 PROCEDURE — 74177 CT ABD & PELVIS W/CONTRAST: CPT

## 2025-07-06 PROCEDURE — 96365 THER/PROPH/DIAG IV INF INIT: CPT

## 2025-07-06 PROCEDURE — 99254 IP/OBS CNSLTJ NEW/EST MOD 60: CPT | Performed by: SURGERY

## 2025-07-06 PROCEDURE — 81001 URINALYSIS AUTO W/SCOPE: CPT

## 2025-07-06 PROCEDURE — 99222 1ST HOSP IP/OBS MODERATE 55: CPT

## 2025-07-06 PROCEDURE — 99223 1ST HOSP IP/OBS HIGH 75: CPT

## 2025-07-06 PROCEDURE — 83690 ASSAY OF LIPASE: CPT

## 2025-07-06 PROCEDURE — 85049 AUTOMATED PLATELET COUNT: CPT

## 2025-07-06 PROCEDURE — 96361 HYDRATE IV INFUSION ADD-ON: CPT

## 2025-07-06 PROCEDURE — 96375 TX/PRO/DX INJ NEW DRUG ADDON: CPT

## 2025-07-06 PROCEDURE — 96376 TX/PRO/DX INJ SAME DRUG ADON: CPT

## 2025-07-06 PROCEDURE — 87040 BLOOD CULTURE FOR BACTERIA: CPT

## 2025-07-06 PROCEDURE — 36415 COLL VENOUS BLD VENIPUNCTURE: CPT

## 2025-07-06 PROCEDURE — 99285 EMERGENCY DEPT VISIT HI MDM: CPT | Performed by: EMERGENCY MEDICINE

## 2025-07-06 PROCEDURE — 85025 COMPLETE CBC W/AUTO DIFF WBC: CPT

## 2025-07-06 PROCEDURE — 83605 ASSAY OF LACTIC ACID: CPT

## 2025-07-06 PROCEDURE — 80053 COMPREHEN METABOLIC PANEL: CPT

## 2025-07-06 PROCEDURE — 99284 EMERGENCY DEPT VISIT MOD MDM: CPT

## 2025-07-06 RX ORDER — ONDANSETRON 2 MG/ML
4 INJECTION INTRAMUSCULAR; INTRAVENOUS EVERY 4 HOURS PRN
Status: DISCONTINUED | OUTPATIENT
Start: 2025-07-06 | End: 2025-07-12 | Stop reason: HOSPADM

## 2025-07-06 RX ORDER — HYDROMORPHONE HCL/PF 1 MG/ML
0.5 SYRINGE (ML) INJECTION EVERY 4 HOURS PRN
Status: DISCONTINUED | OUTPATIENT
Start: 2025-07-06 | End: 2025-07-06 | Stop reason: SDUPTHER

## 2025-07-06 RX ORDER — HYDROMORPHONE HCL/PF 1 MG/ML
0.5 SYRINGE (ML) INJECTION ONCE
Status: COMPLETED | OUTPATIENT
Start: 2025-07-06 | End: 2025-07-06

## 2025-07-06 RX ORDER — METHOCARBAMOL 500 MG/1
500 TABLET, FILM COATED ORAL 4 TIMES DAILY
Status: DISCONTINUED | OUTPATIENT
Start: 2025-07-06 | End: 2025-07-12 | Stop reason: HOSPADM

## 2025-07-06 RX ORDER — POLYETHYLENE GLYCOL 3350 17 G/17G
17 POWDER, FOR SOLUTION ORAL DAILY
Status: DISCONTINUED | OUTPATIENT
Start: 2025-07-07 | End: 2025-07-08

## 2025-07-06 RX ORDER — BISACODYL 10 MG
10 SUPPOSITORY, RECTAL RECTAL DAILY
Status: DISCONTINUED | OUTPATIENT
Start: 2025-07-07 | End: 2025-07-12 | Stop reason: HOSPADM

## 2025-07-06 RX ORDER — SODIUM CHLORIDE 9 MG/ML
100 INJECTION, SOLUTION INTRAVENOUS CONTINUOUS
Status: DISPENSED | OUTPATIENT
Start: 2025-07-06 | End: 2025-07-08

## 2025-07-06 RX ORDER — PRAVASTATIN SODIUM 80 MG/1
80 TABLET ORAL
Status: DISCONTINUED | OUTPATIENT
Start: 2025-07-06 | End: 2025-07-12 | Stop reason: HOSPADM

## 2025-07-06 RX ORDER — DOCUSATE SODIUM 100 MG/1
100 CAPSULE, LIQUID FILLED ORAL 2 TIMES DAILY
Status: DISCONTINUED | OUTPATIENT
Start: 2025-07-06 | End: 2025-07-12 | Stop reason: HOSPADM

## 2025-07-06 RX ORDER — OXYCODONE HYDROCHLORIDE 5 MG/1
5 TABLET ORAL EVERY 6 HOURS PRN
Refills: 0 | Status: DISCONTINUED | OUTPATIENT
Start: 2025-07-06 | End: 2025-07-10

## 2025-07-06 RX ORDER — ONDANSETRON 2 MG/ML
4 INJECTION INTRAMUSCULAR; INTRAVENOUS ONCE
Status: COMPLETED | OUTPATIENT
Start: 2025-07-06 | End: 2025-07-06

## 2025-07-06 RX ORDER — HYDROMORPHONE HCL/PF 1 MG/ML
0.5 SYRINGE (ML) INJECTION EVERY 4 HOURS PRN
Status: DISCONTINUED | OUTPATIENT
Start: 2025-07-06 | End: 2025-07-10

## 2025-07-06 RX ORDER — HEPARIN SODIUM 5000 [USP'U]/ML
5000 INJECTION, SOLUTION INTRAVENOUS; SUBCUTANEOUS EVERY 8 HOURS SCHEDULED
Status: DISCONTINUED | OUTPATIENT
Start: 2025-07-06 | End: 2025-07-08

## 2025-07-06 RX ORDER — CLOPIDOGREL BISULFATE 75 MG/1
75 TABLET ORAL DAILY
Status: DISCONTINUED | OUTPATIENT
Start: 2025-07-07 | End: 2025-07-12 | Stop reason: HOSPADM

## 2025-07-06 RX ORDER — ASPIRIN 81 MG/1
81 TABLET ORAL DAILY
Status: DISCONTINUED | OUTPATIENT
Start: 2025-07-07 | End: 2025-07-12 | Stop reason: HOSPADM

## 2025-07-06 RX ADMIN — METHOCARBAMOL 500 MG: 500 TABLET ORAL at 21:00

## 2025-07-06 RX ADMIN — HYDROMORPHONE HYDROCHLORIDE 0.5 MG: 1 INJECTION, SOLUTION INTRAMUSCULAR; INTRAVENOUS; SUBCUTANEOUS at 22:32

## 2025-07-06 RX ADMIN — SODIUM CHLORIDE 1000 ML: 0.9 INJECTION, SOLUTION INTRAVENOUS at 11:56

## 2025-07-06 RX ADMIN — SODIUM CHLORIDE 75 ML/HR: 0.9 INJECTION, SOLUTION INTRAVENOUS at 17:18

## 2025-07-06 RX ADMIN — ONDANSETRON 4 MG: 2 INJECTION INTRAMUSCULAR; INTRAVENOUS at 11:52

## 2025-07-06 RX ADMIN — OXYCODONE 5 MG: 5 TABLET ORAL at 18:43

## 2025-07-06 RX ADMIN — PRAVASTATIN SODIUM 80 MG: 80 TABLET ORAL at 16:43

## 2025-07-06 RX ADMIN — DOCUSATE SODIUM 100 MG: 100 CAPSULE, LIQUID FILLED ORAL at 17:17

## 2025-07-06 RX ADMIN — CEFTRIAXONE 2000 MG: 2 INJECTION, POWDER, FOR SOLUTION INTRAMUSCULAR; INTRAVENOUS at 13:27

## 2025-07-06 RX ADMIN — METHOCARBAMOL 500 MG: 500 TABLET ORAL at 16:43

## 2025-07-06 RX ADMIN — HEPARIN SODIUM 5000 UNITS: 5000 INJECTION INTRAVENOUS; SUBCUTANEOUS at 17:17

## 2025-07-06 RX ADMIN — HYDROMORPHONE HYDROCHLORIDE 0.5 MG: 1 INJECTION, SOLUTION INTRAMUSCULAR; INTRAVENOUS; SUBCUTANEOUS at 11:56

## 2025-07-06 RX ADMIN — HEPARIN SODIUM 5000 UNITS: 5000 INJECTION INTRAVENOUS; SUBCUTANEOUS at 21:00

## 2025-07-06 RX ADMIN — HYDROMORPHONE HYDROCHLORIDE 0.5 MG: 1 INJECTION, SOLUTION INTRAMUSCULAR; INTRAVENOUS; SUBCUTANEOUS at 13:08

## 2025-07-06 RX ADMIN — IOHEXOL 85 ML: 350 INJECTION, SOLUTION INTRAVENOUS at 12:10

## 2025-07-06 NOTE — H&P
H&P - Hospitalist   Name: Betito Landers 65 y.o. male I MRN: [id number]  Unit/Bed#: -01 I Date of Admission: 7/6/2025   Date of Service: 7/6/2025 I Hospital Day: 0     Assessment & Plan  Obstructive kidney stone  Follows with urology in the outpatient setting for history of nephrolithiasis  Last ultrasound from June 12 with bilateral renal nonobstructing calculi  Presenting with new onset right flank pain, fevers, nausea  CMP with elevated creatinine, monitor with BMP  UA with occasional bacteria  CT with severe right sided hydronephrosis due to obstructing calculus, concern for superimposed infection  Continue empiric ceftriaxone, IVF  Urology to evaluate and possibly intervene tomorrow  N.p.o. at midnight  Hold Plavix and daily aspirin  Continue pain control  Elevated blood pressure reading  BP elevated on admission in the 150s/70s, likely in the setting of pain though he does have this diagnosis as an outpatient and does not take any medication  Continue pain management  Outpatient follow-up with PCP  Constipation concern for ileus  Concern for ileus on CT scan, low appetite, nausea and vomiting, abdominal bloating  Consult general surgery   Start bowel regimen.    IVF  Bowel rest  Consider NG tube if abdominal exam worsening or increase in nausea or vomiting       VTE Pharmacologic Prophylaxis: VTE Score: 4 Moderate Risk (Score 3-4) - Pharmacological DVT Prophylaxis Contraindicated. Sequential Compression Devices Ordered.  Code Status: Prior patient  Discussion with family: Updated  (wife) at bedside.    Anticipated Length of Stay: Patient will be admitted on an inpatient basis with an anticipated length of stay of greater than 2 midnights secondary to hydronephrosis, concern for ileus, pyelonephritis .    History of Present Illness   Chief Complaint: Right flank pain    Betito Landers is a 65 y.o. male with a PMH of elevated blood pressure reading, hyperglycemia, nephrolithiasis who presents  with recurrent right flank pain with associated fevers, abdominal pain.  He was recently seen at the [facility] for similar complaints however was discharged.  Returning now for worsening fevers.  CT scan shows newly obstructing right renal calculus with hydronephrosis and concern for superimposed infection.  Urology to evaluate the patient tomorrow and possibly take for stent intervention.  Patient also notes he has had constipation, abdominal bloating, and anorexia.  States he is not passing gas and belching intermittently but not as much as he feels he should.  States he is only been taking Tylenol for the flank pain.  At the time of my exam his abdominal/flank pain is a 4/10 and radiates down his groin/buttock.  He also notes that his urine has been less frequent/more difficult.  Review of Systems   Constitutional:  Positive for appetite change, chills and fever. Negative for fatigue.   Respiratory:  Negative for shortness of breath.    Cardiovascular:  Negative for chest pain and palpitations.   Gastrointestinal:  Positive for abdominal distention, abdominal pain, constipation, nausea and vomiting.   Genitourinary:  Positive for difficulty urinating, dysuria and flank pain. Negative for hematuria.       Historical Information   Past Medical History[1]  Past Surgical History[2]  Social History[3]  E-Cigarette/Vaping    E-Cigarette Use Never User      E-Cigarette/Vaping Substances    Nicotine No     THC No     CBD No     Flavoring No     Other No     Unknown No        Social History:  Marital Status: /Civil Union   Occupation: unknown  Patient Pre-hospital Living Situation: Home  Patient Pre-hospital Level of Mobility: walks  Patient Pre-hospital Diet Restrictions: none    Meds/Allergies   I have reviewed home medications with patient personally.  Prior to Admission medications    Medication Sig Start Date End Date Taking? Authorizing Provider   aspirin (ECOTRIN LOW STRENGTH) 81 mg EC tablet  Take 81 mg by mouth in the morning.    Historical Provider, MD   clopidogrel (PLAVIX) 75 mg tablet TAKE 1 TABLET DAILY 5/5/25   Mayur Pollock DO   methocarbamol (ROBAXIN) 500 mg tablet Take 1 tablet (500 mg total) by mouth 4 (four) times a day 4/30/25   Mayur Pollock DO   ondansetron (ZOFRAN-ODT) 4 mg disintegrating tablet Take 1 tablet (4 mg total) by mouth every 6 (six) hours as needed for nausea or vomiting 7/2/25   Julio Tejada MD   oxyCODONE (Roxicodone) 5 immediate release tablet Take 1 tablet (5 mg total) by mouth every 6 (six) hours as needed for severe pain for up to 10 days Max Daily Amount: 20 mg 7/2/25 7/12/25  Julio Tejada MD   rosuvastatin (CRESTOR) 20 MG tablet TAKE 1 TABLET DAILY 5/5/25   Mayur Pollock DO     No Known Allergies    Objective :  Temp:  [98.3 °F (36.8 °C)-98.9 °F (37.2 °C)] 98.9 °F (37.2 °C)  HR:  [75-83] 79  BP: (137-168)/(68-83) 157/77  Resp:  [17-22] 17  SpO2:  [94 %-100 %] 94 %  O2 Device: None (Room air)    Physical Exam  Vitals and nursing note reviewed.   Constitutional:       General: He is not in acute distress.     Appearance: He is not ill-appearing or toxic-appearing.   HENT:      Mouth/Throat:      Mouth: Mucous membranes are dry.     Cardiovascular:      Rate and Rhythm: Normal rate and regular rhythm.      Heart sounds: Normal heart sounds.   Pulmonary:      Effort: Pulmonary effort is normal.      Breath sounds: Normal breath sounds. No rales.   Abdominal:      General: There is distension.      Palpations: Abdomen is soft.      Tenderness: There is abdominal tenderness (RUQ). There is right CVA tenderness and left CVA tenderness. There is no rebound.     Musculoskeletal:      Right lower leg: No edema.      Left lower leg: No edema.     Skin:     General: Skin is warm and dry.      Coloration: Skin is not jaundiced.     Neurological:      Mental Status: He is alert and oriented to person, place, and time.          Lines/Drains:            Lab  Results: I have reviewed the following results:  Results from last 7 days   Lab Units 07/06/25  1105   WBC Thousand/uL 10.61*   HEMOGLOBIN g/dL 15.2   HEMATOCRIT % 43.5   PLATELETS Thousands/uL 123*   SEGS PCT % 80*   LYMPHO PCT % 8*   MONO PCT % 12   EOS PCT % 0     Results from last 7 days   Lab Units 07/06/25  1105   SODIUM mmol/L 136   POTASSIUM mmol/L 3.8   CHLORIDE mmol/L 99   CO2 mmol/L 26   BUN mg/dL 19   CREATININE mg/dL 1.50*   ANION GAP mmol/L 11   CALCIUM mg/dL 9.3   ALBUMIN g/dL 4.2   TOTAL BILIRUBIN mg/dL 0.94   ALK PHOS U/L 40   ALT U/L 16   AST U/L 17   GLUCOSE RANDOM mg/dL 101             Lab Results   Component Value Date    HGBA1C 5.8 (H) 04/05/2025    HGBA1C 5.9 (H) 09/14/2023    HGBA1C 5.4 06/10/2021                 Administrative Statements       ** Please Note: This note has been constructed using a voice recognition system. **         [1]   Past Medical History:  Diagnosis Date    Chronic low back pain     GERD (gastroesophageal reflux disease)     Hyperlipidemia     Kidney stone     Sleep apnea     Verruca 2 yrs   [2]   Past Surgical History:  Procedure Laterality Date    COLONOSCOPY      ESOPHAGOGASTRODUODENOSCOPY      FL RETROGRADE PYELOGRAM  5/6/2024    [place] CYSTO/URETERO W/LITHOTRIPSY &INDWELL STENT INSRT Left 5/6/2024    Procedure: CYSTOSCOPY URETEROSCOPY WITH LITHOTRIPSY HOLMIUM LASER, STONE BASKET EXTRACTION, RETROGRADE PYELOGRAM AND INSERTION STENT URETERAL;  Surgeon: Mauro Finch MD;  Location[facility];  Service: Urology    SC ESOPHAGOGASTRODUODENOSCOPY TRANSORAL DIAGNOSTIC N/A 5/24/2018    Procedure: ESOPHAGOGASTRODUODENOSCOPY (EGD);  Surgeon: Francis Sanders MD;  Location: [facility];  Service: Gastroenterology   [3]   Social History  Tobacco Use    Smoking status: Never     Passive exposure: Past    Smokeless tobacco: Never   Vaping Use    Vaping status: Never Used   Substance and Sexual Activity    Alcohol use: Yes     Alcohol/week: 4.0 standard drinks of alcohol     Types:  2 Glasses of wine, 2 Cans of beer per week     Comment: social    Drug use: No    Sexual activity: Yes     Partners: Female

## 2025-07-06 NOTE — ASSESSMENT & PLAN NOTE
7 x 6 mm proximal right ureteral calculus.  Second hospital visit.  Serum creatinine bumped from 1.11 several days ago to 1.50    Plan:  Medical optimization  Continue hydration and symptom management  Strain urine  Flomax  Antiemetics  N.p.o. after midnight for cystoscopy, retrograde pyelography, ureteroscopy, laser lithotripsy and right ureteral stent placement.

## 2025-07-06 NOTE — CONSULTS
Consultation - Surgery-General   Name: Betito Landers 65 y.o. male I MRN: [id number]  Unit/Bed#: -01 I Date of Admission: 7/6/2025   Date of Service: 7/6/2025 I Hospital Day: 0   Inpatient consult to Acute Care Surgery  Consult performed by: Talat Qureshi MD  Consult ordered by: Nicki Gregory PA-C        Physician Requesting Evaluation: Savanah Salazar MD   Reason for Evaluation / Principal Problem: Colonic Ileus    Assessment & Plan  Constipation concern for ileus  Patient is 65-year-old male with past medical history of hypertension, hyperglycemia, nephrolithiasis who presents with recurrent right flank pain with associated fevers.  CT revealing hydronephrosis with obstructing calculus as well as concern for colonic ileus with colonic distention.  Patient is hemodynamically stable.    -No acute surgical intervention  -Recommend bowel regimen with MiraLAX and Dulcolax suppositories in addition to Colace  -If further concerns for constipation or colonic dysmotility, recommend GI consultation  - NG unlikely to be helpful given normal caliber of stomach and small bowel on CT.   - Recommend out of bed and ambulation multiple times a day  - IV fluid resuscitation  - Rest of care per primary team    Obstructive kidney stone  Patient planned for urologic procedure tomorrow.    - Ok to proceed with urology procedure from general surgery standpoint    Please contact the [employer] role for the Surgery-General service with any questions/concerns.    History of Present Illness   Betito Landers is a 65 y.o. male with PMH of HTN, hyperglycemia, nephrolithiasis who presents with recurrent right flank pain with associated fevers, abdominal pain.    Patient reports that he has had nausea with pain but no vomiting since Wednesday.  Has been passing gas since yesterday and his last bowel movement was on Wednesday.  Said chills but no fevers.  Denies any chest pain or shortness of breath.  Has been unable to  tolerate significant p.o. intake over the last couple of days.  Reports he was recently hospitalized at [facility] for flank pain and concern for nephrolithiasis for which he was receiving narcotic pain medication.  Has not been taking a bowel regimen previously.    CT abdomen pelvis completed today demonstrated severe right hydronephrosis and hydroureter with an obstructing calculus and hyperemia of the right renal pelvis and ureter.  Patient also had incidental findings of colonic ileus with colonic distention.  Small bowel loops and stomach are of normal caliber.  Labs significant for white blood cell count 10.6, hemoglobin 15.2, creatinine 1.5.    Review of Systems   Constitutional:  Positive for chills. Negative for fever.   HENT:  Negative for trouble swallowing.    Eyes:  Negative for visual disturbance.   Respiratory:  Negative for shortness of breath.    Cardiovascular:  Negative for chest pain.   Gastrointestinal:  Positive for abdominal pain, nausea and vomiting.   Genitourinary:  Positive for flank pain.   Skin:  Negative for color change.   Neurological:  Negative for speech difficulty.   Psychiatric/Behavioral:  Negative for behavioral problems.      Medical History Review: I have reviewed the patient's PMH, PSH, Social History, Family History, Meds, and Allergies     Objective :  Temp:  [98.3 °F (36.8 °C)-98.9 °F (37.2 °C)] 98.9 °F (37.2 °C)  HR:  [75-83] 79  BP: (137-168)/(68-83) 157/77  Resp:  [17-22] 17  SpO2:  [94 %-100 %] 94 %  O2 Device: None (Room air)      Physical Exam  Constitutional:       Appearance: Normal appearance.   HENT:      Head: Normocephalic and atraumatic.      Right Ear: External ear normal.      Left Ear: External ear normal.      Nose: Nose normal.     Eyes:      Conjunctiva/sclera: Conjunctivae normal.       Cardiovascular:      Rate and Rhythm: Normal rate.      Comments: Appears well perfused  Pulmonary:      Effort: Pulmonary effort is normal. No respiratory distress.    Abdominal:      General: Abdomen is flat. There is distension.      Palpations: Abdomen is soft.      Tenderness: There is abdominal tenderness (Mild right sided abdominal tenderness). There is right CVA tenderness. There is no guarding or rebound.     Skin:     General: Skin is warm and dry.     Neurological:      General: No focal deficit present.      Mental Status: He is alert and oriented to person, place, and time.     Psychiatric:         Mood and Affect: Mood normal.         Behavior: Behavior normal.         Lab Results: I have reviewed the following results:  Recent Labs     07/06/25  1105   WBC 10.61*   HGB 15.2   HCT 43.5   *   SODIUM 136   K 3.8   CL 99   CO2 26   BUN 19   CREATININE 1.50*   GLUC 101   AST 17   ALT 16   ALB 4.2   TBILI 0.94   ALKPHOS 40       Imaging Results Review: I reviewed radiology reports from this admission including: CT abdomen/pelvis.  Other Study Results Review: No additional pertinent studies reviewed.    VTE Pharmacologic Prophylaxis: Heparin  VTE Mechanical Prophylaxis: sequential compression device

## 2025-07-06 NOTE — ASSESSMENT & PLAN NOTE
Concern for ileus on CT scan, low appetite, nausea and vomiting, abdominal bloating  Consult general surgery   Start bowel regimen.    IVF  Bowel rest  Consider NG tube if abdominal exam worsening or increase in nausea or vomiting

## 2025-07-06 NOTE — ASSESSMENT & PLAN NOTE
Patient is 65-year-old male with past medical history of hypertension, hyperglycemia, nephrolithiasis who presents with recurrent right flank pain with associated fevers.  CT revealing hydronephrosis with obstructing calculus as well as concern for colonic ileus with colonic distention.  Patient is hemodynamically stable.    -No acute surgical intervention  -Recommend bowel regimen with MiraLAX and Dulcolax suppositories in addition to Colace  -If further concerns for constipation or colonic dysmotility, recommend GI consultation  - NG unlikely to be helpful given normal caliber of stomach and small bowel on CT.   - Recommend out of bed and ambulation multiple times a day  - IV fluid resuscitation  - Rest of care per primary team

## 2025-07-06 NOTE — ED ATTENDING ATTESTATION
7/6/2025  IWyatt DO, saw and evaluated the patient. I have discussed the patient with the resident/non-physician practitioner and agree with the resident's/non-physician practitioner's findings, Plan of Care, and MDM as documented in the resident's/non-physician practitioner's note, except where noted. All available labs and Radiology studies were reviewed.  I was present for key portions of any procedure(s) performed by the resident/non-physician practitioner and I was immediately available to provide assistance.       At this point I agree with the current assessment done in the Emergency Department.  I have conducted an independent evaluation of this patient a history and physical is as follows:    Patient is a 65-year-old male who presents with right flank and right abdominal pain.  Patient was seen at another campus for sudden onset of right flank pain on 7-25.  He was diagnosed with a 7 x 6 mm obstructing stone in the proximal right ureter with associated right hydronephrosis and ureter.  He was ultimately discharged home but continues to have pain.  Pain has been worse today.  He admits to fevers today as well.  He mitts to nausea but denies vomiting, dysuria, hematuria, other concerns.  He is urinating more frequently but states that he has been hydrating at home.  He has a history of stones requiring ureteral stent.    On exam, patient is in mild distress secondary to pain.  Heart is regular rate and rhythm.  Breath sounds normal.  Abdomen is soft, mildly distended, tender to palpation in the right mid and lower abdomen.  No rebound or guarding.  Right CVA tenderness.    Labs reveal a creatinine which is trending up from baseline.  Repeat imaging reveals severe right hydronephrosis and hydroureter with obstructing 7 x 6 mm ureteral stone.  Superimposed infection not ruled out.  Urinalysis is negative for evidence of infection.  He has a leukocytosis which is trending down from previous.   RTC June 19 follow up with cbc cmpRefer to LIBBY soliz  "Possible infection versus stress reaction.  Discussed case with urology who recommends admission to [facility] with urology consult.    Portions of the above record have been created with voice recognition software.  Occasional wrong word or \"sound alike\" substitutions may have occurred due to the inherent limitations of voice recognition software.  Read the chart carefully and recognize, using context, where substitutions may have occurred.      ED Course         Critical Care Time  Procedures      "

## 2025-07-06 NOTE — ASSESSMENT & PLAN NOTE
Evaluated by general surgery.  No contraindication to anesthesia.    Plan:  Aggressive bowel regimen per GS recommendations.

## 2025-07-06 NOTE — ASSESSMENT & PLAN NOTE
Patient planned for urologic procedure tomorrow.    - Ok to proceed with urology procedure from general surgery standpoint

## 2025-07-06 NOTE — Clinical Note
Case was discussed with MICKEY and the patient's admission status was agreed to be Admission Status: inpatient status to the service of Dr. AREVALO .

## 2025-07-06 NOTE — ASSESSMENT & PLAN NOTE
Follows with urology in the outpatient setting for history of nephrolithiasis  Last ultrasound from June 12 with bilateral renal nonobstructing calculi  Presenting with new onset right flank pain, fevers, nausea  CMP with elevated creatinine, monitor with BMP  UA with occasional bacteria  CT with severe right sided hydronephrosis due to obstructing calculus, concern for superimposed infection  Continue empiric ceftriaxone, IVF  Urology to evaluate and possibly intervene tomorrow  N.p.o. at midnight  Hold Plavix and daily aspirin  Continue pain control

## 2025-07-06 NOTE — CONSULTS
Consultation - Urology   Name: Betito Landers 65 y.o. male I MRN: [id number]  Unit/Bed#: -01 I Date of Admission: 7/6/2025   Date of Service: 7/6/2025 I Hospital Day: 0   Inpatient consult to Urology  Consult performed by: BEBA Rasheed  Consult ordered by: Savanah Salazar MD        Physician Requesting Evaluation: Savanah Salazar MD   Reason for Evaluation / Principal Problem: Ureteral calculus    Assessment & Plan  Obstructive kidney stone  7 x 6 mm proximal right ureteral calculus.  Second hospital visit.  Serum creatinine bumped from 1.11 several days ago to 1.50    Plan:  Medical optimization  Continue hydration and symptom management  Strain urine  Flomax  Antiemetics  N.p.o. after midnight for cystoscopy, retrograde pyelography, ureteroscopy, laser lithotripsy and right ureteral stent placement.  Constipation concern for ileus  Evaluated by general surgery.  No contraindication to anesthesia.    Plan:  Aggressive bowel regimen per  recommendations.  Urology service will follow.    History of Present Illness   Betito Landers is a 65 y.o. male Known to our service for nephrolithiasis with prior endoscopic treatment last previous in May with left ureteroscopy, laser lithotripsy and stent placement, since removed.  Presenting to office in routine follow-up 6/25.  Recent KUB 6/12/2025 demonstrated bilateral nonobstructing intrarenal calculi.  He was instructed to follow-up with annual KUB/US.  Unfortunately, in the interim, patient was evaluated 7/2 for right flank pain and CT demonstrated obstructing right ureteral calculus.  Patient was afebrile hemodynamically stable without acute kidney injury and was deemed appropriate to continue medical expulsive therapy.  However this afternoon patient Reuben presented with worsening symptomology.  He denies fever, chills, dysuria and is voiding clear yellow urine.  He acknowledges right flank pain as well as left lower quadrant pain, increasing  abdominal girth, loss of appetite mild nausea without vomiting.  CT is positive for ileus development as well as persistent right ureteral stone.    He is admitted to the internal medicine service and urology are consulted for potential surgical intervention and additional management recommendations.    Review of Systems   Constitutional:  Positive for appetite change. Negative for chills and fever.   HENT: Negative.     Eyes: Negative.    Respiratory: Negative.     Cardiovascular: Negative.    Gastrointestinal:  Positive for abdominal distention, abdominal pain and nausea. Negative for vomiting.   Endocrine: Negative.    Genitourinary:  Positive for flank pain. Negative for dysuria and hematuria.   Skin: Negative.    Allergic/Immunologic: Negative.    Neurological: Negative.    Hematological: Negative.    Psychiatric/Behavioral: Negative.       Medical History Review: I have reviewed the patient's PMH, PSH, Social History, Family History, Meds, and Allergies   Historical Information   Past Medical History[1]  Past Surgical History[2]  Social History[3]  E-Cigarette/Vaping    E-Cigarette Use Never User      E-Cigarette/Vaping Substances    Nicotine No     THC No     CBD No     Flavoring No     Other No     Unknown No      Family History[4]  Social History[5]    Current Facility-Administered Medications:     [Held by provider] aspirin (ECOTRIN LOW STRENGTH) EC tablet 81 mg, Daily    [START ON 7/7/2025] bisacodyl (DULCOLAX) rectal suppository 10 mg, Daily    [START ON 7/7/2025] ceftriaxone (ROCEPHIN) 1 g/50 mL in dextrose IVPB, Q24H    [Held by provider] clopidogrel (PLAVIX) tablet 75 mg, Daily    docusate sodium (COLACE) capsule 100 mg, BID    heparin (porcine) subcutaneous injection 5,000 Units, Q8H TORY **AND** Platelet count, Once    HYDROmorphone (DILAUDID) injection 0.5 mg, Q4H PRN    methocarbamol (ROBAXIN) tablet 500 mg, 4x Daily    ondansetron (ZOFRAN) injection 4 mg, Q4H PRN    oxyCODONE (ROXICODONE) IR  tablet 5 mg, Q6H PRN    [START ON 7/7/2025] polyethylene glycol (MIRALAX) packet 17 g, Daily    pravastatin (PRAVACHOL) tablet 80 mg, Daily With Dinner    sodium chloride 0.9 % infusion, Continuous, Last Rate: 75 mL/hr (07/06/25 1718)  Prior to Admission Medications   Prescriptions Last Dose Informant Patient Reported? Taking?   aspirin (ECOTRIN LOW STRENGTH) 81 mg EC tablet  Self Yes No   Sig: Take 81 mg by mouth in the morning.   clopidogrel (PLAVIX) 75 mg tablet  Self No No   Sig: TAKE 1 TABLET DAILY   methocarbamol (ROBAXIN) 500 mg tablet  Self No No   Sig: Take 1 tablet (500 mg total) by mouth 4 (four) times a day   ondansetron (ZOFRAN-ODT) 4 mg disintegrating tablet   No No   Sig: Take 1 tablet (4 mg total) by mouth every 6 (six) hours as needed for nausea or vomiting   oxyCODONE (Roxicodone) 5 immediate release tablet   No No   Sig: Take 1 tablet (5 mg total) by mouth every 6 (six) hours as needed for severe pain for up to 10 days Max Daily Amount: 20 mg   rosuvastatin (CRESTOR) 20 MG tablet  Self No No   Sig: TAKE 1 TABLET DAILY      Facility-Administered Medications: None     Patient has no known allergies.    Objective :  Temp:  [98.3 °F (36.8 °C)-98.9 °F (37.2 °C)] 98.9 °F (37.2 °C)  HR:  [75-83] 79  BP: (137-168)/(68-83) 157/77  Resp:  [17-22] 17  SpO2:  [94 %-100 %] 94 %  O2 Device: None (Room air)    I/O         07/04 0701 07/05 0700 07/05 0701  07/06 0700 07/06 0701  07/07 0700    IV Piggyback   1050    Total Intake(mL/kg)   1050 (13.7)    Net   +1050                   Physical Exam  Constitutional:       Appearance: Normal appearance.   HENT:      Head: Normocephalic and atraumatic.     Eyes:      Pupils: Pupils are equal, round, and reactive to light.       Cardiovascular:      Rate and Rhythm: Normal rate and regular rhythm.   Pulmonary:      Effort: Pulmonary effort is normal.      Breath sounds: Normal breath sounds.   Abdominal:      General: There is distension.      Tenderness: There is  abdominal tenderness. There is right CVA tenderness.     Musculoskeletal:         General: Normal range of motion.      Cervical back: Normal range of motion and neck supple.     Skin:     General: Skin is warm and dry.     Neurological:      Mental Status: He is alert and oriented to person, place, and time.     Psychiatric:         Behavior: Behavior normal.            Lab Results: I have reviewed the following results:CBC/BMP:   .     07/06/25  1105   WBC 10.61*   HGB 15.2   HCT 43.5   *   SODIUM 136   K 3.8   CL 99   CO2 26   BUN 19   CREATININE 1.50*   GLUC 101      Recent Labs     07/06/25  1105   WBC 10.61*   HGB 15.2   HCT 43.5   *   SODIUM 136   K 3.8   CL 99   CO2 26   BUN 19   CREATININE 1.50*   GLUC 101   AST 17   ALT 16   ALB 4.2   TBILI 0.94   ALKPHOS 40     Lab Results   Component Value Date    COLORU Yellow 07/06/2025    CLARITYU Clear 07/06/2025    SPECGRAV 1.031 (H) 07/06/2025    PHUR 6.0 07/06/2025    PHUR 5.5 03/08/2019    LEUKOCYTESUR Negative 07/06/2025    NITRITE Negative 07/06/2025    GLUCOSEU Negative 07/06/2025    KETONESU 60 (2+) (A) 07/06/2025    BILIRUBINUR Negative 07/06/2025    BLOODU Moderate (A) 07/06/2025   ,   Lab Results   Component Value Date    URINECX No Growth <1000 cfu/mL 05/02/2024       Imaging Results Review: I reviewed radiology reports from this admission including: CT abdomen/pelvis.      CT abdomen pelvis with contrast [[id number]] Collected: 07/06/25 1306   Order Status: Completed Updated: 07/06/25 1317   Narrative:     CT ABDOMEN AND PELVIS WITH IV CONTRAST    INDICATION: hx right kidney stone, abdominal tenderness generalized. .    COMPARISON: CT abdomen and pelvis July 2, 2025    TECHNIQUE: CT examination of the abdomen and pelvis was performed. Dual energy CT scan technique (DECT) was employed.  Multiplanar 2D reformatted images were created from the source data.    This examination, like all CT scans performed in the [facility], was  performed utilizing techniques to minimize radiation dose exposure, including the use of iterative reconstruction and automated exposure control. Radiation dose length  product (DLP) for this visit: 735 mGy-cm.    IV Contrast: 85 mL of iohexol (OMNIPAQUE)  Enteric Contrast: Not administered.    FINDINGS:    ABDOMEN    LOWER CHEST: No clinically significant abnormality in the visualized lower chest.    LIVER/BILIARY TREE: Unremarkable.    GALLBLADDER: No calcified gallstones. No pericholecystic inflammatory change.    SPLEEN: Unremarkable.    PANCREAS: Unremarkable.    ADRENAL GLANDS: Right adrenal gland is normal.  20 mm left adrenal adenoma is unchanged since 2018.    KIDNEYS/URETERS:  Severe right hydronephrosis and hydroureter with an obstructing calculus in the proximal right ureter measuring 7 x 6 mm. There is slight inferior migration of the calculus.  Delayed right nephrogram. Right perirenal and periureteral stranding.  There is mild hyperemia of the right renal pelvis and right ureter which could be related to obstruction. Superimposed infection is in the differential.  Bilateral nonobstructing renal calculi are seen, the largest in the lower pole of the left kidney measuring 7 mm.  Nonobstructing right renal calculi measuring up to 6 mm in the lower pole.  Left renal scarring and left upper pole renal cyst.    STOMACH AND BOWEL: Stomach is unremarkable. Small bowel loops show normal caliber. Colonic distention suggesting an ileus is seen.    There is no wall thickening. Moderate amount of stool in the colon.      APPENDIX: No findings to suggest appendicitis.    ABDOMINOPELVIC CAVITY: No ascites. No pneumoperitoneum. No lymphadenopathy.    VESSELS: Atherosclerosis without abdominal aortic aneurysm.    PELVIS    REPRODUCTIVE ORGANS: Unremarkable for patient's age.    URINARY BLADDER: Unremarkable.    ABDOMINAL WALL/INGUINAL REGIONS: Unremarkable.    BONES: No acute fracture or suspicious osseous lesion.  Spinal degenerative changes.  Lumbar levoscoliosis.   Impression:         1. Severe right hydronephrosis and hydroureter with minimal inferior migration of the obstructing 7 x 6 mm ureteral calculus.  Hyperemia of the right renal pelvis and ureter which could be secondary to the presence of obstruction or reflect superimposed infection.  2. Bilateral nonobstructing renal calculi.  3. Colonic ileus.  4. Other incidental findings are stable.        Workstation performed: [id number]   CT renal stone study abdomen pelvis wo contrast [[id number]] Collected: 07/02/25 1606   Order Status: Completed Updated: 07/02/25 1615   Narrative:     CT ABDOMEN AND PELVIS WITHOUT IV CONTRAST - LOW DOSE RENAL STONE    INDICATION: Right flank pain, hematuria, hx kidney stones.    COMPARISON: CT abdomen and pelvis August 10, 2024, renal ultrasound 6/12/2025    TECHNIQUE: Low radiation dose thin section CT examination of the abdomen and pelvis was performed without intravenous or oral contrast according to a protocol specifically designed to evaluate for urinary tract calculus. Axial, sagittal, and coronal 2D  reformatted images were created from the source data and submitted for interpretation. Evaluation for pathology in the abdomen and pelvis that is unrelated to urinary tract calculi is limited.    Radiation dose length product (DLP) for this visit: 341 mGy-cm. . This examination, like all CT scans performed in the [facility] at the [facility], was performed utilizing techniques to minimize radiation dose exposure, including the use of iterative  reconstruction and automated exposure control.    URINARY TRACT FINDINGS:    RIGHT KIDNEY AND URETER: Multiple nonobstructing right renal calculi are seen, the largest measuring 6 mm in the lower pole.  Right hydronephrosis and hydroureter is seen with an obstructing calculus in the proximal right ureter which measures 7 x 6 mm on series 2 image 82.    LEFT KIDNEY AND URETER: Nonobstructing left  renal calculi are seen, the largest measuring 7 mm in the lower pole. No hydronephrosis or hydroureter.  Scarring in the upper pole of the left kidney and subcentimeter cyst.  URINARY BLADDER: Unremarkable.      ADDITIONAL FINDINGS:    LOWER CHEST: No clinically significant abnormality in the visualized lower chest.    SOLID VISCERA: Limited low radiation dose noncontrast CT evaluation demonstrates no clinically significant abnormality of the imaged portions of the liver, spleen, pancreas, right adrenal gland.  20 mm left adrenal adenoma is unchanged.    GALLBLADDER/BILIARY TREE: No calcified gallstones. No pericholecystic inflammatory change. No biliary dilation.    STOMACH AND BOWEL: Unremarkable.    APPENDIX: No findings to suggest appendicitis.    ABDOMINOPELVIC CAVITY: No ascites. No pneumoperitoneum. No lymphadenopathy.    VESSELS: Atherosclerosis without abdominal aortic aneurysm.    REPRODUCTIVE ORGANS: Unremarkable for patient's age.    ABDOMINAL WALL/INGUINAL REGIONS: Small fat-containing umbilical hernia is seen. Small fat-containing inguinal hernias.    BONES: No acute fracture or suspicious osseous lesion. Levoscoliosis and spondylosis.   Impression:         1. Right hydronephrosis and hydroureter with a 7 x 6 mm obstructing calculus in the proximal right ureter.  2. Bilateral nonobstructing renal calculi.  3. 2 cm left adrenal adenoma stable since at least 2018. Biochemical evaluation should be considered to assess for a functioning adenoma, if not already performed.      Workstation performed: [id number]     Administrative Statements   I have spent a total time of 30 minutes in caring for this patient on the day of the visit/encounter including Diagnostic results, Instructions for management, Patient and family education, Impressions, Counseling / Coordination of care, Documenting in the medical record, Reviewing/placing orders in the medical record (including tests, medications, and/or  procedures), Obtaining or reviewing history  , and Communicating with other healthcare professionals .       [1]   Past Medical History:  Diagnosis Date    Chronic low back pain     GERD (gastroesophageal reflux disease)     Hyperlipidemia     Kidney stone     Sleep apnea     Verruca 2 yrs   [2]   Past Surgical History:  Procedure Laterality Date    COLONOSCOPY      ESOPHAGOGASTRODUODENOSCOPY      FL RETROGRADE PYELOGRAM  5/6/2024    WI CYSTO/URETERO W/LITHOTRIPSY &INDWELL STENT INSRT Left 5/6/2024    Procedure: CYSTOSCOPY URETEROSCOPY WITH LITHOTRIPSY HOLMIUM LASER, STONE BASKET EXTRACTION, RETROGRADE PYELOGRAM AND INSERTION STENT URETERAL;  Surgeon: Mauro Finch MD;  Location: [facility];  Service: Urology    WI ESOPHAGOGASTRODUODENOSCOPY TRANSORAL DIAGNOSTIC N/A 5/24/2018    Procedure: ESOPHAGOGASTRODUODENOSCOPY (EGD);  Surgeon: Francis Sanders MD;  Location: [facility];  Service: Gastroenterology   [3]   Social History  Tobacco Use    Smoking status: Never     Passive exposure: Past    Smokeless tobacco: Never   Vaping Use    Vaping status: Never Used   Substance and Sexual Activity    Alcohol use: Yes     Alcohol/week: 4.0 standard drinks of alcohol     Types: 2 Glasses of wine, 2 Cans of beer per week     Comment: social    Drug use: No    Sexual activity: Yes     Partners: Female   [4]   Family History  Problem Relation Name Age of Onset    Diabetes Mother Mary     Lung cancer Father Hung     Cancer Father Hung     Cancer Brother Simeon Valdes         Bladder Cancer and a stroke    No Known Problems Daughter      No Known Problems Son      Breast cancer Sister Pebbles Victor    [5]   Social History  Tobacco Use    Smoking status: Never     Passive exposure: Past    Smokeless tobacco: Never   Vaping Use    Vaping status: Never Used   Substance and Sexual Activity    Alcohol use: Yes     Alcohol/week: 4.0 standard drinks of alcohol     Types: 2 Glasses of wine, 2 Cans of beer per week      Comment: social    Drug use: No    Sexual activity: Yes     Partners: Female

## 2025-07-06 NOTE — ASSESSMENT & PLAN NOTE
BP elevated on admission in the 150s/70s, likely in the setting of pain though he does have this diagnosis as an outpatient and does not take any medication  Continue pain management  Outpatient follow-up with PCP

## 2025-07-07 ENCOUNTER — TELEPHONE (OUTPATIENT)
Dept: UROLOGY | Facility: CLINIC | Age: 65
End: 2025-07-07

## 2025-07-07 ENCOUNTER — APPOINTMENT (INPATIENT)
Dept: RADIOLOGY | Facility: HOSPITAL | Age: 65
DRG: 660 | End: 2025-07-07
Payer: COMMERCIAL

## 2025-07-07 ENCOUNTER — ANESTHESIA EVENT (INPATIENT)
Dept: PERIOP | Facility: HOSPITAL | Age: 65
DRG: 660 | End: 2025-07-07
Payer: COMMERCIAL

## 2025-07-07 ENCOUNTER — ANESTHESIA (INPATIENT)
Dept: PERIOP | Facility: HOSPITAL | Age: 65
DRG: 660 | End: 2025-07-07
Payer: COMMERCIAL

## 2025-07-07 PROBLEM — N12 PYELONEPHRITIS: Status: ACTIVE | Noted: 2025-07-07

## 2025-07-07 PROBLEM — K59.00 CONSTIPATION: Status: RESOLVED | Noted: 2025-07-06 | Resolved: 2025-07-07

## 2025-07-07 LAB
ANION GAP SERPL CALCULATED.3IONS-SCNC: 8 MMOL/L (ref 4–13)
BUN SERPL-MCNC: 18 MG/DL (ref 5–25)
CALCIUM SERPL-MCNC: 8.5 MG/DL (ref 8.4–10.2)
CHLORIDE SERPL-SCNC: 102 MMOL/L (ref 96–108)
CO2 SERPL-SCNC: 25 MMOL/L (ref 21–32)
CREAT SERPL-MCNC: 1.43 MG/DL (ref 0.6–1.3)
ERYTHROCYTE [DISTWIDTH] IN BLOOD BY AUTOMATED COUNT: 11.9 % (ref 11.6–15.1)
GFR SERPL CREATININE-BSD FRML MDRD: 51 ML/MIN/1.73SQ M
GLUCOSE SERPL-MCNC: 109 MG/DL (ref 65–140)
HCT VFR BLD AUTO: 40.3 % (ref 36.5–49.3)
HGB BLD-MCNC: 14 G/DL (ref 12–17)
MCH RBC QN AUTO: 32.9 PG (ref 26.8–34.3)
MCHC RBC AUTO-ENTMCNC: 34.7 G/DL (ref 31.4–37.4)
MCV RBC AUTO: 95 FL (ref 82–98)
PLATELET # BLD AUTO: 129 THOUSANDS/UL (ref 149–390)
PMV BLD AUTO: 11.4 FL (ref 8.9–12.7)
POTASSIUM SERPL-SCNC: 3.8 MMOL/L (ref 3.5–5.3)
RBC # BLD AUTO: 4.25 MILLION/UL (ref 3.88–5.62)
SODIUM SERPL-SCNC: 135 MMOL/L (ref 135–147)
WBC # BLD AUTO: 10.93 THOUSAND/UL (ref 4.31–10.16)

## 2025-07-07 PROCEDURE — 0T768DZ DILATION OF RIGHT URETER WITH INTRALUMINAL DEVICE, VIA NATURAL OR ARTIFICIAL OPENING ENDOSCOPIC: ICD-10-PCS | Performed by: UROLOGY

## 2025-07-07 PROCEDURE — C1769 GUIDE WIRE: HCPCS | Performed by: UROLOGY

## 2025-07-07 PROCEDURE — C1747 URETEROSCOPE DIGITAL FLEX SNGL USE STD DEFLECTION APTRA: HCPCS | Performed by: UROLOGY

## 2025-07-07 PROCEDURE — 85027 COMPLETE CBC AUTOMATED: CPT | Performed by: INTERNAL MEDICINE

## 2025-07-07 PROCEDURE — 99232 SBSQ HOSP IP/OBS MODERATE 35: CPT | Performed by: SURGERY

## 2025-07-07 PROCEDURE — 99232 SBSQ HOSP IP/OBS MODERATE 35: CPT | Performed by: UROLOGY

## 2025-07-07 PROCEDURE — 82360 CALCULUS ASSAY QUANT: CPT | Performed by: UROLOGY

## 2025-07-07 PROCEDURE — C1758 CATHETER, URETERAL: HCPCS | Performed by: UROLOGY

## 2025-07-07 PROCEDURE — 99232 SBSQ HOSP IP/OBS MODERATE 35: CPT

## 2025-07-07 PROCEDURE — 52356 CYSTO/URETERO W/LITHOTRIPSY: CPT | Performed by: UROLOGY

## 2025-07-07 PROCEDURE — 74420 UROGRAPHY RTRGR +-KUB: CPT

## 2025-07-07 PROCEDURE — 80048 BASIC METABOLIC PNL TOTAL CA: CPT | Performed by: INTERNAL MEDICINE

## 2025-07-07 PROCEDURE — 0TC68ZZ EXTIRPATION OF MATTER FROM RIGHT URETER, VIA NATURAL OR ARTIFICIAL OPENING ENDOSCOPIC: ICD-10-PCS | Performed by: UROLOGY

## 2025-07-07 PROCEDURE — C2625 STENT, NON-COR, TEM W/DEL SY: HCPCS | Performed by: UROLOGY

## 2025-07-07 DEVICE — IMPLANTABLE DEVICE: Type: IMPLANTABLE DEVICE | Site: URETER | Status: FUNCTIONAL

## 2025-07-07 RX ORDER — DEXAMETHASONE SODIUM PHOSPHATE 10 MG/ML
INJECTION, SOLUTION INTRAMUSCULAR; INTRAVENOUS AS NEEDED
Status: DISCONTINUED | OUTPATIENT
Start: 2025-07-07 | End: 2025-07-07

## 2025-07-07 RX ORDER — OXYBUTYNIN CHLORIDE 5 MG/1
5 TABLET, EXTENDED RELEASE ORAL DAILY PRN
Status: DISCONTINUED | OUTPATIENT
Start: 2025-07-07 | End: 2025-07-12 | Stop reason: HOSPADM

## 2025-07-07 RX ORDER — MIDAZOLAM HYDROCHLORIDE 2 MG/2ML
INJECTION, SOLUTION INTRAMUSCULAR; INTRAVENOUS AS NEEDED
Status: DISCONTINUED | OUTPATIENT
Start: 2025-07-07 | End: 2025-07-07

## 2025-07-07 RX ORDER — FENTANYL CITRATE 50 UG/ML
INJECTION, SOLUTION INTRAMUSCULAR; INTRAVENOUS AS NEEDED
Status: DISCONTINUED | OUTPATIENT
Start: 2025-07-07 | End: 2025-07-07

## 2025-07-07 RX ORDER — TAMSULOSIN HYDROCHLORIDE 0.4 MG/1
0.4 CAPSULE ORAL
Status: DISCONTINUED | OUTPATIENT
Start: 2025-07-08 | End: 2025-07-12 | Stop reason: HOSPADM

## 2025-07-07 RX ORDER — MAGNESIUM HYDROXIDE 1200 MG/15ML
LIQUID ORAL AS NEEDED
Status: DISCONTINUED | OUTPATIENT
Start: 2025-07-07 | End: 2025-07-07 | Stop reason: HOSPADM

## 2025-07-07 RX ORDER — FENTANYL CITRATE/PF 50 MCG/ML
25 SYRINGE (ML) INJECTION
Status: DISCONTINUED | OUTPATIENT
Start: 2025-07-07 | End: 2025-07-07 | Stop reason: HOSPADM

## 2025-07-07 RX ORDER — SODIUM CHLORIDE, SODIUM LACTATE, POTASSIUM CHLORIDE, CALCIUM CHLORIDE 600; 310; 30; 20 MG/100ML; MG/100ML; MG/100ML; MG/100ML
INJECTION, SOLUTION INTRAVENOUS CONTINUOUS PRN
Status: DISCONTINUED | OUTPATIENT
Start: 2025-07-07 | End: 2025-07-07

## 2025-07-07 RX ORDER — LIDOCAINE HYDROCHLORIDE 10 MG/ML
INJECTION, SOLUTION EPIDURAL; INFILTRATION; INTRACAUDAL; PERINEURAL AS NEEDED
Status: DISCONTINUED | OUTPATIENT
Start: 2025-07-07 | End: 2025-07-07

## 2025-07-07 RX ORDER — PROPOFOL 10 MG/ML
INJECTION, EMULSION INTRAVENOUS AS NEEDED
Status: DISCONTINUED | OUTPATIENT
Start: 2025-07-07 | End: 2025-07-07

## 2025-07-07 RX ORDER — EPHEDRINE SULFATE 50 MG/ML
INJECTION INTRAVENOUS AS NEEDED
Status: DISCONTINUED | OUTPATIENT
Start: 2025-07-07 | End: 2025-07-07

## 2025-07-07 RX ORDER — PHENYLEPHRINE HCL IN 0.9% NACL 1 MG/10 ML
SYRINGE (ML) INTRAVENOUS AS NEEDED
Status: DISCONTINUED | OUTPATIENT
Start: 2025-07-07 | End: 2025-07-07

## 2025-07-07 RX ADMIN — Medication 100 MCG: at 18:58

## 2025-07-07 RX ADMIN — DOCUSATE SODIUM 100 MG: 100 CAPSULE, LIQUID FILLED ORAL at 08:08

## 2025-07-07 RX ADMIN — CEFTRIAXONE 1000 MG: 10 INJECTION, POWDER, FOR SOLUTION INTRAVENOUS at 12:01

## 2025-07-07 RX ADMIN — SODIUM CHLORIDE 100 ML/HR: 0.9 INJECTION, SOLUTION INTRAVENOUS at 03:39

## 2025-07-07 RX ADMIN — LIDOCAINE HYDROCHLORIDE 50 MG: 10 INJECTION, SOLUTION EPIDURAL; INFILTRATION; INTRACAUDAL at 18:43

## 2025-07-07 RX ADMIN — SODIUM CHLORIDE, SODIUM LACTATE, POTASSIUM CHLORIDE, AND CALCIUM CHLORIDE: .6; .31; .03; .02 INJECTION, SOLUTION INTRAVENOUS at 18:43

## 2025-07-07 RX ADMIN — HYDROMORPHONE HYDROCHLORIDE 0.5 MG: 1 INJECTION, SOLUTION INTRAMUSCULAR; INTRAVENOUS; SUBCUTANEOUS at 20:59

## 2025-07-07 RX ADMIN — SODIUM CHLORIDE 100 ML/HR: 0.9 INJECTION, SOLUTION INTRAVENOUS at 14:38

## 2025-07-07 RX ADMIN — Medication 100 MCG: at 18:50

## 2025-07-07 RX ADMIN — EPHEDRINE SULFATE 10 MG: 50 INJECTION INTRAVENOUS at 19:01

## 2025-07-07 RX ADMIN — MIDAZOLAM 2 MG: 1 INJECTION INTRAMUSCULAR; INTRAVENOUS at 18:37

## 2025-07-07 RX ADMIN — DEXAMETHASONE SODIUM PHOSPHATE 10 MG: 10 INJECTION INTRAMUSCULAR; INTRAVENOUS at 18:43

## 2025-07-07 RX ADMIN — BISACODYL 10 MG: 10 SUPPOSITORY RECTAL at 08:08

## 2025-07-07 RX ADMIN — METHOCARBAMOL 500 MG: 500 TABLET ORAL at 21:00

## 2025-07-07 RX ADMIN — FENTANYL CITRATE 50 MCG: 50 INJECTION INTRAMUSCULAR; INTRAVENOUS at 18:43

## 2025-07-07 RX ADMIN — PRAVASTATIN SODIUM 80 MG: 80 TABLET ORAL at 16:30

## 2025-07-07 RX ADMIN — HYDROMORPHONE HYDROCHLORIDE 0.5 MG: 1 INJECTION, SOLUTION INTRAMUSCULAR; INTRAVENOUS; SUBCUTANEOUS at 13:22

## 2025-07-07 RX ADMIN — FENTANYL CITRATE 50 MCG: 50 INJECTION INTRAMUSCULAR; INTRAVENOUS at 19:18

## 2025-07-07 RX ADMIN — HEPARIN SODIUM 5000 UNITS: 5000 INJECTION INTRAVENOUS; SUBCUTANEOUS at 06:21

## 2025-07-07 RX ADMIN — PHENYLEPHRINE HYDROCHLORIDE 30 MCG/MIN: 50 INJECTION INTRAVENOUS at 18:59

## 2025-07-07 RX ADMIN — ONDANSETRON 4 MG: 2 INJECTION INTRAMUSCULAR; INTRAVENOUS at 18:43

## 2025-07-07 RX ADMIN — PROPOFOL 150 MG: 10 INJECTION, EMULSION INTRAVENOUS at 18:43

## 2025-07-07 RX ADMIN — METHOCARBAMOL 500 MG: 500 TABLET ORAL at 08:08

## 2025-07-07 RX ADMIN — METHOCARBAMOL 500 MG: 500 TABLET ORAL at 12:00

## 2025-07-07 RX ADMIN — Medication 100 MCG: at 18:56

## 2025-07-07 NOTE — ANESTHESIA POSTPROCEDURE EVALUATION
Post-Op Assessment Note    CV Status:  Stable  Pain Score: 0    Pain management: adequate       Mental Status:  Alert and awake   Hydration Status:  Euvolemic and stable   PONV Controlled:  Controlled   Airway Patency:  Patent and adequate     Post Op Vitals Reviewed: Yes    No anethesia notable event occurred.    Staff: CRNA           Last Filed PACU Vitals:  Vitals Value Taken Time   Temp 99.1 °F (37.3 °C) 07/07/25 19:48   Pulse 90 07/07/25 19:49   /82 07/07/25 19:49   Resp     SpO2 100 % 07/07/25 19:49   Vitals shown include unfiled device data.

## 2025-07-07 NOTE — ANESTHESIA PREPROCEDURE EVALUATION
Procedure:  CYSTOSCOPY URETEROSCOPY WITH LITHOTRIPSY HOLMIUM LASER, RETROGRADE PYELOGRAM AND INSERTION STENT URETERAL (Right: Bladder)    Relevant Problems   CARDIO   (+) Central retinal artery occlusion, right eye   (+) Chest pain   (+) Hyperlipidemia      GI/HEPATIC   (+) Chronic GERD   (+) Ileus (HCC)      /RENAL   (+) Obstructive kidney stone   (+) Pyelonephritis      MUSCULOSKELETAL   (+) Acute left-sided low back pain with left-sided sciatica      NEURO/PSYCH   (+) Anxiety   (+) Generalized anxiety disorder      PULMONARY   (+) Obstructive sleep apnea      Other   (+) Hyperuricosuria        Physical Exam    Airway     Mallampati score: II  TM Distance: >3 FB  Neck ROM: full  Mouth opening: >= 4 cm      Cardiovascular  Cardiovascular exam normal    Dental   No notable dental hx implants    Pulmonary  Pulmonary exam normal     Neurological  - normal exam  He appears oriented x3.      Other Findings        Anesthesia Plan  ASA Score- 3     Anesthesia Type- general with ASA Monitors.         Additional Monitors:     Airway Plan: Oral ETT.           Plan Factors-Exercise tolerance (METS): >4 METS.    Chart reviewed. EKG reviewed.  Existing labs reviewed. Patient summary reviewed.    Patient is not a current smoker. Patient not instructed to abstain from smoking on day of procedure. Patient did not smoke on day of surgery.            Induction-     Postoperative Plan- .   Monitoring Plan - Monitoring plan - standard ASA monitoring      Perioperative Resuscitation Plan - Level 1 - Full Code.       Informed Consent- Anesthetic plan and risks discussed with patient and spouse.  I personally reviewed this patient with the CRNA. Discussed and agreed on the Anesthesia Plan with the CRNA..      NPO Status:  No vitals data found for the desired time range.        Lab Results   Component Value Date    HGBA1C 5.8 (H) 04/05/2025       Lab Results   Component Value Date    K 3.8 07/07/2025     07/07/2025    CO2 25  07/07/2025    BUN 18 07/07/2025    CREATININE 1.43 (H) 07/07/2025    GLUF 94 08/04/2023    CALCIUM 8.5 07/07/2025    AST 17 07/06/2025    ALT 16 07/06/2025    ALKPHOS 40 07/06/2025    EGFR 51 07/07/2025       Lab Results   Component Value Date    WBC 10.93 (H) 07/07/2025    HGB 14.0 07/07/2025    HCT 40.3 07/07/2025    MCV 95 07/07/2025     (L) 07/07/2025     Sinus rhythm  Nonspecific ST and T wave abnormality     Confirmed by Simón Key ([id number]) on 12/28/2023 9:51:32 AM     Specimen Collected: 12/26/23 13:55     Echo 2023     Left Ventricle: Left ventricular cavity size is normal. Wall thickness is mildly increased. The left ventricular ejection fraction is 70%. Systolic function is hyperdynamic. Wall motion is normal. Diastolic function is normal.    Right Ventricle: Right ventricular cavity size is normal. Systolic function is normal.    Aortic Valve: There is mild regurgitation. There is aortic valve sclerosis.    Pulmonic Valve: There is mild regurgitation.

## 2025-07-07 NOTE — ASSESSMENT & PLAN NOTE
Follows with urology in the outpatient setting for history of nephrolithiasis  Last ultrasound from June 12 with bilateral renal nonobstructing calculi  Presenting with new onset right flank pain, fevers, nausea  CMP with elevated creatinine, monitor with BMP  UA with occasional bacteria  Trend blood cultures  CT with severe right sided hydronephrosis due to obstructing calculus, concern for superimposed infection  Continue empiric ceftriaxone, IVF  Urology to take for stent placement today  N.p.o.  Hold Plavix and daily aspirin  Continue pain control

## 2025-07-07 NOTE — OP NOTE
OPERATIVE REPORT  PATIENT NAME: Betito Landers    :  1960  MRN: [id number]  Pt Location: [facility] 03    SURGERY DATE: 2025    Surgeons and Role:     * Sandro Frias DO - Primary    Preop Diagnosis:  Ureteral calculus, right [N20.1]    Post-Op Diagnosis Codes:     * Ureteral calculus, right [N20.1]    Procedure(s):      Cystourethroscopy  Right retrograde pyelogram with live fluoroscopic interpretation  Right ureteroscopy  Laser lithotripsy  Stone basket extraction  Right ureteral stent placement        -single use ureteroscope utilized ()            Specimen(s):  ID Type Source Tests Collected by Time Destination   A : RIGHT URETERAL STONE Calculus Ureter, Right STONE ANALYSIS Sandro Frias DO 2025 1830        Estimated Blood Loss:   Minimal    Drains:  Ureteral Internal Stent Left ureter 6 Fr. (Active)   Number of days: 427       Anesthesia Type:   General    Operative Indications:  Ureteral calculus, right [N20.1]      65-year-old male with urolithiasis     Initially presented to the ED on 2025 with abdominal pain.  Imaging at that time demonstrated right sided obstructing ureteral stone.  Patient was discharged on medical expulsive therapy.     Patient presented again to the ED with continued abdominal pain on 2025     CT abdomen pelvis with contrast 2025: Severe right hydronephrosis and hydroureter due to 7 mm right proximal ureteral stone     UA micro 2025: 4-10 RBC, 1-2 WBC, no bacteria     Labs 2025: White count 10.93, hemoglobin 14.0, creatinine 1.43     Patient afebrile, vital signs stable since admission        Patient consented for cystoscopy, right retrograde pyelogram, right ureteroscopy, laser lithotripsy, stone basket extraction, right ureteral stent placement.            Operative Findings:            No meatal stenosis  No urethral strictures  Prostate mildly enlarged  Somewhat high riding bladder neck  Bilateral ureteral orifices in  orthotopic positions  No bladder lesions  No stones in bladder  Mild trabeculations  No diverticuli  Right retrograde pyelogram: Mild to moderate hydronephrosis without filling defects in the kidney, positive filling defect in the proximal to mid ureter representing the obstructing stone  Right pyeloscopy: 3 mm stone in the lower pole was dusted using 272 µm laser fiber.  Approximately 8 mm stone in the proximal to mid ureter was very impacted.  Stone was fragmented using 272 µm laser fiber.  Fragments were removed using [employer] basket.  Back out ureteroscopy: No stone fragments, positive urothelial defect at the site of previous stone impaction at the mid to proximal ureter.  No other ureteral defects noted.  Successfully placed 6 x 26 double-J ureteral stent with no string attached              Complications:   None    Procedure and Technique:           Patient identified in the preop holding area.  Consent was obtained.  Risks and benefits of the procedure were explained to the patient.  Patient was in agreement.  Patient was brought back to the OR and placed upon the table.  Bilateral lower extremity SCDs were placed and turned on.  Patient received IV ceftriaxone earlier in the day for antibiotics.  Patient underwent smooth induction of anesthesia.  Bilateral lower extremities were placed in stirrups.  Patient was in dorsal lithotomy position.  Area was prepped and draped in sterile fashion.  Timeout was performed by the OR team.     Case began with insertion of 21 Cymro rigid cystoscope.  Pan cystourethroscopy was performed.  See the above findings for details.     Attention was turned toward the right ureteral orifice.      5 Cymro open-ended catheter was advanced through the bridge of the scope toward the ureteral orifice.  Guidewire was advanced through the 5 Cymro open-ended catheter, into the ureter, and coiled in renal pelvis under fluoroscopic guidance.          Dual-lumen catheter was advanced over  the wire and into the ureter under fluoroscopic guidance.  Retrograde pyelogram was performed at this time.  See the above findings for details.  Second wire was placed through the dual-lumen catheter and coiled into the renal pelvis under fluoroscopic guidance.  Dual-lumen catheter was removed over the 2 wires in a push pull fashion.    It was decided that an access sheath would be utilized.  11-13 Fr 45 cm access sheath was advanced over one of the wires under fluoroscopic guidance toward the proximal ureter.  Wire and inner sheath were removed. Flexible single use ureteroscope was assembled.  Scope was advanced into the renal pelvis.  Pyeloscopy was performed.  See the above findings for details regarding stone works.        Backout flexible ureteroscopy was then performed.  See above findings for details.    The ureteroscope was then removed from the bladder and urethra.     Cystoscope was reentered into the bladder over the wire toward the right ureteral orifice.  6 x 26 double-J ureteral stent was advanced over the wire toward the renal pelvis under fluoroscopic guidance.  Wire was removed and stent was deployed.  Good proximal curl was seen on fluoroscopy.  Good distal curl was seen on direct cystoscopy.      There was no string left on the stent.      Bladder was emptied and scope was removed.     Patient was uneventfully awoken from anesthesia and extubated.  Patient was brought to PACU in good condition.                  A qualified resident physician was not available.    Patient Disposition:  PACU          SIGNATURE: Sandro Alexis Altagracia,   DATE: July 7, 2025  TIME: 7:46 PM      Plan  - Okay for diet from urology perspective  - Has right sided 6 x 26 double-J ureteral stent in place.  No string.  Patient will need stent in place for 3 to 4 weeks given the significant impaction of the stone.  - Stent colic medications Flomax 0.4 mg daily, oxybutynin 5 mg and senna release daily as needed for bladder  spasms or stent discomfort.  Patient should be discharged on his medications as well.  - Patient should have a postvoid residual performed at some point overnight to ensure that he is not in retention  - Patient should get another dose of his ceftriaxone tomorrow prior to discharge  - As long as patient is afebrile, vital signs stable, patient can be discharged on 7/8/2025 from urology perspective

## 2025-07-07 NOTE — PROGRESS NOTES
Progress Note - Urology   Name: Betito Landers 65 y.o. male I MRN: [id number]  Unit/Bed#: [facility] I Date of Admission: 7/6/2025   Date of Service: 7/7/2025 I Hospital Day: 1     Assessment & Plan  Obstructive kidney stone  7 x 6 mm proximal right ureteral calculus.  Second hospital visit.  Serum creatinine bumped from 1.11 several days ago to 1.50, today 1.43  UA appears negative.   Mild leukocytosis 10    Plan:  Discussed with patient cystoscopy, R ureteroscopy, holmium laser lithotripsy, basket stone extraction, retrograde pyelogram, insertion of R ureteral stent.  Discussed risk associated with procedure including risk with anesthesia, bleeding, infection, damage to kidneys, ureter, bladder, inability to treat stone, ureteral stricture, need for delayed ureteroscopy for any signs of infection.  Discussed stent colic including frequency, urgency, hematuria, flank pain.  Reports fever at home 100.3F- possible stent only depending on urine appearance intra-op      Ileus (HCC)  General surgery following  Miralax and dulcolax suppositories in addition to colace  Cleared for OR intervention          Subjective:   HPI: seen at bedside. Reports chills, feeling feverish, abdominal pain. Feeling unwell past 24 hours. Ready for surgical intervention. Reports low grade fever 100.3 F prior to hospital. Discussed surgical intervention, surgical consent signed and placed in preop.      Review of Systems   Constitutional:  Positive for chills. Negative for fever.   Respiratory:  Negative for cough and shortness of breath.    Cardiovascular:  Negative for chest pain and palpitations.   Gastrointestinal:  Negative for vomiting.   Genitourinary:  Positive for flank pain. Negative for dysuria and hematuria.   Musculoskeletal:  Negative for arthralgias and back pain.   Skin:  Negative for color change and rash.   All other systems reviewed and are negative.      Objective:    Vitals: Blood pressure 129/72, pulse 84, temperature  "99.9 °F (37.7 °C), temperature source Oral, resp. rate 18, height 5' 4\" (1.626 m), weight 76.6 kg (168 lb 14 oz), SpO2 94%.,Body mass index is 28.99 kg/m².    Physical Exam  Constitutional:       General: He is not in acute distress.     Appearance: He is normal weight. He is not ill-appearing or toxic-appearing.      Comments: Grimacing in pain   HENT:      Head: Normocephalic and atraumatic.      Right Ear: External ear normal.      Left Ear: External ear normal.      Nose: Nose normal.      Mouth/Throat:      Mouth: Mucous membranes are moist.     Eyes:      General: No scleral icterus.     Conjunctiva/sclera: Conjunctivae normal.       Cardiovascular:      Rate and Rhythm: Normal rate.      Pulses: Normal pulses.   Pulmonary:      Effort: Pulmonary effort is normal.   Abdominal:      General: There is no distension.      Tenderness: There is abdominal tenderness. There is no guarding.     Neurological:      General: No focal deficit present.      Mental Status: He is alert and oriented to person, place, and time. Mental status is at baseline.     Psychiatric:         Mood and Affect: Mood normal.         Behavior: Behavior normal.         Thought Content: Thought content normal.         Judgment: Judgment normal.           Labs:  Recent Labs     07/06/25  1105 07/07/25  0339   WBC 10.61* 10.93*       Recent Labs     07/06/25  1105 07/07/25  0339   HGB 15.2 14.0     Recent Labs     07/06/25  1105 07/07/25  0339   HCT 43.5 40.3     Recent Labs     07/06/25  1105 07/07/25  0339   CREATININE 1.50* 1.43*         History:  Past Medical History[1]  Social History[2]  Past Surgical History[3]  Family History[4]    Valorie Delgadillo PA-C  Date: 7/7/2025 Time: 1:25 PM          [1]   Past Medical History:  Diagnosis Date    Chronic low back pain     GERD (gastroesophageal reflux disease)     Hyperlipidemia     Kidney stone     Sleep apnea     Verruca 2 yrs   [2]   Social History  Socioeconomic History    Marital status: " /Civil Union   Tobacco Use    Smoking status: Never     Passive exposure: Past    Smokeless tobacco: Never   Vaping Use    Vaping status: Never Used   Substance and Sexual Activity    Alcohol use: Yes     Alcohol/week: 4.0 standard drinks of alcohol     Types: 2 Glasses of wine, 2 Cans of beer per week     Comment: social    Drug use: No    Sexual activity: Yes     Partners: Female     Social Drivers of Health     Financial Resource Strain: Not At Risk (10/7/2023)    Received from [facility]    Financial Resource Strain     In the last 12 months did you skip medications to save money?: No     In the last 12 months, was there a time when you needed to see a doctor but could not because of cost?: No   Food Insecurity: No Food Insecurity (7/6/2025)    Nursing - Inadequate Food Risk Classification     Worried About Running Out of Food in the Last Year: Patient declined     Ran Out of Food in the Last Year: Never true   Transportation Needs: No Transportation Needs (7/6/2025)    Nursing - Transportation Risk Classification     Lack of Transportation: No   Social Connections: Not At Risk (10/7/2023)    Received from [facility]    Social Connections     Do you often feel lonely?: No   Intimate Partner Violence: Unknown (7/6/2025)    Nursing IPS     Physically Hurt by Someone: No     Hurt or Threatened by Someone: No   Housing Stability: Unknown (7/6/2025)    Nursing: Inadequate Housing Risk Classification     Unable to Pay for Housing in the Last Year: No     Has Housing: No   [3]   Past Surgical History:  Procedure Laterality Date    COLONOSCOPY      ESOPHAGOGASTRODUODENOSCOPY      FL RETROGRADE PYELOGRAM  5/6/2024    MD CYSTO/URETERO W/LITHOTRIPSY &INDWELL STENT INSRT Left 5/6/2024    Procedure: CYSTOSCOPY URETEROSCOPY WITH LITHOTRIPSY HOLMIUM LASER, STONE BASKET EXTRACTION, RETROGRADE PYELOGRAM AND INSERTION STENT URETERAL;  Surgeon: Mauro Finch MD;   Location: [facility];  Service: Urology    [place] ESOPHAGOGASTRODUODENOSCOPY TRANSORAL DIAGNOSTIC N/A 5/24/2018    Procedure: ESOPHAGOGASTRODUODENOSCOPY (EGD);  Surgeon: Francis Sanders MD;  Location: [facility];  Service: Gastroenterology   [4]   Family History  Problem Relation Name Age of Onset    Diabetes Mother Mary     Lung cancer Father Hung     Cancer Father Hung     Cancer Brother Simeon Valdes         Bladder Cancer and a stroke    No Known Problems Daughter      No Known Problems Son      Breast cancer Sister Pebbles Victor

## 2025-07-07 NOTE — ASSESSMENT & PLAN NOTE
7 x 6 mm proximal right ureteral calculus.  Second hospital visit.  Serum creatinine bumped from 1.11 several days ago to 1.50, today 1.43  UA appears negative.   Mild leukocytosis 10    Plan:  Discussed with patient cystoscopy, R ureteroscopy, holmium laser lithotripsy, basket stone extraction, retrograde pyelogram, insertion of R ureteral stent.  Discussed risk associated with procedure including risk with anesthesia, bleeding, infection, damage to kidneys, ureter, bladder, inability to treat stone, ureteral stricture, need for delayed ureteroscopy for any signs of infection.  Discussed stent colic including frequency, urgency, hematuria, flank pain.  Reports fever at home 100.3F- possible stent only depending on urine appearance intra-op

## 2025-07-07 NOTE — TELEPHONE ENCOUNTER
Patient status post on-call right ureteroscopy at [place] on 7/7/2025    Right sided 6 x 26 double-J ureteral stent in place.  No string.    Patient needs office cystoscopy, stent removal in 3 to 4 weeks

## 2025-07-07 NOTE — UTILIZATION REVIEW
Initial Clinical Review    Admission: Date/Time/Statement:   Admission Orders (From admission, onward)       Ordered        07/06/25 1431  INPATIENT ADMISSION  Once                          Orders Placed This Encounter   Procedures    INPATIENT ADMISSION     Standing Status:   Standing     Number of Occurrences:   1     Level of Care:   Med Surg [16]     Estimated length of stay:   More than 2 Midnights     Certification:   I certify that inpatient services are medically necessary for this patient for a duration of greater than two midnights. See H&P and MD Progress Notes for additional information about the patient's course of treatment.     ED Arrival Information       Expected   -    Arrival   7/6/2025 10:27    Acuity   Urgent              Means of arrival   Walk-In    Escorted by   Self    Service   Hospitalist    Admission type   Emergency              Arrival complaint   lower abd pain             Chief Complaint   Patient presents with    Abdominal Pain     Abd and flank pain, dx with kidney stones on Wednesday. Pain worsened. Fever per family. Tylenol given PTA       Initial Presentation: 65 y.o. male with a PMH of elevated blood pressure reading, hyperglycemia, nephrolithiasis who presents with recurrent right flank pain with associated fevers, abdominal pain. He was recently seen at the [facility] for similar complaints however was discharged. Returning now for worsening fevers. CT scan shows newly obstructing right renal calculus with hydronephrosis and concern for superimposed infection. Patient also notes he has had constipation, abdominal bloating, and anorexia. States he is not passing gas and belching intermittently but not as much as he feels he should. States he is only been taking Tylenol for the flank pain. At the time of my exam his abdominal/flank pain is a 4/10 and radiates down his groin/buttock. He also notes that his urine has been less frequent/more difficult. Plan: Inpatient  admission for evaluation and treatment of obstructive kidney stone, elevated blood pressure reading, constipation with concern for ileus: IV ceftriaxone, Urology consult, NPO at midnight, hold Plavix and ASA, pain control, General Surgery consult, bowel regimen, bowel rest, IV fluids.     Urology consult: Continue IV fluids. Strain urine, Flomax. Antiemetics. N.p.o. after midnight for cystoscopy, retrograde pyelography, ureteroscopy, laser lithotripsy and right ureteral stent placement.     Surgery consult: Will administer MiraLAX as well as Dulcolax suppository today. Colonic ileus likely multifactorial secondary to sepsis, hydronephrosis, and narcotic pain medicine. Okay to proceed with urologic procedure.    Anticipated Length of Stay/Certification Statement: Patient will be admitted on an inpatient basis with an anticipated length of stay of greater than 2 midnights secondary to hydronephrosis, concern for ileus, pyelonephritis.    Date: 7/7   Day 2:     Surgery: No acute surgical intervention. Continue bowel regimen with MiraLAX and Dulcolax suppositories in addition to Colace. Recommend out of bed and ambulation multiple times a day. Continue IV fluids.     Internal medicine: continue IV ceftriaxone and IV fluids. Urology to take for stent placement today. NPO. Continue holding Plavix and ASA. Pain control. Start bowel regimen of Miralax, dulcolax suppositories and Colace.     Urology: Discussed with patient cystoscopy, R ureteroscopy, holmium laser lithotripsy, basket stone extraction, retrograde pyelogram, insertion of R ureteral stent. Reports fever at home 100.3F- possible stent only depending on urine appearance intra-op.     Procedure:  Cystourethroscopy  Right retrograde pyelogram with live fluoroscopic interpretation  Right ureteroscopy  Laser lithotripsy  Stone basket extraction  Right ureteral stent placement    ED Treatment-Medication Administration from 07/06/2025 1027 to 07/06/2025 1605          Date/Time Order Dose Route Action     07/06/2025 1156 sodium chloride 0.9 % bolus 1,000 mL 1,000 mL Intravenous New Bag     07/06/2025 1156 HYDROmorphone (DILAUDID) injection 0.5 mg 0.5 mg Intravenous Given     07/06/2025 1152 ondansetron (ZOFRAN) injection 4 mg 4 mg Intravenous Given     07/06/2025 1210 iohexol (OMNIPAQUE) 350 MG/ML injection (MULTI-DOSE) 85 mL 85 mL Intravenous Given     07/06/2025 1308 HYDROmorphone (DILAUDID) injection 0.5 mg 0.5 mg Intravenous Given     07/06/2025 1327 ceftriaxone (ROCEPHIN) 2 g/50 mL in dextrose IVPB 2,000 mg Intravenous New Bag            Scheduled Medications:  [Held by provider] aspirin, 81 mg, Oral, Daily  bisacodyl, 10 mg, Rectal, Daily  cefTRIAXone, 1,000 mg, Intravenous, Q24H  [Held by provider] clopidogrel, 75 mg, Oral, Daily  docusate sodium, 100 mg, Oral, BID  [Held by provider] heparin (porcine), 5,000 Units, Subcutaneous, Q8H TORY  methocarbamol, 500 mg, Oral, 4x Daily  polyethylene glycol, 17 g, Oral, Daily  pravastatin, 80 mg, Oral, Daily With Dinner      Continuous IV Infusions:  sodium chloride, 100 mL/hr, Intravenous, Continuous      PRN Meds:  HYDROmorphone, 0.5 mg, Intravenous, Q4H PRN  ondansetron, 4 mg, Intravenous, Q4H PRN  oxyCODONE, 5 mg, Oral, Q6H PRN      ED Triage Vitals   Temperature Pulse Respirations Blood Pressure SpO2 Pain Score   07/06/25 1037 07/06/25 1037 07/06/25 1037 07/06/25 1038 07/06/25 1037 07/06/25 1156   98.3 °F (36.8 °C) 83 22 152/72 100 % 8     Weight (last 2 days)       Date/Time Weight    07/06/25 1616 76.6 (168.87)            Vital Signs (last 3 days)       Date/Time Temp Pulse Resp BP MAP (mmHg) SpO2 O2 Device Patient Position - Orthostatic VS Piedad Coma Scale Score Pain    07/07/25 0808 -- -- -- -- -- 94 % None (Room air) -- 15 No Pain    07/07/25 0754 99.9 °F (37.7 °C) 84 18 129/72 -- 94 % None (Room air) Lying -- --    07/07/25 0330 -- -- -- -- -- -- None (Room air) -- 15 No Pain    07/06/25 2246 98.8 °F (37.1 °C) 85 18  140/70 98 94 % None (Room air) Lying -- --    07/06/25 2232 -- -- -- -- -- -- -- -- -- 8 07/06/25 2033 99 °F (37.2 °C) 81 18 128/68 -- 97 % None (Room air) Lying -- --    07/06/25 1843 -- -- -- -- -- -- -- -- -- 6    07/06/25 1700 -- -- -- -- -- -- -- -- 15 4    07/06/25 1616 98.9 °F (37.2 °C) 79 17 157/77 110 94 % None (Room air) Lying -- --    07/06/25 1511 -- 75 18 168/80 115 95 % None (Room air) Lying -- --    07/06/25 1507 -- -- -- -- -- -- -- -- 15 --    07/06/25 1505 -- -- -- -- -- -- -- -- -- 4    07/06/25 1400 -- 83 18 153/77 108 96 % -- -- -- --    07/06/25 1343 -- 83 18 137/68 97 95 % -- -- -- --    07/06/25 1328 -- -- -- -- -- -- -- -- -- 5 07/06/25 1308 -- -- -- -- -- -- -- -- -- 9    07/06/25 1226 -- -- -- -- -- -- -- -- -- 8 07/06/25 1220 98.6 °F (37 °C) -- -- -- -- -- -- -- -- --    07/06/25 1200 -- 76 20 153/83 112 99 % None (Room air) -- -- --    07/06/25 1156 -- -- -- -- -- -- -- -- -- 8 07/06/25 1038 -- -- -- 152/72 -- -- -- -- -- --    07/06/25 1037 98.3 °F (36.8 °C) 83 22 -- -- 100 % None (Room air) Sitting -- --              Pertinent Labs/Diagnostic Test Results:   Radiology:  CT abdomen pelvis with contrast   Final Interpretation by Deepali Patton MD (07/06 1316)         1. Severe right hydronephrosis and hydroureter with minimal inferior migration of the obstructing 7 x 6 mm ureteral calculus.   Hyperemia of the right renal pelvis and ureter which could be secondary to the presence of obstruction or reflect superimposed infection.   2. Bilateral nonobstructing renal calculi.   3. Colonic ileus.   4. Other incidental findings are stable.            Workstation performed: [id number]           Cardiology:  No orders to display     GI:  No orders to display           Results from last 7 days   Lab Units 07/07/25  0339 07/06/25  1819 07/06/25  1105 07/02/25  1422   WBC Thousand/uL 10.93*  --  10.61* 11.88*   HEMOGLOBIN g/dL 14.0  --  15.2 14.7   HEMATOCRIT % 40.3  --  43.5  42.8   PLATELETS Thousands/uL 129* 118* 123* 136*   TOTAL NEUT ABS Thousands/µL  --   --  8.43* 10.18*         Results from last 7 days   Lab Units 07/07/25  0339 07/06/25  1105 07/02/25  1422   SODIUM mmol/L 135 136 139   POTASSIUM mmol/L 3.8 3.8 4.3   CHLORIDE mmol/L 102 99 104   CO2 mmol/L 25 26 26   ANION GAP mmol/L 8 11 9   BUN mg/dL 18 19 15   CREATININE mg/dL 1.43* 1.50* 1.11   EGFR ml/min/1.73sq m 51 48 70   CALCIUM mg/dL 8.5 9.3 9.3     Results from last 7 days   Lab Units 07/06/25  1105 07/02/25  1422   AST U/L 17 24   ALT U/L 16 26   ALK PHOS U/L 40 46   TOTAL PROTEIN g/dL 7.5 7.1   ALBUMIN g/dL 4.2 4.6   TOTAL BILIRUBIN mg/dL 0.94 0.64         Results from last 7 days   Lab Units 07/07/25  0339 07/06/25  1105 07/02/25  1422   GLUCOSE RANDOM mg/dL 109 101 129           Results from last 7 days   Lab Units 07/06/25  1819   LACTIC ACID mmol/L 0.7           Results from last 7 days   Lab Units 07/06/25  1105 07/02/25  1422   LIPASE u/L 34 66           Results from last 7 days   Lab Units 07/06/25  1157 07/02/25  1734   CLARITY UA  Clear Clear   COLOR UA  Yellow Light Yellow   SPEC GRAV UA  1.031* 1.015   PH UA  6.0 7.5   GLUCOSE UA mg/dl Negative Negative   KETONES UA mg/dl 60 (2+)* Negative   BLOOD UA  Moderate* Moderate*   PROTEIN UA mg/dl 50 (1+)* Negative   NITRITE UA  Negative Negative   BILIRUBIN UA  Negative Negative   UROBILINOGEN UA (BE) mg/dl <2.0 <2.0   LEUKOCYTES UA  Negative Negative   WBC UA /hpf 1-2 1-2   RBC UA /hpf 4-10* 4-10*   BACTERIA UA /hpf None Seen None Seen   EPITHELIAL CELLS WET PREP /hpf Occasional None Seen   MUCUS THREADS  Occasional* Occasional*           Results from last 7 days   Lab Units 07/06/25  1326 07/06/25  1317   BLOOD CULTURE  Received in Microbiology Lab. Culture in Progress. Received in Microbiology Lab. Culture in Progress.         Past Medical History[1]  Present on Admission:   Obstructive kidney stone   Hyperglycemia   Elevated blood pressure  reading      Admitting Diagnosis: Ileus (HCC) [K56.7]  Abdominal pain [R10.9]  Ureteral calculus, right [N20.1]  WILFRED (acute kidney injury) (HCC) [N17.9]  Age/Sex: 65 y.o. male    Network Utilization Review Department  ATTENTION: Please call with any questions or concerns to [phone] and carefully listen to the prompts so that you are directed to the right person. All voicemails are confidential.   For Discharge needs, contact Care Management DC Support Team at [phone] opt. 2  Send all requests for admission clinical reviews, approved or denied determinations and any other requests to dedicated fax number below belonging to the campus where the patient is receiving treatment. List of dedicated fax numbers for the Facilities:  FACILITY NAME UR FAX NUMBER   ADMISSION DENIALS (Administrative/Medical Necessity) [phone]   DISCHARGE SUPPORT TEAM (NETWORK) [phone]   PARENT CHILD HEALTH (Maternity/NICU/Pediatrics) [phone]   [facility] [phone]   [facility] [phone]   [facility] [phone]   [facility] [phone]   [facility] [phone]   [facility] [phone]   [facility] [phone]   [facility] [phone]   [facility] [phone]   [facility] [phone]   [facility] [phone]   [facility] [phone]              [1]   Past Medical History:  Diagnosis Date    Chronic low back pain     GERD (gastroesophageal reflux disease)     Hyperlipidemia     Kidney stone     Sleep apnea     Verruca 2 yrs

## 2025-07-07 NOTE — ASSESSMENT & PLAN NOTE
Patient is 65-year-old male with past medical history of hypertension, hyperglycemia, nephrolithiasis who presents with recurrent right flank pain with associated fevers.  CT revealing hydronephrosis with obstructing calculus as well as concern for colonic ileus with colonic distention.  Patient is hemodynamically stable.    -No acute surgical intervention  -Continue bowel regimen with MiraLAX and Dulcolax suppositories in addition to Colace  -If further concerns for constipation or colonic dysmotility, recommend GI consultation  - NG unlikely to be helpful given normal caliber of stomach and small bowel on CT.   - Recommend out of bed and ambulation multiple times a day  - IV fluid resuscitation  - Rest of care per primary team  - Please reach out with further questions or concerns

## 2025-07-07 NOTE — PLAN OF CARE
Problem: PAIN - ADULT  Goal: Verbalizes/displays adequate comfort level or baseline comfort level  Description: Interventions:  - Encourage patient to monitor pain and request assistance  - Assess pain using appropriate pain scale  - Administer analgesics as ordered based on type and severity of pain and evaluate response  - Implement non-pharmacological measures as appropriate and evaluate response  - Consider cultural and social influences on pain and pain management  - Notify physician/advanced practitioner if interventions unsuccessful or patient reports new pain  - Educate patient/family on pain management process including their role and importance of  reporting pain   - Provide non-pharmacologic/complimentary pain relief interventions  Outcome: Progressing     Problem: SAFETY ADULT  Goal: Patient will remain free of falls  Description: INTERVENTIONS:  - Educate patient/family on patient safety including physical limitations  - Instruct patient to call for assistance with activity   - Consider consulting OT/PT to assist with strengthening/mobility based on AM PAC & JH-HLM score  - Consult OT/PT to assist with strengthening/mobility   - Keep Call bell within reach  - Keep bed low and locked with side rails adjusted as appropriate  - Keep care items and personal belongings within reach  - Initiate and maintain comfort rounds  - Consider moving patient to room near nurses station  Outcome: Progressing  Goal: Maintain or return to baseline ADL function  Description: INTERVENTIONS:  -  Assess patient's ability to carry out ADLs; assess patient's baseline for ADL function and identify physical deficits which impact ability to perform ADLs (bathing, care of mouth/teeth, toileting, grooming, dressing, etc.)  - Assess/evaluate cause of self-care deficits   - Assess range of motion  - Assess patient's mobility; develop plan if impaired  - Assess patient's need for assistive devices and provide as appropriate  -  Encourage maximum independence but intervene and supervise when necessary  - Involve family in performance of ADLs  - Assess for home care needs following discharge   - Consider OT consult to assist with ADL evaluation and planning for discharge  - Provide patient education as appropriate  - Monitor functional capacity and physical performance, use of AM PAC & JH-HLM   - Monitor gait, balance and fatigue with ambulation    Outcome: Progressing  Goal: Maintains/Returns to pre admission functional level  Description: INTERVENTIONS:  - Perform AM-PAC 6 Click Basic Mobility/ Daily Activity assessment daily.  - Set and communicate daily mobility goal to care team and patient/family/caregiver.   - Collaborate with rehabilitation services on mobility goals if consulted  - Perform Range of Motion 4 times a day.  - Reposition patient every 2 hours.  - Dangle patient 3 times a day  - Stand patient 3 times a day  - Ambulate patient 3 times a day  - Out of bed to chair 3 times a day   - Out of bed for meals 3 times a day  - Out of bed for toileting  - Record patient progress and toleration of activity level   Outcome: Progressing

## 2025-07-07 NOTE — PROGRESS NOTES
Progress Note - Surgery-General   Name: Betito Landers 65 y.o. male I MRN: [id number]  Unit/Bed#: -01 I Date of Admission: 7/6/2025   Date of Service: 7/7/2025 I Hospital Day: 1    Assessment & Plan  Constipation concern for ileus  Patient is 65-year-old male with past medical history of hypertension, hyperglycemia, nephrolithiasis who presents with recurrent right flank pain with associated fevers.  CT revealing hydronephrosis with obstructing calculus as well as concern for colonic ileus with colonic distention.  Patient is hemodynamically stable.    -No acute surgical intervention  -Continue bowel regimen with MiraLAX and Dulcolax suppositories in addition to Colace  -If further concerns for constipation or colonic dysmotility, recommend GI consultation  - NG unlikely to be helpful given normal caliber of stomach and small bowel on CT.   - Recommend out of bed and ambulation multiple times a day  - IV fluid resuscitation  - Rest of care per primary team  - Please reach out with further questions or concerns    Obstructive kidney stone  Patient planned for urologic procedure tomorrow.    - Ok to proceed with urology procedure from general surgery standpoint      Please contact the SecureChat role for the Surgery-General service with any questions/concerns.    Subjective   No acute events overnight. Pain is well controlled. Denies N/V, fevers, chills, CP, SOB. Passing flatus but no BM      Objective :  Temp:  [98.3 °F (36.8 °C)-99 °F (37.2 °C)] 98.8 °F (37.1 °C)  HR:  [75-85] 85  BP: (128-168)/(68-83) 140/70  Resp:  [17-22] 18  SpO2:  [94 %-100 %] 94 %  O2 Device: None (Room air)    I/O         07/05 0701  07/06 0700 07/06 0701  07/07 0700    P.O.  100    I.V. (mL/kg)  900 (11.7)    IV Piggyback  1050    Total Intake(mL/kg)  2050 (26.8)    Urine (mL/kg/hr)  150    Total Output  150    Net  +1900                  Physical Exam  Constitutional:       Appearance: Normal appearance.   HENT:      Head:  Normocephalic and atraumatic.      Right Ear: External ear normal.      Left Ear: External ear normal.      Nose: Nose normal.      Eyes:      Conjunctiva/sclera: Conjunctivae normal.         Cardiovascular:      Rate and Rhythm: Normal rate.      Comments: Appears well perfused  Pulmonary:      Effort: Pulmonary effort is normal. No respiratory distress.   Abdominal:      General: Abdomen is flat. There is distension.      Palpations: Abdomen is soft.      Tenderness: There is abdominal tenderness (Mild right sided abdominal tenderness). There is right CVA tenderness. There is no guarding or rebound.      Skin:     General: Skin is warm and dry.      Neurological:      General: No focal deficit present.      Mental Status: He is alert and oriented to person, place, and time.      Psychiatric:         Mood and Affect: Mood normal.         Behavior: Behavior normal.       Lab Results: I have reviewed the following results:  Recent Labs     07/06/25  1105 07/06/25  1819 07/07/25  0339   WBC 10.61*  --  10.93*   HGB 15.2  --  14.0   HCT 43.5  --  40.3   * 118* 129*   SODIUM 136  --  135   K 3.8  --  3.8   CL 99  --  102   CO2 26  --  25   BUN 19  --  18   CREATININE 1.50*  --  1.43*   GLUC 101  --  109   AST 17  --   --    ALT 16  --   --    ALB 4.2  --   --    TBILI 0.94  --   --    ALKPHOS 40  --   --    LACTICACID  --  0.7  --        Imaging Results Review: I reviewed radiology reports from this admission including: CT abdomen/pelvis.  Other Study Results Review: No additional pertinent studies reviewed.    VTE Pharmacologic Prophylaxis: Heparin  VTE Mechanical Prophylaxis: sequential compression device

## 2025-07-07 NOTE — ASSESSMENT & PLAN NOTE
Concern for ileus on CT scan, moderate stool burden in colon. low appetite, nausea and vomiting, abdominal bloating  Consult general surgery; no acute surgical intervention. No benefit to NG tube given clear stomach and small bowel.   Start bowel regimen: miralax, ducolax suppositoreis, colace  IVF

## 2025-07-07 NOTE — ASSESSMENT & PLAN NOTE
General surgery following  Miralax and dulcolax suppositories in addition to colace  Cleared for OR intervention

## 2025-07-07 NOTE — PROGRESS NOTES
Progress Note - Hospitalist   Name: Betito Landers 65 y.o. male I MRN: [id number]  Unit/Bed#: [facility]-01 I Date of Admission: 7/6/2025   Date of Service: 7/7/2025 I Hospital Day: 1    Assessment & Plan  Obstructive kidney stone  Pyelonephritis  Follows with urology in the outpatient setting for history of nephrolithiasis  Last ultrasound from June 12 with bilateral renal nonobstructing calculi  Presenting with new onset right flank pain, fevers, nausea  CMP with elevated creatinine, monitor with BMP  UA with occasional bacteria  Trend blood cultures  CT with severe right sided hydronephrosis due to obstructing calculus, concern for superimposed infection  Continue empiric ceftriaxone, IVF  Urology to take for stent placement today  N.p.o.  Hold Plavix and daily aspirin  Continue pain control  Elevated blood pressure reading  BP elevated on admission in the 150s/70s, likely in the setting of pain though he does have this diagnosis as an outpatient and does not take any medication  Continue pain management  Outpatient follow-up with PCP  Ileus ([facility])  Concern for ileus on CT scan, moderate stool burden in colon. low appetite, nausea and vomiting, abdominal bloating  Consult general surgery; no acute surgical intervention. No benefit to NG tube given clear stomach and small bowel.   Start bowel regimen: miralax, ducolax suppositoreis, colace  IVF    VTE Pharmacologic Prophylaxis: VTE Score: 4 Moderate Risk (Score 3-4) - Pharmacological DVT Prophylaxis Ordered: heparin.    Mobility:   Basic Mobility Inpatient Raw Score: 24  JH-HLM Goal: 8: Walk 250 feet or more  JH-HLM Achieved: 7: Walk 25 feet or more  JH-HLM Goal NOT achieved. Continue with multidisciplinary rounding and encourage appropriate mobility to improve upon JH-HLM goals.    Patient Centered Rounds: I performed bedside rounds with nursing staff today.   Discussions with Specialists or Other Care Team Provider: urology, general surgery    Education and  Discussions with Family / Patient: Updated  (wife) via phone.    Current Length of Stay: 1 day(s)  Current Patient Status: Inpatient   Certification Statement: The patient will continue to require additional inpatient hospital stay due to urologic intervention, resolution of ileus  Discharge Plan: Anticipate discharge in 24-48 hrs to home.    Code Status: Level 1 - Full Code    Subjective   Patient feels his pain is more controled today, no nausea overnight and was able to tolerate some food last night. No flank pain today, feels somewhat feverish.     Objective :  Temp:  [98.3 °F (36.8 °C)-99.9 °F (37.7 °C)] 99.9 °F (37.7 °C)  HR:  [75-85] 84  BP: (128-168)/(68-83) 129/72  Resp:  [17-22] 18  SpO2:  [94 %-100 %] 94 %  O2 Device: None (Room air)    Body mass index is 28.99 kg/m².     Input and Output Summary (last 24 hours):     Intake/Output Summary (Last 24 hours) at 7/7/2025 0919  Last data filed at 7/7/2025 0339  Gross per 24 hour   Intake 2050 ml   Output 150 ml   Net 1900 ml       Physical Exam  Vitals and nursing note reviewed.   Constitutional:       General: He is not in acute distress.     Appearance: He is not ill-appearing or toxic-appearing.   HENT:      Mouth/Throat:      Mouth: Mucous membranes are moist.     Cardiovascular:      Rate and Rhythm: Normal rate and regular rhythm.      Heart sounds: Normal heart sounds.   Pulmonary:      Effort: Pulmonary effort is normal.      Breath sounds: Normal breath sounds. No rales.   Abdominal:      General: Bowel sounds are decreased. There is distension.      Palpations: Abdomen is soft.      Tenderness: There is no abdominal tenderness. There is no right CVA tenderness, left CVA tenderness or rebound.     Musculoskeletal:      Right lower leg: No edema.      Left lower leg: No edema.     Skin:     General: Skin is warm and dry.      Coloration: Skin is not jaundiced.     Neurological:      Mental Status: He is alert and oriented to person, place,  and time.           Lines/Drains:              Lab Results: I have reviewed the following results:   Results from last 7 days   Lab Units 07/07/25  0339 07/06/25  1819 07/06/25  1105   WBC Thousand/uL 10.93*  --  10.61*   HEMOGLOBIN g/dL 14.0  --  15.2   HEMATOCRIT % 40.3  --  43.5   PLATELETS Thousands/uL 129*   < > 123*   SEGS PCT %  --   --  80*   LYMPHO PCT %  --   --  8*   MONO PCT %  --   --  12   EOS PCT %  --   --  0    < > = values in this interval not displayed.     Results from last 7 days   Lab Units 07/07/25  0339 07/06/25  1105   SODIUM mmol/L 135 136   POTASSIUM mmol/L 3.8 3.8   CHLORIDE mmol/L 102 99   CO2 mmol/L 25 26   BUN mg/dL 18 19   CREATININE mg/dL 1.43* 1.50*   ANION GAP mmol/L 8 11   CALCIUM mg/dL 8.5 9.3   ALBUMIN g/dL  --  4.2   TOTAL BILIRUBIN mg/dL  --  0.94   ALK PHOS U/L  --  40   ALT U/L  --  16   AST U/L  --  17   GLUCOSE RANDOM mg/dL 109 101                 Results from last 7 days   Lab Units 07/06/25  1819   LACTIC ACID mmol/L 0.7       Recent Cultures (last 7 days):   Results from last 7 days   Lab Units 07/06/25  1326 07/06/25  1317   BLOOD CULTURE  Received in Microbiology Lab. Culture in Progress. Received in Microbiology Lab. Culture in Progress.             Last 24 Hours Medication List:     Current Facility-Administered Medications:     [Held by provider] aspirin (ECOTRIN LOW STRENGTH) EC tablet 81 mg, Daily    bisacodyl (DULCOLAX) rectal suppository 10 mg, Daily    ceftriaxone (ROCEPHIN) 1 g/50 mL in dextrose IVPB, Q24H    [Held by provider] clopidogrel (PLAVIX) tablet 75 mg, Daily    docusate sodium (COLACE) capsule 100 mg, BID    heparin (porcine) subcutaneous injection 5,000 Units, Q8H TORY **AND** [COMPLETED] Platelet count, Once    HYDROmorphone (DILAUDID) injection 0.5 mg, Q4H PRN    methocarbamol (ROBAXIN) tablet 500 mg, 4x Daily    ondansetron (ZOFRAN) injection 4 mg, Q4H PRN    oxyCODONE (ROXICODONE) IR tablet 5 mg, Q6H PRN    polyethylene glycol (MIRALAX) packet  17 g, Daily    pravastatin (PRAVACHOL) tablet 80 mg, Daily With Dinner    sodium chloride 0.9 % infusion, Continuous, Last Rate: 100 mL/hr (07/07/25 5046)    Administrative Statements   Today, Patient Was Seen By: Nicki Gregory PA-C      **Please Note: This note may have been constructed using a voice recognition system.**

## 2025-07-07 NOTE — DISCHARGE INSTR - AVS FIRST PAGE
Mr. Landers,        I was able to laser and break apart the stone that was blocking your right ureter.  I also went into your right kidney and removed the stone that was in there as well.    The stone that was in your right ureter was very inflamed and stuck.  Your ureter therefore will need multiple weeks of healing with the stent.  The office will call you to set up an appointment to have your stent removed in about 3 to 4 weeks.    Please see the additional postoperative instructions below for details.    Please call the office with any questions or concerns.        Sincerely,  Sandro Frias            You had procedure on your ureter and kidney.      You had ureteral stent placed.  This is a tube that is placed in your ureter to keep urine draining from your kidney to your bladder.  It may cause discomfort in the form of back spasms or lower abdominal spasms.  This is normal and can be treated with medications if need be.      You may see some blood in your urine.  This is normal.  Please drink plenty of fluids.  Generally speaking, as long as your urine is fruit punch color or lighter, that is okay.  You may even pass small blood clots in your urine.  That is also okay.  If you notice that you are passing large blood clots, if it is becoming more difficult to urinate, or if your urine is very dark like the color of Merlot wine, please call the office for further instruction.      You have been given a prescription for Flomax.  Please take this daily while your ureteral stent is in place. This medication can occasionally cause lightheadedness. Please stop medication if you experience this or any other concerning side effects.      You have been given a prescription for oxybutynin.  Please take this daily as needed for stent discomfort. Please note that this medication may have side effects including but not limited to: dry eyes, dry mouth, constipation, urinary retention. Please drink plenty of water with this  medication.        You may take Tylenol as needed for pain.      Pain control plan: Having a ureteral stent is often very uncomfortable. In order to stay on top of your pain, please take over the counter Tylenol around the clock. Additionally take Flomax daily with your stent in place whether or not you are having discomfort. Additionally if you are having discomfort, you need to take your oxybutynin daily, as this helps a lot with bladder spasms and stent discomfort.     Most importantly, please drink lots of fluids, as this is the best way to avoid pain with your stent!          You may shower and bathe as usual when you get home.    You do not have any incisions and can resume typical physical activities, as long as you are not having too much discomfort with stent in place.    Please call the office or present to the ED if you experience concerning signs or symptoms including but not limited to: fevers, chills, nausea, vomiting, worsening flank pain, worsening abdominal pain, passage of large blood clots in the urine, inability to urinate.    You will follow up in the office in 3 to 4 weeks to have your stent removed. Office will call you with details.

## 2025-07-08 LAB
ANION GAP SERPL CALCULATED.3IONS-SCNC: 7 MMOL/L (ref 4–13)
BUN SERPL-MCNC: 19 MG/DL (ref 5–25)
CALCIUM SERPL-MCNC: 8.1 MG/DL (ref 8.4–10.2)
CHLORIDE SERPL-SCNC: 105 MMOL/L (ref 96–108)
CO2 SERPL-SCNC: 24 MMOL/L (ref 21–32)
CREAT SERPL-MCNC: 1.06 MG/DL (ref 0.6–1.3)
ERYTHROCYTE [DISTWIDTH] IN BLOOD BY AUTOMATED COUNT: 11.9 % (ref 11.6–15.1)
GFR SERPL CREATININE-BSD FRML MDRD: 74 ML/MIN/1.73SQ M
GLUCOSE SERPL-MCNC: 129 MG/DL (ref 65–140)
HCT VFR BLD AUTO: 40.7 % (ref 36.5–49.3)
HGB BLD-MCNC: 13.8 G/DL (ref 12–17)
MCH RBC QN AUTO: 32.6 PG (ref 26.8–34.3)
MCHC RBC AUTO-ENTMCNC: 33.9 G/DL (ref 31.4–37.4)
MCV RBC AUTO: 96 FL (ref 82–98)
PLATELET # BLD AUTO: 139 THOUSANDS/UL (ref 149–390)
PMV BLD AUTO: 11.5 FL (ref 8.9–12.7)
POTASSIUM SERPL-SCNC: 4.1 MMOL/L (ref 3.5–5.3)
RBC # BLD AUTO: 4.23 MILLION/UL (ref 3.88–5.62)
SODIUM SERPL-SCNC: 136 MMOL/L (ref 135–147)
WBC # BLD AUTO: 7.69 THOUSAND/UL (ref 4.31–10.16)

## 2025-07-08 PROCEDURE — 85027 COMPLETE CBC AUTOMATED: CPT | Performed by: UROLOGY

## 2025-07-08 PROCEDURE — 80048 BASIC METABOLIC PNL TOTAL CA: CPT | Performed by: UROLOGY

## 2025-07-08 PROCEDURE — 99232 SBSQ HOSP IP/OBS MODERATE 35: CPT

## 2025-07-08 PROCEDURE — 99254 IP/OBS CNSLTJ NEW/EST MOD 60: CPT | Performed by: INTERNAL MEDICINE

## 2025-07-08 RX ORDER — SODIUM CHLORIDE 9 MG/ML
75 INJECTION, SOLUTION INTRAVENOUS CONTINUOUS
Status: DISPENSED | OUTPATIENT
Start: 2025-07-08 | End: 2025-07-09

## 2025-07-08 RX ORDER — POLYETHYLENE GLYCOL 3350 17 G/17G
17 POWDER, FOR SOLUTION ORAL 2 TIMES DAILY
Status: DISCONTINUED | OUTPATIENT
Start: 2025-07-08 | End: 2025-07-12 | Stop reason: HOSPADM

## 2025-07-08 RX ORDER — HEPARIN SODIUM 5000 [USP'U]/ML
5000 INJECTION, SOLUTION INTRAVENOUS; SUBCUTANEOUS EVERY 8 HOURS SCHEDULED
Status: DISCONTINUED | OUTPATIENT
Start: 2025-07-08 | End: 2025-07-12 | Stop reason: HOSPADM

## 2025-07-08 RX ADMIN — HEPARIN SODIUM 5000 UNITS: 5000 INJECTION INTRAVENOUS; SUBCUTANEOUS at 16:52

## 2025-07-08 RX ADMIN — POLYETHYLENE GLYCOL 3350 17 G: 17 POWDER, FOR SOLUTION ORAL at 09:05

## 2025-07-08 RX ADMIN — OXYCODONE 5 MG: 5 TABLET ORAL at 02:02

## 2025-07-08 RX ADMIN — DOCUSATE SODIUM 100 MG: 100 CAPSULE, LIQUID FILLED ORAL at 16:47

## 2025-07-08 RX ADMIN — TAMSULOSIN HYDROCHLORIDE 0.4 MG: 0.4 CAPSULE ORAL at 16:47

## 2025-07-08 RX ADMIN — PRAVASTATIN SODIUM 80 MG: 80 TABLET ORAL at 16:47

## 2025-07-08 RX ADMIN — BISACODYL 10 MG: 10 SUPPOSITORY RECTAL at 09:05

## 2025-07-08 RX ADMIN — HEPARIN SODIUM 5000 UNITS: 5000 INJECTION INTRAVENOUS; SUBCUTANEOUS at 21:01

## 2025-07-08 RX ADMIN — METHOCARBAMOL 500 MG: 500 TABLET ORAL at 21:00

## 2025-07-08 RX ADMIN — SODIUM CHLORIDE 75 ML/HR: 0.9 INJECTION, SOLUTION INTRAVENOUS at 13:41

## 2025-07-08 RX ADMIN — DOCUSATE SODIUM 100 MG: 100 CAPSULE, LIQUID FILLED ORAL at 09:05

## 2025-07-08 RX ADMIN — ASPIRIN 81 MG: 81 TABLET, DELAYED RELEASE ORAL at 17:20

## 2025-07-08 RX ADMIN — CEFTRIAXONE 1000 MG: 10 INJECTION, POWDER, FOR SOLUTION INTRAVENOUS at 12:45

## 2025-07-08 RX ADMIN — POLYETHYLENE GLYCOL 3350 17 G: 17 POWDER, FOR SOLUTION ORAL at 21:01

## 2025-07-08 RX ADMIN — CLOPIDOGREL BISULFATE 75 MG: 75 TABLET, FILM COATED ORAL at 17:20

## 2025-07-08 NOTE — PROGRESS NOTES
"Progress Note - Urology   Name: Betito Landers 65 y.o. male I MRN: [id number]  Unit/Bed#: S -01 I Date of Admission: 7/6/2025   Date of Service: 7/8/2025 I Hospital Day: 2     Assessment & Plan  Obstructive kidney stone  POD 1 cystoscopy, right retrograde pyelogram, right ureteroscopy, insertion of right ureteral stent by Dr. Frias  Vital signs stable, afebrile  Continue with Flomax daily.  Oxybutynin on hold due to ileus.  Obtain PVR today  Rocephin 24 hours postop  Can continue with Plavix and aspirin  Ileus management per primary and GI  Patient will require stent x 4 weeks.  Outpatient ureteral stent removed to be arranged  Urology will sign off but remain available for any further inpatient needs. Please feel free to contact the provider currently [facility] with questions or concerns.       Ileus (HCC)  General surgery following  Miralax and dulcolax suppositories in addition to colace  Cleared for OR intervention            Subjective:   HPI: Seen at bedside.  Reports he had some couple liquid bowel movements today.  Urinating well overnight however into the afternoon patient reports decreased urination.  He reports dysuria.    Review of Systems   Constitutional:  Negative for chills and fever.   Respiratory:  Negative for cough and shortness of breath.    Cardiovascular:  Negative for chest pain and palpitations.   Gastrointestinal:  Positive for abdominal pain. Negative for vomiting.   Genitourinary:  Positive for dysuria and hematuria.   Musculoskeletal:  Negative for arthralgias and back pain.   Skin:  Negative for color change and rash.   All other systems reviewed and are negative.      Objective:    Vitals: Blood pressure 117/65, pulse 79, temperature 99.2 °F (37.3 °C), resp. rate 18, height 5' 4\" (1.626 m), weight 76.6 kg (168 lb 14 oz), SpO2 97%.,Body mass index is 28.99 kg/m².    Physical Exam  Constitutional:       General: He is not in acute " distress.     Appearance: Normal appearance. He is normal weight. He is not ill-appearing or toxic-appearing.   HENT:      Head: Normocephalic and atraumatic.      Right Ear: External ear normal.      Left Ear: External ear normal.      Nose: Nose normal.      Mouth/Throat:      Mouth: Mucous membranes are moist.     Eyes:      General: No scleral icterus.     Conjunctiva/sclera: Conjunctivae normal.       Cardiovascular:      Rate and Rhythm: Normal rate.      Pulses: Normal pulses.   Pulmonary:      Effort: Pulmonary effort is normal.     Neurological:      General: No focal deficit present.      Mental Status: He is alert and oriented to person, place, and time. Mental status is at baseline.     Psychiatric:         Mood and Affect: Mood normal.         Behavior: Behavior normal.         Thought Content: Thought content normal.         Judgment: Judgment normal.         Labs:  Recent Labs     07/06/25 1105 07/07/25  0339 07/08/25  0506   WBC 10.61* 10.93* 7.69       Recent Labs     07/06/25  1105 07/07/25  0339 07/08/25  0506   HGB 15.2 14.0 13.8     Recent Labs     07/06/25  1105 07/07/25  0339 07/08/25  0506   HCT 43.5 40.3 40.7     Recent Labs     07/06/25  1105 07/07/25  0339 07/08/25  0506   CREATININE 1.50* 1.43* 1.06         History:  Past Medical History[1]  Social History[2]  Past Surgical History[3]  Family History[4]    Valorie Delgadillo PA-C  Date: 7/8/2025 Time: 3:54 PM          [1]   Past Medical History:  Diagnosis Date    Chronic low back pain     GERD (gastroesophageal reflux disease)     Hyperlipidemia     Kidney stone     Sleep apnea     Verruca 2 yrs   [2]   Social History  Socioeconomic History    Marital status: /Civil Union   Tobacco Use    Smoking status: Never     Passive exposure: Past    Smokeless tobacco: Never   Vaping Use    Vaping status: Never Used   Substance and Sexual Activity    Alcohol use: Yes     Alcohol/week: 4.0 standard drinks of alcohol     Types: 2 Glasses of  wine, 2 Cans of beer per week     Comment: social    Drug use: No    Sexual activity: Yes     Partners: Female     Social Drivers of Health     Financial Resource Strain: Not At Risk (10/7/2023)    Received from [facility]    Financial Resource Strain     In the last 12 months did you skip medications to save money?: No     In the last 12 months, was there a time when you needed to see a doctor but could not because of cost?: No   Food Insecurity: No Food Insecurity (7/6/2025)    Nursing - Inadequate Food Risk Classification     Worried About Running Out of Food in the Last Year: Patient declined     Ran Out of Food in the Last Year: Never true   Transportation Needs: No Transportation Needs (7/6/2025)    Nursing - Transportation Risk Classification     Lack of Transportation: No   Social Connections: Not At Risk (10/7/2023)    Received from [facility]    Social Connections     Do you often feel lonely?: No   Intimate Partner Violence: Unknown (7/6/2025)    Nursing IPS     Physically Hurt by Someone: No     Hurt or Threatened by Someone: No   Housing Stability: Unknown (7/6/2025)    Nursing: Inadequate Housing Risk Classification     Unable to Pay for Housing in the Last Year: No     Has Housing: No   [3]   Past Surgical History:  Procedure Laterality Date    COLONOSCOPY      ESOPHAGOGASTRODUODENOSCOPY      FL RETROGRADE PYELOGRAM  5/6/2024    WI CYSTO/URETERO W/LITHOTRIPSY &INDWELL STENT INSRT Left 5/6/2024    Procedure: CYSTOSCOPY URETEROSCOPY WITH LITHOTRIPSY HOLMIUM LASER, STONE BASKET EXTRACTION, RETROGRADE PYELOGRAM AND INSERTION STENT URETERAL;  Surgeon: Mauro Finch MD;  [facility];  Service: Urology    WI CYSTO/URETERO W/LITHOTRIPSY &INDWELL STENT INSRT Right 7/7/2025    Procedure: CYSTOSCOPY URETEROSCOPY WITH LITHOTRIPSY HOLMIUM LASER, RETROGRADE PYELOGRAM AND INSERTION STENT URETERAL;  Surgeon: Sandro Frias DO;  [facility];  Service: Urology    [place] ESOPHAGOGASTRODUODENOSCOPY TRANSORAL DIAGNOSTIC N/A 5/24/2018    Procedure: ESOPHAGOGASTRODUODENOSCOPY (EGD);  Surgeon: Francis Sanders MD;  Location: [facility];  Service: Gastroenterology   [4]   Family History  Problem Relation Name Age of Onset    Diabetes Mother Mary     Lung cancer Father Hung     Cancer Father Hung     Cancer Brother Simeon Valdes         Bladder Cancer and a stroke    No Known Problems Daughter      No Known Problems Son      Breast cancer Sister Pebbles Victor

## 2025-07-08 NOTE — PLAN OF CARE
Problem: PAIN - ADULT  Goal: Verbalizes/displays adequate comfort level or baseline comfort level  Description: Interventions:  - Encourage patient to monitor pain and request assistance  - Assess pain using appropriate pain scale  - Administer analgesics as ordered based on type and severity of pain and evaluate response  - Implement non-pharmacological measures as appropriate and evaluate response  - Consider cultural and social influences on pain and pain management  - Notify physician/advanced practitioner if interventions unsuccessful or patient reports new pain  - Educate patient/family on pain management process including their role and importance of  reporting pain   - Provide non-pharmacologic/complimentary pain relief interventions  Outcome: Progressing     Problem: SAFETY ADULT  Goal: Maintain or return to baseline ADL function  Description: INTERVENTIONS:  -  Assess patient's ability to carry out ADLs; assess patient's baseline for ADL function and identify physical deficits which impact ability to perform ADLs (bathing, care of mouth/teeth, toileting, grooming, dressing, etc.)  - Assess/evaluate cause of self-care deficits   - Assess range of motion  - Assess patient's mobility; develop plan if impaired  - Assess patient's need for assistive devices and provide as appropriate  - Encourage maximum independence but intervene and supervise when necessary  - Involve family in performance of ADLs  - Assess for home care needs following discharge   - Consider OT consult to assist with ADL evaluation and planning for discharge  - Provide patient education as appropriate  - Monitor functional capacity and physical performance, use of AM PAC & JH-HLM   - Monitor gait, balance and fatigue with ambulation    Outcome: Progressing  Goal: Maintains/Returns to pre admission functional level  Description: INTERVENTIONS:  - Perform AM-PAC 6 Click Basic Mobility/ Daily Activity assessment daily.  - Set and communicate  daily mobility goal to care team and patient/family/caregiver.   - Collaborate with rehabilitation services on mobility goals if consulted  - Perform Range of Motion  - Record patient progress and toleration of activity level   Outcome: Progressing

## 2025-07-08 NOTE — ASSESSMENT & PLAN NOTE
Concern for ileus on CT scan, moderate stool burden in colon. low appetite, nausea and vomiting, abdominal bloating  Consult general surgery; no acute surgical intervention. No benefit to NG tube given clear stomach and small bowel.   Start bowel regimen: miralax, ducolax suppositoreis, colace  Will give mineral oil enema x 1 today  IVF  GI consult  Monitor abdominal exam

## 2025-07-08 NOTE — ASSESSMENT & PLAN NOTE
POD 1 cystoscopy, right retrograde pyelogram, right ureteroscopy, insertion of right ureteral stent by Dr. Frias  Vital signs stable, afebrile  Continue with Flomax daily.  Oxybutynin on hold due to ileus.  Obtain PVR today  Rocephin 24 hours postop  Can continue with Plavix and aspirin  Ileus management per primary and GI  Patient will require stent x 4 weeks.  Outpatient ureteral stent removed to be arranged  Urology will sign off but remain available for any further inpatient needs. Please feel free to contact the provider currently covering the Urology [facility] role for this campus with questions or concerns.

## 2025-07-08 NOTE — CONSULTS
Consultation - Gastroenterology   Name: Betito Landers 65 y.o. male I MRN: [id number]  Unit/Bed#: S -01 I Date of Admission: 7/6/2025   Date of Service: 7/8/2025 I Hospital Day: 2   Inpatient consult to gastroenterology  Consult performed by: Maureen Gilliland PA-C  Consult ordered by: Nicki Gregory PA-C        Physician Requesting Evaluation: Deidra Mario, *   Reason for Evaluation / Principal Problem: Ileus    Assessment & Plan  Ileus (HCC)  Patient is 65-year-old male with history of HLD, GERD, TONIA, anxiety, and retinal artery occlusion on Plavix admitted for obstructive kidney stone and found to have significant stool burden as well as colonic ileus with colonic distention on CT.    Patient reports having significant abdominal distention that improved this afternoon following a large bowel movement  Currently on MiraLAX 17 g twice daily, Colace 100 mg twice daily, Dulcolax 10 mg suppository daily.  Received first mineral oil enema today.    Plan:  Continue MiraLAX 17 g twice daily  Provide Dulcolax 10 mg suppository as needed  Will hold off on further enemas at this time as patient has had significant bowel movement and improvement in abdominal distention  Avoid narcotics and anticholinergic meds as much as possible  Recommend frequent ambulation        History of Present Illness   HPI:  Betito Landers is a 65 y.o. male with history of HLD, GERD, TONIA, anxiety, and hx of retinal artery occlusion on Plavix who was admitted 7/6 for obstructive kidney stone.  CT abdomen pelvis showed hydronephrosis with obstructing calculus as well as concern for colonic ileus with colonic distention. GI is consulted for evaluation of ileus.    Patient reports he typically moves his bowels daily without any straining or issue.  He denies any melena or hematochezia.  He reports that he had gone over 5 days without a bowel movement until this morning.  He noted significant distention, but this is improved with a  bowel movement this morning.    Last colonoscopy was 4/2015 notable for internal and external hemorrhoids but otherwise normal colon.  Recommended repeat in 10 years.  Patient is scheduled at outside facility for repeat screening colonoscopy in August.    He denies any NSAID use.  Denies alcohol use.  No history of smoking.    Review of Systems   HENT:  Negative for sore throat and trouble swallowing.    Gastrointestinal:  Positive for abdominal distention and constipation. Negative for abdominal pain, anal bleeding, blood in stool, diarrhea, nausea and vomiting.     Medical History Review: I have reviewed the patient's PMH, PSH, Social History, Family History, Meds, and Allergies     Objective :  Temp:  [98.7 °F (37.1 °C)-99.2 °F (37.3 °C)] 99.2 °F (37.3 °C)  HR:  [79-95] 79  BP: (117-165)/(65-84) 117/65  Resp:  [18-22] 18  SpO2:  [93 %-100 %] 97 %  O2 Device: None (Room air)    Physical Exam  Vitals and nursing note reviewed.   Constitutional:       General: He is not in acute distress.     Appearance: He is well-developed.   HENT:      Head: Normocephalic and atraumatic.     Eyes:      Conjunctiva/sclera: Conjunctivae normal.       Cardiovascular:      Rate and Rhythm: Normal rate and regular rhythm.      Heart sounds: No murmur heard.  Pulmonary:      Effort: Pulmonary effort is normal. No respiratory distress.      Breath sounds: Normal breath sounds.   Abdominal:      Palpations: Abdomen is soft.      Tenderness: There is no abdominal tenderness. There is no guarding or rebound.     Musculoskeletal:         General: No swelling.      Cervical back: Neck supple.     Skin:     General: Skin is warm and dry.      Capillary Refill: Capillary refill takes less than 2 seconds.     Neurological:      Mental Status: He is alert.     Psychiatric:         Mood and Affect: Mood normal.         Lab Results: I have reviewed the following results:CBC/BMP:   .     07/08/25  0506   WBC 7.69   HGB 13.8   HCT 40.7   *    SODIUM 136   K 4.1      CO2 24   BUN 19   CREATININE 1.06   GLUC 129    , LFTs: No new results in last 24 hours.     Imaging Results Review: I reviewed radiology reports from this admission including: CT abdomen/pelvis.

## 2025-07-08 NOTE — ASSESSMENT & PLAN NOTE
Patient is 65-year-old male with history of HLD, GERD, TONIA, anxiety, and retinal artery occlusion on Plavix admitted for obstructive kidney stone and found to have significant stool burden as well as colonic ileus with colonic distention on CT.    Patient reports having significant abdominal distention that improved this afternoon following a large bowel movement  Currently on MiraLAX 17 g twice daily, Colace 100 mg twice daily, Dulcolax 10 mg suppository daily.  Received first mineral oil enema today.    Plan:  Continue MiraLAX 17 g twice daily  Provide Dulcolax 10 mg suppository as needed  Will hold off on further enemas at this time as patient has had significant bowel movement and improvement in abdominal distention  Avoid narcotics and anticholinergic meds as much as possible  Recommend frequent ambulation

## 2025-07-08 NOTE — ANESTHESIA POSTPROCEDURE EVALUATION
Post-Op Assessment Note    CV Status:  Stable  Pain Score: 2    Pain management: adequate       Mental Status:  Alert and awake   Hydration Status:  Euvolemic   PONV Controlled:  Controlled   Airway Patency:  Patent     Post Op Vitals Reviewed: Yes    No anethesia notable event occurred.            Last Filed PACU Vitals:  Vitals Value Taken Time   Temp 99 °F (37.2 °C) 07/07/25 20:00   Pulse 88 07/07/25 20:08   /81 07/07/25 20:18   Resp 21 07/07/25 20:08   SpO2 96 % 07/07/25 20:08   Vitals shown include unfiled device data.    Modified Chirag:     Vitals Value Taken Time   Activity 2 07/07/25 20:00   Respiration 2 07/07/25 20:00   Circulation 2 07/07/25 20:00   Consciousness 2 07/07/25 20:00   Oxygen Saturation 2 07/07/25 20:00     Modified Chirag Score: 10

## 2025-07-08 NOTE — ASSESSMENT & PLAN NOTE
Follows with urology in the outpatient setting for history of nephrolithiasis  Last ultrasound from June 12 with bilateral renal nonobstructing calculi  Presenting with new onset right flank pain, fevers, nausea  CMP with elevated creatinine, monitor with BMP  UA with occasional bacteria  Trend blood cultures  CT with severe right sided hydronephrosis due to obstructing calculus, concern for superimposed infection  S/p ureteral stent placement on 7/7 with urology- follow up in 3 weeks  Continue empiric ceftriaxone D3, IVF  Urology to take for stent placement today  Resume aspirin and Plavix  Continue pain control

## 2025-07-08 NOTE — PROGRESS NOTES
Progress Note - Hospitalist   Name: Betito Landers 65 y.o. male I MRN: [id number]  Unit/Bed#: S -01 I Date of Admission: 7/6/2025   Date of Service: 7/8/2025 I Hospital Day: 2    Assessment & Plan  Obstructive kidney stone  Pyelonephritis  Follows with urology in the outpatient setting for history of nephrolithiasis  Last ultrasound from June 12 with bilateral renal nonobstructing calculi  Presenting with new onset right flank pain, fevers, nausea  CMP with elevated creatinine, monitor with BMP  UA with occasional bacteria  Trend blood cultures  CT with severe right sided hydronephrosis due to obstructing calculus, concern for superimposed infection  S/p ureteral stent placement on 7/7 with urology- follow up in 3 weeks  Continue empiric ceftriaxone D3, IVF  Urology to take for stent placement today  Resume aspirin and Plavix  Continue pain control  Ileus (HCC)  Concern for ileus on CT scan, moderate stool burden in colon. low appetite, nausea and vomiting, abdominal bloating  Consult general surgery; no acute surgical intervention. No benefit to NG tube given clear stomach and small bowel.   Start bowel regimen: miralax, ducolax suppositoreis, colace  Will give mineral oil enema x 1 today  IVF  GI consult  Monitor abdominal exam  Elevated blood pressure reading  BP elevated on admission in the 150s/70s, likely in the setting of pain though he does have this diagnosis as an outpatient and does not take any medication  Continue pain management  Outpatient follow-up with PCP    VTE Pharmacologic Prophylaxis: VTE Score: 4 Moderate Risk (Score 3-4) - Pharmacological DVT Prophylaxis Ordered: heparin.    Mobility:   Basic Mobility Inpatient Raw Score: 24  JH-HLM Goal: 8: Walk 250 feet or more  JH-HLM Achieved: 7: Walk 25 feet or more  JH-HLM Goal NOT achieved. Continue with multidisciplinary rounding and encourage appropriate mobility to improve upon JH-HLM goals.    Patient Centered Rounds: I performed bedside  rounds with nursing staff today.   Discussions with Specialists or Other Care Team Provider: GI, urology    Education and Discussions with Family / Patient: Updated  (wife) at bedside.    Current Length of Stay: 2 day(s)  Current Patient Status: Inpatient   Certification Statement: The patient will continue to require additional inpatient hospital stay due to ileus/constipation  Discharge Plan: Anticipate discharge in 24-48 hrs to home.    Code Status: Level 1 - Full Code    Subjective   Patient's pain is much improved today, denies any further fevers, chills, nausea or vomiting.  He was able to eat last night.  He is having some hematuria and does note some overflow diarrhea this morning.  He was passing gas last night without issue.  Denies any abdominal pain but still feels distended.    Objective :  Temp:  [98.7 °F (37.1 °C)-99.6 °F (37.6 °C)] 98.7 °F (37.1 °C)  HR:  [83-95] 83  BP: (127-165)/(71-84) 147/84  Resp:  [18-22] 18  SpO2:  [93 %-100 %] 96 %  O2 Device: None (Room air)    Body mass index is 28.99 kg/m².     Input and Output Summary (last 24 hours):     Intake/Output Summary (Last 24 hours) at 7/8/2025 1208  Last data filed at 7/8/2025 0501  Gross per 24 hour   Intake 2528.33 ml   Output 1700 ml   Net 828.33 ml       Physical Exam  Vitals and nursing note reviewed.   Constitutional:       General: He is not in acute distress.     Appearance: He is not ill-appearing or toxic-appearing.   HENT:      Mouth/Throat:      Mouth: Mucous membranes are moist.     Cardiovascular:      Rate and Rhythm: Normal rate and regular rhythm.      Heart sounds: Normal heart sounds.   Pulmonary:      Effort: Pulmonary effort is normal.      Breath sounds: Normal breath sounds. No rales.   Abdominal:      General: Bowel sounds are decreased. There is distension.      Palpations: Abdomen is soft.      Tenderness: There is no abdominal tenderness. There is no right CVA tenderness, left CVA tenderness or rebound.      Musculoskeletal:      Right lower leg: No edema.      Left lower leg: No edema.     Skin:     General: Skin is warm and dry.      Coloration: Skin is not jaundiced.     Neurological:      Mental Status: He is alert and oriented to person, place, and time.           Lines/Drains:              Lab Results: I have reviewed the following results:   Results from last 7 days   Lab Units 07/08/25  0506 07/06/25  1819 07/06/25  1105   WBC Thousand/uL 7.69   < > 10.61*   HEMOGLOBIN g/dL 13.8   < > 15.2   HEMATOCRIT % 40.7   < > 43.5   PLATELETS Thousands/uL 139*   < > 123*   SEGS PCT %  --   --  80*   LYMPHO PCT %  --   --  8*   MONO PCT %  --   --  12   EOS PCT %  --   --  0    < > = values in this interval not displayed.     Results from last 7 days   Lab Units 07/08/25  0506 07/07/25  0339 07/06/25  1105   SODIUM mmol/L 136   < > 136   POTASSIUM mmol/L 4.1   < > 3.8   CHLORIDE mmol/L 105   < > 99   CO2 mmol/L 24   < > 26   BUN mg/dL 19   < > 19   CREATININE mg/dL 1.06   < > 1.50*   ANION GAP mmol/L 7   < > 11   CALCIUM mg/dL 8.1*   < > 9.3   ALBUMIN g/dL  --   --  4.2   TOTAL BILIRUBIN mg/dL  --   --  0.94   ALK PHOS U/L  --   --  40   ALT U/L  --   --  16   AST U/L  --   --  17   GLUCOSE RANDOM mg/dL 129   < > 101    < > = values in this interval not displayed.                 Results from last 7 days   Lab Units 07/06/25  1819   LACTIC ACID mmol/L 0.7       Recent Cultures (last 7 days):   Results from last 7 days   Lab Units 07/06/25  1326 07/06/25  1317   BLOOD CULTURE  No Growth at 24 hrs. No Growth at 24 hrs.             Last 24 Hours Medication List:     Current Facility-Administered Medications:     [Held by provider] aspirin (ECOTRIN LOW STRENGTH) EC tablet 81 mg, Daily    bisacodyl (DULCOLAX) rectal suppository 10 mg, Daily    ceftriaxone (ROCEPHIN) 1 g/50 mL in dextrose IVPB, Q24H, Last Rate: 1,000 mg (07/07/25 1201)    [Held by provider] clopidogrel (PLAVIX) tablet 75 mg, Daily    docusate sodium (COLACE)  capsule 100 mg, BID    [Transfer Hold] heparin (porcine) subcutaneous injection 5,000 Units, Q8H TORY **AND** [COMPLETED] Platelet count, Once    HYDROmorphone (DILAUDID) injection 0.5 mg, Q4H PRN    methocarbamol (ROBAXIN) tablet 500 mg, 4x Daily    ondansetron (ZOFRAN) injection 4 mg, Q4H PRN    oxybutynin (DITROPAN-XL) 24 hr tablet 5 mg, Daily PRN    oxyCODONE (ROXICODONE) IR tablet 5 mg, Q6H PRN    polyethylene glycol (MIRALAX) packet 17 g, Daily    pravastatin (PRAVACHOL) tablet 80 mg, Daily With Dinner    tamsulosin (FLOMAX) capsule 0.4 mg, Daily With Dinner    Administrative Statements   Today, Patient Was Seen By: Nicki Gregory PA-C      **Please Note: This note may have been constructed using a voice recognition system.**

## 2025-07-09 PROBLEM — N12 PYELONEPHRITIS: Status: RESOLVED | Noted: 2025-07-07 | Resolved: 2025-07-09

## 2025-07-09 LAB
ANION GAP SERPL CALCULATED.3IONS-SCNC: 7 MMOL/L (ref 4–13)
BUN SERPL-MCNC: 17 MG/DL (ref 5–25)
CALCIUM SERPL-MCNC: 8.6 MG/DL (ref 8.4–10.2)
CHLORIDE SERPL-SCNC: 102 MMOL/L (ref 96–108)
CO2 SERPL-SCNC: 30 MMOL/L (ref 21–32)
CREAT SERPL-MCNC: 1.18 MG/DL (ref 0.6–1.3)
ERYTHROCYTE [DISTWIDTH] IN BLOOD BY AUTOMATED COUNT: 12 % (ref 11.6–15.1)
GFR SERPL CREATININE-BSD FRML MDRD: 65 ML/MIN/1.73SQ M
GLUCOSE SERPL-MCNC: 106 MG/DL (ref 65–140)
HCT VFR BLD AUTO: 39.9 % (ref 36.5–49.3)
HGB BLD-MCNC: 13.8 G/DL (ref 12–17)
MCH RBC QN AUTO: 33.1 PG (ref 26.8–34.3)
MCHC RBC AUTO-ENTMCNC: 34.6 G/DL (ref 31.4–37.4)
MCV RBC AUTO: 96 FL (ref 82–98)
PLATELET # BLD AUTO: 156 THOUSANDS/UL (ref 149–390)
PMV BLD AUTO: 11.1 FL (ref 8.9–12.7)
POTASSIUM SERPL-SCNC: 3.4 MMOL/L (ref 3.5–5.3)
RBC # BLD AUTO: 4.17 MILLION/UL (ref 3.88–5.62)
SODIUM SERPL-SCNC: 139 MMOL/L (ref 135–147)
WBC # BLD AUTO: 9.08 THOUSAND/UL (ref 4.31–10.16)

## 2025-07-09 PROCEDURE — 99232 SBSQ HOSP IP/OBS MODERATE 35: CPT | Performed by: PHYSICIAN ASSISTANT

## 2025-07-09 PROCEDURE — 85027 COMPLETE CBC AUTOMATED: CPT

## 2025-07-09 PROCEDURE — 99232 SBSQ HOSP IP/OBS MODERATE 35: CPT | Performed by: INTERNAL MEDICINE

## 2025-07-09 PROCEDURE — 80048 BASIC METABOLIC PNL TOTAL CA: CPT

## 2025-07-09 RX ORDER — POTASSIUM CHLORIDE 1500 MG/1
40 TABLET, EXTENDED RELEASE ORAL ONCE
Status: COMPLETED | OUTPATIENT
Start: 2025-07-09 | End: 2025-07-09

## 2025-07-09 RX ORDER — SODIUM CHLORIDE 9 MG/ML
75 INJECTION, SOLUTION INTRAVENOUS CONTINUOUS
Status: DISCONTINUED | OUTPATIENT
Start: 2025-07-09 | End: 2025-07-12 | Stop reason: HOSPADM

## 2025-07-09 RX ADMIN — MINERAL OIL 1 ENEMA: 100 ENEMA RECTAL at 15:12

## 2025-07-09 RX ADMIN — DOCUSATE SODIUM 100 MG: 100 CAPSULE, LIQUID FILLED ORAL at 17:40

## 2025-07-09 RX ADMIN — METHOCARBAMOL 500 MG: 500 TABLET ORAL at 21:18

## 2025-07-09 RX ADMIN — POLYETHYLENE GLYCOL 3350 17 G: 17 POWDER, FOR SOLUTION ORAL at 08:20

## 2025-07-09 RX ADMIN — ASPIRIN 81 MG: 81 TABLET, DELAYED RELEASE ORAL at 08:19

## 2025-07-09 RX ADMIN — METHOCARBAMOL 500 MG: 500 TABLET ORAL at 11:50

## 2025-07-09 RX ADMIN — DOCUSATE SODIUM 100 MG: 100 CAPSULE, LIQUID FILLED ORAL at 08:19

## 2025-07-09 RX ADMIN — HEPARIN SODIUM 5000 UNITS: 5000 INJECTION INTRAVENOUS; SUBCUTANEOUS at 06:06

## 2025-07-09 RX ADMIN — SODIUM CHLORIDE 75 ML/HR: 0.9 INJECTION, SOLUTION INTRAVENOUS at 11:51

## 2025-07-09 RX ADMIN — METHOCARBAMOL 500 MG: 500 TABLET ORAL at 17:40

## 2025-07-09 RX ADMIN — POLYETHYLENE GLYCOL 3350 17 G: 17 POWDER, FOR SOLUTION ORAL at 21:17

## 2025-07-09 RX ADMIN — TAMSULOSIN HYDROCHLORIDE 0.4 MG: 0.4 CAPSULE ORAL at 17:40

## 2025-07-09 RX ADMIN — POTASSIUM CHLORIDE 40 MEQ: 1500 TABLET, EXTENDED RELEASE ORAL at 11:50

## 2025-07-09 RX ADMIN — METHOCARBAMOL 500 MG: 500 TABLET ORAL at 08:19

## 2025-07-09 RX ADMIN — CLOPIDOGREL BISULFATE 75 MG: 75 TABLET, FILM COATED ORAL at 08:19

## 2025-07-09 RX ADMIN — PRAVASTATIN SODIUM 80 MG: 80 TABLET ORAL at 17:40

## 2025-07-09 NOTE — ASSESSMENT & PLAN NOTE
Concern for ileus on CT scan, moderate stool burden in colon. low appetite, nausea and vomiting, abdominal bloating  Consulted general surgery  No acute surgical intervention.   No benefit to NG tube given clear stomach and small bowel.   Start bowel regimen: miralax, ducolax suppositoreis, colace  Asked GI to re-evaluate as he is still distended and not had much stool output   IVF  Monitor abdominal exam  Consider KUB today   Appreciate further GI recs

## 2025-07-09 NOTE — PLAN OF CARE
Problem: PAIN - ADULT  Goal: Verbalizes/displays adequate comfort level or baseline comfort level  Description: Interventions:  - Encourage patient to monitor pain and request assistance  - Assess pain using appropriate pain scale  - Administer analgesics as ordered based on type and severity of pain and evaluate response  - Implement non-pharmacological measures as appropriate and evaluate response  - Consider cultural and social influences on pain and pain management  - Notify physician/advanced practitioner if interventions unsuccessful or patient reports new pain  - Educate patient/family on pain management process including their role and importance of  reporting pain   - Provide non-pharmacologic/complimentary pain relief interventions  Outcome: Progressing     Problem: SAFETY ADULT  Goal: Patient will remain free of falls  Description: INTERVENTIONS:  - Educate patient/family on patient safety including physical limitations  - Instruct patient to call for assistance with activity   - Consider consulting OT/PT to assist with strengthening/mobility based on AM PAC & JH-HLM score  - Consult OT/PT to assist with strengthening/mobility   - Keep Call bell within reach  - Keep bed low and locked with side rails adjusted as appropriate  - Keep care items and personal belongings within reach  - Initiate and maintain comfort rounds  - Make Fall Risk Sign visible to staff  - Offer Toileting every 2 Hours, in advance of need  - Initiate/Maintain alarm  - Obtain necessary fall risk management equipment:   - Apply yellow socks and bracelet for high fall risk patients  - Consider moving patient to room near nurses station  Outcome: Progressing  Goal: Maintain or return to baseline ADL function  Description: INTERVENTIONS:  -  Assess patient's ability to carry out ADLs; assess patient's baseline for ADL function and identify physical deficits which impact ability to perform ADLs (bathing, care of mouth/teeth, toileting,  grooming, dressing, etc.)  - Assess/evaluate cause of self-care deficits   - Assess range of motion  - Assess patient's mobility; develop plan if impaired  - Assess patient's need for assistive devices and provide as appropriate  - Encourage maximum independence but intervene and supervise when necessary  - Involve family in performance of ADLs  - Assess for home care needs following discharge   - Consider OT consult to assist with ADL evaluation and planning for discharge  - Provide patient education as appropriate  - Monitor functional capacity and physical performance, use of AM PAC & -HLM   - Monitor gait, balance and fatigue with ambulation    Outcome: Progressing  Goal: Maintains/Returns to pre admission functional level  Description: INTERVENTIONS:  - Perform AM-PAC 6 Click Basic Mobility/ Daily Activity assessment daily.  - Set and communicate daily mobility goal to care team and patient/family/caregiver.   - Collaborate with rehabilitation services on mobility goals if consulted  - Perform Range of Motion 3 times a day.  - Reposition patient every 2 hours.  - Dangle patient 3 times a day  - Stand patient 3 times a day  - Ambulate patient 3 times a day  - Out of bed to chair 3 times a day   - Out of bed for meals 3 times a day  - Out of bed for toileting  - Record patient progress and toleration of activity level   Outcome: Progressing

## 2025-07-09 NOTE — ASSESSMENT & PLAN NOTE
Follows with urology in the outpatient setting for history of nephrolithiasis  Last ultrasound from June 12 with bilateral renal nonobstructing calculi  Presenting with new onset right flank pain, fevers, nausea  CMP with elevated creatinine, monitor with BMP  UA with occasional bacteria  Trend blood cultures  CT with severe right sided hydronephrosis due to obstructing calculus, concern for superimposed infection  S/p ureteral stent placement on 7/7 with urology- follow up in 3 weeks  No further antibiotics needed   Stent removal in office in 4 weeks   Resume aspirin and Plavix  Continue pain control  Follows with urology in the outpatient setting for history of nephrolithiasis  Last ultrasound from June 12 with bilateral renal nonobstructing calculi  Presenting with new onset right flank pain, fevers, nausea  CMP with elevated creatinine, monitor with BMP  UA with occasional bacteria  Trend blood cultures  CT with severe right sided hydronephrosis due to obstructing calculus, concern for superimposed infection  S/p ureteral stent placement on 7/7 with urology- follow up in 3 weeks  Rocephin 24 hours post-op completed   Stop antibiotics   Outpatient Urology follow up

## 2025-07-09 NOTE — PROGRESS NOTES
Progress Note - Gastroenterology   Name: Betito Landers 65 y.o. male I MRN: [id number]  Unit/Bed#: S -01 I Date of Admission: 7/6/2025   Date of Service: 7/9/2025 I Hospital Day: 3    Assessment & Plan  Ileus (HCC)  Patient is 65-year-old male with history of HLD, GERD, TONIA, anxiety, and retinal artery occlusion on Plavix admitted for obstructive kidney stone and found to have significant stool burden as well as colonic ileus with colonic distention on CT.    Patient reports increasing abdominal distention.  On exam he is more distended than yesterday but continues to have bowel sounds.    Currently on MiraLAX 17 g twice daily, Colace 100 mg twice daily, Dulcolax 10 mg suppository daily.      Plan:  Recommend mineral oil enema today and tapwater enema tonight and tomorrow morning  Continue MiraLAX 17 g twice daily.  If patient is able to tolerate oral intake would consider therapeutic bowel prep   Provide Dulcolax 10 mg suppository as needed  Avoid narcotics and anticholinergic meds as much as possible  Recommend frequent ambulation  Consider repeat KUB if worsening abdominal distention          Subjective   Patient complaining of increased distention overnight.  He had a few bowel movements overnight but they were liquid and very small in volume.  He endorses loss of appetite having difficulty taking MiraLAX by mouth.    Objective :  Temp:  [98.5 °F (36.9 °C)-99.2 °F (37.3 °C)] 98.5 °F (36.9 °C)  HR:  [79-80] 79  BP: (117-130)/(65-75) 130/75  Resp:  [16] 16  SpO2:  [96 %-97 %] 97 %    Physical Exam  Vitals and nursing note reviewed.   Constitutional:       General: He is not in acute distress.     Appearance: He is well-developed.   HENT:      Head: Normocephalic and atraumatic.     Eyes:      Conjunctiva/sclera: Conjunctivae normal.       Cardiovascular:      Rate and Rhythm: Normal rate and regular rhythm.      Heart sounds: No murmur heard.  Pulmonary:      Effort: Pulmonary effort is normal. No  respiratory distress.      Breath sounds: Normal breath sounds.   Abdominal:      General: Bowel sounds are normal. There is distension.      Palpations: Abdomen is soft.      Tenderness: There is no abdominal tenderness.     Musculoskeletal:         General: No swelling.      Cervical back: Neck supple.     Skin:     General: Skin is warm and dry.      Capillary Refill: Capillary refill takes less than 2 seconds.     Neurological:      General: No focal deficit present.      Mental Status: He is alert.     Psychiatric:         Mood and Affect: Mood normal.       Lab Results: I have reviewed the following results:CBC/BMP:   .     07/09/25  0606   WBC 9.08   HGB 13.8   HCT 39.9      SODIUM 139   K 3.4*      CO2 30   BUN 17   CREATININE 1.18   GLUC 106    , LFTs: No new results in last 24 hours.

## 2025-07-09 NOTE — PROGRESS NOTES
Progress Note - Hospitalist   Name: Betito Landers 65 y.o. male I MRN: [id number]  Unit/Bed#: S -01 I Date of Admission: 7/6/2025   Date of Service: 7/9/2025 I Hospital Day: 3    Assessment & Plan  Obstructive kidney stone  Pyelonephritis (Resolved: 7/9/2025)  Follows with urology in the outpatient setting for history of nephrolithiasis  Last ultrasound from June 12 with bilateral renal nonobstructing calculi  Presenting with new onset right flank pain, fevers, nausea  CMP with elevated creatinine, monitor with BMP  UA with occasional bacteria  Trend blood cultures  CT with severe right sided hydronephrosis due to obstructing calculus, concern for superimposed infection  S/p ureteral stent placement on 7/7 with urology- follow up in 3 weeks  No further antibiotics needed   Stent removal in office in 4 weeks   Resume aspirin and Plavix  Continue pain control  Follows with urology in the outpatient setting for history of nephrolithiasis  Last ultrasound from June 12 with bilateral renal nonobstructing calculi  Presenting with new onset right flank pain, fevers, nausea  CMP with elevated creatinine, monitor with BMP  UA with occasional bacteria  Trend blood cultures  CT with severe right sided hydronephrosis due to obstructing calculus, concern for superimposed infection  S/p ureteral stent placement on 7/7 with urology- follow up in 3 weeks  Rocephin 24 hours post-op completed   Stop antibiotics   Outpatient Urology follow up   Ileus (HCC)  Concern for ileus on CT scan, moderate stool burden in colon. low appetite, nausea and vomiting, abdominal bloating  Consulted general surgery  No acute surgical intervention.   No benefit to NG tube given clear stomach and small bowel.   Start bowel regimen: miralax, ducolax suppositoreis, colace  Asked GI to re-evaluate as he is still distended and not had much stool output   IVF  Monitor abdominal exam  Consider KUB today   Appreciate further GI recs   Elevated blood  pressure reading  BP elevated on admission in the 150s/70s, likely in the setting of pain though he does have this diagnosis as an outpatient and does not take any medication  BP more acceptable now  Continue pain management  Outpatient follow-up with PCP    VTE Pharmacologic Prophylaxis: VTE Score: 4 Moderate Risk (Score 3-4) - Pharmacological DVT Prophylaxis Ordered: heparin.    Mobility:   Basic Mobility Inpatient Raw Score: 24  JH-HLM Goal: 8: Walk 250 feet or more  JH-HLM Achieved: 8: Walk 250 feet ot more  JH-HLM Goal achieved. Continue to encourage appropriate mobility.    Patient Centered Rounds: I performed bedside rounds with nursing staff today.   Discussions with Specialists or Other Care Team Provider: Discussed with GI, RN, CM     Education and Discussions with Family / Patient: Updated  (wife) at bedside.    Current Length of Stay: 3 day(s)  Current Patient Status: Inpatient   Certification Statement: The patient will continue to require additional inpatient hospital stay due to treatment of Ileus  Discharge Plan: Anticipate discharge tomorrow to home.    Code Status: Level 1 - Full Code    Subjective   Patient reports feeling no better than yesterday. Had 2 episodes of watery bowel movement. No solid stool. Wants to hold off on suppository. Denies fevers or chills. States appetite is still very poor. Denies fevers or chills.     Objective :  Temp:  [98.5 °F (36.9 °C)-99.2 °F (37.3 °C)] 98.5 °F (36.9 °C)  HR:  [79-80] 79  BP: (117-130)/(65-75) 130/75  Resp:  [16] 16  SpO2:  [96 %-97 %] 97 %    Body mass index is 28.99 kg/m².     Input and Output Summary (last 24 hours):     Intake/Output Summary (Last 24 hours) at 7/9/2025 1123  Last data filed at 7/9/2025 0600  Gross per 24 hour   Intake 640 ml   Output 380 ml   Net 260 ml       Physical Exam  Vitals and nursing note reviewed.   Constitutional:       General: He is not in acute distress.     Appearance: Normal appearance. He is normal  weight. He is ill-appearing. He is not diaphoretic.   HENT:      Head: Normocephalic and atraumatic.      Mouth/Throat:      Mouth: Mucous membranes are dry.     Eyes:      General: No scleral icterus.     Pupils: Pupils are equal, round, and reactive to light.       Cardiovascular:      Rate and Rhythm: Normal rate and regular rhythm.      Pulses: Normal pulses.      Heart sounds: Normal heart sounds, S1 normal and S2 normal. No murmur heard.     No systolic murmur is present.      No diastolic murmur is present.      No gallop. No S3 or S4 sounds.   Pulmonary:      Effort: Pulmonary effort is normal. No accessory muscle usage or respiratory distress.      Breath sounds: Normal breath sounds. No stridor. No decreased breath sounds, wheezing, rhonchi or rales.   Chest:      Chest wall: No tenderness.   Abdominal:      General: Bowel sounds are decreased. There is distension.      Palpations: Abdomen is soft.      Tenderness: There is no abdominal tenderness. There is no guarding.     Musculoskeletal:      Right lower leg: No edema.      Left lower leg: No edema.     Skin:     General: Skin is warm and dry.      Coloration: Skin is not jaundiced.     Neurological:      General: No focal deficit present.      Mental Status: He is alert. Mental status is at baseline.      Motor: No tremor or seizure activity.     Psychiatric:         Behavior: Behavior is cooperative.           Lines/Drains:              Lab Results: I have reviewed the following results:   Results from last 7 days   Lab Units 07/09/25  0606 07/06/25  1819 07/06/25  1105   WBC Thousand/uL 9.08   < > 10.61*   HEMOGLOBIN g/dL 13.8   < > 15.2   HEMATOCRIT % 39.9   < > 43.5   PLATELETS Thousands/uL 156   < > 123*   SEGS PCT %  --   --  80*   LYMPHO PCT %  --   --  8*   MONO PCT %  --   --  12   EOS PCT %  --   --  0    < > = values in this interval not displayed.     Results from last 7 days   Lab Units 07/09/25  0606 07/07/25  0339 07/06/25  1105   SODIUM  mmol/L 139   < > 136   POTASSIUM mmol/L 3.4*   < > 3.8   CHLORIDE mmol/L 102   < > 99   CO2 mmol/L 30   < > 26   BUN mg/dL 17   < > 19   CREATININE mg/dL 1.18   < > 1.50*   ANION GAP mmol/L 7   < > 11   CALCIUM mg/dL 8.6   < > 9.3   ALBUMIN g/dL  --   --  4.2   TOTAL BILIRUBIN mg/dL  --   --  0.94   ALK PHOS U/L  --   --  40   ALT U/L  --   --  16   AST U/L  --   --  17   GLUCOSE RANDOM mg/dL 106   < > 101    < > = values in this interval not displayed.                 Results from last 7 days   Lab Units 07/06/25  1819   LACTIC ACID mmol/L 0.7       Recent Cultures (last 7 days):   Results from last 7 days   Lab Units 07/06/25  1326 07/06/25  1317   BLOOD CULTURE  No Growth at 48 hrs. No Growth at 48 hrs.       Imaging Results Review: No pertinent imaging studies reviewed.  Other Study Results Review: No additional pertinent studies reviewed.    Last 24 Hours Medication List:     Current Facility-Administered Medications:     aspirin (ECOTRIN LOW STRENGTH) EC tablet 81 mg, Daily    bisacodyl (DULCOLAX) rectal suppository 10 mg, Daily    clopidogrel (PLAVIX) tablet 75 mg, Daily    docusate sodium (COLACE) capsule 100 mg, BID    heparin (porcine) subcutaneous injection 5,000 Units, Q8H TORY    HYDROmorphone (DILAUDID) injection 0.5 mg, Q4H PRN    methocarbamol (ROBAXIN) tablet 500 mg, 4x Daily    mineral oil enema 1 enema, Once    ondansetron (ZOFRAN) injection 4 mg, Q4H PRN    oxybutynin (DITROPAN-XL) 24 hr tablet 5 mg, Daily PRN    oxyCODONE (ROXICODONE) IR tablet 5 mg, Q6H PRN    polyethylene glycol (MIRALAX) packet 17 g, BID    potassium chloride (Klor-Con M20) CR tablet 40 mEq, Once    pravastatin (PRAVACHOL) tablet 80 mg, Daily With Dinner    sodium chloride 0.9 % infusion, Continuous    tamsulosin (FLOMAX) capsule 0.4 mg, Daily With Dinner    Administrative Statements   Today, Patient Was Seen By: Lloyd Orlando Lucas, PA-C  I have spent a total time of 35 minutes in caring for this patient on the day of the  visit/encounter including Diagnostic results, Instructions for management, Impressions, Counseling / Coordination of care, Documenting in the medical record, Reviewing/placing orders in the medical record (including tests, medications, and/or procedures), Obtaining or reviewing history  , and Communicating with other healthcare professionals .    **Please Note: This note may have been constructed using a voice recognition system.**

## 2025-07-09 NOTE — ASSESSMENT & PLAN NOTE
BP elevated on admission in the 150s/70s, likely in the setting of pain though he does have this diagnosis as an outpatient and does not take any medication  BP more acceptable now  Continue pain management  Outpatient follow-up with PCP

## 2025-07-09 NOTE — ASSESSMENT & PLAN NOTE
Patient is 65-year-old male with history of HLD, GERD, TONIA, anxiety, and retinal artery occlusion on Plavix admitted for obstructive kidney stone and found to have significant stool burden as well as colonic ileus with colonic distention on CT.    Patient reports increasing abdominal distention.  On exam he is more distended than yesterday but continues to have bowel sounds.    Currently on MiraLAX 17 g twice daily, Colace 100 mg twice daily, Dulcolax 10 mg suppository daily.      Plan:  Recommend mineral oil enema today and tapwater enema tonight and tomorrow morning  Continue MiraLAX 17 g twice daily.  If patient is able to tolerate oral intake would consider therapeutic bowel prep   Provide Dulcolax 10 mg suppository as needed  Avoid narcotics and anticholinergic meds as much as possible  Recommend frequent ambulation  Consider repeat KUB if worsening abdominal distention

## 2025-07-10 ENCOUNTER — APPOINTMENT (INPATIENT)
Dept: RADIOLOGY | Facility: HOSPITAL | Age: 65
DRG: 660 | End: 2025-07-10
Payer: COMMERCIAL

## 2025-07-10 LAB
ANION GAP SERPL CALCULATED.3IONS-SCNC: 6 MMOL/L (ref 4–13)
BUN SERPL-MCNC: 12 MG/DL (ref 5–25)
CALCIUM SERPL-MCNC: 8.3 MG/DL (ref 8.4–10.2)
CHLORIDE SERPL-SCNC: 105 MMOL/L (ref 96–108)
CO2 SERPL-SCNC: 28 MMOL/L (ref 21–32)
CREAT SERPL-MCNC: 0.89 MG/DL (ref 0.6–1.3)
GFR SERPL CREATININE-BSD FRML MDRD: 90 ML/MIN/1.73SQ M
GLUCOSE SERPL-MCNC: 101 MG/DL (ref 65–140)
MAGNESIUM SERPL-MCNC: 2 MG/DL (ref 1.9–2.7)
POTASSIUM SERPL-SCNC: 3.8 MMOL/L (ref 3.5–5.3)
SODIUM SERPL-SCNC: 139 MMOL/L (ref 135–147)

## 2025-07-10 PROCEDURE — 99232 SBSQ HOSP IP/OBS MODERATE 35: CPT | Performed by: PHYSICIAN ASSISTANT

## 2025-07-10 PROCEDURE — 80048 BASIC METABOLIC PNL TOTAL CA: CPT | Performed by: PHYSICIAN ASSISTANT

## 2025-07-10 PROCEDURE — 99232 SBSQ HOSP IP/OBS MODERATE 35: CPT | Performed by: INTERNAL MEDICINE

## 2025-07-10 PROCEDURE — 74018 RADEX ABDOMEN 1 VIEW: CPT

## 2025-07-10 PROCEDURE — 83735 ASSAY OF MAGNESIUM: CPT | Performed by: PHYSICIAN ASSISTANT

## 2025-07-10 RX ADMIN — METHOCARBAMOL 500 MG: 500 TABLET ORAL at 09:18

## 2025-07-10 RX ADMIN — PRAVASTATIN SODIUM 80 MG: 80 TABLET ORAL at 17:20

## 2025-07-10 RX ADMIN — DOCUSATE SODIUM 100 MG: 100 CAPSULE, LIQUID FILLED ORAL at 17:20

## 2025-07-10 RX ADMIN — POLYETHYLENE GLYCOL 3350 17 G: 17 POWDER, FOR SOLUTION ORAL at 20:04

## 2025-07-10 RX ADMIN — BISACODYL 10 MG: 10 SUPPOSITORY RECTAL at 09:18

## 2025-07-10 RX ADMIN — DOCUSATE SODIUM 100 MG: 100 CAPSULE, LIQUID FILLED ORAL at 09:18

## 2025-07-10 RX ADMIN — POLYETHYLENE GLYCOL 3350 17 G: 17 POWDER, FOR SOLUTION ORAL at 09:17

## 2025-07-10 RX ADMIN — SODIUM CHLORIDE 75 ML/HR: 0.9 INJECTION, SOLUTION INTRAVENOUS at 05:35

## 2025-07-10 RX ADMIN — ASPIRIN 81 MG: 81 TABLET, DELAYED RELEASE ORAL at 09:18

## 2025-07-10 RX ADMIN — CLOPIDOGREL BISULFATE 75 MG: 75 TABLET, FILM COATED ORAL at 09:18

## 2025-07-10 RX ADMIN — TAMSULOSIN HYDROCHLORIDE 0.4 MG: 0.4 CAPSULE ORAL at 17:20

## 2025-07-10 NOTE — ASSESSMENT & PLAN NOTE
BP elevated on admission in the 150s/70s, likely in the setting of pain though he does have this diagnosis as an outpatient and does not take any medication  BP more acceptable now  Stop opioids in setting of ileus  Outpatient follow-up with PCP

## 2025-07-10 NOTE — PROGRESS NOTES
Progress Note - Hospitalist   Name: Betito Landers 65 y.o. male I MRN: [id number]  Unit/Bed#: S -01 I Date of Admission: 7/6/2025   Date of Service: 7/10/2025 I Hospital Day: 4    Assessment & Plan  Obstructive kidney stone  Follows with urology in the outpatient setting for history of nephrolithiasis  Last ultrasound from June 12 with bilateral renal nonobstructing calculi  Presenting with new onset right flank pain, fevers, nausea  CMP with elevated creatinine, monitor with BMP  UA with occasional bacteria  BCX negative at 72 hours   CT with severe right sided hydronephrosis due to obstructing calculus, concern for superimposed infection  S/p ureteral stent placement on 7/7 with urology- follow up in 3 weeks  No further antibiotics needed   Stent removal in office in 4 weeks   Resume aspirin and Plavix  Continue pain control    Ileus (HCC)  Concern for ileus on CT scan, moderate stool burden in colon. low appetite, nausea and vomiting, abdominal bloating  Consulted general surgery  No acute surgical intervention.   No benefit to NG tube given clear stomach and small bowel.   Start bowel regimen: miralax, ducolax suppositoreis, colace   IVF  Monitor abdominal exam  KUB today   Appreciate further GI recs   Elevated blood pressure reading  BP elevated on admission in the 150s/70s, likely in the setting of pain though he does have this diagnosis as an outpatient and does not take any medication  BP more acceptable now  Stop opioids in setting of ileus  Outpatient follow-up with PCP    VTE Pharmacologic Prophylaxis: VTE Score: 4 Moderate Risk (Score 3-4) - Pharmacological DVT Prophylaxis Ordered: heparin.    Mobility:   Basic Mobility Inpatient Raw Score: 24  JH-HLM Goal: 8: Walk 250 feet or more  JH-HLM Achieved: 8: Walk 250 feet ot more  JH-HLM Goal achieved. Continue to encourage appropriate mobility.    Patient Centered Rounds: I performed bedside rounds with nursing staff today.   Discussions with  Specialists or Other Care Team Provider: Discussed with GI, RN, CM     Education and Discussions with Family / Patient: Updated  (wife) via phone.    Current Length of Stay: 4 day(s)  Current Patient Status: Inpatient   Certification Statement: The patient will continue to require additional inpatient hospital stay due to further treatment of Ileus  Discharge Plan: Anticipate discharge in 24-48 hrs to home.    Code Status: Level 1 - Full Code    Subjective   Patient reported improvement in his abdominal distention after enema yesterday, but then reported return of symptom. He states today that he doesn't feel much difference than yesterday. He has been ambulating. Denies fevers or chills. States that his pain is controlled     Objective :  Temp:  [98.7 °F (37.1 °C)-98.8 °F (37.1 °C)] 98.8 °F (37.1 °C)  HR:  [81-82] 82  BP: (130-143)/(76-77) 140/76  Resp:  [16] 16  SpO2:  [92 %-97 %] 92 %    Body mass index is 28.99 kg/m².     Input and Output Summary (last 24 hours):     Intake/Output Summary (Last 24 hours) at 7/10/2025 1012  Last data filed at 7/10/2025 0500  Gross per 24 hour   Intake 840 ml   Output 1000 ml   Net -160 ml       Physical Exam  Vitals and nursing note reviewed.   Constitutional:       General: He is not in acute distress.     Appearance: Normal appearance. He is normal weight. He is not ill-appearing or diaphoretic.   HENT:      Head: Normocephalic and atraumatic.      Mouth/Throat:      Mouth: Mucous membranes are moist.     Eyes:      General: No scleral icterus.     Pupils: Pupils are equal, round, and reactive to light.       Cardiovascular:      Rate and Rhythm: Normal rate and regular rhythm.      Pulses: Normal pulses.      Heart sounds: Normal heart sounds, S1 normal and S2 normal. No murmur heard.     No systolic murmur is present.      No diastolic murmur is present.      No gallop. No S3 or S4 sounds.   Pulmonary:      Effort: Pulmonary effort is normal. No accessory muscle  usage or respiratory distress.      Breath sounds: Normal breath sounds. No stridor. No decreased breath sounds, wheezing, rhonchi or rales.   Chest:      Chest wall: No tenderness.   Abdominal:      General: Bowel sounds are normal. There is no distension.      Palpations: Abdomen is soft.      Tenderness: There is no abdominal tenderness. There is no guarding.     Musculoskeletal:      Right lower leg: No edema.      Left lower leg: No edema.     Skin:     General: Skin is warm and dry.      Coloration: Skin is not jaundiced.     Neurological:      General: No focal deficit present.      Mental Status: He is alert. Mental status is at baseline.      Motor: No tremor or seizure activity.     Psychiatric:         Behavior: Behavior is cooperative.           Lines/Drains:              Lab Results: I have reviewed the following results:   Results from last 7 days   Lab Units 07/09/25  0606 07/06/25  1819 07/06/25  1105   WBC Thousand/uL 9.08   < > 10.61*   HEMOGLOBIN g/dL 13.8   < > 15.2   HEMATOCRIT % 39.9   < > 43.5   PLATELETS Thousands/uL 156   < > 123*   SEGS PCT %  --   --  80*   LYMPHO PCT %  --   --  8*   MONO PCT %  --   --  12   EOS PCT %  --   --  0    < > = values in this interval not displayed.     Results from last 7 days   Lab Units 07/10/25  0459 07/07/25  0339 07/06/25  1105   SODIUM mmol/L 139   < > 136   POTASSIUM mmol/L 3.8   < > 3.8   CHLORIDE mmol/L 105   < > 99   CO2 mmol/L 28   < > 26   BUN mg/dL 12   < > 19   CREATININE mg/dL 0.89   < > 1.50*   ANION GAP mmol/L 6   < > 11   CALCIUM mg/dL 8.3*   < > 9.3   ALBUMIN g/dL  --   --  4.2   TOTAL BILIRUBIN mg/dL  --   --  0.94   ALK PHOS U/L  --   --  40   ALT U/L  --   --  16   AST U/L  --   --  17   GLUCOSE RANDOM mg/dL 101   < > 101    < > = values in this interval not displayed.                 Results from last 7 days   Lab Units 07/06/25  1819   LACTIC ACID mmol/L 0.7       Recent Cultures (last 7 days):   Results from last 7 days   Lab Units  07/06/25  1326 07/06/25  1317   BLOOD CULTURE  No Growth at 72 hrs. No Growth at 72 hrs.       Imaging Results Review: No pertinent imaging studies reviewed.  Other Study Results Review: No additional pertinent studies reviewed.    Last 24 Hours Medication List:     Current Facility-Administered Medications:     aspirin (ECOTRIN LOW STRENGTH) EC tablet 81 mg, Daily    bisacodyl (DULCOLAX) rectal suppository 10 mg, Daily    clopidogrel (PLAVIX) tablet 75 mg, Daily    docusate sodium (COLACE) capsule 100 mg, BID    heparin (porcine) subcutaneous injection 5,000 Units, Q8H TORY    methocarbamol (ROBAXIN) tablet 500 mg, 4x Daily    ondansetron (ZOFRAN) injection 4 mg, Q4H PRN    oxybutynin (DITROPAN-XL) 24 hr tablet 5 mg, Daily PRN    polyethylene glycol (MIRALAX) packet 17 g, BID    pravastatin (PRAVACHOL) tablet 80 mg, Daily With Dinner    sodium chloride 0.9 % infusion, Continuous, Last Rate: 75 mL/hr (07/10/25 0535)    tamsulosin (FLOMAX) capsule 0.4 mg, Daily With Dinner    Administrative Statements   Today, Patient Was Seen By: Lloyd Lucas PA-C  I have spent a total time of 35 minutes in caring for this patient on the day of the visit/encounter including Diagnostic results, Instructions for management, Impressions, Counseling / Coordination of care, Documenting in the medical record, Reviewing/placing orders in the medical record (including tests, medications, and/or procedures), Obtaining or reviewing history  , and Communicating with other healthcare professionals .    **Please Note: This note may have been constructed using a voice recognition system.**

## 2025-07-10 NOTE — PROGRESS NOTES
Progress Note - Gastroenterology   Name: Betito Landers 65 y.o. male I MRN: [id number]  Unit/Bed#: S -01 I Date of Admission: 7/6/2025   Date of Service: 7/10/2025 I Hospital Day: 4      65-year-old male with history of HLD, GERD, TONIA, anxiety, and retinal artery occlusion on Plavix admitted for obstructive kidney stone and found to have significant stool burden as well as colonic ileus with colonic distention on CT.  Assessment & Plan  Ileus (HCC)  Patient reports some stool output with enema yesterday however still with abdominal distention and decreased appetite.  Bowel sounds noted.  No nausea or vomiting.    - Will check KUB this morning.  - Dulcolax suppository.  - Patient reports more improvement with tapwater enema.  Will repeat that today  -Miralax BID    - Will downgrade to clear liquid diet until tolerating.  - Continue with ambulation  - Monitor electrolytes and replete as necessary  -Avoid anticholinergics when possible    I have discussed the above management plan in detail with the primary service.   Gastroenterology service will follow.    Subjective   Patient reports having some stool output after tapwater enema.  He reports his abdominal distention was slightly improved however not feeling much better today.  Unable to tolerate diet due to fullness feeling.    Objective :  Temp:  [98.7 °F (37.1 °C)-98.8 °F (37.1 °C)] 98.8 °F (37.1 °C)  HR:  [81-82] 82  BP: (130-143)/(76-77) 140/76  Resp:  [16] 16  SpO2:  [92 %-97 %] 92 %    Physical Exam  Vitals and nursing note reviewed.   Constitutional:       General: He is not in acute distress.     Appearance: He is well-developed.      Comments: Sitting in chair   HENT:      Head: Normocephalic and atraumatic.     Eyes:      Conjunctiva/sclera: Conjunctivae normal.       Cardiovascular:      Rate and Rhythm: Normal rate and regular rhythm.      Heart sounds: No murmur heard.  Pulmonary:      Effort: Pulmonary effort is normal. No respiratory distress.       Breath sounds: Normal breath sounds.   Abdominal:      General: There is distension.      Palpations: Abdomen is soft.      Tenderness: There is abdominal tenderness (mild).     Musculoskeletal:         General: No swelling.      Cervical back: Neck supple.     Skin:     General: Skin is warm and dry.      Capillary Refill: Capillary refill takes less than 2 seconds.     Neurological:      Mental Status: He is alert.     Psychiatric:         Mood and Affect: Mood normal.         Lab Results: I have reviewed the following results:

## 2025-07-10 NOTE — ASSESSMENT & PLAN NOTE
Patient reports some stool output with enema yesterday however still with abdominal distention and decreased appetite.  Bowel sounds noted.  No nausea or vomiting.    - Will check KUB this morning.  - Dulcolax suppository.  - Patient reports more improvement with tapwater enema.  Will repeat that today  -Miralax BID    - Will downgrade to clear liquid diet until tolerating.  - Continue with ambulation  - Monitor electrolytes and replete as necessary  -Avoid anticholinergics when possible

## 2025-07-10 NOTE — UTILIZATION REVIEW
Continued Stay Review    Date: 7/10/25                          Current Patient Class: Inpatient  Current Level of Care: Med Surg    HPI:65 y.o. male initially admitted on 7/6     Current Diagnosis: Obstructive kidney stone and ileus    Assessment/Plan: S/p ureteral stent placement on 7/7 with urology. Continue ASA and Plavix. Pain control. Concern for ileus on CT scan, moderate stool burden in colon. low appetite, nausea and vomiting, abdominal bloating. Surgery consulted- no acute surgical intervention. No benefit to NG tube given clear stomach and small bowel. Bowel regimen of miralax, ducolax suppositoreis, colace. IV fluids. Stop opioids in setting of ileus. Plan for KUB today.     Medications:   Scheduled Medications:  aspirin, 81 mg, Oral, Daily  bisacodyl, 10 mg, Rectal, Daily  clopidogrel, 75 mg, Oral, Daily  docusate sodium, 100 mg, Oral, BID  heparin (porcine), 5,000 Units, Subcutaneous, Q8H TORY  methocarbamol, 500 mg, Oral, 4x Daily  polyethylene glycol, 17 g, Oral, BID  pravastatin, 80 mg, Oral, Daily With Dinner  tamsulosin, 0.4 mg, Oral, Daily With Dinner      Continuous IV Infusions:  sodium chloride, 75 mL/hr, Intravenous, Continuous      PRN Meds:  ondansetron, 4 mg, Intravenous, Q4H PRN  oxybutynin, 5 mg, Oral, Daily PRN      Discharge Plan: TBD    Vital Signs (last 3 days)       Date/Time Temp Pulse Resp BP MAP (mmHg) SpO2 O2 Flow Rate (L/min) O2 Device Patient Position - Orthostatic VS Piedad Coma Scale Score Pain    07/10/25 08:41:56 98.8 °F (37.1 °C) -- -- 140/76 97 92 % -- -- -- -- --    07/09/25 21:52:51 98.7 °F (37.1 °C) 82 16 130/76 94 97 % -- -- -- -- --    07/09/25 2100 -- -- -- -- -- -- -- -- -- 15 No Pain    07/09/25 19:20:30 98.8 °F (37.1 °C) 81 16 143/77 99 97 % -- -- -- -- --    07/09/25 0900 -- -- -- -- -- -- -- -- -- -- No Pain    07/09/25 0800 -- -- -- -- -- -- -- -- -- 15 --    07/09/25 07:36:03 98.5 °F (36.9 °C) 79 -- 130/75 93 97 % -- -- -- -- --    07/08/25 22:10:52 99.2 °F  (37.3 °C) 80 16 122/73 89 96 % -- -- -- -- --    07/08/25 2100 -- -- -- -- -- -- -- -- -- 15 No Pain    07/08/25 15:02:37 99.2 °F (37.3 °C) 79 -- 117/65 82 97 % -- -- -- -- --    07/08/25 0800 -- -- -- -- -- -- -- -- -- 15 --    07/08/25 07:20:16 98.7 °F (37.1 °C) 83 18 147/84 105 96 % -- None (Room air) Lying -- --    07/08/25 0202 -- -- -- -- -- -- -- -- -- -- 7    07/07/25 22:22:38 98.8 °F (37.1 °C) 90 -- 127/71 90 93 % -- -- -- -- --    07/07/25 2100 -- -- -- -- -- -- -- -- -- 15 --    07/07/25 2059 -- -- -- -- -- -- -- -- -- -- 8    07/07/25 20:28:06 99.1 °F (37.3 °C) 90 -- 155/81 106 93 % -- -- -- -- --    07/07/25 20:23:20 -- 95 -- 155/81 106 95 % -- -- -- -- --    07/07/25 2000 99 °F (37.2 °C) 90 22 151/71 102 96 % -- None (Room air) -- 15 No Pain    07/07/25 1948 99.1 °F (37.3 °C) 85 18 155/82 111 100 % 6 L/min Simple mask -- 15 No Pain    07/07/25 1702 99.1 °F (37.3 °C) 90 18 165/78 -- 96 % -- None (Room air) -- -- 5    07/07/25 1518 99.6 °F (37.6 °C) 85 18 144/80 106 95 % -- None (Room air) Lying -- --    07/07/25 1322 -- -- -- -- -- -- -- -- -- -- 8    07/07/25 0808 -- -- -- -- -- 94 % -- None (Room air) -- 15 No Pain    07/07/25 0754 99.9 °F (37.7 °C) 84 18 129/72 -- 94 % -- None (Room air) Lying -- --    07/07/25 0330 -- -- -- -- -- -- -- None (Room air) -- 15 No Pain          Weight (last 2 days)       None            Pertinent Labs/Diagnostic Results:   Radiology:  FL retrograde pyelogram   Final Interpretation by Paco Vanegas DO (07/08 6954)      Fluoroscopic guidance provided for right retrograde pyelogram.      Please see separate procedure report for additional details.         Workstation performed: [id number]         CT abdomen pelvis with contrast   Final Interpretation by Deepali Patton MD (07/06 3649)         1. Severe right hydronephrosis and hydroureter with minimal inferior migration of the obstructing 7 x 6 mm ureteral calculus.   Hyperemia of the right renal pelvis and  ureter which could be secondary to the presence of obstruction or reflect superimposed infection.   2. Bilateral nonobstructing renal calculi.   3. Colonic ileus.   4. Other incidental findings are stable.            Workstation performed: [id number]         XR abdomen 1 view kub    (Results Pending)     Cardiology:  No orders to display     GI:  No orders to display           Results from last 7 days   Lab Units 07/09/25  0606 07/08/25  0506 07/07/25  0339 07/06/25 1819 07/06/25  1105   WBC Thousand/uL 9.08 7.69 10.93*  --  10.61*   HEMOGLOBIN g/dL 13.8 13.8 14.0  --  15.2   HEMATOCRIT % 39.9 40.7 40.3  --  43.5   PLATELETS Thousands/uL 156 139* 129*   < > 123*   TOTAL NEUT ABS Thousands/µL  --   --   --   --  8.43*    < > = values in this interval not displayed.         Results from last 7 days   Lab Units 07/10/25  0459 07/09/25  0606 07/08/25  0506 07/07/25 0339 07/06/25  1105   SODIUM mmol/L 139 139 136 135 136   POTASSIUM mmol/L 3.8 3.4* 4.1 3.8 3.8   CHLORIDE mmol/L 105 102 105 102 99   CO2 mmol/L 28 30 24 25 26   ANION GAP mmol/L 6 7 7 8 11   BUN mg/dL 12 17 19 18 19   CREATININE mg/dL 0.89 1.18 1.06 1.43* 1.50*   EGFR ml/min/1.73sq m 90 65 74 51 48   CALCIUM mg/dL 8.3* 8.6 8.1* 8.5 9.3   MAGNESIUM mg/dL 2.0  --   --   --   --      Results from last 7 days   Lab Units 07/06/25  1105   AST U/L 17   ALT U/L 16   ALK PHOS U/L 40   TOTAL PROTEIN g/dL 7.5   ALBUMIN g/dL 4.2   TOTAL BILIRUBIN mg/dL 0.94         Results from last 7 days   Lab Units 07/10/25  0459 07/09/25  0606 07/08/25  0506 07/07/25  0339 07/06/25  1105   GLUCOSE RANDOM mg/dL 101 106 129 109 101           Results from last 7 days   Lab Units 07/06/25  1819   LACTIC ACID mmol/L 0.7           Results from last 7 days   Lab Units 07/06/25  1105   LIPASE u/L 34         Results from last 7 days   Lab Units 07/06/25  1157   CLARITY UA  Clear   COLOR UA  Yellow   SPEC GRAV UA  1.031*   PH UA  6.0   GLUCOSE UA mg/dl Negative   KETONES UA mg/dl 60 (2+)*    BLOOD UA  Moderate*   PROTEIN UA mg/dl 50 (1+)*   NITRITE UA  Negative   BILIRUBIN UA  Negative   UROBILINOGEN UA (BE) mg/dl <2.0   LEUKOCYTES UA  Negative   WBC UA /hpf 1-2   RBC UA /hpf 4-10*   BACTERIA UA /hpf None Seen   EPITHELIAL CELLS WET PREP /hpf Occasional   MUCUS THREADS  Occasional*           Results from last 7 days   Lab Units 07/06/25  1326 07/06/25  1317   BLOOD CULTURE  No Growth at 72 hrs. No Growth at 72 hrs.         Network Utilization Review Department  ATTENTION: Please call with any questions or concerns to [phone] and carefully listen to the prompts so that you are directed to the right person. All voicemails are confidential.   For Discharge needs, contact Care Management DC Support Team at [phone] opt. [phone]  Send all requests for admission clinical reviews, approved or denied determinations and any other requests to dedicated fax number below belonging to the [place] where the patient is receiving treatment. List of dedicated fax numbers for the Facilities:  FACILITY NAME UR FAX NUMBER   ADMISSION DENIALS (Administrative/Medical Necessity) [phone]   DISCHARGE SUPPORT TEAM (NETWORK) [phone]   PARENT CHILD HEALTH (Maternity/NICU/Pediatrics) [phone]   [facility] [phone]   [facility] [phone]   [facility] [phone]   [facility] [phone]   [facility] [phone]   [facility] [phone]   [facility] [phone]   [facility] [phone]   [facility] [phone]   [facility] [phone]   [facility] [phone]   [facility] [phone]

## 2025-07-10 NOTE — ASSESSMENT & PLAN NOTE
Concern for ileus on CT scan, moderate stool burden in colon. low appetite, nausea and vomiting, abdominal bloating  Consulted general surgery  No acute surgical intervention.   No benefit to NG tube given clear stomach and small bowel.   Start bowel regimen: miralax, ducolax suppositoreis, colace   IVF  Monitor abdominal exam  KUB today   Appreciate further GI recs    Initial (On Arrival)

## 2025-07-10 NOTE — ASSESSMENT & PLAN NOTE
Follows with urology in the outpatient setting for history of nephrolithiasis  Last ultrasound from June 12 with bilateral renal nonobstructing calculi  Presenting with new onset right flank pain, fevers, nausea  CMP with elevated creatinine, monitor with BMP  UA with occasional bacteria  BCX negative at 72 hours   CT with severe right sided hydronephrosis due to obstructing calculus, concern for superimposed infection  S/p ureteral stent placement on 7/7 with urology- follow up in 3 weeks  No further antibiotics needed   Stent removal in office in 4 weeks   Resume aspirin and Plavix  Continue pain control

## 2025-07-11 LAB
BACTERIA BLD CULT: NORMAL
BACTERIA BLD CULT: NORMAL

## 2025-07-11 PROCEDURE — 99232 SBSQ HOSP IP/OBS MODERATE 35: CPT | Performed by: INTERNAL MEDICINE

## 2025-07-11 PROCEDURE — 99232 SBSQ HOSP IP/OBS MODERATE 35: CPT | Performed by: PHYSICIAN ASSISTANT

## 2025-07-11 RX ORDER — SIMETHICONE 80 MG
80 TABLET,CHEWABLE ORAL
Status: DISCONTINUED | OUTPATIENT
Start: 2025-07-11 | End: 2025-07-12 | Stop reason: HOSPADM

## 2025-07-11 RX ADMIN — DOCUSATE SODIUM 100 MG: 100 CAPSULE, LIQUID FILLED ORAL at 17:15

## 2025-07-11 RX ADMIN — POLYETHYLENE GLYCOL 3350 17 G: 17 POWDER, FOR SOLUTION ORAL at 08:08

## 2025-07-11 RX ADMIN — CLOPIDOGREL BISULFATE 75 MG: 75 TABLET, FILM COATED ORAL at 08:08

## 2025-07-11 RX ADMIN — POLYETHYLENE GLYCOL 3350 17 G: 17 POWDER, FOR SOLUTION ORAL at 21:40

## 2025-07-11 RX ADMIN — TAMSULOSIN HYDROCHLORIDE 0.4 MG: 0.4 CAPSULE ORAL at 17:15

## 2025-07-11 RX ADMIN — PRAVASTATIN SODIUM 80 MG: 80 TABLET ORAL at 17:15

## 2025-07-11 RX ADMIN — SODIUM CHLORIDE 75 ML/HR: 0.9 INJECTION, SOLUTION INTRAVENOUS at 19:35

## 2025-07-11 RX ADMIN — SIMETHICONE 80 MG: 80 TABLET, CHEWABLE ORAL at 17:15

## 2025-07-11 RX ADMIN — DOCUSATE SODIUM 100 MG: 100 CAPSULE, LIQUID FILLED ORAL at 08:08

## 2025-07-11 RX ADMIN — ASPIRIN 81 MG: 81 TABLET, DELAYED RELEASE ORAL at 08:08

## 2025-07-11 RX ADMIN — SIMETHICONE 80 MG: 80 TABLET, CHEWABLE ORAL at 13:09

## 2025-07-11 NOTE — PROGRESS NOTES
Progress Note - Gastroenterology   Name: Betito Landers 65 y.o. male I MRN: [id number]  Unit/Bed#: S -01 I Date of Admission: 7/6/2025   Date of Service: 7/11/2025 I Hospital Day: 5      65-year-old male with history of HLD, GERD, TONIA, anxiety, and retinal artery occlusion on Plavix admitted for obstructive kidney stone and found to have significant stool burden as well as colonic ileus with colonic distention on CT.   Assessment & Plan  Ileus (HCC)  Patient with significant stool output and gas overnight.  Tolerating clear liquids.  Some improvement in abdominal distention.  Abdominal exam with bowel sounds noted, no tenderness.  Suspect multifactorial on arrival from opioid medications, oxybutynin.    - Patient with significant stool and air output overnight.  He is comfortable in chair.  - Will advance diet today and monitor.  - Continue with ambulation, walking the halls.  - Continue MiraLAX twice daily.  - Tapwater enema or Dulcolax suppository as needed  - Continue to avoid opioids.  - Continue to avoid anticholinergics when possible.    -Patient due for colonoscopy as an outpatient.  He has follow up with them 8/29.      I have discussed the above management plan in detail with the primary service.   Gastroenterology service will follow.    Subjective   Patient reports feeling okay this morning.  He reports having 5 large bowel movements overnight with large passage of gas as well.  This happened between 1 AM and 5 AM.  No nausea or vomiting.  Tolerated clear liquids.  He felt improvement of his abdominal distention after these bowel movements with slight recurrence this morning.    Objective :  Temp:  [98.8 °F (37.1 °C)-98.9 °F (37.2 °C)] 98.8 °F (37.1 °C)  HR:  [76-80] 79  BP: (128-142)/(77-80) 142/80  Resp:  [16-18] 18  SpO2:  [95 %-97 %] 96 %    Physical Exam  Vitals reviewed.   Constitutional:       General: He is not in acute distress.     Appearance: Normal appearance. He is not ill-appearing.       Comments: Sitting in chair   HENT:      Head: Normocephalic and atraumatic.     Eyes:      Conjunctiva/sclera: Conjunctivae normal.       Cardiovascular:      Rate and Rhythm: Normal rate and regular rhythm.      Heart sounds: No murmur heard.  Pulmonary:      Effort: Pulmonary effort is normal. No respiratory distress.      Breath sounds: Normal breath sounds.   Abdominal:      General: There is no distension.      Palpations: Abdomen is soft.      Tenderness: There is no abdominal tenderness. There is no guarding or rebound.      Comments: Mild abdominal distention but soft, slightly improved from yesterday.     Musculoskeletal:         General: No swelling.      Cervical back: Normal range of motion.      Right lower leg: No edema.      Left lower leg: No edema.     Skin:     General: Skin is warm.      Coloration: Skin is not jaundiced.     Neurological:      General: No focal deficit present.      Mental Status: He is alert and oriented to person, place, and time. Mental status is at baseline.     Psychiatric:         Mood and Affect: Mood normal.         Behavior: Behavior normal.         Lab Results: I have reviewed the following results:

## 2025-07-11 NOTE — PLAN OF CARE
Problem: PAIN - ADULT  Goal: Verbalizes/displays adequate comfort level or baseline comfort level  Description: Interventions:  - Encourage patient to monitor pain and request assistance  - Assess pain using appropriate pain scale  - Administer analgesics as ordered based on type and severity of pain and evaluate response  - Implement non-pharmacological measures as appropriate and evaluate response  - Consider cultural and social influences on pain and pain management  - Notify physician/advanced practitioner if interventions unsuccessful or patient reports new pain  - Educate patient/family on pain management process including their role and importance of  reporting pain   - Provide non-pharmacologic/complimentary pain relief interventions  Outcome: Progressing     Problem: SAFETY ADULT  Goal: Patient will remain free of falls  Description: INTERVENTIONS:  - Educate patient/family on patient safety including physical limitations  - Instruct patient to call for assistance with activity   - Consider consulting OT/PT to assist with strengthening/mobility based on AM PAC & JH-HLM score  - Consult OT/PT to assist with strengthening/mobility   - Keep Call bell within reach  - Keep bed low and locked with side rails adjusted as appropriate  - Keep care items and personal belongings within reach  - Initiate and maintain comfort rounds  - Make Fall Risk Sign visible to staff  - Offer Toileting every  Hours, in advance of need  - Initiate/Maintain alarm  - Obtain necessary fall risk management equipment:   - Apply yellow socks and bracelet for high fall risk patients  - Consider moving patient to room near nurses station  Outcome: Progressing  Goal: Maintain or return to baseline ADL function  Description: INTERVENTIONS:  -  Assess patient's ability to carry out ADLs; assess patient's baseline for ADL function and identify physical deficits which impact ability to perform ADLs (bathing, care of mouth/teeth, toileting,  grooming, dressing, etc.)  - Assess/evaluate cause of self-care deficits   - Assess range of motion  - Assess patient's mobility; develop plan if impaired  - Assess patient's need for assistive devices and provide as appropriate  - Encourage maximum independence but intervene and supervise when necessary  - Involve family in performance of ADLs  - Assess for home care needs following discharge   - Consider OT consult to assist with ADL evaluation and planning for discharge  - Provide patient education as appropriate  - Monitor functional capacity and physical performance, use of AM PAC & -HLM   - Monitor gait, balance and fatigue with ambulation    Outcome: Progressing  Goal: Maintains/Returns to pre admission functional level  Description: INTERVENTIONS:  - Perform AM-PAC 6 Click Basic Mobility/ Daily Activity assessment daily.  - Set and communicate daily mobility goal to care team and patient/family/caregiver.   - Collaborate with rehabilitation services on mobility goals if consulted  - Perform Range of Motion  times a day.  - Reposition patient every  hours.  - Dangle patient  times a day  - Stand patient  times a day  - Ambulate patient  times a day  - Out of bed to chair  times a day   - Out of bed for meals  times a day  - Out of bed for toileting  - Record patient progress and toleration of activity level   Outcome: Progressing

## 2025-07-11 NOTE — ASSESSMENT & PLAN NOTE
Patient with significant stool output and gas overnight.  Tolerating clear liquids.  Some improvement in abdominal distention.  Abdominal exam with bowel sounds noted, no tenderness.  Suspect multifactorial on arrival from opioid medications, oxybutynin.    - Patient with significant stool and air output overnight.  He is comfortable in chair.  - Will advance diet today and monitor.  - Continue with ambulation, walking the halls.  - Continue MiraLAX twice daily.  - Tapwater enema or Dulcolax suppository as needed  - Continue to avoid opioids.  - Continue to avoid anticholinergics when possible.    -Patient due for colonoscopy as an outpatient.  He has follow up with them 8/29.

## 2025-07-11 NOTE — PROGRESS NOTES
Progress Note - Hospitalist   Name: Betito Landers 65 y.o. male I MRN: [id number]  Unit/Bed#: S -01 I Date of Admission: 7/6/2025   Date of Service: 7/11/2025 I Hospital Day: 5    Assessment & Plan  Obstructive kidney stone  Follows with urology in the outpatient setting for history of nephrolithiasis  Last ultrasound from June 12 with bilateral renal nonobstructing calculi  Presenting with new onset right flank pain, fevers, nausea  CMP with elevated creatinine, monitor with BMP  UA with occasional bacteria  BCX negative at 72 hours   CT with severe right sided hydronephrosis due to obstructing calculus, concern for superimposed infection  S/p ureteral stent placement on 7/7 with urology- follow up in 3 weeks  No further antibiotics needed   Stent removal in office in 4 weeks   Resume aspirin and Plavix  Continue pain control    Ileus (HCC)  Concern for ileus on CT scan, moderate stool burden in colon. low appetite, nausea and vomiting, abdominal bloating  KUB pending   Consulted general surgery  No acute surgical intervention.   No benefit to NG tube given clear stomach and small bowel.   Start bowel regimen: miralax, ducolax suppositoreis, colace   IVF  Monitor abdominal exam  Encourage frequent ambulation   Appreciate further GI recs   Elevated blood pressure reading  BP elevated on admission in the 150s/70s, likely in the setting of pain though he does have this diagnosis as an outpatient and does not take any medication  BP more acceptable now  Stop opioids in setting of ileus  Outpatient follow-up with PCP    VTE Pharmacologic Prophylaxis: VTE Score: 4 Moderate Risk (Score 3-4) - Pharmacological DVT Prophylaxis Ordered: heparin.    Mobility:   Basic Mobility Inpatient Raw Score: 24  JH-HLM Goal: 8: Walk 250 feet or more  JH-HLM Achieved: 7: Walk 25 feet or more  JH-HLM Goal achieved. Continue to encourage appropriate mobility.    Patient Centered Rounds: I performed bedside rounds with nursing staff  today.   Discussions with Specialists or Other Care Team Provider: Discussed with GI, RN, CM     Education and Discussions with Family / Patient: Patient declined call to .     Current Length of Stay: 5 day(s)  Current Patient Status: Inpatient   Certification Statement: The patient will continue to require additional inpatient hospital stay due to monitoring Ileus  Discharge Plan: Anticipate discharge tomorrow to home.    Code Status: Level 1 - Full Code    Subjective   Patient reports lots of stool and air passage overnight. Felt better as far as distention this morning, but got some bloating after eating. Going to try more solid food later. No urinary complaints.     Objective :  Temp:  [98.8 °F (37.1 °C)-98.9 °F (37.2 °C)] 98.8 °F (37.1 °C)  HR:  [76-80] 79  BP: (128-142)/(77-80) 142/80  Resp:  [16-18] 18  SpO2:  [95 %-97 %] 96 %    Body mass index is 28.99 kg/m².     Input and Output Summary (last 24 hours):     Intake/Output Summary (Last 24 hours) at 7/11/2025 1200  Last data filed at 7/10/2025 1300  Gross per 24 hour   Intake 180 ml   Output --   Net 180 ml       Physical Exam  Vitals and nursing note reviewed.   Constitutional:       General: He is not in acute distress.     Appearance: Normal appearance. He is normal weight. He is not ill-appearing or diaphoretic.   HENT:      Head: Normocephalic and atraumatic.      Mouth/Throat:      Mouth: Mucous membranes are moist.     Eyes:      General: No scleral icterus.     Pupils: Pupils are equal, round, and reactive to light.       Cardiovascular:      Rate and Rhythm: Normal rate and regular rhythm.      Pulses: Normal pulses.      Heart sounds: Normal heart sounds, S1 normal and S2 normal. No murmur heard.     No systolic murmur is present.      No diastolic murmur is present.      No gallop. No S3 or S4 sounds.   Pulmonary:      Effort: Pulmonary effort is normal. No accessory muscle usage or respiratory distress.      Breath sounds: Normal  breath sounds. No stridor. No decreased breath sounds, wheezing, rhonchi or rales.   Chest:      Chest wall: No tenderness.   Abdominal:      General: Bowel sounds are normal. There is no distension.      Palpations: Abdomen is soft.      Tenderness: There is no abdominal tenderness. There is no guarding.     Musculoskeletal:      Right lower leg: No edema.      Left lower leg: No edema.     Skin:     General: Skin is warm and dry.      Coloration: Skin is not jaundiced.     Neurological:      General: No focal deficit present.      Mental Status: He is alert. Mental status is at baseline.      Motor: No tremor or seizure activity.     Psychiatric:         Behavior: Behavior is cooperative.           Lines/Drains:              Lab Results: I have reviewed the following results:   Results from last 7 days   Lab Units 07/09/25  0606 07/06/25  1819 07/06/25  1105   WBC Thousand/uL 9.08   < > 10.61*   HEMOGLOBIN g/dL 13.8   < > 15.2   HEMATOCRIT % 39.9   < > 43.5   PLATELETS Thousands/uL 156   < > 123*   SEGS PCT %  --   --  80*   LYMPHO PCT %  --   --  8*   MONO PCT %  --   --  12   EOS PCT %  --   --  0    < > = values in this interval not displayed.     Results from last 7 days   Lab Units 07/10/25  0459 07/07/25  0339 07/06/25  1105   SODIUM mmol/L 139   < > 136   POTASSIUM mmol/L 3.8   < > 3.8   CHLORIDE mmol/L 105   < > 99   CO2 mmol/L 28   < > 26   BUN mg/dL 12   < > 19   CREATININE mg/dL 0.89   < > 1.50*   ANION GAP mmol/L 6   < > 11   CALCIUM mg/dL 8.3*   < > 9.3   ALBUMIN g/dL  --   --  4.2   TOTAL BILIRUBIN mg/dL  --   --  0.94   ALK PHOS U/L  --   --  40   ALT U/L  --   --  16   AST U/L  --   --  17   GLUCOSE RANDOM mg/dL 101   < > 101    < > = values in this interval not displayed.                 Results from last 7 days   Lab Units 07/06/25  1819   LACTIC ACID mmol/L 0.7       Recent Cultures (last 7 days):   Results from last 7 days   Lab Units 07/06/25  1326 07/06/25  1317   BLOOD CULTURE  No Growth  After 4 Days. No Growth After 4 Days.       Imaging Results Review: No pertinent imaging studies reviewed.  Other Study Results Review: No additional pertinent studies reviewed.    Last 24 Hours Medication List:     Current Facility-Administered Medications:     aspirin (ECOTRIN LOW STRENGTH) EC tablet 81 mg, Daily    bisacodyl (DULCOLAX) rectal suppository 10 mg, Daily    clopidogrel (PLAVIX) tablet 75 mg, Daily    docusate sodium (COLACE) capsule 100 mg, BID    heparin (porcine) subcutaneous injection 5,000 Units, Q8H TORY    methocarbamol (ROBAXIN) tablet 500 mg, 4x Daily    ondansetron (ZOFRAN) injection 4 mg, Q4H PRN    oxybutynin (DITROPAN-XL) 24 hr tablet 5 mg, Daily PRN    polyethylene glycol (MIRALAX) packet 17 g, BID    pravastatin (PRAVACHOL) tablet 80 mg, Daily With Dinner    sodium chloride 0.9 % infusion, Continuous, Last Rate: 75 mL/hr (07/10/25 0535)    tamsulosin (FLOMAX) capsule 0.4 mg, Daily With Dinner    Administrative Statements   Today, Patient Was Seen By: Lloyd Lucas PA-C  I have spent a total time of 35 minutes in caring for this patient on the day of the visit/encounter including Diagnostic results, Instructions for management, Impressions, Counseling / Coordination of care, Documenting in the medical record, Reviewing/placing orders in the medical record (including tests, medications, and/or procedures), Obtaining or reviewing history  , and Communicating with other healthcare professionals .    **Please Note: This note may have been constructed using a voice recognition system.**

## 2025-07-11 NOTE — ASSESSMENT & PLAN NOTE
Concern for ileus on CT scan, moderate stool burden in colon. low appetite, nausea and vomiting, abdominal bloating  KUB pending   Consulted general surgery  No acute surgical intervention.   No benefit to NG tube given clear stomach and small bowel.   Start bowel regimen: miralax, ducolax suppositoreis, colace   IVF  Monitor abdominal exam  Encourage frequent ambulation   Appreciate further GI recs

## 2025-07-11 NOTE — CASE MANAGEMENT
Case Management Assessment    Patient name Betito CHAWLA /S -01 MRN [id number]  : 1960 Date 2025       Current Admission Date: 2025  Current Admission Diagnosis:Obstructive kidney stone   Patient Active Problem List    Diagnosis Date Noted    Ileus (HCC) 2025    Left shoulder strain, initial encounter 03/10/2025    Hyperuricosuria 2025    Abrasion of left cornea 2024    Obstructive kidney stone 10/17/2024    Acute left-sided low back pain with left-sided sciatica 2024    Elevated blood pressure reading 2024    Sciatica, right side 2024    Generalized anxiety disorder 2024    Intermittent palpitations 10/18/2023    Central retinal artery occlusion, right eye 10/11/2023    Vision problem 2023    Actinic keratosis 2023    Sprain of right rotator cuff capsule 04/10/2023    Need for diphtheria-tetanus-pertussis (Tdap) vaccine 04/10/2023    Skin inflammation 2023    Neck pain 2022    Right foot pain 2022    Pilonidal cyst 2022    Blisters of multiple sites 2022    Heat rash 2022    COVID-19 virus infection 2022    Sciatica, left side 2022    Right elbow pain 2021    Hyperglycemia 2021    Allergic contact dermatitis due to plants, except food 2020    Annual physical exam 2020    Non-allergic rhinitis 2019    Right groin pain 2019    Nocturia more than twice per night 2018    Obstructive sleep apnea 2018    Chronic GERD 2018    Chest pain 2016    Hyperlipidemia 2016    Anxiety 2016      LOS (days): 5  Geometric Mean LOS (GMLOS) (days): 2.8  Days to GMLOS:-2.3     OBJECTIVE:    Risk of Unplanned Readmission Score: 9.44         Current admission status: Inpatient       Preferred Pharmacy:   [facility]/pharmacy #[id number] - [address] - [address]  [address]  [facility]  [id number]  Phone: [phone] Fax: [phone]    [facility] - [place] [address] - [address]  [address]  [place] [address]  Phone: [phone] Fax: [phone]    Primary Care Provider: Mayur Pollock DO    Primary Insurance: BLUE CROSS  Secondary Insurance:     ASSESSMENT:  Active Health Care Proxies    There are no active Health Care Proxies on file.                      Patient Information  Admitted from:: Home  Mental Status: Alert  During Assessment patient was accompanied by: Spouse  Assessment information provided by:: Patient, Spouse  Primary Caregiver: Self  Support Systems: Family members  County of Residence: [place]  What city do you live in?: [place]  Home entry access options. Select all that apply.: No steps to enter home  Type of Current Residence: 2 story home  Upon entering residence, is there a bedroom on the main floor (no further steps)?: No  A bedroom is located on the following floor levels of residence (select all that apply):: 2nd Floor  Upon entering residence, is there a bathroom on the main floor (no further steps)?: Yes (half bath on main level, full bath on second level)  Number of steps to 2nd floor from main floor: One Flight  Living Arrangements: Lives w/ Spouse/significant other    Activities of Daily Living Prior to Admission  Functional Status: Independent  Completes ADLs independently?: Yes  Ambulates independently?: Yes  Does patient use assisted devices?: No  Does patient currently own DME?: No  Does patient have a history of Outpatient Therapy (PT/OT)?: No  Does the patient have a history of Short-Term Rehab?: No  Does patient have a history of HHC?: No  Does patient currently have HHC?: No         Patient Information Continued  Does patient have prescription coverage?: Yes  Can the patient afford their medications and any related supplies (such as glucometers or test strips)?: Yes  Does patient receive dialysis  treatments?: No         Means of Transportation  Means of Transport to Appts:: Family transport    Patient confirmed PCP [facility] and preferred pharmacy [facility].    Social Determinants of Health (SDOH)      Flowsheet Row Most Recent Value   Housing Stability    In the last 12 months, was there a time when you were not able to pay the mortgage or rent on time? N   At any time in the past 12 months, were you homeless or living in a shelter (including now)? N   Transportation Needs    In the past 12 months, has lack of transportation kept you from medical appointments or from getting medications? no   In the past 12 months, has lack of transportation kept you from meetings, work, or from getting things needed for daily living? No   Food Insecurity    Within the past 12 months, you worried that your food would run out before you got the money to buy more. Never true   Within the past 12 months, the food you bought just didn't last and you didn't have money to get more. Never true   Utilities    In the past 12 months has the electric, gas, oil, or water company threatened to shut off services in your home? No

## 2025-07-11 NOTE — PLAN OF CARE
Problem: PAIN - ADULT  Goal: Verbalizes/displays adequate comfort level or baseline comfort level  Description: Interventions:  - Encourage patient to monitor pain and request assistance  - Assess pain using appropriate pain scale  - Administer analgesics as ordered based on type and severity of pain and evaluate response  - Implement non-pharmacological measures as appropriate and evaluate response  - Consider cultural and social influences on pain and pain management  - Notify physician/advanced practitioner if interventions unsuccessful or patient reports new pain  - Educate patient/family on pain management process including their role and importance of  reporting pain   - Provide non-pharmacologic/complimentary pain relief interventions  Outcome: Progressing     Problem: SAFETY ADULT  Goal: Patient will remain free of falls  Description: INTERVENTIONS:  - Educate patient/family on patient safety including physical limitations  - Instruct patient to call for assistance with activity   - Consider consulting OT/PT to assist with strengthening/mobility based on AM PAC & JH-HLM score  - Consult OT/PT to assist with strengthening/mobility   - Keep Call bell within reach  - Keep bed low and locked with side rails adjusted as appropriate  - Keep care items and personal belongings within reach  - Initiate and maintain comfort rounds  - Make Fall Risk Sign visible to staff  - Offer Toileting every 2 Hours, in advance of need  - Initiate/Maintain no alarm  - Obtain necessary fall risk management equipment:   - Apply yellow socks and bracelet for high fall risk patients  - Consider moving patient to room near nurses station  Outcome: Progressing

## 2025-07-12 VITALS
DIASTOLIC BLOOD PRESSURE: 74 MMHG | SYSTOLIC BLOOD PRESSURE: 124 MMHG | OXYGEN SATURATION: 96 % | HEART RATE: 85 BPM | BODY MASS INDEX: 28.83 KG/M2 | HEIGHT: 64 IN | TEMPERATURE: 99.8 F | RESPIRATION RATE: 18 BRPM | WEIGHT: 168.87 LBS

## 2025-07-12 LAB
ANION GAP SERPL CALCULATED.3IONS-SCNC: 8 MMOL/L (ref 4–13)
BUN SERPL-MCNC: 9 MG/DL (ref 5–25)
CALCIUM SERPL-MCNC: 8.5 MG/DL (ref 8.4–10.2)
CHLORIDE SERPL-SCNC: 106 MMOL/L (ref 96–108)
CO2 SERPL-SCNC: 27 MMOL/L (ref 21–32)
CREAT SERPL-MCNC: 0.86 MG/DL (ref 0.6–1.3)
ERYTHROCYTE [DISTWIDTH] IN BLOOD BY AUTOMATED COUNT: 12.1 % (ref 11.6–15.1)
GFR SERPL CREATININE-BSD FRML MDRD: 92 ML/MIN/1.73SQ M
GLUCOSE SERPL-MCNC: 108 MG/DL (ref 65–140)
HCT VFR BLD AUTO: 37.4 % (ref 36.5–49.3)
HGB BLD-MCNC: 12.5 G/DL (ref 12–17)
MCH RBC QN AUTO: 32.6 PG (ref 26.8–34.3)
MCHC RBC AUTO-ENTMCNC: 33.4 G/DL (ref 31.4–37.4)
MCV RBC AUTO: 97 FL (ref 82–98)
PLATELET # BLD AUTO: 159 THOUSANDS/UL (ref 149–390)
PMV BLD AUTO: 10.7 FL (ref 8.9–12.7)
POTASSIUM SERPL-SCNC: 3.5 MMOL/L (ref 3.5–5.3)
RBC # BLD AUTO: 3.84 MILLION/UL (ref 3.88–5.62)
SODIUM SERPL-SCNC: 141 MMOL/L (ref 135–147)
WBC # BLD AUTO: 6.65 THOUSAND/UL (ref 4.31–10.16)

## 2025-07-12 PROCEDURE — 85027 COMPLETE CBC AUTOMATED: CPT | Performed by: PHYSICIAN ASSISTANT

## 2025-07-12 PROCEDURE — 99239 HOSP IP/OBS DSCHRG MGMT >30: CPT | Performed by: PHYSICIAN ASSISTANT

## 2025-07-12 PROCEDURE — 80048 BASIC METABOLIC PNL TOTAL CA: CPT | Performed by: PHYSICIAN ASSISTANT

## 2025-07-12 RX ORDER — POLYETHYLENE GLYCOL 3350 17 G/17G
17 POWDER, FOR SOLUTION ORAL DAILY PRN
Qty: 10 EACH | Refills: 0 | Status: SHIPPED | OUTPATIENT
Start: 2025-07-12

## 2025-07-12 RX ORDER — ONDANSETRON 4 MG/1
4 TABLET, ORALLY DISINTEGRATING ORAL EVERY 6 HOURS PRN
Qty: 30 TABLET | Refills: 0 | Status: SHIPPED | OUTPATIENT
Start: 2025-07-12 | End: 2025-08-01 | Stop reason: CLARIF

## 2025-07-12 RX ORDER — TAMSULOSIN HYDROCHLORIDE 0.4 MG/1
0.4 CAPSULE ORAL
Qty: 30 CAPSULE | Refills: 0 | Status: SHIPPED | OUTPATIENT
Start: 2025-07-12

## 2025-07-12 RX ORDER — BISACODYL 10 MG
10 SUPPOSITORY, RECTAL RECTAL DAILY PRN
Qty: 12 SUPPOSITORY | Refills: 0 | Status: SHIPPED | OUTPATIENT
Start: 2025-07-12

## 2025-07-12 RX ORDER — DOCUSATE SODIUM 100 MG/1
100 CAPSULE, LIQUID FILLED ORAL 2 TIMES DAILY
Qty: 60 CAPSULE | Refills: 0 | Status: SHIPPED | OUTPATIENT
Start: 2025-07-12

## 2025-07-12 RX ORDER — SIMETHICONE 80 MG
80 TABLET,CHEWABLE ORAL EVERY 6 HOURS PRN
Qty: 30 TABLET | Refills: 0 | Status: SHIPPED | OUTPATIENT
Start: 2025-07-12 | End: 2025-08-01 | Stop reason: CLARIF

## 2025-07-12 RX ORDER — OXYBUTYNIN CHLORIDE 5 MG/1
5 TABLET, EXTENDED RELEASE ORAL DAILY PRN
Qty: 20 TABLET | Refills: 0 | Status: SHIPPED | OUTPATIENT
Start: 2025-07-12

## 2025-07-12 RX ADMIN — POLYETHYLENE GLYCOL 3350 17 G: 17 POWDER, FOR SOLUTION ORAL at 09:10

## 2025-07-12 RX ADMIN — DOCUSATE SODIUM 100 MG: 100 CAPSULE, LIQUID FILLED ORAL at 09:10

## 2025-07-12 RX ADMIN — ASPIRIN 81 MG: 81 TABLET, DELAYED RELEASE ORAL at 09:10

## 2025-07-12 RX ADMIN — CLOPIDOGREL BISULFATE 75 MG: 75 TABLET, FILM COATED ORAL at 09:10

## 2025-07-12 RX ADMIN — SIMETHICONE 80 MG: 80 TABLET, CHEWABLE ORAL at 11:42

## 2025-07-12 RX ADMIN — SIMETHICONE 80 MG: 80 TABLET, CHEWABLE ORAL at 09:10

## 2025-07-12 NOTE — ASSESSMENT & PLAN NOTE
Concern for ileus on CT scan, moderate stool burden in colon. low appetite, nausea and vomiting, abdominal bloating  Exam much improved today   Consulted general surgery  No acute surgical intervention.   No benefit to NG tube given clear stomach and small bowel.   Continue bowel regimen as needed: miralax, ducolax suppositoreis, colace   Appreciate GI input

## 2025-07-12 NOTE — DISCHARGE SUMMARY
Discharge Summary - Hospitalist   Name: Betito Landers 65 y.o. male I MRN: [id number]  Unit/Bed#: S -01 I Date of Admission: 7/6/2025   Date of Service: 7/12/2025 I Hospital Day: 6     Assessment & Plan  Obstructive kidney stone  Follows with urology in the outpatient setting for history of nephrolithiasis  Last ultrasound from June 12 with bilateral renal nonobstructing calculi  Presenting with new onset right flank pain, fevers, nausea  CMP with elevated creatinine, monitor with BMP  UA with occasional bacteria  BCX negative at 72 hours   CT with severe right sided hydronephrosis due to obstructing calculus, concern for superimposed infection  S/p ureteral stent placement on 7/7 with urology- follow up in 3 weeks  No further antibiotics needed   Stent removal in office in 4 weeks   Resume aspirin and Plavix  Stable for discharge     Ileus (HCC)  Concern for ileus on CT scan, moderate stool burden in colon. low appetite, nausea and vomiting, abdominal bloating  Exam much improved today   Consulted general surgery  No acute surgical intervention.   No benefit to NG tube given clear stomach and small bowel.   Continue bowel regimen as needed: miralax, ducolax suppositoreis, colace   Appreciate GI input   Elevated blood pressure reading  BP elevated on admission in the 150s/70s, likely in the setting of pain though he does have this diagnosis as an outpatient and does not take any medication  BP more acceptable now  Stop opioids in setting of ileus  Outpatient follow-up with PCP     Medical Problems       Resolved Problems  Date Reviewed: 7/12/2025          Resolved    Constipation concern for ileus 7/7/2025     Resolved by  Nicki Gregory PA-C    Pyelonephritis 7/9/2025     Resolved by  Lloyd Lucas PA-C        Discharging Physician / Practitioner: Lloyd Lucas PA-C  PCP: Mayur Pollock DO  Admission Date:   Admission Orders (From admission, onward)       Ordered        07/06/25 6731   INPATIENT ADMISSION  Once                          Discharge Date: 07/12/25    Next Steps for Physician/AP Assuming Care:  None    Test Results Pending at Discharge (will require follow up):  None    Medication Changes for Discharge & Rationale:   Flomax QD  Oxybutynin PRN  Miralax PRN  Colace  Simethicone PRN  Dolcolax PRN  See after visit summary for reconciled discharge medications provided to patient and/or family.     Consultations During Hospital Stay:  Urology - Dr. Frias  GI - Dr. Conner  General Surgery - Dr. Crabtree    Procedures Performed:   CT Abdomen Pelvis   XR KUB  Cystouretheroscopy    Significant Findings / Test Results:   CT Abdomen Pelvis: Severe right hydronephrosis and hydroureter with minimal inferior migration of the obstructing 7x6 mm ureteral calculus. Hyperemia of the right renal pelvis and ureter which could be secondary to the presence of obstruction or reflect superimposed infection. Bilateral nonobstructing renal calculi. Colonic ileus.   XR KUB: Persistent ileus presentation similar to CT with decreased colonic stool burden  Cystourethroscopy: see op notes for details     Incidental Findings:   None     Hospital Course:   Betito Landers is a 65 y.o. male patient who originally presented to the hospital on 7/6/2025 due to abdominal pain and renal stone. Patient was admitted and started on IV fluids and IV antibiotics. Patient was taken for cystoureteroscopy and stent was placed  which will be removed in the office in 4 weeks. He was found to have an Ileus and GI/Surgery was consulted. Surgery did not feel any acute interventions were needed. GI managed his condition conservatively with bowel regimen and fluids and slowly he improved. Once he was tolerating meals and symptoms had improved he was discharged home. He will follow up with Urology in 4 weeks for stent removal.     No notes on file      Please see above list of diagnoses and related plan for additional information.  "    Discharge Day Visit / Exam:   Subjective:  Patient reports feeling much better. Still passing gas and stool. Tolerated breakfast well. Wants to go home.   Vitals: Blood Pressure: 124/74 (07/12/25 0816)  Pulse: 85 (07/12/25 0816)  Temperature: 99.8 °F (37.7 °C) (07/12/25 0816)  Temp Source: Oral (07/08/25 0720)  Respirations: 18 (07/11/25 0707)  Height: 5' 4\" (162.6 cm) (07/06/25 1616)  Weight - Scale: 76.6 kg (168 lb 14 oz) (07/06/25 1616)  SpO2: 96 % (07/12/25 0816)  Physical Exam  Vitals and nursing note reviewed.   Constitutional:       General: He is not in acute distress.     Appearance: Normal appearance. He is normal weight. He is not ill-appearing or diaphoretic.   HENT:      Head: Normocephalic and atraumatic.      Mouth/Throat:      Mouth: Mucous membranes are moist.     Eyes:      General: No scleral icterus.     Pupils: Pupils are equal, round, and reactive to light.       Cardiovascular:      Rate and Rhythm: Normal rate and regular rhythm.      Pulses: Normal pulses.      Heart sounds: Normal heart sounds, S1 normal and S2 normal. No murmur heard.     No systolic murmur is present.      No diastolic murmur is present.      No gallop. No S3 or S4 sounds.   Pulmonary:      Effort: Pulmonary effort is normal. No accessory muscle usage or respiratory distress.      Breath sounds: Normal breath sounds. No stridor. No decreased breath sounds, wheezing, rhonchi or rales.   Chest:      Chest wall: No tenderness.   Abdominal:      General: Bowel sounds are normal. There is no distension.      Palpations: Abdomen is soft.      Tenderness: There is no abdominal tenderness. There is no guarding.     Musculoskeletal:      Right lower leg: No edema.      Left lower leg: No edema.     Skin:     General: Skin is warm and dry.      Coloration: Skin is not jaundiced.     Neurological:      General: No focal deficit present.      Mental Status: He is alert. Mental status is at baseline.      Motor: No tremor or " seizure activity.     Psychiatric:         Behavior: Behavior is cooperative.          Discussion with Family: Updated  (wife) via phone.    Discharge instructions/Information to patient and family:   See after visit summary for information provided to patient and family.      Provisions for Follow-Up Care:  See after visit summary for information related to follow-up care and any pertinent home health orders.      Mobility at time of Discharge:   Basic Mobility Inpatient Raw Score: 24  JH-HLM Goal: 8: Walk 250 feet or more  JH-HLM Achieved: 8: Walk 250 feet ot more  HLM Goal achieved. Continue to encourage appropriate mobility.     Disposition:   Home    Planned Readmission: none    Administrative Statements   Discharge Statement:  I have spent a total time of 45 minutes in caring for this patient on the day of the visit/encounter. >30 minutes of time was spent on: Diagnostic results, Instructions for management, Impressions, Counseling / Coordination of care, Documenting in the medical record, Reviewing / ordering tests, medicine, procedures  , and Communicating with other healthcare professionals .    **Please Note: This note may have been constructed using a voice recognition system**

## 2025-07-12 NOTE — PLAN OF CARE
Problem: PAIN - ADULT  Goal: Verbalizes/displays adequate comfort level or baseline comfort level  Description: Interventions:  - Encourage patient to monitor pain and request assistance  - Assess pain using appropriate pain scale  - Administer analgesics as ordered based on type and severity of pain and evaluate response  - Implement non-pharmacological measures as appropriate and evaluate response  - Consider cultural and social influences on pain and pain management  - Notify physician/advanced practitioner if interventions unsuccessful or patient reports new pain  - Educate patient/family on pain management process including their role and importance of  reporting pain   - Provide non-pharmacologic/complimentary pain relief interventions  7/12/2025 1357 by Mala Torres RN  Outcome: Completed  7/12/2025 1007 by Mala Torres RN  Outcome: Progressing     Problem: SAFETY ADULT  Goal: Patient will remain free of falls  Description: INTERVENTIONS:  - Educate patient/family on patient safety including physical limitations  - Instruct patient to call for assistance with activity   - Consider consulting OT/PT to assist with strengthening/mobility based on AM PAC & JH-HLM score  - Consult OT/PT to assist with strengthening/mobility   - Keep Call bell within reach  - Keep bed low and locked with side rails adjusted as appropriate  - Keep care items and personal belongings within reach  - Initiate and maintain comfort rounds  - Make Fall Risk Sign visible to staff  - Offer Toileting every  Hours, in advance of need  - Initiate/Maintain alarm  - Obtain necessary fall risk management equipment:   - Apply yellow socks and bracelet for high fall risk patients  - Consider moving patient to room near nurses station  7/12/2025 1357 by Mala Torres RN  Outcome: Completed  7/12/2025 1007 by Mala Torres RN  Outcome: Progressing  Goal: Maintain or return to baseline ADL function  Description: INTERVENTIONS:  -   Assess patient's ability to carry out ADLs; assess patient's baseline for ADL function and identify physical deficits which impact ability to perform ADLs (bathing, care of mouth/teeth, toileting, grooming, dressing, etc.)  - Assess/evaluate cause of self-care deficits   - Assess range of motion  - Assess patient's mobility; develop plan if impaired  - Assess patient's need for assistive devices and provide as appropriate  - Encourage maximum independence but intervene and supervise when necessary  - Involve family in performance of ADLs  - Assess for home care needs following discharge   - Consider OT consult to assist with ADL evaluation and planning for discharge  - Provide patient education as appropriate  - Monitor functional capacity and physical performance, use of AM PAC & -HLM   - Monitor gait, balance and fatigue with ambulation    7/12/2025 1357 by Mala Torres RN  Outcome: Completed  7/12/2025 1007 by Mala Torres RN  Outcome: Progressing  Goal: Maintains/Returns to pre admission functional level  Description: INTERVENTIONS:  - Perform AM-PAC 6 Click Basic Mobility/ Daily Activity assessment daily.  - Set and communicate daily mobility goal to care team and patient/family/caregiver.   - Collaborate with rehabilitation services on mobility goals if consulted  - Perform Range of Motion  times a day.  - Reposition patient every  hours.  - Dangle patient  times a day  - Stand patient  times a day  - Ambulate patient  times a day  - Out of bed to chair  times a day   - Out of bed for meals times a day  - Out of bed for toileting  - Record patient progress and toleration of activity level   7/12/2025 1357 by Mala Torres RN  Outcome: Completed  7/12/2025 1007 by Mala Torres RN  Outcome: Progressing

## 2025-07-12 NOTE — ASSESSMENT & PLAN NOTE
Follows with urology in the outpatient setting for history of nephrolithiasis  Last ultrasound from June 12 with bilateral renal nonobstructing calculi  Presenting with new onset right flank pain, fevers, nausea  CMP with elevated creatinine, monitor with BMP  UA with occasional bacteria  BCX negative at 72 hours   CT with severe right sided hydronephrosis due to obstructing calculus, concern for superimposed infection  S/p ureteral stent placement on 7/7 with urology- follow up in 3 weeks  No further antibiotics needed   Stent removal in office in 4 weeks   Resume aspirin and Plavix  Stable for discharge

## 2025-07-14 ENCOUNTER — TRANSITIONAL CARE MANAGEMENT (OUTPATIENT)
Dept: FAMILY MEDICINE CLINIC | Facility: CLINIC | Age: 65
End: 2025-07-14

## 2025-07-14 NOTE — UTILIZATION REVIEW
Continued Stay Review    Date: 7/11/25              Current Patient Class: Inpatient  Current Level of Care: Med Surg    HPI:65 y.o. male initially admitted on 7/6/25      Current Diagnosis: Obstructive kidney stone, ileus.     7/11 Internal Medicine:  Patient reports lots of stool and air passage overnight. Felt better as far as distention this morning, but got some bloating after eating. KUB pending.  Gastroenterology:  Patient had multiple bowel movements overnight and reports feeling better.  Tolerating clear liquids. Advance diet as tolerated to low residue diet.  Continue MiraLAX twice daily. Try simethicone. Continue with ambulation.     Medications:     Scheduled Medications:      aspirin (ECOTRIN LOW STRENGTH) EC tablet 81 mg, Daily    bisacodyl (DULCOLAX) rectal suppository 10 mg, Daily    clopidogrel (PLAVIX) tablet 75 mg, Daily    docusate sodium (COLACE) capsule 100 mg, BID    heparin (porcine) subcutaneous injection 5,000 Units, Q8H TORY    methocarbamol (ROBAXIN) tablet 500 mg, 4x Daily    polyethylene glycol (MIRALAX) packet 17 g, BID    pravastatin (PRAVACHOL) tablet 80 mg, Daily With Dinner    tamsulosin (FLOMAX) capsule 0.4 mg, Daily With Dinner     simethicone (MYLICON) chewable tablet 80 mg  Dose: 80 mg  Freq: 4 times daily (with meals and at bedtime) Route: PO  Start: 07/11/25 1315        Continuous infusion:      sodium chloride 0.9 % infusion  Rate: 75 mL/hr Dose: 75 mL/hr  Freq: Continuous Route: IV  Last Dose: 75 mL/hr (07/11/25 1935)  Start: 07/09/25 1130       PRN Medications: None given.        ondansetron (ZOFRAN) injection 4 mg, Q4H PRN    oxybutynin (DITROPAN-XL) 24 hr tablet 5 mg, Daily PRN      Vital Signs (last 3 days) before discharge      Date/Time Temp Pulse Resp BP MAP (mmHg) SpO2 [place] Coma Scale Score Pain   07/11/25 21:49:21 99 °F (37.2 °C) 74 -- 144/79 101 96 % -- --   07/11/25 0730 -- -- -- -- -- -- 15 No Pain   07/11/25 07:07:54 98.8 °F (37.1 °C) 79 18 142/80 101 96 % --  --   07/10/25 22:34:22 98.9 °F (37.2 °C) 76 16 141/77 98 95 % -- --   07/10/25 2000 -- -- -- -- -- -- 15 No Pain   07/10/25 15:59:19 98.9 °F (37.2 °C) 80 -- 128/79 95 97 % -- --   07/10/25 08:41:56 98.8 °F (37.1 °C) -- -- 140/76 97 92 % -- --   07/10/25 0800 -- -- -- -- -- -- 15 --   07/09/25 21:52:51 98.7 °F (37.1 °C) 82 16 130/76 94 97 % -- --   07/09/25 2100 -- -- -- -- -- -- 15 No Pain   07/09/25 19:20:30 98.8 °F (37.1 °C) 81 16 143/77 99 97 % -- --   07/09/25 0900 -- -- -- -- -- -- -- No Pain   07/09/25 0800 -- -- -- -- -- -- 15 --   07/09/25 07:36:03 98.5 °F (36.9 °C) 79 -- 130/75 93 97 % -- --       Pertinent Labs/Diagnostic Results:   Radiology:  XR abdomen 1 view kub   Final Interpretation by Morris Crespo MD (07/11 1203)      Persistent ileus presentation similar to CT with decreased colonic stool burden.               Workstation performed: [id number]         FL retrograde pyelogram   Final Interpretation by Paco Vanegas DO (07/08 7858)      Fluoroscopic guidance provided for right retrograde pyelogram.      Please see separate procedure report for additional details.         Workstation performed: [id number]         CT abdomen pelvis with contrast   Final Interpretation by Deepali Patton MD (07/06 7606)         1. Severe right hydronephrosis and hydroureter with minimal inferior migration of the obstructing 7 x 6 mm ureteral calculus.   Hyperemia of the right renal pelvis and ureter which could be secondary to the presence of obstruction or reflect superimposed infection.   2. Bilateral nonobstructing renal calculi.   3. Colonic ileus.   4. Other incidental findings are stable.            Workstation performed: [id number]               Results from last 7 days   Lab Units 07/09/25  0606 07/08/25  0506   WBC Thousand/uL 9.08 7.69   HEMOGLOBIN g/dL 13.8 13.8   HEMATOCRIT % 39.9 40.7   PLATELETS Thousands/uL 156 139*         Results from last 7 days   Lab Units 07/10/25  0459 07/09/25  0606  07/08/25  0506   SODIUM mmol/L 139 139 136   POTASSIUM mmol/L 3.8 3.4* 4.1   CHLORIDE mmol/L 105 102 105   CO2 mmol/L 28 30 24   ANION GAP mmol/L 6 7 7   BUN mg/dL 12 17 19   CREATININE mg/dL 0.89 1.18 1.06   EGFR ml/min/1.73sq m 90 65 74   CALCIUM mg/dL 8.3* 8.6 8.1*   MAGNESIUM mg/dL 2.0  --   --              Results from last 7 days   Lab Units 07/10/25  0459 07/09/25  0606 07/08/25  0506   GLUCOSE RANDOM mg/dL 101 106 129                   Network Utilization Review Department  ATTENTION: Please call with any questions or concerns to [phone] and carefully listen to the prompts so that you are directed to the right person. All voicemails are confidential.   For Discharge needs, contact Care Management DC Support Team at [phone] opt. 2  Send all requests for admission clinical reviews, approved or denied determinations and any other requests to dedicated fax number below belonging to the [place] where the patient is receiving treatment. List of dedicated fax numbers for the Facilities:  FACILITY NAME UR FAX NUMBER   ADMISSION DENIALS (Administrative/Medical Necessity) [phone]   DISCHARGE SUPPORT TEAM (NETWORK) [phone]   PARENT CHILD HEALTH (Maternity/NICU/Pediatrics) [phone]   [facility] [phone]   [facility] [phone]   [facility] [phone]   [facility] [phone]   [facility] [phone]   [facility] [phone]   [facility] [phone]   [facility] [phone]   [facility] [phone]   [facility] [phone]   [facility] [phone]   [facility] [phone]

## 2025-07-14 NOTE — UTILIZATION REVIEW
NOTIFICATION OF ADMISSION DISCHARGE   This is a Notification of Discharge from [facility]. Please be advised that this patient has been discharge from our facility. Below you will find the admission and discharge date and time including the patient’s disposition.   UTILIZATION REVIEW CONTACT:  Utilization Review Assistants  Network Utilization Review Department  Phone: [phone] x carefully listen to the prompts. All voicemails are confidential.  Email: [email or web address]     ADMISSION INFORMATION  PRESENTATION DATE: 7/6/2025 10:54 AM  OBERVATION ADMISSION DATE: N/A  INPATIENT ADMISSION DATE: 7/6/25  2:31 PM   DISCHARGE DATE: 7/12/2025  2:34 PM   DISPOSITION:Home/Self Care    Network Utilization Review Department  ATTENTION: Please call with any questions or concerns to [phone] and carefully listen to the prompts so that you are directed to the right person. All voicemails are confidential.   For Discharge needs, contact Care Management DC Support Team at [phone] opt. 2  Send all requests for admission clinical reviews, approved or denied determinations and any other requests to dedicated fax number below belonging to the campus where the patient is receiving treatment. List of dedicated fax numbers for the Facilities:  FACILITY NAME UR FAX NUMBER   ADMISSION DENIALS (Administrative/Medical Necessity) [phone]   DISCHARGE SUPPORT TEAM ([facility]) [phone]   PARENT CHILD HEALTH (Maternity/NICU/Pediatrics) [phone]   [facility] [phone]   [facility] [phone]   [facility] [phone]   [facility] [phone]   [facility] [phone]   [facility] [phone]   [facility] [phone]   [facility] [phone]   [facility]  [facility] [phone]   Critical access hospital [phone]   General acute hospital [phone]   [facility] [phone]

## 2025-07-16 ENCOUNTER — OFFICE VISIT (OUTPATIENT)
Dept: FAMILY MEDICINE CLINIC | Facility: CLINIC | Age: 65
End: 2025-07-16
Payer: COMMERCIAL

## 2025-07-16 VITALS
TEMPERATURE: 100.3 F | BODY MASS INDEX: 28.44 KG/M2 | HEIGHT: 64 IN | SYSTOLIC BLOOD PRESSURE: 108 MMHG | WEIGHT: 166.6 LBS | DIASTOLIC BLOOD PRESSURE: 64 MMHG | HEART RATE: 85 BPM | OXYGEN SATURATION: 98 % | RESPIRATION RATE: 18 BRPM

## 2025-07-16 DIAGNOSIS — N20.0 KIDNEY STONE: Primary | ICD-10-CM

## 2025-07-16 DIAGNOSIS — M54.32 SCIATICA, LEFT SIDE: ICD-10-CM

## 2025-07-16 DIAGNOSIS — K56.7 ILEUS (HCC): ICD-10-CM

## 2025-07-16 PROCEDURE — 99496 TRANSJ CARE MGMT HIGH F2F 7D: CPT | Performed by: FAMILY MEDICINE

## 2025-07-16 NOTE — ASSESSMENT & PLAN NOTE
Bowels are starting to regulate he is stopping the stool softener and only using MiraLAX he is at loose stools no further ileus posthospitalization at home now recommend reducing MiraLAX    Orders:    Comprehensive metabolic panel; Future    CBC and differential; Future    UA (URINE) with reflex to Scope; Future

## 2025-07-16 NOTE — ASSESSMENT & PLAN NOTE
Postobstructive kidney stone records reviewed from hospitalization seen in transition of care management stent placed.  Urinating without significant difficulty no further blood in urine at this time.    Orders:    Comprehensive metabolic panel; Future    CBC and differential; Future    UA (URINE) with reflex to Scope; Future

## 2025-07-16 NOTE — PROGRESS NOTES
"Transition of Care Visit  Name: Betito Landers      : 1960      MRN: [id number]  Encounter Provider: Mayur Pollock DO  Encounter Date: 2025   Encounter department: [facility]    Name: Betito Landers      : 1960      MRN: [id number]  Encounter Provider: Mayur Pollock DO  Encounter Date: 2025   Encounter department: Pending sale to [facility]    :  Assessment & Plan  Obstructive kidney stone  Postobstructive kidney stone records reviewed from hospitalization seen in transition of care management stent placed.  Urinating without significant difficulty no further blood in urine at this time.    Orders:    Comprehensive metabolic panel; Future    CBC and differential; Future    UA (URINE) with reflex to Scope; Future    Ileus (HCC)  Bowels are starting to regulate he is stopping the stool softener and only using MiraLAX he is at loose stools no further ileus posthospitalization at home now recommend reducing MiraLAX    Orders:    Comprehensive metabolic panel; Future    CBC and differential; Future    UA (URINE) with reflex to Scope; Future    Sciatica, left side  Pain persist but improved overall has some right flank pain now           History of Present Illness   History of Present Illness   Objective   /64 (BP Location: Left arm, Patient Position: Sitting, Cuff Size: Standard)   Pulse 85   Temp 100.3 °F (37.9 °C) (Tympanic)   Resp 18   Ht 5' 4\" (1.626 m)   Wt 75.6 kg (166 lb 9.6 oz)   SpO2 98%   BMI 28.60 kg/m²       History of Present Illness     Transitional Care Management Review:   Betito Landers is a 65 y.o. male here for TCM follow up.     During the TCM phone call patient stated:  TCM Call (since 2025)       Date and time call was made  2025  9:23 AM    Hospital care reviewed  Records reviewed    Patient was hospitialized at  [facility]    Date of Admission  25    Date of discharge  25    " "Diagnosis  Obstructive kidney stone    Disposition  Home    Were the patients medications reviewed and updated  Yes    Current Symptoms  None          TCM Call (since 7/2/2025)       Post hospital issues  None    Scheduled for follow up?  Yes    Did you obtain your prescribed medications  Yes    Do you need help managing your prescriptions or medications  No    Is transportation to your appointment needed  No          Posthospitalization for obstructing kidney stone.  Transition of care management.  Ileus resolved      Review of Systems   Constitutional:  Negative for chills, fatigue and fever.   HENT:  Negative for congestion, nosebleeds, rhinorrhea, sinus pressure and sore throat.    Eyes:  Negative for discharge and redness.   Respiratory:  Negative for cough and shortness of breath.    Cardiovascular:  Negative for chest pain, palpitations and leg swelling.   Gastrointestinal:  Negative for abdominal pain, blood in stool and nausea.   Endocrine: Negative for cold intolerance, heat intolerance and polyuria.   Genitourinary:  Positive for difficulty urinating, dysuria and hematuria. Negative for frequency.   Musculoskeletal:  Negative for arthralgias, back pain and myalgias.   Skin:  Negative for rash.   Neurological:  Negative for dizziness, weakness and headaches.   Hematological:  Negative for adenopathy.   Psychiatric/Behavioral:  Negative for behavioral problems and sleep disturbance. The patient is not nervous/anxious.      Objective   /64 (BP Location: Left arm, Patient Position: Sitting, Cuff Size: Standard)   Pulse 85   Temp 100.3 °F (37.9 °C) (Tympanic)   Resp 18   Ht 5' 4\" (1.626 m)   Wt 75.6 kg (166 lb 9.6 oz)   SpO2 98%   BMI 28.60 kg/m²     Physical Exam  Vitals and nursing note reviewed.   Constitutional:       Appearance: He is well-developed.   HENT:      Head: Normocephalic and atraumatic.      Right Ear: External ear normal.      Left Ear: External ear normal.      Nose: Nose " normal.     Eyes:      General: No scleral icterus.     Conjunctiva/sclera: Conjunctivae normal.      Pupils: Pupils are equal, round, and reactive to light.     Neck:      Thyroid: No thyromegaly.      Vascular: No JVD.     Cardiovascular:      Rate and Rhythm: Normal rate and regular rhythm.      Heart sounds: Normal heart sounds. No murmur heard.  Pulmonary:      Effort: Pulmonary effort is normal.      Breath sounds: Normal breath sounds. No wheezing or rales.   Chest:      Chest wall: No tenderness.   Abdominal:      General: Bowel sounds are normal. There is no distension.      Palpations: Abdomen is soft. There is no mass.      Tenderness: There is no abdominal tenderness. There is no guarding or rebound.     Musculoskeletal:         General: No tenderness or deformity. Normal range of motion.      Cervical back: Normal range of motion and neck supple.   Lymphadenopathy:      Cervical: No cervical adenopathy.     Skin:     General: Skin is warm and dry.      Findings: No erythema or rash.     Neurological:      Mental Status: He is alert and oriented to person, place, and time.      Cranial Nerves: No cranial nerve deficit.      Deep Tendon Reflexes: Reflexes are normal and symmetric. Reflexes normal.     Psychiatric:         Behavior: Behavior normal.         Thought Content: Thought content normal.         Judgment: Judgment normal.       Medications have been reviewed by provider in current encounter    Administrative Statements   I have spent a total time of 45 minutes in caring for this patient on the day of the visit/encounter including Diagnostic results, Prognosis, Risks and benefits of tx options, Instructions for management, Patient and family education, Importance of tx compliance, Risk factor reductions, Impressions, Counseling / Coordination of care, Documenting in the medical record, Reviewing/placing orders in the medical record (including tests, medications, and/or procedures), Obtaining or  reviewing history  , and Communicating with other healthcare professionals .

## 2025-07-17 ENCOUNTER — TELEPHONE (OUTPATIENT)
Age: 65
End: 2025-07-17

## 2025-07-17 LAB
ALBUMIN SERPL-MCNC: 3.7 G/DL (ref 3.6–5.1)
ALBUMIN/GLOB SERPL: 1.4 (CALC) (ref 1–2.5)
ALP SERPL-CCNC: 56 U/L (ref 35–144)
ALT SERPL-CCNC: 40 U/L (ref 9–46)
AMORPH SED URNS QL MICRO: ABNORMAL /HPF
APPEARANCE UR: CLEAR
AST SERPL-CCNC: 34 U/L (ref 10–35)
BACTERIA UR QL AUTO: ABNORMAL /HPF
BASOPHILS # BLD AUTO: 38 CELLS/UL (ref 0–200)
BASOPHILS NFR BLD AUTO: 0.4 %
BILIRUB SERPL-MCNC: 0.4 MG/DL (ref 0.2–1.2)
BILIRUB UR QL STRIP: NEGATIVE
BLASTS # BLD: ABNORMAL CELLS/UL
BLASTS NFR BLD MANUAL: ABNORMAL %
BUN SERPL-MCNC: 12 MG/DL (ref 7–25)
BUN/CREAT SERPL: NORMAL (CALC) (ref 6–22)
CALCIUM OXALATE DIHYDRATE MFR STONE IR: 20 %
CALCIUM SERPL-MCNC: 8.7 MG/DL (ref 8.6–10.3)
CAOX CRY #/AREA URNS HPF: ABNORMAL /HPF
CASTS #/AREA URNS LPF: ABNORMAL /LPF
CHLORIDE SERPL-SCNC: 102 MMOL/L (ref 98–110)
CO2 SERPL-SCNC: 27 MMOL/L (ref 20–32)
COLOR STONE: NORMAL
COLOR UR: YELLOW
COM MFR STONE: 80 %
CREAT SERPL-MCNC: 0.85 MG/DL (ref 0.7–1.35)
CRYSTALS #/AREA URNS HPF: ABNORMAL /HPF
EOSINOPHIL # BLD AUTO: 67 CELLS/UL (ref 15–500)
EOSINOPHIL NFR BLD AUTO: 0.7 %
ERYTHROCYTE [DISTWIDTH] IN BLOOD BY AUTOMATED COUNT: 11.8 % (ref 11–15)
GFR/BSA.PRED SERPLBLD CYS-BASED-ARV: 98 ML/MIN/1.73M2
GLOBULIN SER CALC-MCNC: 2.7 G/DL (CALC) (ref 1.9–3.7)
GLUCOSE SERPL-MCNC: 101 MG/DL (ref 65–139)
GLUCOSE UR QL STRIP: NEGATIVE
GRAN CASTS #/AREA URNS LPF: ABNORMAL /LPF
HCT VFR BLD AUTO: 36.4 % (ref 38.5–50)
HGB BLD-MCNC: 12.2 G/DL (ref 13.2–17.1)
HGB UR QL STRIP: ABNORMAL
HYALINE CASTS #/AREA URNS LPF: ABNORMAL /LPF
KETONES UR QL STRIP: NEGATIVE
LEUKOCYTE ESTERASE UR QL STRIP: ABNORMAL
LYMPHOCYTES # BLD AUTO: 1340 CELLS/UL (ref 850–3900)
LYMPHOCYTES NFR BLD AUTO: 14.1 %
MCH RBC QN AUTO: 32.6 PG (ref 27–33)
MCHC RBC AUTO-ENTMCNC: 33.5 G/DL (ref 32–36)
MCV RBC AUTO: 97.3 FL (ref 80–100)
METAMYELOCYTES # BLD: ABNORMAL CELLS/UL
METAMYELOCYTES NFR BLD MANUAL: ABNORMAL %
MONOCYTES # BLD AUTO: 1007 CELLS/UL (ref 200–950)
MONOCYTES NFR BLD AUTO: 10.6 %
MYELOCYTES # BLD: ABNORMAL CELLS/UL
MYELOCYTES NFR BLD MANUAL: ABNORMAL %
NEUTROPHILS # BLD AUTO: 7049 CELLS/UL (ref 1500–7800)
NEUTROPHILS NFR BLD AUTO: 74.2 %
NEUTS BAND # BLD: ABNORMAL CELLS/UL (ref 0–750)
NEUTS BAND NFR BLD MANUAL: ABNORMAL %
NITRITE UR QL STRIP: NEGATIVE
NRBC # BLD: ABNORMAL CELLS/UL
NRBC BLD-RTO: ABNORMAL /100 WBC
PH UR STRIP: 5.5 [PH] (ref 5–8)
PLATELET # BLD AUTO: 198 THOUSAND/UL (ref 140–400)
PMV BLD REES-ECKER: 11.2 FL (ref 7.5–12.5)
POTASSIUM SERPL-SCNC: 3.5 MMOL/L (ref 3.5–5.3)
PROMYELOCYTES # BLD: ABNORMAL CELLS/UL
PROMYELOCYTES NFR BLD MANUAL: ABNORMAL %
PROT SERPL-MCNC: 6.4 G/DL (ref 6.1–8.1)
PROT UR QL STRIP: ABNORMAL
RBC # BLD AUTO: 3.74 MILLION/UL (ref 4.2–5.8)
RBC #/AREA URNS HPF: ABNORMAL /HPF
RENAL EPI CELLS #/AREA URNS HPF: ABNORMAL /HPF
SERVICE CMNT-IMP: ABNORMAL
SERVICE CMNT-IMP: ABNORMAL
SIZE STONE: NORMAL MM
SODIUM SERPL-SCNC: 139 MMOL/L (ref 135–146)
SP GR UR STRIP: 1.01 (ref 1–1.03)
SPEC SOURCE SUBJ: NORMAL
SQUAMOUS #/AREA URNS HPF: ABNORMAL /HPF
STONE ANALYSIS-IMP: NORMAL
TRANS CELLS #/AREA URNS HPF: ABNORMAL /HPF
TRI-PHOS CRY #/AREA URNS HPF: ABNORMAL /HPF
URATE CRY #/AREA URNS HPF: ABNORMAL /HPF
VARIANT LYMPHS NFR BLD: ABNORMAL % (ref 0–10)
WBC # BLD AUTO: 9.5 THOUSAND/UL (ref 3.8–10.8)
WBC #/AREA URNS HPF: ABNORMAL /HPF
WT STONE: 39 MG
YEAST #/AREA URNS HPF: ABNORMAL /HPF

## 2025-07-17 NOTE — TELEPHONE ENCOUNTER
Patients wife called in to go over patients labs. Triage nurse informed the wife that the results once released to the provider will be reviewed and PCP will call pt to over the results. Wife verbalized understanding.

## 2025-07-18 NOTE — TELEPHONE ENCOUNTER
Patient called in asking if he is able to swim while having a stent without a string in. Advised patient he is able to swim. Also confirmed his appt for stent removal. No further assistance needed.

## 2025-08-05 ENCOUNTER — PROCEDURE VISIT (OUTPATIENT)
Dept: UROLOGY | Facility: AMBULATORY SURGERY CENTER | Age: 65
End: 2025-08-05
Payer: COMMERCIAL

## 2025-08-05 VITALS
HEIGHT: 64 IN | WEIGHT: 158 LBS | BODY MASS INDEX: 26.98 KG/M2 | SYSTOLIC BLOOD PRESSURE: 156 MMHG | DIASTOLIC BLOOD PRESSURE: 87 MMHG | OXYGEN SATURATION: 99 % | HEART RATE: 94 BPM

## 2025-08-05 DIAGNOSIS — N20.1 URETERAL CALCULUS, RIGHT: ICD-10-CM

## 2025-08-05 DIAGNOSIS — N20.0 KIDNEY STONE: ICD-10-CM

## 2025-08-05 PROCEDURE — 99024 POSTOP FOLLOW-UP VISIT: CPT

## 2025-08-05 PROCEDURE — 52310 CYSTOSCOPY AND TREATMENT: CPT

## 2025-08-05 RX ORDER — CEPHALEXIN 500 MG/1
500 CAPSULE ORAL EVERY 6 HOURS SCHEDULED
Qty: 12 CAPSULE | Refills: 0 | Status: SHIPPED | OUTPATIENT
Start: 2025-08-05 | End: 2025-08-08

## 2025-08-11 ENCOUNTER — TELEPHONE (OUTPATIENT)
Age: 65
End: 2025-08-11

## (undated) DEVICE — Device

## (undated) DEVICE — GLOVE SRG BIOGEL 7.5

## (undated) DEVICE — UROLOGIC DRAIN BAG: Brand: UNBRANDED

## (undated) DEVICE — FIBER STD QUANTA 272 MICRON

## (undated) DEVICE — ADAPTOR SYRINGE LL ONGUARD

## (undated) DEVICE — PREMIUM DRY TRAY LF: Brand: MEDLINE INDUSTRIES, INC.

## (undated) DEVICE — CATH URETERAL 5FR X 70 CM FLEX TIP POLYUR BARD

## (undated) DEVICE — URETEROSCOPE DIGITAL FLEX SNGL USE STD DEFLECTION APTRA

## (undated) DEVICE — CHLORHEXIDINE 4PCT 4 OZ

## (undated) DEVICE — SPECIMEN CONTAINER STERILE PEEL PACK

## (undated) DEVICE — BASKET SPECIMEN RETRIVAL 1.9FR 120CM

## (undated) DEVICE — GUIDEWIRE STRGHT TIP 0.035 IN  SOLO PLUS

## (undated) DEVICE — STERILE LUBRICATING JELLY, TUBE: Brand: HR LUBRICATING JELLY

## (undated) DEVICE — SINGLE-USE DIGITAL FLEXIBLE URETEROSCOPE: Brand: APTRA

## (undated) DEVICE — VALVE SUCTION-IRR 2.5MM ADJ UROSEAL

## (undated) DEVICE — INVIEW CLEAR LEGGINGS: Brand: CONVERTORS

## (undated) DEVICE — PACK TUR

## (undated) DEVICE — SHEATH URETERAL ACCESS 10/12FR 45CM PROXIS

## (undated) DEVICE — ADAPTER URINARY CATH SIMPLIVIA ONGUARD 2